# Patient Record
Sex: MALE | Race: WHITE | Employment: OTHER | URBAN - METROPOLITAN AREA
[De-identification: names, ages, dates, MRNs, and addresses within clinical notes are randomized per-mention and may not be internally consistent; named-entity substitution may affect disease eponyms.]

---

## 2017-01-01 ENCOUNTER — PATIENT OUTREACH (OUTPATIENT)
Dept: CASE MANAGEMENT | Age: 67
End: 2017-01-01

## 2017-01-01 ENCOUNTER — TELEPHONE (OUTPATIENT)
Dept: INTERNAL MEDICINE CLINIC | Facility: CLINIC | Age: 67
End: 2017-01-01

## 2017-01-01 ENCOUNTER — OFFICE VISIT (OUTPATIENT)
Dept: INTERNAL MEDICINE CLINIC | Facility: CLINIC | Age: 67
End: 2017-01-01

## 2017-01-01 ENCOUNTER — HOSPITAL ENCOUNTER (EMERGENCY)
Facility: HOSPITAL | Age: 67
Discharge: HOME | End: 2017-01-01
Payer: MEDICARE

## 2017-01-01 ENCOUNTER — APPOINTMENT (OUTPATIENT)
Dept: CT IMAGING | Facility: HOSPITAL | Age: 67
DRG: 880 | End: 2017-01-01
Attending: EMERGENCY MEDICINE
Payer: MEDICARE

## 2017-01-01 ENCOUNTER — APPOINTMENT (OUTPATIENT)
Dept: ULTRASOUND IMAGING | Facility: HOSPITAL | Age: 67
End: 2017-01-01
Attending: EMERGENCY MEDICINE
Payer: MEDICARE

## 2017-01-01 ENCOUNTER — APPOINTMENT (OUTPATIENT)
Dept: GENERAL RADIOLOGY | Facility: HOSPITAL | Age: 67
End: 2017-01-01
Payer: MEDICARE

## 2017-01-01 ENCOUNTER — OFFICE VISIT (OUTPATIENT)
Dept: NEUROLOGY | Facility: CLINIC | Age: 67
End: 2017-01-01

## 2017-01-01 ENCOUNTER — HOSPITAL ENCOUNTER (OUTPATIENT)
Facility: HOSPITAL | Age: 67
Setting detail: OBSERVATION
Discharge: HOME | End: 2017-01-01
Attending: EMERGENCY MEDICINE | Admitting: INTERNAL MEDICINE
Payer: MEDICARE

## 2017-01-01 ENCOUNTER — TELEPHONE (OUTPATIENT)
Dept: NEUROLOGY | Facility: CLINIC | Age: 67
End: 2017-01-01

## 2017-01-01 ENCOUNTER — APPOINTMENT (OUTPATIENT)
Dept: CT IMAGING | Facility: HOSPITAL | Age: 67
End: 2017-01-01
Attending: EMERGENCY MEDICINE
Payer: MEDICARE

## 2017-01-01 ENCOUNTER — OFFICE VISIT (OUTPATIENT)
Dept: PHYSICAL THERAPY | Age: 67
End: 2017-01-01
Attending: Other
Payer: MEDICARE

## 2017-01-01 ENCOUNTER — APPOINTMENT (OUTPATIENT)
Dept: GENERAL RADIOLOGY | Facility: HOSPITAL | Age: 67
End: 2017-01-01
Attending: EMERGENCY MEDICINE
Payer: MEDICARE

## 2017-01-01 ENCOUNTER — HOSPITAL ENCOUNTER (INPATIENT)
Facility: HOSPITAL | Age: 67
LOS: 4 days | Discharge: LEFT BEFORE DISCHARGE | DRG: 880 | End: 2017-01-01
Attending: EMERGENCY MEDICINE | Admitting: HOSPITALIST
Payer: MEDICARE

## 2017-01-01 ENCOUNTER — APPOINTMENT (OUTPATIENT)
Dept: MRI IMAGING | Facility: HOSPITAL | Age: 67
DRG: 880 | End: 2017-01-01
Attending: PHYSICIAN ASSISTANT
Payer: MEDICARE

## 2017-01-01 ENCOUNTER — APPOINTMENT (OUTPATIENT)
Dept: GENERAL RADIOLOGY | Facility: HOSPITAL | Age: 67
DRG: 229 | End: 2017-01-01
Payer: MEDICARE

## 2017-01-01 ENCOUNTER — APPOINTMENT (OUTPATIENT)
Dept: GENERAL RADIOLOGY | Facility: HOSPITAL | Age: 67
DRG: 229 | End: 2017-01-01
Attending: INTERNAL MEDICINE
Payer: MEDICARE

## 2017-01-01 ENCOUNTER — LAB ENCOUNTER (OUTPATIENT)
Dept: LAB | Facility: HOSPITAL | Age: 67
End: 2017-01-01
Attending: Other
Payer: MEDICARE

## 2017-01-01 ENCOUNTER — TELEPHONE (OUTPATIENT)
Dept: SURGERY | Facility: CLINIC | Age: 67
End: 2017-01-01

## 2017-01-01 ENCOUNTER — APPOINTMENT (OUTPATIENT)
Dept: MRI IMAGING | Facility: HOSPITAL | Age: 67
DRG: 103 | End: 2017-01-01
Attending: Other
Payer: MEDICARE

## 2017-01-01 ENCOUNTER — APPOINTMENT (OUTPATIENT)
Dept: CT IMAGING | Facility: HOSPITAL | Age: 67
DRG: 103 | End: 2017-01-01
Attending: EMERGENCY MEDICINE
Payer: MEDICARE

## 2017-01-01 ENCOUNTER — APPOINTMENT (OUTPATIENT)
Dept: INTERVENTIONAL RADIOLOGY/VASCULAR | Facility: HOSPITAL | Age: 67
DRG: 229 | End: 2017-01-01
Attending: INTERNAL MEDICINE
Payer: MEDICARE

## 2017-01-01 ENCOUNTER — TELEPHONE (OUTPATIENT)
Dept: NEPHROLOGY | Facility: CLINIC | Age: 67
End: 2017-01-01

## 2017-01-01 ENCOUNTER — APPOINTMENT (OUTPATIENT)
Dept: PHYSICAL THERAPY | Age: 67
End: 2017-01-01
Attending: Other
Payer: MEDICARE

## 2017-01-01 ENCOUNTER — APPOINTMENT (OUTPATIENT)
Dept: ULTRASOUND IMAGING | Facility: HOSPITAL | Age: 67
DRG: 291 | End: 2017-01-01
Attending: NURSE PRACTITIONER
Payer: MEDICARE

## 2017-01-01 ENCOUNTER — PATIENT MESSAGE (OUTPATIENT)
Dept: NEUROLOGY | Facility: CLINIC | Age: 67
End: 2017-01-01

## 2017-01-01 ENCOUNTER — APPOINTMENT (OUTPATIENT)
Dept: CV DIAGNOSTICS | Facility: HOSPITAL | Age: 67
End: 2017-01-01
Attending: INTERNAL MEDICINE
Payer: MEDICARE

## 2017-01-01 ENCOUNTER — APPOINTMENT (OUTPATIENT)
Dept: MRI IMAGING | Facility: HOSPITAL | Age: 67
DRG: 229 | End: 2017-01-01
Attending: Other
Payer: MEDICARE

## 2017-01-01 ENCOUNTER — APPOINTMENT (OUTPATIENT)
Dept: LAB | Facility: HOSPITAL | Age: 67
End: 2017-01-01
Attending: INTERNAL MEDICINE
Payer: MEDICARE

## 2017-01-01 ENCOUNTER — HOSPITAL ENCOUNTER (INPATIENT)
Facility: HOSPITAL | Age: 67
LOS: 2 days | Discharge: HOME | DRG: 291 | End: 2017-01-01
Attending: EMERGENCY MEDICINE | Admitting: INTERNAL MEDICINE
Payer: MEDICARE

## 2017-01-01 ENCOUNTER — HOSPITAL ENCOUNTER (EMERGENCY)
Facility: HOSPITAL | Age: 67
Discharge: ANOTHER HOSPITAL | End: 2017-01-01
Attending: EMERGENCY MEDICINE
Payer: MEDICARE

## 2017-01-01 ENCOUNTER — APPOINTMENT (OUTPATIENT)
Dept: CV DIAGNOSTICS | Facility: HOSPITAL | Age: 67
DRG: 103 | End: 2017-01-01
Attending: Other
Payer: MEDICARE

## 2017-01-01 ENCOUNTER — APPOINTMENT (OUTPATIENT)
Dept: CT IMAGING | Facility: HOSPITAL | Age: 67
DRG: 880 | End: 2017-01-01
Attending: INTERNAL MEDICINE
Payer: MEDICARE

## 2017-01-01 ENCOUNTER — HOSPITAL ENCOUNTER (INPATIENT)
Facility: HOSPITAL | Age: 67
LOS: 3 days | Discharge: HOME | DRG: 103 | End: 2017-01-01
Attending: EMERGENCY MEDICINE | Admitting: HOSPITALIST
Payer: MEDICARE

## 2017-01-01 ENCOUNTER — APPOINTMENT (OUTPATIENT)
Dept: LAB | Age: 67
End: 2017-01-01
Attending: INTERNAL MEDICINE
Payer: MEDICARE

## 2017-01-01 ENCOUNTER — APPOINTMENT (OUTPATIENT)
Dept: ULTRASOUND IMAGING | Facility: HOSPITAL | Age: 67
DRG: 103 | End: 2017-01-01
Attending: Other
Payer: MEDICARE

## 2017-01-01 ENCOUNTER — APPOINTMENT (OUTPATIENT)
Dept: CT IMAGING | Facility: HOSPITAL | Age: 67
DRG: 229 | End: 2017-01-01
Attending: HOSPITALIST
Payer: MEDICARE

## 2017-01-01 ENCOUNTER — APPOINTMENT (OUTPATIENT)
Dept: CT IMAGING | Facility: HOSPITAL | Age: 67
DRG: 229 | End: 2017-01-01
Attending: EMERGENCY MEDICINE
Payer: MEDICARE

## 2017-01-01 ENCOUNTER — APPOINTMENT (OUTPATIENT)
Dept: GENERAL RADIOLOGY | Facility: HOSPITAL | Age: 67
DRG: 103 | End: 2017-01-01
Payer: MEDICARE

## 2017-01-01 ENCOUNTER — APPOINTMENT (OUTPATIENT)
Dept: CT IMAGING | Facility: HOSPITAL | Age: 67
End: 2017-01-01
Payer: MEDICARE

## 2017-01-01 ENCOUNTER — PATIENT MESSAGE (OUTPATIENT)
Dept: CASE MANAGEMENT | Age: 67
End: 2017-01-01

## 2017-01-01 ENCOUNTER — APPOINTMENT (OUTPATIENT)
Dept: GENERAL RADIOLOGY | Facility: HOSPITAL | Age: 67
DRG: 291 | End: 2017-01-01
Attending: EMERGENCY MEDICINE
Payer: MEDICARE

## 2017-01-01 ENCOUNTER — OFFICE VISIT (OUTPATIENT)
Dept: NEPHROLOGY | Facility: CLINIC | Age: 67
End: 2017-01-01

## 2017-01-01 ENCOUNTER — HOSPITAL ENCOUNTER (INPATIENT)
Facility: HOSPITAL | Age: 67
LOS: 1 days | Discharge: HOME HEALTH | DRG: 229 | End: 2017-01-01
Attending: EMERGENCY MEDICINE | Admitting: HOSPITALIST
Payer: MEDICARE

## 2017-01-01 ENCOUNTER — HOSPITAL ENCOUNTER (EMERGENCY)
Facility: HOSPITAL | Age: 67
Discharge: HOME | End: 2017-01-01
Attending: EMERGENCY MEDICINE
Payer: MEDICARE

## 2017-01-01 VITALS
RESPIRATION RATE: 18 BRPM | BODY MASS INDEX: 34.71 KG/M2 | DIASTOLIC BLOOD PRESSURE: 60 MMHG | WEIGHT: 285.06 LBS | HEIGHT: 76 IN | OXYGEN SATURATION: 97 % | TEMPERATURE: 98 F | SYSTOLIC BLOOD PRESSURE: 156 MMHG | HEART RATE: 58 BPM

## 2017-01-01 VITALS
OXYGEN SATURATION: 97 % | WEIGHT: 281 LBS | HEART RATE: 54 BPM | TEMPERATURE: 97 F | SYSTOLIC BLOOD PRESSURE: 113 MMHG | DIASTOLIC BLOOD PRESSURE: 50 MMHG | RESPIRATION RATE: 16 BRPM | BODY MASS INDEX: 34.22 KG/M2 | HEIGHT: 76 IN

## 2017-01-01 VITALS
OXYGEN SATURATION: 100 % | DIASTOLIC BLOOD PRESSURE: 82 MMHG | HEIGHT: 76 IN | BODY MASS INDEX: 34.83 KG/M2 | WEIGHT: 286 LBS | SYSTOLIC BLOOD PRESSURE: 136 MMHG | RESPIRATION RATE: 16 BRPM | TEMPERATURE: 98 F | HEART RATE: 88 BPM

## 2017-01-01 VITALS
TEMPERATURE: 98 F | SYSTOLIC BLOOD PRESSURE: 116 MMHG | HEIGHT: 76 IN | HEART RATE: 60 BPM | RESPIRATION RATE: 18 BRPM | DIASTOLIC BLOOD PRESSURE: 58 MMHG | WEIGHT: 301 LBS | BODY MASS INDEX: 36.65 KG/M2

## 2017-01-01 VITALS
HEART RATE: 79 BPM | RESPIRATION RATE: 17 BRPM | BODY MASS INDEX: 34.63 KG/M2 | TEMPERATURE: 98 F | HEIGHT: 76 IN | WEIGHT: 284.38 LBS | DIASTOLIC BLOOD PRESSURE: 70 MMHG | SYSTOLIC BLOOD PRESSURE: 128 MMHG | OXYGEN SATURATION: 98 %

## 2017-01-01 VITALS
WEIGHT: 299 LBS | HEART RATE: 56 BPM | SYSTOLIC BLOOD PRESSURE: 140 MMHG | DIASTOLIC BLOOD PRESSURE: 80 MMHG | BODY MASS INDEX: 36 KG/M2

## 2017-01-01 VITALS
BODY MASS INDEX: 36.41 KG/M2 | DIASTOLIC BLOOD PRESSURE: 70 MMHG | HEIGHT: 76 IN | WEIGHT: 299 LBS | SYSTOLIC BLOOD PRESSURE: 152 MMHG | HEART RATE: 65 BPM | RESPIRATION RATE: 19 BRPM

## 2017-01-01 VITALS
RESPIRATION RATE: 18 BRPM | HEART RATE: 75 BPM | DIASTOLIC BLOOD PRESSURE: 55 MMHG | BODY MASS INDEX: 36.4 KG/M2 | SYSTOLIC BLOOD PRESSURE: 150 MMHG | TEMPERATURE: 98 F | WEIGHT: 298.94 LBS | OXYGEN SATURATION: 100 % | HEIGHT: 76 IN

## 2017-01-01 VITALS
DIASTOLIC BLOOD PRESSURE: 60 MMHG | BODY MASS INDEX: 34 KG/M2 | HEART RATE: 64 BPM | SYSTOLIC BLOOD PRESSURE: 130 MMHG | WEIGHT: 281 LBS

## 2017-01-01 VITALS
BODY MASS INDEX: 35.52 KG/M2 | RESPIRATION RATE: 18 BRPM | OXYGEN SATURATION: 95 % | TEMPERATURE: 98 F | HEIGHT: 76 IN | DIASTOLIC BLOOD PRESSURE: 65 MMHG | HEART RATE: 62 BPM | WEIGHT: 291.69 LBS | SYSTOLIC BLOOD PRESSURE: 152 MMHG

## 2017-01-01 VITALS
OXYGEN SATURATION: 100 % | WEIGHT: 278.38 LBS | BODY MASS INDEX: 34 KG/M2 | RESPIRATION RATE: 18 BRPM | SYSTOLIC BLOOD PRESSURE: 123 MMHG | HEART RATE: 54 BPM | TEMPERATURE: 98 F | DIASTOLIC BLOOD PRESSURE: 60 MMHG

## 2017-01-01 VITALS
BODY MASS INDEX: 35.97 KG/M2 | TEMPERATURE: 97 F | WEIGHT: 295.38 LBS | RESPIRATION RATE: 16 BRPM | HEART RATE: 55 BPM | DIASTOLIC BLOOD PRESSURE: 60 MMHG | SYSTOLIC BLOOD PRESSURE: 118 MMHG | HEIGHT: 76 IN

## 2017-01-01 VITALS
DIASTOLIC BLOOD PRESSURE: 66 MMHG | HEART RATE: 52 BPM | RESPIRATION RATE: 18 BRPM | HEIGHT: 76 IN | WEIGHT: 280 LBS | OXYGEN SATURATION: 98 % | TEMPERATURE: 98 F | BODY MASS INDEX: 34.1 KG/M2 | SYSTOLIC BLOOD PRESSURE: 158 MMHG

## 2017-01-01 VITALS
SYSTOLIC BLOOD PRESSURE: 146 MMHG | BODY MASS INDEX: 36.29 KG/M2 | WEIGHT: 298 LBS | HEIGHT: 76 IN | RESPIRATION RATE: 16 BRPM | DIASTOLIC BLOOD PRESSURE: 70 MMHG | HEART RATE: 60 BPM | TEMPERATURE: 98 F

## 2017-01-01 VITALS
SYSTOLIC BLOOD PRESSURE: 122 MMHG | DIASTOLIC BLOOD PRESSURE: 60 MMHG | BODY MASS INDEX: 35.97 KG/M2 | HEART RATE: 60 BPM | DIASTOLIC BLOOD PRESSURE: 56 MMHG | SYSTOLIC BLOOD PRESSURE: 110 MMHG | WEIGHT: 293.38 LBS | HEIGHT: 76 IN | HEART RATE: 51 BPM | HEIGHT: 76 IN | TEMPERATURE: 98 F | TEMPERATURE: 98 F | OXYGEN SATURATION: 99 % | BODY MASS INDEX: 35.72 KG/M2 | WEIGHT: 295.38 LBS | RESPIRATION RATE: 17 BRPM | RESPIRATION RATE: 10 BRPM

## 2017-01-01 VITALS
SYSTOLIC BLOOD PRESSURE: 126 MMHG | DIASTOLIC BLOOD PRESSURE: 68 MMHG | RESPIRATION RATE: 18 BRPM | BODY MASS INDEX: 37.14 KG/M2 | HEIGHT: 76 IN | HEART RATE: 50 BPM | WEIGHT: 305 LBS

## 2017-01-01 VITALS
WEIGHT: 293 LBS | RESPIRATION RATE: 16 BRPM | DIASTOLIC BLOOD PRESSURE: 62 MMHG | BODY MASS INDEX: 36 KG/M2 | SYSTOLIC BLOOD PRESSURE: 130 MMHG | HEART RATE: 74 BPM

## 2017-01-01 VITALS
HEART RATE: 94 BPM | WEIGHT: 287.94 LBS | TEMPERATURE: 98 F | DIASTOLIC BLOOD PRESSURE: 52 MMHG | OXYGEN SATURATION: 99 % | BODY MASS INDEX: 35 KG/M2 | RESPIRATION RATE: 16 BRPM | SYSTOLIC BLOOD PRESSURE: 122 MMHG

## 2017-01-01 VITALS
WEIGHT: 315 LBS | OXYGEN SATURATION: 98 % | DIASTOLIC BLOOD PRESSURE: 72 MMHG | HEIGHT: 76 IN | RESPIRATION RATE: 18 BRPM | BODY MASS INDEX: 38.36 KG/M2 | SYSTOLIC BLOOD PRESSURE: 130 MMHG | HEART RATE: 54 BPM

## 2017-01-01 DIAGNOSIS — E78.5 DYSLIPIDEMIA: ICD-10-CM

## 2017-01-01 DIAGNOSIS — R11.2 INTRACTABLE VOMITING WITH NAUSEA, UNSPECIFIED VOMITING TYPE: ICD-10-CM

## 2017-01-01 DIAGNOSIS — E03.8 OTHER SPECIFIED HYPOTHYROIDISM: Chronic | ICD-10-CM

## 2017-01-01 DIAGNOSIS — E13.22 CONTROLLED OTHER SPECIFIED DIABETES MELLITUS WITH STAGE 3 CHRONIC KIDNEY DISEASE, UNSPECIFIED LONG TERM INSULIN USE STATUS: ICD-10-CM

## 2017-01-01 DIAGNOSIS — E11.40 TYPE 2 DIABETES MELLITUS WITH DIABETIC NEUROPATHY, WITH LONG-TERM CURRENT USE OF INSULIN (HCC): ICD-10-CM

## 2017-01-01 DIAGNOSIS — Z79.4 TYPE 2 DIABETES MELLITUS WITH DIABETIC NEUROPATHY, WITH LONG-TERM CURRENT USE OF INSULIN (HCC): ICD-10-CM

## 2017-01-01 DIAGNOSIS — N18.30 CKD (CHRONIC KIDNEY DISEASE) STAGE 3, GFR 30-59 ML/MIN (HCC): ICD-10-CM

## 2017-01-01 DIAGNOSIS — R27.8 SENSORY ATAXIA: ICD-10-CM

## 2017-01-01 DIAGNOSIS — R79.89 ELEVATED LFTS: Primary | ICD-10-CM

## 2017-01-01 DIAGNOSIS — I63.9 ACUTE ISCHEMIC STROKE (HCC): ICD-10-CM

## 2017-01-01 DIAGNOSIS — N18.9 CHRONIC KIDNEY DISEASE, UNSPECIFIED CKD STAGE: ICD-10-CM

## 2017-01-01 DIAGNOSIS — R42 DIZZINESS: ICD-10-CM

## 2017-01-01 DIAGNOSIS — D69.6 THROMBOCYTOPENIA (HCC): ICD-10-CM

## 2017-01-01 DIAGNOSIS — I73.9 PVD (PERIPHERAL VASCULAR DISEASE) (HCC): ICD-10-CM

## 2017-01-01 DIAGNOSIS — I10 BENIGN ESSENTIAL HTN: ICD-10-CM

## 2017-01-01 DIAGNOSIS — R80.9 PROTEINURIA, UNSPECIFIED TYPE: ICD-10-CM

## 2017-01-01 DIAGNOSIS — M79.609 PAIN IN EXTREMITY, UNSPECIFIED EXTREMITY: ICD-10-CM

## 2017-01-01 DIAGNOSIS — Z79.4 TYPE 2 DIABETES MELLITUS WITH OTHER CIRCULATORY COMPLICATION, WITH LONG-TERM CURRENT USE OF INSULIN (HCC): ICD-10-CM

## 2017-01-01 DIAGNOSIS — E11.22 DIABETES MELLITUS WITH STAGE 3 CHRONIC KIDNEY DISEASE (HCC): ICD-10-CM

## 2017-01-01 DIAGNOSIS — N28.9 RENAL INSUFFICIENCY: Primary | ICD-10-CM

## 2017-01-01 DIAGNOSIS — M25.562 ACUTE PAIN OF LEFT KNEE: Primary | ICD-10-CM

## 2017-01-01 DIAGNOSIS — K75.81 LIVER CIRRHOSIS SECONDARY TO NASH (HCC): ICD-10-CM

## 2017-01-01 DIAGNOSIS — I25.110 CORONARY ARTERY DISEASE INVOLVING NATIVE CORONARY ARTERY OF NATIVE HEART WITH UNSTABLE ANGINA PECTORIS (HCC): ICD-10-CM

## 2017-01-01 DIAGNOSIS — K74.60 LIVER CIRRHOSIS SECONDARY TO NASH (HCC): ICD-10-CM

## 2017-01-01 DIAGNOSIS — G44.229 CHRONIC TENSION-TYPE HEADACHE, NOT INTRACTABLE: ICD-10-CM

## 2017-01-01 DIAGNOSIS — E89.0 POSTPROCEDURAL HYPOTHYROIDISM: ICD-10-CM

## 2017-01-01 DIAGNOSIS — N17.9 ACUTE RENAL FAILURE SUPERIMPOSED ON CHRONIC KIDNEY DISEASE, UNSPECIFIED CKD STAGE, UNSPECIFIED ACUTE RENAL FAILURE TYPE (HCC): ICD-10-CM

## 2017-01-01 DIAGNOSIS — R07.9 CHEST PAIN, UNSPECIFIED TYPE: Primary | ICD-10-CM

## 2017-01-01 DIAGNOSIS — K74.60 LIVER CIRRHOSIS SECONDARY TO NASH (HCC): Primary | ICD-10-CM

## 2017-01-01 DIAGNOSIS — E11.00 UNCONTROLLED TYPE 2 DIABETES MELLITUS WITH HYPEROSMOLAR NONKETOTIC HYPERGLYCEMIA (HCC): ICD-10-CM

## 2017-01-01 DIAGNOSIS — L02.91 ABSCESS: Primary | ICD-10-CM

## 2017-01-01 DIAGNOSIS — R20.0 BILATERAL HAND NUMBNESS: ICD-10-CM

## 2017-01-01 DIAGNOSIS — R42 VERTIGO: ICD-10-CM

## 2017-01-01 DIAGNOSIS — E11.42 DIABETIC POLYNEUROPATHY ASSOCIATED WITH TYPE 2 DIABETES MELLITUS (HCC): ICD-10-CM

## 2017-01-01 DIAGNOSIS — F33.42 RECURRENT MAJOR DEPRESSIVE DISORDER, IN FULL REMISSION (HCC): ICD-10-CM

## 2017-01-01 DIAGNOSIS — Z99.89 OSA ON CPAP: ICD-10-CM

## 2017-01-01 DIAGNOSIS — R07.9 ACUTE CHEST PAIN: Primary | ICD-10-CM

## 2017-01-01 DIAGNOSIS — Z86.73 HISTORY OF STROKE: ICD-10-CM

## 2017-01-01 DIAGNOSIS — R29.818 TRANSIENT NEUROLOGICAL SYMPTOMS: ICD-10-CM

## 2017-01-01 DIAGNOSIS — R74.8 ABNORMAL SERUM LEVEL OF ALKALINE PHOSPHATASE: ICD-10-CM

## 2017-01-01 DIAGNOSIS — E11.59 TYPE 2 DIABETES MELLITUS WITH OTHER CIRCULATORY COMPLICATION, WITH LONG-TERM CURRENT USE OF INSULIN (HCC): ICD-10-CM

## 2017-01-01 DIAGNOSIS — R07.9 CHEST PAIN OF UNCERTAIN ETIOLOGY: Primary | ICD-10-CM

## 2017-01-01 DIAGNOSIS — N18.30 DIABETES MELLITUS WITH STAGE 3 CHRONIC KIDNEY DISEASE (HCC): ICD-10-CM

## 2017-01-01 DIAGNOSIS — E11.43 AUTONOMIC NEUROPATHY DUE TO TYPE 2 DIABETES MELLITUS (HCC): ICD-10-CM

## 2017-01-01 DIAGNOSIS — I63.9 ACUTE ISCHEMIC STROKE (HCC): Primary | ICD-10-CM

## 2017-01-01 DIAGNOSIS — R55 POSTURAL DIZZINESS WITH PRESYNCOPE: ICD-10-CM

## 2017-01-01 DIAGNOSIS — R53.1 WEAKNESS: ICD-10-CM

## 2017-01-01 DIAGNOSIS — I10 ESSENTIAL HYPERTENSION: ICD-10-CM

## 2017-01-01 DIAGNOSIS — Z94.4 LIVER TRANSPLANT RECIPIENT (HCC): ICD-10-CM

## 2017-01-01 DIAGNOSIS — M25.562 ACUTE PAIN OF LEFT KNEE: ICD-10-CM

## 2017-01-01 DIAGNOSIS — R41.3 SHORT-TERM MEMORY LOSS: ICD-10-CM

## 2017-01-01 DIAGNOSIS — R55 SYNCOPE AND COLLAPSE: Primary | ICD-10-CM

## 2017-01-01 DIAGNOSIS — E78.5 HYPERLIPIDEMIA, UNSPECIFIED HYPERLIPIDEMIA TYPE: ICD-10-CM

## 2017-01-01 DIAGNOSIS — Z94.4 LIVER TRANSPLANTED (HCC): ICD-10-CM

## 2017-01-01 DIAGNOSIS — R55 SYNCOPE AND COLLAPSE: ICD-10-CM

## 2017-01-01 DIAGNOSIS — Z09 HOSPITAL DISCHARGE FOLLOW-UP: ICD-10-CM

## 2017-01-01 DIAGNOSIS — F44.9 CONVERSION DISORDER: ICD-10-CM

## 2017-01-01 DIAGNOSIS — Z94.0 KIDNEY TRANSPLANT RECIPIENT: ICD-10-CM

## 2017-01-01 DIAGNOSIS — Z89.9 AMPUTEE: ICD-10-CM

## 2017-01-01 DIAGNOSIS — E03.9 HYPOTHYROIDISM, UNSPECIFIED TYPE: ICD-10-CM

## 2017-01-01 DIAGNOSIS — I63.9 ACUTE CVA (CEREBROVASCULAR ACCIDENT) (HCC): Primary | ICD-10-CM

## 2017-01-01 DIAGNOSIS — I63.9 ACUTE CVA (CEREBROVASCULAR ACCIDENT) (HCC): ICD-10-CM

## 2017-01-01 DIAGNOSIS — Z89.9 AMPUTEE: Primary | ICD-10-CM

## 2017-01-01 DIAGNOSIS — R20.2 PARESTHESIA: Primary | ICD-10-CM

## 2017-01-01 DIAGNOSIS — R53.1 GENERALIZED WEAKNESS: ICD-10-CM

## 2017-01-01 DIAGNOSIS — K75.81 LIVER CIRRHOSIS SECONDARY TO NASH (HCC): Primary | ICD-10-CM

## 2017-01-01 DIAGNOSIS — L02.31 ABSCESS OF RIGHT BUTTOCK: ICD-10-CM

## 2017-01-01 DIAGNOSIS — S83.92XA SPRAIN OF LEFT KNEE, UNSPECIFIED LIGAMENT, INITIAL ENCOUNTER: ICD-10-CM

## 2017-01-01 DIAGNOSIS — N18.3 CONTROLLED OTHER SPECIFIED DIABETES MELLITUS WITH STAGE 3 CHRONIC KIDNEY DISEASE, UNSPECIFIED LONG TERM INSULIN USE STATUS: ICD-10-CM

## 2017-01-01 DIAGNOSIS — N17.9 AKI (ACUTE KIDNEY INJURY) (HCC): Primary | ICD-10-CM

## 2017-01-01 DIAGNOSIS — E66.01 SEVERE OBESITY (BMI 35.0-39.9) WITH COMORBIDITY (HCC): Primary | Chronic | ICD-10-CM

## 2017-01-01 DIAGNOSIS — R17 ELEVATED BILIRUBIN: ICD-10-CM

## 2017-01-01 DIAGNOSIS — N18.4 CKD (CHRONIC KIDNEY DISEASE) STAGE 4, GFR 15-29 ML/MIN (HCC): ICD-10-CM

## 2017-01-01 DIAGNOSIS — R74.01 TRANSAMINITIS: Chronic | ICD-10-CM

## 2017-01-01 DIAGNOSIS — Z94.4 H/O LIVER TRANSPLANT (HCC): ICD-10-CM

## 2017-01-01 DIAGNOSIS — I50.22 CHRONIC SYSTOLIC CONGESTIVE HEART FAILURE (HCC): Primary | ICD-10-CM

## 2017-01-01 DIAGNOSIS — R20.2 PARESTHESIA: ICD-10-CM

## 2017-01-01 DIAGNOSIS — D64.9 ANEMIA, UNSPECIFIED TYPE: ICD-10-CM

## 2017-01-01 DIAGNOSIS — R29.818 TRANSIENT NEUROLOGICAL SYMPTOMS: Primary | ICD-10-CM

## 2017-01-01 DIAGNOSIS — G47.33 OSA ON CPAP: ICD-10-CM

## 2017-01-01 DIAGNOSIS — Z02.9 ENCOUNTERS FOR ADMINISTRATIVE PURPOSE: ICD-10-CM

## 2017-01-01 DIAGNOSIS — M21.372 FOOT DROP, LEFT: Primary | ICD-10-CM

## 2017-01-01 DIAGNOSIS — I25.119 CORONARY ARTERY DISEASE INVOLVING NATIVE CORONARY ARTERY OF NATIVE HEART WITH ANGINA PECTORIS (HCC): ICD-10-CM

## 2017-01-01 DIAGNOSIS — G43.919 INTRACTABLE COMPLICATED MIGRAINE: ICD-10-CM

## 2017-01-01 DIAGNOSIS — R42 POSTURAL DIZZINESS WITH PRESYNCOPE: ICD-10-CM

## 2017-01-01 DIAGNOSIS — E11.42 TYPE 2 DIABETES MELLITUS WITH POLYNEUROPATHY (HCC): ICD-10-CM

## 2017-01-01 DIAGNOSIS — E11.43 AUTONOMIC NEUROPATHY DUE TO TYPE 2 DIABETES MELLITUS (HCC): Primary | ICD-10-CM

## 2017-01-01 DIAGNOSIS — R27.8 SENSORY ATAXIA: Primary | ICD-10-CM

## 2017-01-01 DIAGNOSIS — R17 JAUNDICE: Primary | ICD-10-CM

## 2017-01-01 DIAGNOSIS — E72.20 HYPERAMMONEMIA (HCC): ICD-10-CM

## 2017-01-01 DIAGNOSIS — K76.7 HEPATORENAL SYNDROME (HCC): ICD-10-CM

## 2017-01-01 DIAGNOSIS — R07.9 ACUTE CHEST PAIN: ICD-10-CM

## 2017-01-01 DIAGNOSIS — E78.2 MIXED HYPERLIPIDEMIA: ICD-10-CM

## 2017-01-01 DIAGNOSIS — N18.9 ACUTE RENAL FAILURE SUPERIMPOSED ON CHRONIC KIDNEY DISEASE, UNSPECIFIED CKD STAGE, UNSPECIFIED ACUTE RENAL FAILURE TYPE (HCC): ICD-10-CM

## 2017-01-01 DIAGNOSIS — I50.33 ACUTE ON CHRONIC DIASTOLIC CONGESTIVE HEART FAILURE (HCC): ICD-10-CM

## 2017-01-01 LAB
ALBUMIN SERPL-MCNC: 2.5 G/DL (ref 3.5–4.8)
ALBUMIN SERPL-MCNC: 2.6 G/DL (ref 3.5–4.8)
ALBUMIN SERPL-MCNC: 2.7 G/DL (ref 3.5–4.8)
ALBUMIN SERPL-MCNC: 2.7 G/DL (ref 3.5–4.8)
ALBUMIN SERPL-MCNC: 2.8 G/DL (ref 3.5–4.8)
ALBUMIN SERPL-MCNC: 2.9 G/DL (ref 3.5–4.8)
ALBUMIN SERPL-MCNC: 3 G/DL (ref 3.5–4.8)
ALBUMIN SERPL-MCNC: 3.1 G/DL (ref 3.5–4.8)
ALBUMIN SERPL-MCNC: 3.3 G/DL (ref 3.5–4.8)
ALLENS TEST: POSITIVE
ALP LIVER SERPL-CCNC: 1087 U/L (ref 45–117)
ALP LIVER SERPL-CCNC: 1146 U/L (ref 45–117)
ALP LIVER SERPL-CCNC: 1235 U/L (ref 45–117)
ALP LIVER SERPL-CCNC: 1348 U/L (ref 45–117)
ALP LIVER SERPL-CCNC: 748 U/L (ref 45–117)
ALP LIVER SERPL-CCNC: 797 U/L (ref 45–117)
ALP LIVER SERPL-CCNC: 917 U/L (ref 45–117)
ALP LIVER SERPL-CCNC: 949 U/L (ref 45–117)
ALP LIVER SERPL-CCNC: 949 U/L (ref 45–117)
ALT SERPL-CCNC: 144 U/L (ref 17–63)
ALT SERPL-CCNC: 177 U/L (ref 17–63)
ALT SERPL-CCNC: 41 U/L (ref 17–63)
ALT SERPL-CCNC: 46 U/L (ref 17–63)
ALT SERPL-CCNC: 48 U/L (ref 17–63)
ALT SERPL-CCNC: 52 U/L (ref 17–63)
ALT SERPL-CCNC: 52 U/L (ref 17–63)
ALT SERPL-CCNC: 61 U/L (ref 17–63)
ALT SERPL-CCNC: 84 U/L (ref 17–63)
AMMONIA: 34 UMOL/L (ref 11–32)
ANA SCREEN: NEGATIVE
APTT PPP: 25.3 SECONDS (ref 25–34)
APTT PPP: 26.9 SECONDS (ref 25–34)
APTT PPP: 27.2 SECONDS (ref 25–34)
ARTERIAL BLD GAS O2 SATURATION: 95 % (ref 92–100)
ARTERIAL BLOOD GAS BASE EXCESS: -7.6
ARTERIAL BLOOD GAS HCO3: 17.3 MEQ/L (ref 22–26)
ARTERIAL BLOOD GAS PCO2: 33 MM HG (ref 35–45)
ARTERIAL BLOOD GAS PH: 7.34 (ref 7.35–7.45)
ARTERIAL BLOOD GAS PO2: 89 MM HG (ref 80–105)
AST SERPL-CCNC: 174 U/L (ref 15–41)
AST SERPL-CCNC: 198 U/L (ref 15–41)
AST SERPL-CCNC: 57 U/L (ref 15–41)
AST SERPL-CCNC: 68 U/L (ref 15–41)
AST SERPL-CCNC: 72 U/L (ref 15–41)
AST SERPL-CCNC: 80 U/L (ref 15–41)
AST SERPL-CCNC: 87 U/L (ref 15–41)
AST SERPL-CCNC: 95 U/L (ref 15–41)
AST SERPL-CCNC: 99 U/L (ref 15–41)
ATRIAL RATE: 25 BPM
ATRIAL RATE: 45 BPM
ATRIAL RATE: 53 BPM
ATRIAL RATE: 54 BPM
ATRIAL RATE: 56 BPM
ATRIAL RATE: 60 BPM
ATRIAL RATE: 68 BPM
ATRIAL RATE: 73 BPM
ATRIAL RATE: 82 BPM
ATRIAL RATE: 83 BPM
BASOPHILS # BLD AUTO: 0 X10(3) UL (ref 0–0.1)
BASOPHILS # BLD AUTO: 0 X10(3) UL (ref 0–0.1)
BASOPHILS # BLD AUTO: 0.01 X10(3) UL (ref 0–0.1)
BASOPHILS NFR BLD AUTO: 0 %
BASOPHILS NFR BLD AUTO: 0 %
BASOPHILS NFR BLD AUTO: 0.1 %
BASOPHILS NFR BLD AUTO: 0.2 %
BILIRUB SERPL-MCNC: 2.3 MG/DL (ref 0.1–2)
BILIRUB SERPL-MCNC: 2.3 MG/DL (ref 0.1–2)
BILIRUB SERPL-MCNC: 2.7 MG/DL (ref 0.1–2)
BILIRUB SERPL-MCNC: 2.8 MG/DL (ref 0.1–2)
BILIRUB SERPL-MCNC: 2.8 MG/DL (ref 0.1–2)
BILIRUB SERPL-MCNC: 3.1 MG/DL (ref 0.1–2)
BILIRUB SERPL-MCNC: 3.2 MG/DL (ref 0.1–2)
BILIRUB SERPL-MCNC: 4.6 MG/DL (ref 0.1–2)
BILIRUB SERPL-MCNC: 5.1 MG/DL (ref 0.1–2)
BILIRUB UR QL STRIP.AUTO: NEGATIVE
BUN BLD-MCNC: 39 MG/DL (ref 8–20)
BUN BLD-MCNC: 40 MG/DL (ref 8–20)
BUN BLD-MCNC: 46 MG/DL (ref 8–20)
BUN BLD-MCNC: 59 MG/DL (ref 8–20)
BUN BLD-MCNC: 65 MG/DL (ref 8–20)
BUN BLD-MCNC: 66 MG/DL (ref 8–20)
BUN BLD-MCNC: 70 MG/DL (ref 8–20)
BUN BLD-MCNC: 71 MG/DL (ref 8–20)
BUN BLD-MCNC: 71 MG/DL (ref 8–20)
BUN BLD-MCNC: 73 MG/DL (ref 8–20)
BUN BLD-MCNC: 77 MG/DL (ref 8–20)
BUN BLD-MCNC: 80 MG/DL (ref 8–20)
BUN BLD-MCNC: 81 MG/DL (ref 8–20)
BUN BLD-MCNC: 83 MG/DL (ref 8–20)
CALCIUM BLD-MCNC: 10.2 MG/DL (ref 8.3–10.3)
CALCIUM BLD-MCNC: 8.4 MG/DL (ref 8.3–10.3)
CALCIUM BLD-MCNC: 8.9 MG/DL (ref 8.3–10.3)
CALCIUM BLD-MCNC: 8.9 MG/DL (ref 8.3–10.3)
CALCIUM BLD-MCNC: 9 MG/DL (ref 8.3–10.3)
CALCIUM BLD-MCNC: 9.2 MG/DL (ref 8.3–10.3)
CALCIUM BLD-MCNC: 9.4 MG/DL (ref 8.3–10.3)
CALCIUM BLD-MCNC: 9.5 MG/DL (ref 8.3–10.3)
CALCIUM BLD-MCNC: 9.5 MG/DL (ref 8.3–10.3)
CALCIUM BLD-MCNC: 9.8 MG/DL (ref 8.3–10.3)
CALCIUM BLD-MCNC: 9.9 MG/DL (ref 8.3–10.3)
CALCIUM BLD-MCNC: 9.9 MG/DL (ref 8.3–10.3)
CALCULATED O2 SATURATION: 96 % (ref 92–100)
CARBOXYHEMOGLOBIN: 1.4 % SAT (ref 0–3)
CHLORIDE: 104 MMOL/L (ref 101–111)
CHLORIDE: 104 MMOL/L (ref 101–111)
CHLORIDE: 105 MMOL/L (ref 101–111)
CHLORIDE: 106 MMOL/L (ref 101–111)
CHLORIDE: 108 MMOL/L (ref 101–111)
CHLORIDE: 109 MMOL/L (ref 101–111)
CHLORIDE: 109 MMOL/L (ref 101–111)
CHLORIDE: 110 MMOL/L (ref 101–111)
CHLORIDE: 111 MMOL/L (ref 101–111)
CHLORIDE: 112 MMOL/L (ref 101–111)
CHLORIDE: 114 MMOL/L (ref 101–111)
CHLORIDE: 115 MMOL/L (ref 101–111)
CHLORIDE: 118 MMOL/L (ref 101–111)
CHLORIDE: 99 MMOL/L (ref 101–111)
CHOLEST SMN-MCNC: 119 MG/DL (ref ?–200)
CHOLEST SMN-MCNC: 178 MG/DL (ref ?–200)
CHOLEST SMN-MCNC: 183 MG/DL (ref ?–200)
CK: 46 IU/L (ref 39–308)
CLARITY UR REFRACT.AUTO: CLEAR
CLARITY UR REFRACT.AUTO: CLEAR
CO2: 17 MMOL/L (ref 22–32)
CO2: 17 MMOL/L (ref 22–32)
CO2: 18 MMOL/L (ref 22–32)
CO2: 19 MMOL/L (ref 22–32)
CO2: 20 MMOL/L (ref 22–32)
CO2: 21 MMOL/L (ref 22–32)
CO2: 21 MMOL/L (ref 22–32)
CO2: 23 MMOL/L (ref 22–32)
CO2: 25 MMOL/L (ref 22–32)
CO2: 25 MMOL/L (ref 22–32)
COLOR UR AUTO: YELLOW
COLOR UR AUTO: YELLOW
CREAT BLD-MCNC: 1.91 MG/DL (ref 0.7–1.3)
CREAT BLD-MCNC: 1.99 MG/DL (ref 0.7–1.3)
CREAT BLD-MCNC: 2.1 MG/DL (ref 0.7–1.3)
CREAT BLD-MCNC: 2.31 MG/DL (ref 0.7–1.3)
CREAT BLD-MCNC: 2.34 MG/DL (ref 0.7–1.3)
CREAT BLD-MCNC: 2.35 MG/DL (ref 0.7–1.3)
CREAT BLD-MCNC: 2.47 MG/DL (ref 0.7–1.3)
CREAT BLD-MCNC: 2.47 MG/DL (ref 0.7–1.3)
CREAT BLD-MCNC: 2.49 MG/DL (ref 0.7–1.3)
CREAT BLD-MCNC: 2.68 MG/DL (ref 0.7–1.3)
CREAT BLD-MCNC: 2.7 MG/DL (ref 0.7–1.3)
CREAT BLD-MCNC: 2.76 MG/DL (ref 0.7–1.3)
CREAT BLD-MCNC: 2.81 MG/DL (ref 0.7–1.3)
CREAT BLD-MCNC: 2.82 MG/DL (ref 0.7–1.3)
EOSINOPHIL # BLD AUTO: 0.18 X10(3) UL (ref 0–0.3)
EOSINOPHIL # BLD AUTO: 0.27 X10(3) UL (ref 0–0.3)
EOSINOPHIL # BLD AUTO: 0.34 X10(3) UL (ref 0–0.3)
EOSINOPHIL # BLD AUTO: 0.36 X10(3) UL (ref 0–0.3)
EOSINOPHIL # BLD AUTO: 0.36 X10(3) UL (ref 0–0.3)
EOSINOPHIL # BLD AUTO: 0.38 X10(3) UL (ref 0–0.3)
EOSINOPHIL # BLD AUTO: 0.44 X10(3) UL (ref 0–0.3)
EOSINOPHIL # BLD AUTO: 0.52 X10(3) UL (ref 0–0.3)
EOSINOPHIL NFR BLD AUTO: 3.4 %
EOSINOPHIL NFR BLD AUTO: 3.7 %
EOSINOPHIL NFR BLD AUTO: 4.8 %
EOSINOPHIL NFR BLD AUTO: 4.8 %
EOSINOPHIL NFR BLD AUTO: 5.3 %
EOSINOPHIL NFR BLD AUTO: 5.4 %
EOSINOPHIL NFR BLD AUTO: 5.6 %
EOSINOPHIL NFR BLD AUTO: 5.7 %
EOSINOPHIL NFR BLD AUTO: 6.9 %
EOSINOPHIL NFR BLD AUTO: 6.9 %
ERYTHROCYTE [DISTWIDTH] IN BLOOD BY AUTOMATED COUNT: 12.8 % (ref 11.5–16)
ERYTHROCYTE [DISTWIDTH] IN BLOOD BY AUTOMATED COUNT: 13.3 % (ref 11.5–16)
ERYTHROCYTE [DISTWIDTH] IN BLOOD BY AUTOMATED COUNT: 13.3 % (ref 11.5–16)
ERYTHROCYTE [DISTWIDTH] IN BLOOD BY AUTOMATED COUNT: 14.3 % (ref 11.5–16)
ERYTHROCYTE [DISTWIDTH] IN BLOOD BY AUTOMATED COUNT: 14.4 % (ref 11.5–16)
ERYTHROCYTE [DISTWIDTH] IN BLOOD BY AUTOMATED COUNT: 14.8 % (ref 11.5–16)
ERYTHROCYTE [DISTWIDTH] IN BLOOD BY AUTOMATED COUNT: 15.1 % (ref 11.5–16)
ERYTHROCYTE [DISTWIDTH] IN BLOOD BY AUTOMATED COUNT: 15.2 % (ref 11.5–16)
ERYTHROCYTE [DISTWIDTH] IN BLOOD BY AUTOMATED COUNT: 15.5 % (ref 11.5–16)
ERYTHROCYTE [DISTWIDTH] IN BLOOD BY AUTOMATED COUNT: 15.5 % (ref 11.5–16)
EST. AVERAGE GLUCOSE BLD GHB EST-MCNC: 151 MG/DL (ref 68–126)
EST. AVERAGE GLUCOSE BLD GHB EST-MCNC: 163 MG/DL (ref 68–126)
EST. AVERAGE GLUCOSE BLD GHB EST-MCNC: 166 MG/DL (ref 68–126)
EST. AVERAGE GLUCOSE BLD GHB EST-MCNC: 189 MG/DL (ref 68–126)
FIO2: 21 %
FREE T4: 1.1 NG/DL (ref 0.9–1.8)
GLUCOSE BLD-MCNC: 101 MG/DL (ref 65–99)
GLUCOSE BLD-MCNC: 103 MG/DL (ref 65–99)
GLUCOSE BLD-MCNC: 106 MG/DL (ref 65–99)
GLUCOSE BLD-MCNC: 111 MG/DL (ref 65–99)
GLUCOSE BLD-MCNC: 115 MG/DL (ref 65–99)
GLUCOSE BLD-MCNC: 116 MG/DL (ref 65–99)
GLUCOSE BLD-MCNC: 116 MG/DL (ref 65–99)
GLUCOSE BLD-MCNC: 118 MG/DL (ref 65–99)
GLUCOSE BLD-MCNC: 120 MG/DL (ref 65–99)
GLUCOSE BLD-MCNC: 122 MG/DL (ref 65–99)
GLUCOSE BLD-MCNC: 124 MG/DL (ref 65–99)
GLUCOSE BLD-MCNC: 128 MG/DL (ref 65–99)
GLUCOSE BLD-MCNC: 130 MG/DL (ref 65–99)
GLUCOSE BLD-MCNC: 131 MG/DL (ref 65–99)
GLUCOSE BLD-MCNC: 131 MG/DL (ref 65–99)
GLUCOSE BLD-MCNC: 132 MG/DL (ref 70–99)
GLUCOSE BLD-MCNC: 137 MG/DL (ref 65–99)
GLUCOSE BLD-MCNC: 137 MG/DL (ref 70–99)
GLUCOSE BLD-MCNC: 139 MG/DL (ref 65–99)
GLUCOSE BLD-MCNC: 142 MG/DL (ref 65–99)
GLUCOSE BLD-MCNC: 146 MG/DL (ref 65–99)
GLUCOSE BLD-MCNC: 149 MG/DL (ref 65–99)
GLUCOSE BLD-MCNC: 151 MG/DL (ref 65–99)
GLUCOSE BLD-MCNC: 152 MG/DL (ref 65–99)
GLUCOSE BLD-MCNC: 152 MG/DL (ref 65–99)
GLUCOSE BLD-MCNC: 158 MG/DL (ref 65–99)
GLUCOSE BLD-MCNC: 159 MG/DL (ref 65–99)
GLUCOSE BLD-MCNC: 162 MG/DL (ref 65–99)
GLUCOSE BLD-MCNC: 162 MG/DL (ref 65–99)
GLUCOSE BLD-MCNC: 175 MG/DL (ref 65–99)
GLUCOSE BLD-MCNC: 176 MG/DL (ref 65–99)
GLUCOSE BLD-MCNC: 188 MG/DL (ref 70–99)
GLUCOSE BLD-MCNC: 189 MG/DL (ref 70–99)
GLUCOSE BLD-MCNC: 192 MG/DL (ref 70–99)
GLUCOSE BLD-MCNC: 193 MG/DL (ref 65–99)
GLUCOSE BLD-MCNC: 211 MG/DL (ref 65–99)
GLUCOSE BLD-MCNC: 217 MG/DL (ref 65–99)
GLUCOSE BLD-MCNC: 220 MG/DL (ref 65–99)
GLUCOSE BLD-MCNC: 223 MG/DL (ref 65–99)
GLUCOSE BLD-MCNC: 233 MG/DL (ref 65–99)
GLUCOSE BLD-MCNC: 235 MG/DL (ref 65–99)
GLUCOSE BLD-MCNC: 235 MG/DL (ref 65–99)
GLUCOSE BLD-MCNC: 256 MG/DL (ref 65–99)
GLUCOSE BLD-MCNC: 260 MG/DL (ref 65–99)
GLUCOSE BLD-MCNC: 262 MG/DL (ref 65–99)
GLUCOSE BLD-MCNC: 264 MG/DL (ref 65–99)
GLUCOSE BLD-MCNC: 278 MG/DL (ref 70–99)
GLUCOSE BLD-MCNC: 281 MG/DL (ref 70–99)
GLUCOSE BLD-MCNC: 284 MG/DL (ref 65–99)
GLUCOSE BLD-MCNC: 300 MG/DL (ref 65–99)
GLUCOSE BLD-MCNC: 307 MG/DL (ref 65–99)
GLUCOSE BLD-MCNC: 310 MG/DL (ref 65–99)
GLUCOSE BLD-MCNC: 310 MG/DL (ref 70–99)
GLUCOSE BLD-MCNC: 334 MG/DL (ref 65–99)
GLUCOSE BLD-MCNC: 337 MG/DL (ref 70–99)
GLUCOSE BLD-MCNC: 360 MG/DL (ref 70–99)
GLUCOSE BLD-MCNC: 386 MG/DL (ref 65–99)
GLUCOSE BLD-MCNC: 402 MG/DL (ref 70–99)
GLUCOSE BLD-MCNC: 58 MG/DL (ref 65–99)
GLUCOSE BLD-MCNC: 62 MG/DL (ref 70–99)
GLUCOSE BLD-MCNC: 65 MG/DL (ref 65–99)
GLUCOSE BLD-MCNC: 66 MG/DL (ref 65–99)
GLUCOSE BLD-MCNC: 68 MG/DL (ref 65–99)
GLUCOSE BLD-MCNC: 77 MG/DL (ref 65–99)
GLUCOSE BLD-MCNC: 85 MG/DL (ref 65–99)
GLUCOSE BLD-MCNC: 90 MG/DL (ref 70–99)
GLUCOSE BLD-MCNC: 93 MG/DL (ref 65–99)
GLUCOSE BLD-MCNC: 93 MG/DL (ref 65–99)
GLUCOSE BLD-MCNC: 94 MG/DL (ref 70–99)
GLUCOSE BLD-MCNC: 95 MG/DL (ref 65–99)
GLUCOSE BLD-MCNC: 97 MG/DL (ref 65–99)
GLUCOSE BLD-MCNC: 99 MG/DL (ref 65–99)
GLUCOSE UR STRIP.AUTO-MCNC: 50 MG/DL
GLUCOSE UR STRIP.AUTO-MCNC: NEGATIVE MG/DL
GLUCOSE UR STRIP.AUTO-MCNC: NEGATIVE MG/DL
HAV IGM SER QL: 1.9 MG/DL (ref 1.7–3)
HBA1C MFR BLD HPLC: 6.9 % (ref ?–5.7)
HBA1C MFR BLD HPLC: 7.3 % (ref ?–5.7)
HBA1C MFR BLD HPLC: 7.4 % (ref ?–5.7)
HBA1C MFR BLD HPLC: 8.2 % (ref ?–5.7)
HCT VFR BLD AUTO: 25.8 % (ref 37–53)
HCT VFR BLD AUTO: 26.2 % (ref 37–53)
HCT VFR BLD AUTO: 28.7 % (ref 37–53)
HCT VFR BLD AUTO: 28.8 % (ref 37–53)
HCT VFR BLD AUTO: 30.2 % (ref 37–53)
HCT VFR BLD AUTO: 30.7 % (ref 37–53)
HCT VFR BLD AUTO: 31.7 % (ref 37–53)
HCT VFR BLD AUTO: 32.5 % (ref 37–53)
HCT VFR BLD AUTO: 35.3 % (ref 37–53)
HCT VFR BLD AUTO: 36.9 % (ref 37–53)
HDLC SERPL-MCNC: 29 MG/DL (ref 45–?)
HDLC SERPL-MCNC: 41 MG/DL (ref 45–?)
HDLC SERPL-MCNC: 42 MG/DL (ref 45–?)
HDLC SERPL: 2.83 {RATIO} (ref ?–4.97)
HDLC SERPL: 4.34 {RATIO} (ref ?–4.97)
HDLC SERPL: 6.31 {RATIO} (ref ?–4.97)
HGB BLD-MCNC: 10 G/DL (ref 13–17)
HGB BLD-MCNC: 10.1 G/DL (ref 13–17)
HGB BLD-MCNC: 10.6 G/DL (ref 13–17)
HGB BLD-MCNC: 11.5 G/DL (ref 13–17)
HGB BLD-MCNC: 12.5 G/DL (ref 13–17)
HGB BLD-MCNC: 8.7 G/DL (ref 13–17)
HGB BLD-MCNC: 8.8 G/DL (ref 13–17)
HGB BLD-MCNC: 9.2 G/DL (ref 13–17)
HGB BLD-MCNC: 9.6 G/DL (ref 13–17)
HGB BLD-MCNC: 9.9 G/DL (ref 13–17)
HYALINE CASTS #/AREA URNS AUTO: PRESENT /LPF
IMMATURE GRANULOCYTE COUNT: 0.01 X10(3) UL (ref 0–1)
IMMATURE GRANULOCYTE COUNT: 0.02 X10(3) UL (ref 0–1)
IMMATURE GRANULOCYTE COUNT: 0.03 X10(3) UL (ref 0–1)
IMMATURE GRANULOCYTE COUNT: 0.04 X10(3) UL (ref 0–1)
IMMATURE GRANULOCYTE RATIO %: 0.2 %
IMMATURE GRANULOCYTE RATIO %: 0.3 %
IMMATURE GRANULOCYTE RATIO %: 0.3 %
IMMATURE GRANULOCYTE RATIO %: 0.4 %
IMMATURE GRANULOCYTE RATIO %: 0.5 %
IMMATURE GRANULOCYTE RATIO %: 0.5 %
INR BLD: 0.96 (ref 0.89–1.11)
INR BLD: 0.97 (ref 0.89–1.11)
INR BLD: 0.97 (ref 0.89–1.11)
INR BLD: 0.98 (ref 0.89–1.11)
INR BLD: 1.12 (ref 0.89–1.11)
IONIZED CALCIUM: 1.29 MMOL/L (ref 1.12–1.32)
KETONES UR STRIP.AUTO-MCNC: NEGATIVE MG/DL
LACTIC ACID ARTERIAL: <1.3 MMOL/L (ref 0.5–2)
LDLC SERPL CALC-MCNC: 120 MG/DL (ref ?–130)
LDLC SERPL CALC-MCNC: 128 MG/DL (ref ?–130)
LDLC SERPL CALC-MCNC: 62 MG/DL (ref ?–130)
LDLC SERPL-MCNC: 15 MG/DL (ref 5–40)
LDLC SERPL-MCNC: 17 MG/DL (ref 5–40)
LEUKOCYTE ESTERASE UR QL STRIP.AUTO: NEGATIVE
LIPASE: 102 U/L (ref 73–393)
LIPASE: 155 U/L (ref 73–393)
LYMPHOCYTES # BLD AUTO: 0.54 X10(3) UL (ref 0.9–4)
LYMPHOCYTES # BLD AUTO: 0.58 X10(3) UL (ref 0.9–4)
LYMPHOCYTES # BLD AUTO: 0.77 X10(3) UL (ref 0.9–4)
LYMPHOCYTES # BLD AUTO: 0.8 X10(3) UL (ref 0.9–4)
LYMPHOCYTES # BLD AUTO: 0.87 X10(3) UL (ref 0.9–4)
LYMPHOCYTES # BLD AUTO: 0.93 X10(3) UL (ref 0.9–4)
LYMPHOCYTES # BLD AUTO: 0.94 X10(3) UL (ref 0.9–4)
LYMPHOCYTES # BLD AUTO: 0.95 X10(3) UL (ref 0.9–4)
LYMPHOCYTES # BLD AUTO: 1.09 X10(3) UL (ref 0.9–4)
LYMPHOCYTES # BLD AUTO: 1.19 X10(3) UL (ref 0.9–4)
LYMPHOCYTES NFR BLD AUTO: 10.4 %
LYMPHOCYTES NFR BLD AUTO: 11 %
LYMPHOCYTES NFR BLD AUTO: 11.4 %
LYMPHOCYTES NFR BLD AUTO: 12.4 %
LYMPHOCYTES NFR BLD AUTO: 13.2 %
LYMPHOCYTES NFR BLD AUTO: 14.1 %
LYMPHOCYTES NFR BLD AUTO: 14.5 %
LYMPHOCYTES NFR BLD AUTO: 14.9 %
LYMPHOCYTES NFR BLD AUTO: 15.5 %
LYMPHOCYTES NFR BLD AUTO: 16.6 %
M PROTEIN MFR SERPL ELPH: 6.2 G/DL (ref 6.1–8.3)
M PROTEIN MFR SERPL ELPH: 6.4 G/DL (ref 6.1–8.3)
M PROTEIN MFR SERPL ELPH: 6.6 G/DL (ref 6.1–8.3)
M PROTEIN MFR SERPL ELPH: 6.7 G/DL (ref 6.1–8.3)
M PROTEIN MFR SERPL ELPH: 6.9 G/DL (ref 6.1–8.3)
M PROTEIN MFR SERPL ELPH: 7 G/DL (ref 6.1–8.3)
M PROTEIN MFR SERPL ELPH: 7.2 G/DL (ref 6.1–8.3)
M PROTEIN MFR SERPL ELPH: 7.5 G/DL (ref 6.1–8.3)
M PROTEIN MFR SERPL ELPH: 7.8 G/DL (ref 6.1–8.3)
MCH RBC QN AUTO: 28.7 PG (ref 27–33.2)
MCH RBC QN AUTO: 28.8 PG (ref 27–33.2)
MCH RBC QN AUTO: 28.8 PG (ref 27–33.2)
MCH RBC QN AUTO: 28.9 PG (ref 27–33.2)
MCH RBC QN AUTO: 29 PG (ref 27–33.2)
MCH RBC QN AUTO: 29 PG (ref 27–33.2)
MCH RBC QN AUTO: 29.2 PG (ref 27–33.2)
MCH RBC QN AUTO: 29.6 PG (ref 27–33.2)
MCH RBC QN AUTO: 29.7 PG (ref 27–33.2)
MCH RBC QN AUTO: 30 PG (ref 27–33.2)
MCHC RBC AUTO-ENTMCNC: 31.8 G/DL (ref 31–37)
MCHC RBC AUTO-ENTMCNC: 31.9 G/DL (ref 31–37)
MCHC RBC AUTO-ENTMCNC: 31.9 G/DL (ref 31–37)
MCHC RBC AUTO-ENTMCNC: 32.6 G/DL (ref 31–37)
MCHC RBC AUTO-ENTMCNC: 33.2 G/DL (ref 31–37)
MCHC RBC AUTO-ENTMCNC: 33.9 G/DL (ref 31–37)
MCHC RBC AUTO-ENTMCNC: 34.1 G/DL (ref 31–37)
MCHC RBC AUTO-ENTMCNC: 34.5 G/DL (ref 31–37)
MCV RBC AUTO: 85.6 FL (ref 80–99)
MCV RBC AUTO: 85.7 FL (ref 80–99)
MCV RBC AUTO: 88.1 FL (ref 80–99)
MCV RBC AUTO: 88.3 FL (ref 80–99)
MCV RBC AUTO: 88.3 FL (ref 80–99)
MCV RBC AUTO: 89 FL (ref 80–99)
MCV RBC AUTO: 89.4 FL (ref 80–99)
MCV RBC AUTO: 90.1 FL (ref 80–99)
MCV RBC AUTO: 90.6 FL (ref 80–99)
MCV RBC AUTO: 91.4 FL (ref 80–99)
METHEMOGLOBIN: 0.6 % SAT (ref 0.4–1.5)
MONOCYTES # BLD AUTO: 0.35 X10(3) UL (ref 0.1–0.6)
MONOCYTES # BLD AUTO: 0.36 X10(3) UL (ref 0.1–0.6)
MONOCYTES # BLD AUTO: 0.44 X10(3) UL (ref 0.1–0.6)
MONOCYTES # BLD AUTO: 0.47 X10(3) UL (ref 0.1–0.6)
MONOCYTES # BLD AUTO: 0.47 X10(3) UL (ref 0.1–0.6)
MONOCYTES # BLD AUTO: 0.48 X10(3) UL (ref 0.1–0.6)
MONOCYTES # BLD AUTO: 0.5 X10(3) UL (ref 0.1–0.6)
MONOCYTES # BLD AUTO: 0.53 X10(3) UL (ref 0.1–0.6)
MONOCYTES # BLD AUTO: 0.57 X10(3) UL (ref 0.1–0.6)
MONOCYTES # BLD AUTO: 0.66 X10(3) UL (ref 0.1–0.6)
MONOCYTES NFR BLD AUTO: 6.2 %
MONOCYTES NFR BLD AUTO: 6.6 %
MONOCYTES NFR BLD AUTO: 6.9 %
MONOCYTES NFR BLD AUTO: 6.9 %
MONOCYTES NFR BLD AUTO: 7.6 %
MONOCYTES NFR BLD AUTO: 7.6 %
MONOCYTES NFR BLD AUTO: 7.7 %
MONOCYTES NFR BLD AUTO: 8.3 %
MONOCYTES NFR BLD AUTO: 8.6 %
MONOCYTES NFR BLD AUTO: 8.9 %
NEUTROPHIL ABS PRELIM: 3.74 X10 (3) UL (ref 1.3–6.7)
NEUTROPHIL ABS PRELIM: 3.88 X10 (3) UL (ref 1.3–6.7)
NEUTROPHIL ABS PRELIM: 3.89 X10 (3) UL (ref 1.3–6.7)
NEUTROPHIL ABS PRELIM: 3.94 X10 (3) UL (ref 1.3–6.7)
NEUTROPHIL ABS PRELIM: 4.34 X10 (3) UL (ref 1.3–6.7)
NEUTROPHIL ABS PRELIM: 4.84 X10 (3) UL (ref 1.3–6.7)
NEUTROPHIL ABS PRELIM: 5.3 X10 (3) UL (ref 1.3–6.7)
NEUTROPHIL ABS PRELIM: 5.77 X10 (3) UL (ref 1.3–6.7)
NEUTROPHIL ABS PRELIM: 5.81 X10 (3) UL (ref 1.3–6.7)
NEUTROPHIL ABS PRELIM: 5.91 X10 (3) UL (ref 1.3–6.7)
NEUTROPHILS # BLD AUTO: 3.74 X10(3) UL (ref 1.3–6.7)
NEUTROPHILS # BLD AUTO: 3.88 X10(3) UL (ref 1.3–6.7)
NEUTROPHILS # BLD AUTO: 3.89 X10(3) UL (ref 1.3–6.7)
NEUTROPHILS # BLD AUTO: 3.94 X10(3) UL (ref 1.3–6.7)
NEUTROPHILS # BLD AUTO: 4.34 X10(3) UL (ref 1.3–6.7)
NEUTROPHILS # BLD AUTO: 4.84 X10(3) UL (ref 1.3–6.7)
NEUTROPHILS # BLD AUTO: 5.3 X10(3) UL (ref 1.3–6.7)
NEUTROPHILS # BLD AUTO: 5.77 X10(3) UL (ref 1.3–6.7)
NEUTROPHILS # BLD AUTO: 5.81 X10(3) UL (ref 1.3–6.7)
NEUTROPHILS # BLD AUTO: 5.91 X10(3) UL (ref 1.3–6.7)
NEUTROPHILS NFR BLD AUTO: 69.4 %
NEUTROPHILS NFR BLD AUTO: 70.5 %
NEUTROPHILS NFR BLD AUTO: 70.9 %
NEUTROPHILS NFR BLD AUTO: 71.8 %
NEUTROPHILS NFR BLD AUTO: 72.8 %
NEUTROPHILS NFR BLD AUTO: 73.1 %
NEUTROPHILS NFR BLD AUTO: 76 %
NEUTROPHILS NFR BLD AUTO: 76 %
NEUTROPHILS NFR BLD AUTO: 76.2 %
NEUTROPHILS NFR BLD AUTO: 76.5 %
NITRITE UR QL STRIP.AUTO: NEGATIVE
NONHDLC SERPL-MCNC: 137 MG/DL (ref ?–130)
NONHDLC SERPL-MCNC: 154 MG/DL (ref ?–130)
NONHDLC SERPL-MCNC: 77 MG/DL (ref ?–130)
P AXIS: 40 DEGREES
P AXIS: 45 DEGREES
P AXIS: 47 DEGREES
P AXIS: 50 DEGREES
P AXIS: 50 DEGREES
P AXIS: 51 DEGREES
P AXIS: 51 DEGREES
P AXIS: 52 DEGREES
P-R INTERVAL: 194 MS
P-R INTERVAL: 198 MS
P-R INTERVAL: 206 MS
P-R INTERVAL: 222 MS
P-R INTERVAL: 224 MS
P-R INTERVAL: 226 MS
P-R INTERVAL: 226 MS
P-R INTERVAL: 232 MS
PATIENT TEMPERATURE: 98.6 F
PH UR STRIP.AUTO: 5 [PH] (ref 4.5–8)
PLATELET # BLD AUTO: 113 10(3)UL (ref 150–450)
PLATELET # BLD AUTO: 119 10(3)UL (ref 150–450)
PLATELET # BLD AUTO: 121 10(3)UL (ref 150–450)
PLATELET # BLD AUTO: 123 10(3)UL (ref 150–450)
PLATELET # BLD AUTO: 138 10(3)UL (ref 150–450)
PLATELET # BLD AUTO: 150 10(3)UL (ref 150–450)
PLATELET # BLD AUTO: 153 10(3)UL (ref 150–450)
PLATELET # BLD AUTO: 155 10(3)UL (ref 150–450)
PLATELET # BLD AUTO: 162 10(3)UL (ref 150–450)
PLATELET # BLD AUTO: 216 10(3)UL (ref 150–450)
POTASSIUM BLOOD GAS: 3.8 MMOL/L (ref 3.6–5.1)
POTASSIUM SERPL-SCNC: 3.8 MMOL/L (ref 3.6–5.1)
POTASSIUM SERPL-SCNC: 3.8 MMOL/L (ref 3.6–5.1)
POTASSIUM SERPL-SCNC: 3.9 MMOL/L (ref 3.6–5.1)
POTASSIUM SERPL-SCNC: 4 MMOL/L (ref 3.6–5.1)
POTASSIUM SERPL-SCNC: 4.1 MMOL/L (ref 3.6–5.1)
POTASSIUM SERPL-SCNC: 4.1 MMOL/L (ref 3.6–5.1)
POTASSIUM SERPL-SCNC: 4.3 MMOL/L (ref 3.6–5.1)
POTASSIUM SERPL-SCNC: 4.4 MMOL/L (ref 3.6–5.1)
POTASSIUM SERPL-SCNC: 4.4 MMOL/L (ref 3.6–5.1)
POTASSIUM SERPL-SCNC: 4.5 MMOL/L (ref 3.6–5.1)
POTASSIUM SERPL-SCNC: 4.8 MMOL/L (ref 3.6–5.1)
POTASSIUM SERPL-SCNC: 5 MMOL/L (ref 3.6–5.1)
POTASSIUM SERPL-SCNC: 5.2 MMOL/L (ref 3.6–5.1)
POTASSIUM SERPL-SCNC: 5.3 MMOL/L (ref 3.6–5.1)
PRO-BETA NATRIURETIC PEPTIDE: 1534 PG/ML (ref ?–125)
PRO-BETA NATRIURETIC PEPTIDE: 829 PG/ML (ref ?–125)
PROT UR STRIP.AUTO-MCNC: 30 MG/DL
PROT UR STRIP.AUTO-MCNC: NEGATIVE MG/DL
PROT UR STRIP.AUTO-MCNC: NEGATIVE MG/DL
PSA SERPL DL<=0.01 NG/ML-MCNC: 12.8 SECONDS (ref 12–14.3)
PSA SERPL DL<=0.01 NG/ML-MCNC: 12.9 SECONDS (ref 12–14.3)
PSA SERPL DL<=0.01 NG/ML-MCNC: 12.9 SECONDS (ref 12–14.3)
PSA SERPL DL<=0.01 NG/ML-MCNC: 13 SECONDS (ref 12–14.3)
PSA SERPL DL<=0.01 NG/ML-MCNC: 14.5 SECONDS (ref 12–14.3)
Q-T INTERVAL: 428 MS
Q-T INTERVAL: 430 MS
Q-T INTERVAL: 434 MS
Q-T INTERVAL: 450 MS
Q-T INTERVAL: 454 MS
Q-T INTERVAL: 466 MS
Q-T INTERVAL: 470 MS
Q-T INTERVAL: 494 MS
Q-T INTERVAL: 526 MS
Q-T INTERVAL: 532 MS
QRS DURATION: 120 MS
QRS DURATION: 174 MS
QRS DURATION: 174 MS
QRS DURATION: 176 MS
QRS DURATION: 176 MS
QRS DURATION: 178 MS
QRS DURATION: 182 MS
QRS DURATION: 182 MS
QRS DURATION: 208 MS
QRS DURATION: 212 MS
QTC CALCULATION (BEZET): 438 MS
QTC CALCULATION (BEZET): 441 MS
QTC CALCULATION (BEZET): 466 MS
QTC CALCULATION (BEZET): 468 MS
QTC CALCULATION (BEZET): 471 MS
QTC CALCULATION (BEZET): 478 MS
QTC CALCULATION (BEZET): 479 MS
QTC CALCULATION (BEZET): 485 MS
QTC CALCULATION (BEZET): 502 MS
QTC CALCULATION (BEZET): 509 MS
R AXIS: -11 DEGREES
R AXIS: -11 DEGREES
R AXIS: 28 DEGREES
R AXIS: 28 DEGREES
R AXIS: 35 DEGREES
R AXIS: 36 DEGREES
R AXIS: 38 DEGREES
R AXIS: 38 DEGREES
R AXIS: 45 DEGREES
R AXIS: 49 DEGREES
RBC # BLD AUTO: 2.93 X10(6)UL (ref 3.8–5.8)
RBC # BLD AUTO: 2.93 X10(6)UL (ref 3.8–5.8)
RBC # BLD AUTO: 3.15 X10(6)UL (ref 3.8–5.8)
RBC # BLD AUTO: 3.35 X10(6)UL (ref 3.8–5.8)
RBC # BLD AUTO: 3.35 X10(6)UL (ref 3.8–5.8)
RBC # BLD AUTO: 3.45 X10(6)UL (ref 3.8–5.8)
RBC # BLD AUTO: 3.5 X10(6)UL (ref 3.8–5.8)
RBC # BLD AUTO: 3.68 X10(6)UL (ref 3.8–5.8)
RBC # BLD AUTO: 4 X10(6)UL (ref 3.8–5.8)
RBC # BLD AUTO: 4.31 X10(6)UL (ref 3.8–5.8)
RBC UR QL AUTO: NEGATIVE
RED CELL DISTRIBUTION WIDTH-SD: 39.9 FL (ref 35.1–46.3)
RED CELL DISTRIBUTION WIDTH-SD: 42.7 FL (ref 35.1–46.3)
RED CELL DISTRIBUTION WIDTH-SD: 43.3 FL (ref 35.1–46.3)
RED CELL DISTRIBUTION WIDTH-SD: 45.8 FL (ref 35.1–46.3)
RED CELL DISTRIBUTION WIDTH-SD: 46.8 FL (ref 35.1–46.3)
RED CELL DISTRIBUTION WIDTH-SD: 47.1 FL (ref 35.1–46.3)
RED CELL DISTRIBUTION WIDTH-SD: 48.2 FL (ref 35.1–46.3)
RED CELL DISTRIBUTION WIDTH-SD: 49.5 FL (ref 35.1–46.3)
RED CELL DISTRIBUTION WIDTH-SD: 50.4 FL (ref 35.1–46.3)
RED CELL DISTRIBUTION WIDTH-SD: 51.3 FL (ref 35.1–46.3)
SED RATE-ML: 48 MM/HR (ref 0–12)
SODIUM BLOOD GAS: 138 MMOL/L (ref 136–144)
SODIUM SERPL-SCNC: 133 MMOL/L (ref 136–144)
SODIUM SERPL-SCNC: 135 MMOL/L (ref 136–144)
SODIUM SERPL-SCNC: 136 MMOL/L (ref 136–144)
SODIUM SERPL-SCNC: 137 MMOL/L (ref 136–144)
SODIUM SERPL-SCNC: 138 MMOL/L (ref 136–144)
SODIUM SERPL-SCNC: 140 MMOL/L (ref 136–144)
SODIUM SERPL-SCNC: 141 MMOL/L (ref 136–144)
SODIUM SERPL-SCNC: 143 MMOL/L (ref 136–144)
SODIUM SERPL-SCNC: 144 MMOL/L (ref 136–144)
SODIUM SERPL-SCNC: 144 MMOL/L (ref 136–144)
SP GR UR STRIP.AUTO: 1.01 (ref 1–1.03)
T AXIS: 10 DEGREES
T AXIS: 18 DEGREES
T AXIS: 18 DEGREES
T AXIS: 19 DEGREES
T AXIS: 23 DEGREES
T AXIS: 24 DEGREES
T AXIS: 4 DEGREES
T AXIS: 72 DEGREES
T AXIS: 88 DEGREES
T AXIS: 9 DEGREES
TOTAL HEMOGLOBIN: 9.5 G/DL (ref 12.6–17.4)
TRIGLYCERIDES: 129 MG/DL (ref ?–150)
TRIGLYCERIDES: 77 MG/DL (ref ?–150)
TRIGLYCERIDES: 87 MG/DL (ref ?–150)
TROPONIN: <0.046 NG/ML (ref ?–0.05)
TSI SER-ACNC: 0.08 MIU/ML (ref 0.35–5.5)
UROBILINOGEN UR STRIP.AUTO-MCNC: 2 MG/DL
UROBILINOGEN UR STRIP.AUTO-MCNC: 4 MG/DL
UROBILINOGEN UR STRIP.AUTO-MCNC: <2 MG/DL
VENTRICULAR RATE: 50 BPM
VENTRICULAR RATE: 50 BPM
VENTRICULAR RATE: 53 BPM
VENTRICULAR RATE: 54 BPM
VENTRICULAR RATE: 56 BPM
VENTRICULAR RATE: 60 BPM
VENTRICULAR RATE: 68 BPM
VENTRICULAR RATE: 73 BPM
VENTRICULAR RATE: 82 BPM
VENTRICULAR RATE: 83 BPM
VLDL: 26 MG/DL (ref 5–40)
WBC # BLD AUTO: 4.9 X10(3) UL (ref 4–13)
WBC # BLD AUTO: 5.1 X10(3) UL (ref 4–13)
WBC # BLD AUTO: 5.5 X10(3) UL (ref 4–13)
WBC # BLD AUTO: 5.6 X10(3) UL (ref 4–13)
WBC # BLD AUTO: 6.1 X10(3) UL (ref 4–13)
WBC # BLD AUTO: 6.6 X10(3) UL (ref 4–13)
WBC # BLD AUTO: 7.5 X10(3) UL (ref 4–13)
WBC # BLD AUTO: 7.6 X10(3) UL (ref 4–13)
WBC # BLD AUTO: 7.7 X10(3) UL (ref 4–13)
WBC # BLD AUTO: 8 X10(3) UL (ref 4–13)

## 2017-01-01 PROCEDURE — 99232 SBSQ HOSP IP/OBS MODERATE 35: CPT | Performed by: OTHER

## 2017-01-01 PROCEDURE — 99233 SBSQ HOSP IP/OBS HIGH 50: CPT | Performed by: OTHER

## 2017-01-01 PROCEDURE — 82140 ASSAY OF AMMONIA: CPT

## 2017-01-01 PROCEDURE — 84439 ASSAY OF FREE THYROXINE: CPT

## 2017-01-01 PROCEDURE — 84443 ASSAY THYROID STIM HORMONE: CPT

## 2017-01-01 PROCEDURE — 71010 XR CHEST AP PORTABLE  (CPT=71010): CPT | Performed by: EMERGENCY MEDICINE

## 2017-01-01 PROCEDURE — 87070 CULTURE OTHR SPECIMN AEROBIC: CPT | Performed by: EMERGENCY MEDICINE

## 2017-01-01 PROCEDURE — 99239 HOSP IP/OBS DSCHRG MGMT >30: CPT | Performed by: HOSPITALIST

## 2017-01-01 PROCEDURE — 76700 US EXAM ABDOM COMPLETE: CPT | Performed by: NURSE PRACTITIONER

## 2017-01-01 PROCEDURE — 82962 GLUCOSE BLOOD TEST: CPT

## 2017-01-01 PROCEDURE — 71010 XR CHEST AP PORTABLE  (CPT=71010): CPT | Performed by: INTERNAL MEDICINE

## 2017-01-01 PROCEDURE — 83036 HEMOGLOBIN GLYCOSYLATED A1C: CPT

## 2017-01-01 PROCEDURE — 99223 1ST HOSP IP/OBS HIGH 75: CPT | Performed by: HOSPITALIST

## 2017-01-01 PROCEDURE — 70544 MR ANGIOGRAPHY HEAD W/O DYE: CPT | Performed by: OTHER

## 2017-01-01 PROCEDURE — 70551 MRI BRAIN STEM W/O DYE: CPT | Performed by: OTHER

## 2017-01-01 PROCEDURE — 73560 X-RAY EXAM OF KNEE 1 OR 2: CPT | Performed by: EMERGENCY MEDICINE

## 2017-01-01 PROCEDURE — 84484 ASSAY OF TROPONIN QUANT: CPT

## 2017-01-01 PROCEDURE — 99232 SBSQ HOSP IP/OBS MODERATE 35: CPT | Performed by: HOSPITALIST

## 2017-01-01 PROCEDURE — 93880 EXTRACRANIAL BILAT STUDY: CPT | Performed by: OTHER

## 2017-01-01 PROCEDURE — 02HK3NZ INSERTION OF INTRACARDIAC PACEMAKER INTO RIGHT VENTRICLE, PERCUTANEOUS APPROACH: ICD-10-PCS | Performed by: INTERNAL MEDICINE

## 2017-01-01 PROCEDURE — G0009 ADMIN PNEUMOCOCCAL VACCINE: HCPCS | Performed by: INTERNAL MEDICINE

## 2017-01-01 PROCEDURE — 96361 HYDRATE IV INFUSION ADD-ON: CPT

## 2017-01-01 PROCEDURE — 99223 1ST HOSP IP/OBS HIGH 75: CPT | Performed by: OTHER

## 2017-01-01 PROCEDURE — 80053 COMPREHEN METABOLIC PANEL: CPT | Performed by: INTERNAL MEDICINE

## 2017-01-01 PROCEDURE — 99222 1ST HOSP IP/OBS MODERATE 55: CPT | Performed by: INTERNAL MEDICINE

## 2017-01-01 PROCEDURE — 95819 EEG AWAKE AND ASLEEP: CPT | Performed by: OTHER

## 2017-01-01 PROCEDURE — 99214 OFFICE O/P EST MOD 30 MIN: CPT | Performed by: INTERNAL MEDICINE

## 2017-01-01 PROCEDURE — 70544 MR ANGIOGRAPHY HEAD W/O DYE: CPT | Performed by: PHYSICIAN ASSISTANT

## 2017-01-01 PROCEDURE — 36415 COLL VENOUS BLD VENIPUNCTURE: CPT

## 2017-01-01 PROCEDURE — 82570 ASSAY OF URINE CREATININE: CPT

## 2017-01-01 PROCEDURE — 93306 TTE W/DOPPLER COMPLETE: CPT | Performed by: OTHER

## 2017-01-01 PROCEDURE — 93970 EXTREMITY STUDY: CPT | Performed by: EMERGENCY MEDICINE

## 2017-01-01 PROCEDURE — 87186 SC STD MICRODIL/AGAR DIL: CPT | Performed by: EMERGENCY MEDICINE

## 2017-01-01 PROCEDURE — 80053 COMPREHEN METABOLIC PANEL: CPT

## 2017-01-01 PROCEDURE — G0008 ADMIN INFLUENZA VIRUS VAC: HCPCS | Performed by: INTERNAL MEDICINE

## 2017-01-01 PROCEDURE — 82272 OCCULT BLD FECES 1-3 TESTS: CPT

## 2017-01-01 PROCEDURE — 87205 SMEAR GRAM STAIN: CPT | Performed by: EMERGENCY MEDICINE

## 2017-01-01 PROCEDURE — 73630 X-RAY EXAM OF FOOT: CPT | Performed by: EMERGENCY MEDICINE

## 2017-01-01 PROCEDURE — 99284 EMERGENCY DEPT VISIT MOD MDM: CPT

## 2017-01-01 PROCEDURE — 87077 CULTURE AEROBIC IDENTIFY: CPT | Performed by: EMERGENCY MEDICINE

## 2017-01-01 PROCEDURE — 97112 NEUROMUSCULAR REEDUCATION: CPT

## 2017-01-01 PROCEDURE — 90653 IIV ADJUVANT VACCINE IM: CPT | Performed by: INTERNAL MEDICINE

## 2017-01-01 PROCEDURE — 99490 CHRNC CARE MGMT STAFF 1ST 20: CPT

## 2017-01-01 PROCEDURE — 85610 PROTHROMBIN TIME: CPT | Performed by: EMERGENCY MEDICINE

## 2017-01-01 PROCEDURE — 99285 EMERGENCY DEPT VISIT HI MDM: CPT

## 2017-01-01 PROCEDURE — 97163 PT EVAL HIGH COMPLEX 45 MIN: CPT

## 2017-01-01 PROCEDURE — 80053 COMPREHEN METABOLIC PANEL: CPT | Performed by: EMERGENCY MEDICINE

## 2017-01-01 PROCEDURE — 85025 COMPLETE CBC W/AUTO DIFF WBC: CPT | Performed by: EMERGENCY MEDICINE

## 2017-01-01 PROCEDURE — 93975 VASCULAR STUDY: CPT | Performed by: NURSE PRACTITIONER

## 2017-01-01 PROCEDURE — 81003 URINALYSIS AUTO W/O SCOPE: CPT

## 2017-01-01 PROCEDURE — 99232 SBSQ HOSP IP/OBS MODERATE 35: CPT | Performed by: INTERNAL MEDICINE

## 2017-01-01 PROCEDURE — 96360 HYDRATION IV INFUSION INIT: CPT

## 2017-01-01 PROCEDURE — B51VYZZ FLUOROSCOPY OF OTHER VEINS USING OTHER CONTRAST: ICD-10-PCS | Performed by: INTERNAL MEDICINE

## 2017-01-01 PROCEDURE — 70551 MRI BRAIN STEM W/O DYE: CPT | Performed by: PHYSICIAN ASSISTANT

## 2017-01-01 PROCEDURE — 70450 CT HEAD/BRAIN W/O DYE: CPT | Performed by: INTERNAL MEDICINE

## 2017-01-01 PROCEDURE — 70491 CT SOFT TISSUE NECK W/DYE: CPT | Performed by: HOSPITALIST

## 2017-01-01 PROCEDURE — 83880 ASSAY OF NATRIURETIC PEPTIDE: CPT

## 2017-01-01 PROCEDURE — 99223 1ST HOSP IP/OBS HIGH 75: CPT | Performed by: INTERNAL MEDICINE

## 2017-01-01 PROCEDURE — 80048 BASIC METABOLIC PNL TOTAL CA: CPT | Performed by: INTERNAL MEDICINE

## 2017-01-01 PROCEDURE — 93000 ELECTROCARDIOGRAM COMPLETE: CPT | Performed by: INTERNAL MEDICINE

## 2017-01-01 PROCEDURE — 90670 PCV13 VACCINE IM: CPT | Performed by: INTERNAL MEDICINE

## 2017-01-01 PROCEDURE — 73590 X-RAY EXAM OF LOWER LEG: CPT | Performed by: EMERGENCY MEDICINE

## 2017-01-01 PROCEDURE — 99215 OFFICE O/P EST HI 40 MIN: CPT | Performed by: OTHER

## 2017-01-01 PROCEDURE — 85730 THROMBOPLASTIN TIME PARTIAL: CPT | Performed by: EMERGENCY MEDICINE

## 2017-01-01 PROCEDURE — 81001 URINALYSIS AUTO W/SCOPE: CPT | Performed by: EMERGENCY MEDICINE

## 2017-01-01 PROCEDURE — 99217 OBSERVATION CARE DISCHARGE: CPT | Performed by: HOSPITALIST

## 2017-01-01 PROCEDURE — 96374 THER/PROPH/DIAG INJ IV PUSH: CPT

## 2017-01-01 PROCEDURE — 93017 CV STRESS TEST TRACING ONLY: CPT | Performed by: INTERNAL MEDICINE

## 2017-01-01 PROCEDURE — 99233 SBSQ HOSP IP/OBS HIGH 50: CPT | Performed by: HOSPITALIST

## 2017-01-01 PROCEDURE — 70547 MR ANGIOGRAPHY NECK W/O DYE: CPT | Performed by: PHYSICIAN ASSISTANT

## 2017-01-01 PROCEDURE — 10060 I&D ABSCESS SIMPLE/SINGLE: CPT

## 2017-01-01 PROCEDURE — 70450 CT HEAD/BRAIN W/O DYE: CPT | Performed by: EMERGENCY MEDICINE

## 2017-01-01 PROCEDURE — 83690 ASSAY OF LIPASE: CPT | Performed by: EMERGENCY MEDICINE

## 2017-01-01 PROCEDURE — 83690 ASSAY OF LIPASE: CPT

## 2017-01-01 PROCEDURE — 99225 SUBSEQUENT OBSERVATION CARE: CPT | Performed by: HOSPITALIST

## 2017-01-01 PROCEDURE — 72141 MRI NECK SPINE W/O DYE: CPT | Performed by: OTHER

## 2017-01-01 PROCEDURE — 74176 CT ABD & PELVIS W/O CONTRAST: CPT | Performed by: EMERGENCY MEDICINE

## 2017-01-01 PROCEDURE — 70450 CT HEAD/BRAIN W/O DYE: CPT

## 2017-01-01 PROCEDURE — 78452 HT MUSCLE IMAGE SPECT MULT: CPT | Performed by: INTERNAL MEDICINE

## 2017-01-01 PROCEDURE — 99214 OFFICE O/P EST MOD 30 MIN: CPT | Performed by: OTHER

## 2017-01-01 PROCEDURE — 99495 TRANSJ CARE MGMT MOD F2F 14D: CPT | Performed by: INTERNAL MEDICINE

## 2017-01-01 PROCEDURE — 99204 OFFICE O/P NEW MOD 45 MIN: CPT | Performed by: INTERNAL MEDICINE

## 2017-01-01 PROCEDURE — 99215 OFFICE O/P EST HI 40 MIN: CPT | Performed by: NURSE PRACTITIONER

## 2017-01-01 PROCEDURE — 99496 TRANSJ CARE MGMT HIGH F2F 7D: CPT | Performed by: INTERNAL MEDICINE

## 2017-01-01 PROCEDURE — 99291 CRITICAL CARE FIRST HOUR: CPT | Performed by: INTERNAL MEDICINE

## 2017-01-01 PROCEDURE — 95816 EEG AWAKE AND DROWSY: CPT | Performed by: OTHER

## 2017-01-01 PROCEDURE — 82607 VITAMIN B-12: CPT

## 2017-01-01 PROCEDURE — 99220 INITIAL OBSERVATION CARE,LEVL III: CPT | Performed by: INTERNAL MEDICINE

## 2017-01-01 PROCEDURE — 71010 XR CHEST AP PORTABLE  (CPT=71010): CPT

## 2017-01-01 PROCEDURE — 84425 ASSAY OF VITAMIN B-1: CPT

## 2017-01-01 PROCEDURE — 99214 OFFICE O/P EST MOD 30 MIN: CPT | Performed by: NURSE PRACTITIONER

## 2017-01-01 PROCEDURE — 71020 XR CHEST PA + LAT CHEST (CPT=71020): CPT | Performed by: INTERNAL MEDICINE

## 2017-01-01 PROCEDURE — 82550 ASSAY OF CK (CPK): CPT

## 2017-01-01 PROCEDURE — 99239 HOSP IP/OBS DSCHRG MGMT >30: CPT | Performed by: INTERNAL MEDICINE

## 2017-01-01 PROCEDURE — 80061 LIPID PANEL: CPT

## 2017-01-01 PROCEDURE — 82043 UR ALBUMIN QUANTITATIVE: CPT

## 2017-01-01 PROCEDURE — 85025 COMPLETE CBC W/AUTO DIFF WBC: CPT

## 2017-01-01 RX ORDER — ONDANSETRON 2 MG/ML
4 INJECTION INTRAMUSCULAR; INTRAVENOUS EVERY 6 HOURS PRN
Status: DISCONTINUED | OUTPATIENT
Start: 2017-01-01 | End: 2017-01-01

## 2017-01-01 RX ORDER — ROSUVASTATIN CALCIUM 5 MG/1
5 TABLET, COATED ORAL NIGHTLY
Status: DISCONTINUED | OUTPATIENT
Start: 2017-01-01 | End: 2017-01-01

## 2017-01-01 RX ORDER — ROSUVASTATIN CALCIUM 10 MG/1
5 TABLET, COATED ORAL NIGHTLY
Status: DISCONTINUED | OUTPATIENT
Start: 2017-01-01 | End: 2017-01-01

## 2017-01-01 RX ORDER — LORAZEPAM 2 MG/ML
1-2 INJECTION INTRAMUSCULAR AS NEEDED
Status: DISCONTINUED | OUTPATIENT
Start: 2017-01-01 | End: 2017-01-01 | Stop reason: SDUPTHER

## 2017-01-01 RX ORDER — DOCUSATE SODIUM 100 MG/1
100 CAPSULE, LIQUID FILLED ORAL 2 TIMES DAILY
Status: DISCONTINUED | OUTPATIENT
Start: 2017-01-01 | End: 2017-01-01

## 2017-01-01 RX ORDER — ESCITALOPRAM OXALATE 10 MG/1
10 TABLET ORAL EVERY MORNING
Status: DISCONTINUED | OUTPATIENT
Start: 2017-01-01 | End: 2017-01-01

## 2017-01-01 RX ORDER — LORAZEPAM 0.5 MG/1
0.5 TABLET ORAL ONCE
Qty: 2 TABLET | Refills: 0 | Status: CANCELLED | OUTPATIENT
Start: 2017-01-01 | End: 2017-01-01

## 2017-01-01 RX ORDER — HYDROCODONE BITARTRATE AND ACETAMINOPHEN 5; 325 MG/1; MG/1
2 TABLET ORAL ONCE
Status: COMPLETED | OUTPATIENT
Start: 2017-01-01 | End: 2017-01-01

## 2017-01-01 RX ORDER — NORTRIPTYLINE HYDROCHLORIDE 10 MG/1
20 CAPSULE ORAL NIGHTLY
Qty: 60 CAPSULE | Refills: 1 | Status: SHIPPED | OUTPATIENT
Start: 2017-01-01 | End: 2017-01-01

## 2017-01-01 RX ORDER — SODIUM PHOSPHATE, DIBASIC AND SODIUM PHOSPHATE, MONOBASIC 7; 19 G/133ML; G/133ML
1 ENEMA RECTAL ONCE AS NEEDED
Status: DISCONTINUED | OUTPATIENT
Start: 2017-01-01 | End: 2017-01-01

## 2017-01-01 RX ORDER — LACTULOSE 20 G/30ML
20 SOLUTION ORAL 2 TIMES DAILY
Qty: 420 ML | Refills: 0 | Status: SHIPPED | OUTPATIENT
Start: 2017-01-01 | End: 2017-01-01

## 2017-01-01 RX ORDER — ISOSORBIDE DINITRATE 20 MG/1
10 TABLET ORAL 2 TIMES DAILY
Status: DISCONTINUED | OUTPATIENT
Start: 2017-01-01 | End: 2017-01-01

## 2017-01-01 RX ORDER — NITROGLYCERIN 20 MG/100ML
10 INJECTION INTRAVENOUS CONTINUOUS
Status: DISCONTINUED | OUTPATIENT
Start: 2017-01-01 | End: 2017-01-01

## 2017-01-01 RX ORDER — DEXTROSE MONOHYDRATE 25 G/50ML
50 INJECTION, SOLUTION INTRAVENOUS
Status: DISCONTINUED | OUTPATIENT
Start: 2017-01-01 | End: 2017-01-01

## 2017-01-01 RX ORDER — GABAPENTIN 400 MG/1
400 CAPSULE ORAL 3 TIMES DAILY
Qty: 90 CAPSULE | Refills: 3 | Status: SHIPPED | OUTPATIENT
Start: 2017-01-01 | End: 2017-01-01

## 2017-01-01 RX ORDER — NALTREXONE HYDROCHLORIDE 50 MG/1
50 TABLET, FILM COATED ORAL
Status: DISCONTINUED | OUTPATIENT
Start: 2017-01-01 | End: 2017-01-01

## 2017-01-01 RX ORDER — HYDROXYZINE HYDROCHLORIDE 25 MG/1
25 TABLET, FILM COATED ORAL EVERY 6 HOURS PRN
Status: DISCONTINUED | OUTPATIENT
Start: 2017-01-01 | End: 2017-01-01

## 2017-01-01 RX ORDER — FUROSEMIDE 10 MG/ML
20 INJECTION INTRAMUSCULAR; INTRAVENOUS
Status: DISCONTINUED | OUTPATIENT
Start: 2017-01-01 | End: 2017-01-01

## 2017-01-01 RX ORDER — GABAPENTIN 400 MG/1
400 CAPSULE ORAL 3 TIMES DAILY
Qty: 90 CAPSULE | Refills: 0 | Status: SHIPPED | OUTPATIENT
Start: 2017-01-01 | End: 2017-01-01

## 2017-01-01 RX ORDER — TEMAZEPAM 7.5 MG/1
7.5 CAPSULE ORAL NIGHTLY PRN
Status: DISCONTINUED | OUTPATIENT
Start: 2017-01-01 | End: 2017-01-01

## 2017-01-01 RX ORDER — DIPHENHYDRAMINE HCL 50 MG
50 CAPSULE ORAL NIGHTLY PRN
Status: DISCONTINUED | OUTPATIENT
Start: 2017-01-01 | End: 2017-01-01

## 2017-01-01 RX ORDER — BUMETANIDE 2 MG/1
2 TABLET ORAL DAILY
Status: CANCELLED | OUTPATIENT
Start: 2017-01-01

## 2017-01-01 RX ORDER — LORAZEPAM 2 MG/ML
2 INJECTION INTRAMUSCULAR ONCE AS NEEDED
Status: ACTIVE | OUTPATIENT
Start: 2017-01-01 | End: 2017-01-01

## 2017-01-01 RX ORDER — CLOPIDOGREL BISULFATE 75 MG/1
75 TABLET ORAL DAILY
COMMUNITY

## 2017-01-01 RX ORDER — KETOROLAC TROMETHAMINE 30 MG/ML
15 INJECTION, SOLUTION INTRAMUSCULAR; INTRAVENOUS ONCE
Status: COMPLETED | OUTPATIENT
Start: 2017-01-01 | End: 2017-01-01

## 2017-01-01 RX ORDER — LORAZEPAM 2 MG/ML
1 INJECTION INTRAMUSCULAR ONCE
Status: COMPLETED | OUTPATIENT
Start: 2017-01-01 | End: 2017-01-01

## 2017-01-01 RX ORDER — SODIUM CHLORIDE 9 MG/ML
1000 INJECTION, SOLUTION INTRAVENOUS ONCE
Status: COMPLETED | OUTPATIENT
Start: 2017-01-01 | End: 2017-01-01

## 2017-01-01 RX ORDER — METOCLOPRAMIDE HYDROCHLORIDE 5 MG/ML
10 INJECTION INTRAMUSCULAR; INTRAVENOUS EVERY 8 HOURS PRN
Status: DISCONTINUED | OUTPATIENT
Start: 2017-01-01 | End: 2017-01-01

## 2017-01-01 RX ORDER — LEVOTHYROXINE SODIUM 0.15 MG/1
300 TABLET ORAL
Status: DISCONTINUED | OUTPATIENT
Start: 2017-01-01 | End: 2017-01-01

## 2017-01-01 RX ORDER — ASPIRIN 325 MG
325 TABLET ORAL DAILY
Status: DISCONTINUED | OUTPATIENT
Start: 2017-01-01 | End: 2017-01-01

## 2017-01-01 RX ORDER — METOPROLOL TARTRATE 50 MG/1
50 TABLET, FILM COATED ORAL 2 TIMES DAILY
COMMUNITY

## 2017-01-01 RX ORDER — PANTOPRAZOLE SODIUM 20 MG/1
20 TABLET, DELAYED RELEASE ORAL
Status: DISCONTINUED | OUTPATIENT
Start: 2017-01-01 | End: 2017-01-01

## 2017-01-01 RX ORDER — METOPROLOL TARTRATE 50 MG/1
50 TABLET, FILM COATED ORAL
Status: DISCONTINUED | OUTPATIENT
Start: 2017-01-01 | End: 2017-01-01

## 2017-01-01 RX ORDER — URSODIOL 300 MG/1
300 CAPSULE ORAL 3 TIMES DAILY
Status: DISCONTINUED | OUTPATIENT
Start: 2017-01-01 | End: 2017-01-01

## 2017-01-01 RX ORDER — ROSUVASTATIN CALCIUM 5 MG/1
5 TABLET, COATED ORAL NIGHTLY
Qty: 30 TABLET | Refills: 1 | Status: ON HOLD | OUTPATIENT
Start: 2017-01-01 | End: 2018-01-01

## 2017-01-01 RX ORDER — ALPRAZOLAM 0.5 MG/1
0.5 TABLET ORAL 2 TIMES DAILY PRN
Status: DISCONTINUED | OUTPATIENT
Start: 2017-01-01 | End: 2017-01-01

## 2017-01-01 RX ORDER — GABAPENTIN 400 MG/1
400 CAPSULE ORAL 3 TIMES DAILY
Status: DISCONTINUED | OUTPATIENT
Start: 2017-01-01 | End: 2017-01-01

## 2017-01-01 RX ORDER — CLOPIDOGREL BISULFATE 75 MG/1
75 TABLET ORAL DAILY
Status: DISCONTINUED | OUTPATIENT
Start: 2017-01-01 | End: 2017-01-01

## 2017-01-01 RX ORDER — PANTOPRAZOLE SODIUM 40 MG/1
40 TABLET, DELAYED RELEASE ORAL DAILY
Status: DISCONTINUED | OUTPATIENT
Start: 2017-01-01 | End: 2017-01-01

## 2017-01-01 RX ORDER — MORPHINE SULFATE 2 MG/ML
2 INJECTION, SOLUTION INTRAMUSCULAR; INTRAVENOUS ONCE
Status: COMPLETED | OUTPATIENT
Start: 2017-01-01 | End: 2017-01-01

## 2017-01-01 RX ORDER — PANTOPRAZOLE SODIUM 20 MG/1
20 TABLET, DELAYED RELEASE ORAL 2 TIMES DAILY
Status: DISCONTINUED | OUTPATIENT
Start: 2017-01-01 | End: 2017-01-01

## 2017-01-01 RX ORDER — ALPRAZOLAM 0.5 MG/1
0.5 TABLET ORAL EVERY 6 HOURS PRN
Status: DISCONTINUED | OUTPATIENT
Start: 2017-01-01 | End: 2017-01-01

## 2017-01-01 RX ORDER — MORPHINE SULFATE 4 MG/ML
1 INJECTION, SOLUTION INTRAMUSCULAR; INTRAVENOUS EVERY 2 HOUR PRN
Status: DISCONTINUED | OUTPATIENT
Start: 2017-01-01 | End: 2017-01-01

## 2017-01-01 RX ORDER — MYCOPHENOLATE MOFETIL 250 MG/1
1000 CAPSULE ORAL 2 TIMES DAILY
Status: DISCONTINUED | OUTPATIENT
Start: 2017-01-01 | End: 2017-01-01

## 2017-01-01 RX ORDER — ACETAMINOPHEN 325 MG/1
650 TABLET ORAL EVERY 4 HOURS PRN
Status: DISCONTINUED | OUTPATIENT
Start: 2017-01-01 | End: 2017-01-01

## 2017-01-01 RX ORDER — PYRIDOSTIGMINE BROMIDE 60 MG/1
30 TABLET ORAL DAILY
Status: DISCONTINUED | OUTPATIENT
Start: 2017-01-01 | End: 2017-01-01

## 2017-01-01 RX ORDER — PANTOPRAZOLE SODIUM 40 MG/1
40 TABLET, DELAYED RELEASE ORAL
Status: DISCONTINUED | OUTPATIENT
Start: 2017-01-01 | End: 2017-01-01

## 2017-01-01 RX ORDER — SODIUM CHLORIDE 9 MG/ML
INJECTION, SOLUTION INTRAVENOUS ONCE
Status: COMPLETED | OUTPATIENT
Start: 2017-01-01 | End: 2017-01-01

## 2017-01-01 RX ORDER — AMLODIPINE BESYLATE 2.5 MG/1
2.5 TABLET ORAL DAILY
Qty: 30 TABLET | Refills: 3 | Status: SHIPPED | OUTPATIENT
Start: 2017-01-01 | End: 2017-01-01

## 2017-01-01 RX ORDER — DOXEPIN HYDROCHLORIDE 50 MG/1
1 CAPSULE ORAL DAILY
Status: DISCONTINUED | OUTPATIENT
Start: 2017-01-01 | End: 2017-01-01

## 2017-01-01 RX ORDER — BUMETANIDE 1 MG/1
2 TABLET ORAL DAILY
Qty: 180 TABLET | Refills: 0 | Status: SHIPPED | OUTPATIENT
Start: 2017-01-01 | End: 2017-01-01

## 2017-01-01 RX ORDER — DIAZEPAM 2 MG/1
TABLET ORAL
COMMUNITY
Start: 2016-08-03 | End: 2017-01-01 | Stop reason: ALTCHOICE

## 2017-01-01 RX ORDER — BUMETANIDE 1 MG/1
TABLET ORAL
Qty: 180 TABLET | Refills: 0 | Status: SHIPPED | OUTPATIENT
Start: 2017-01-01 | End: 2017-01-01

## 2017-01-01 RX ORDER — SODIUM CHLORIDE 9 MG/ML
INJECTION, SOLUTION INTRAVENOUS CONTINUOUS
Status: ACTIVE | OUTPATIENT
Start: 2017-01-01 | End: 2017-01-01

## 2017-01-01 RX ORDER — BUMETANIDE 2 MG/1
2 TABLET ORAL DAILY
Status: DISCONTINUED | OUTPATIENT
Start: 2017-01-01 | End: 2017-01-01

## 2017-01-01 RX ORDER — HYDROXYZINE HYDROCHLORIDE 25 MG/1
25 TABLET, FILM COATED ORAL EVERY 6 HOURS PRN
COMMUNITY
Start: 2017-01-01 | End: 2017-01-01 | Stop reason: ALTCHOICE

## 2017-01-01 RX ORDER — BUMETANIDE 1 MG/1
2 TABLET ORAL DAILY
COMMUNITY

## 2017-01-01 RX ORDER — NORTRIPTYLINE HYDROCHLORIDE 10 MG/1
20 CAPSULE ORAL NIGHTLY
Status: DISCONTINUED | OUTPATIENT
Start: 2017-01-01 | End: 2017-01-01

## 2017-01-01 RX ORDER — ROSUVASTATIN CALCIUM 5 MG/1
5 TABLET, COATED ORAL NIGHTLY
Qty: 90 TABLET | Refills: 1 | OUTPATIENT
Start: 2017-01-01

## 2017-01-01 RX ORDER — AMLODIPINE BESYLATE 2.5 MG/1
2.5 TABLET ORAL DAILY
Status: DISCONTINUED | OUTPATIENT
Start: 2017-01-01 | End: 2017-01-01

## 2017-01-01 RX ORDER — DIPHENHYDRAMINE HYDROCHLORIDE 50 MG/ML
25 INJECTION INTRAMUSCULAR; INTRAVENOUS ONCE
Status: COMPLETED | OUTPATIENT
Start: 2017-01-01 | End: 2017-01-01

## 2017-01-01 RX ORDER — SENNOSIDES 8.6 MG
17.2 TABLET ORAL NIGHTLY
Status: DISCONTINUED | OUTPATIENT
Start: 2017-01-01 | End: 2017-01-01

## 2017-01-01 RX ORDER — ACETAMINOPHEN 650 MG/1
650 SUPPOSITORY RECTAL EVERY 4 HOURS PRN
Status: DISCONTINUED | OUTPATIENT
Start: 2017-01-01 | End: 2017-01-01

## 2017-01-01 RX ORDER — PYRIDOSTIGMINE BROMIDE 60 MG/1
30 TABLET ORAL DAILY
Qty: 30 TABLET | Refills: 1 | Status: SHIPPED | OUTPATIENT
Start: 2017-01-01 | End: 2017-01-01

## 2017-01-01 RX ORDER — LORAZEPAM 2 MG/ML
1-2 INJECTION INTRAMUSCULAR ONCE
Status: DISCONTINUED | OUTPATIENT
Start: 2017-01-01 | End: 2017-01-01

## 2017-01-01 RX ORDER — BISACODYL 10 MG
10 SUPPOSITORY, RECTAL RECTAL
Status: DISCONTINUED | OUTPATIENT
Start: 2017-01-01 | End: 2017-01-01

## 2017-01-01 RX ORDER — HYDROMORPHONE HYDROCHLORIDE 1 MG/ML
0.5 INJECTION, SOLUTION INTRAMUSCULAR; INTRAVENOUS; SUBCUTANEOUS ONCE
Status: COMPLETED | OUTPATIENT
Start: 2017-01-01 | End: 2017-01-01

## 2017-01-01 RX ORDER — ROSUVASTATIN CALCIUM 5 MG/1
5 TABLET, COATED ORAL NIGHTLY
COMMUNITY
End: 2017-01-01

## 2017-01-01 RX ORDER — GABAPENTIN 100 MG/1
100 CAPSULE ORAL 3 TIMES DAILY
Status: ON HOLD | COMMUNITY
End: 2017-01-01

## 2017-01-01 RX ORDER — ISOSORBIDE DINITRATE 10 MG/1
10 TABLET ORAL 2 TIMES DAILY
COMMUNITY
End: 2018-01-01 | Stop reason: DRUGHIGH

## 2017-01-01 RX ORDER — METOPROLOL TARTRATE 50 MG/1
50 TABLET, FILM COATED ORAL
Qty: 60 TABLET | Refills: 3 | Status: SHIPPED | OUTPATIENT
Start: 2017-01-01 | End: 2017-01-01

## 2017-01-01 RX ORDER — MELATONIN
325 DAILY
Status: DISCONTINUED | OUTPATIENT
Start: 2017-01-01 | End: 2017-01-01

## 2017-01-01 RX ORDER — CHLORHEXIDINE GLUCONATE 4 G/100ML
30 SOLUTION TOPICAL
Status: ACTIVE | OUTPATIENT
Start: 2017-01-01 | End: 2017-01-01

## 2017-01-01 RX ORDER — NORTRIPTYLINE HYDROCHLORIDE 10 MG/1
CAPSULE ORAL
Qty: 120 CAPSULE | Refills: 1 | Status: SHIPPED | OUTPATIENT
Start: 2017-01-01 | End: 2017-01-01

## 2017-01-01 RX ORDER — INSULIN ASPART 100 [IU]/ML
0.2 INJECTION, SOLUTION INTRAVENOUS; SUBCUTANEOUS ONCE
Status: COMPLETED | OUTPATIENT
Start: 2017-01-01 | End: 2017-01-01

## 2017-01-01 RX ORDER — POLYETHYLENE GLYCOL 3350 17 G/17G
17 POWDER, FOR SOLUTION ORAL DAILY PRN
Status: DISCONTINUED | OUTPATIENT
Start: 2017-01-01 | End: 2017-01-01

## 2017-01-01 RX ORDER — ENOXAPARIN SODIUM 100 MG/ML
40 INJECTION SUBCUTANEOUS NIGHTLY
Status: DISCONTINUED | OUTPATIENT
Start: 2017-01-01 | End: 2017-01-01

## 2017-01-01 RX ORDER — NITROGLYCERIN 0.4 MG/1
0.4 TABLET SUBLINGUAL AS NEEDED
COMMUNITY
Start: 2015-10-07 | End: 2018-01-01

## 2017-01-01 RX ORDER — NITROGLYCERIN 0.4 MG/1
0.4 TABLET SUBLINGUAL AS NEEDED
Status: DISCONTINUED | OUTPATIENT
Start: 2017-01-01 | End: 2017-01-01

## 2017-01-01 RX ORDER — HEPARIN SODIUM 5000 [USP'U]/ML
5000 INJECTION, SOLUTION INTRAVENOUS; SUBCUTANEOUS EVERY 12 HOURS SCHEDULED
Status: DISCONTINUED | OUTPATIENT
Start: 2017-01-01 | End: 2017-01-01

## 2017-01-01 RX ORDER — NITROGLYCERIN 0.4 MG/1
0.4 TABLET SUBLINGUAL EVERY 5 MIN PRN
Status: DISCONTINUED | OUTPATIENT
Start: 2017-01-01 | End: 2017-01-01

## 2017-01-01 RX ORDER — SODIUM CHLORIDE 9 MG/ML
INJECTION, SOLUTION INTRAVENOUS CONTINUOUS
Status: DISCONTINUED | OUTPATIENT
Start: 2017-01-01 | End: 2017-01-01

## 2017-01-01 RX ORDER — NITROGLYCERIN 0.4 MG/1
0.4 TABLET SUBLINGUAL ONCE
Status: COMPLETED | OUTPATIENT
Start: 2017-01-01 | End: 2017-01-01

## 2017-01-01 RX ORDER — LORAZEPAM 2 MG/ML
1 INJECTION INTRAMUSCULAR AS NEEDED
Status: DISCONTINUED | OUTPATIENT
Start: 2017-01-01 | End: 2017-01-01

## 2017-01-01 RX ORDER — MORPHINE SULFATE 4 MG/ML
2 INJECTION, SOLUTION INTRAMUSCULAR; INTRAVENOUS EVERY 2 HOUR PRN
Status: DISCONTINUED | OUTPATIENT
Start: 2017-01-01 | End: 2017-01-01

## 2017-01-01 RX ORDER — LIDOCAINE HYDROCHLORIDE 10 MG/ML
INJECTION, SOLUTION INFILTRATION; PERINEURAL
Status: COMPLETED
Start: 2017-01-01 | End: 2017-01-01

## 2017-01-01 RX ORDER — MORPHINE SULFATE 4 MG/ML
4 INJECTION, SOLUTION INTRAMUSCULAR; INTRAVENOUS EVERY 2 HOUR PRN
Status: DISCONTINUED | OUTPATIENT
Start: 2017-01-01 | End: 2017-01-01

## 2017-01-01 RX ORDER — FAMOTIDINE 20 MG/1
20 TABLET ORAL DAILY
Status: DISCONTINUED | OUTPATIENT
Start: 2017-01-01 | End: 2017-01-01

## 2017-01-01 RX ORDER — ONDANSETRON 2 MG/ML
4 INJECTION INTRAMUSCULAR; INTRAVENOUS EVERY 4 HOURS PRN
Status: DISCONTINUED | OUTPATIENT
Start: 2017-01-01 | End: 2017-01-01

## 2017-01-01 RX ORDER — METOCLOPRAMIDE HYDROCHLORIDE 5 MG/ML
5 INJECTION INTRAMUSCULAR; INTRAVENOUS EVERY 8 HOURS PRN
Status: DISCONTINUED | OUTPATIENT
Start: 2017-01-01 | End: 2017-01-01

## 2017-01-01 RX ORDER — ALENDRONATE SODIUM 70 MG/1
70 TABLET ORAL WEEKLY
COMMUNITY
End: 2017-01-01

## 2017-01-01 RX ORDER — URSODIOL 300 MG/1
300 CAPSULE ORAL 3 TIMES DAILY
COMMUNITY

## 2017-01-01 RX ORDER — HEPARIN SODIUM 5000 [USP'U]/ML
INJECTION, SOLUTION INTRAVENOUS; SUBCUTANEOUS
Status: COMPLETED
Start: 2017-01-01 | End: 2017-01-01

## 2017-01-01 RX ORDER — LABETALOL HYDROCHLORIDE 5 MG/ML
10 INJECTION, SOLUTION INTRAVENOUS EVERY 4 HOURS PRN
Status: DISCONTINUED | OUTPATIENT
Start: 2017-01-01 | End: 2017-01-01

## 2017-01-01 RX ORDER — ASPIRIN 300 MG
300 SUPPOSITORY, RECTAL RECTAL DAILY
Status: DISCONTINUED | OUTPATIENT
Start: 2017-01-01 | End: 2017-01-01

## 2017-01-01 RX ORDER — ASPIRIN 81 MG/1
324 TABLET, CHEWABLE ORAL ONCE
Status: COMPLETED | OUTPATIENT
Start: 2017-01-01 | End: 2017-01-01

## 2017-01-01 RX ORDER — GABAPENTIN 300 MG/1
300 CAPSULE ORAL 3 TIMES DAILY
Status: DISCONTINUED | OUTPATIENT
Start: 2017-01-01 | End: 2017-01-01

## 2017-01-01 RX ORDER — HEPARIN SODIUM 1000 [USP'U]/ML
INJECTION, SOLUTION INTRAVENOUS; SUBCUTANEOUS
Status: COMPLETED
Start: 2017-01-01 | End: 2017-01-01

## 2017-01-01 RX ORDER — MORPHINE SULFATE 2 MG/ML
1 INJECTION, SOLUTION INTRAMUSCULAR; INTRAVENOUS EVERY 4 HOURS PRN
Status: DISCONTINUED | OUTPATIENT
Start: 2017-01-01 | End: 2017-01-01

## 2017-01-01 RX ORDER — HYDROCODONE BITARTRATE AND ACETAMINOPHEN 5; 325 MG/1; MG/1
1 TABLET ORAL EVERY 6 HOURS PRN
Qty: 20 TABLET | Refills: 0 | Status: SHIPPED | OUTPATIENT
Start: 2017-01-01 | End: 2017-01-01 | Stop reason: ALTCHOICE

## 2017-01-01 RX ORDER — CLINDAMYCIN PHOSPHATE 150 MG/ML
INJECTION, SOLUTION INTRAVENOUS
Status: COMPLETED
Start: 2017-01-01 | End: 2017-01-01

## 2017-01-01 RX ORDER — HYDROCODONE BITARTRATE AND ACETAMINOPHEN 10; 325 MG/1; MG/1
1 TABLET ORAL EVERY 4 HOURS PRN
Status: DISCONTINUED | OUTPATIENT
Start: 2017-01-01 | End: 2017-01-01

## 2017-01-01 RX ORDER — FAMOTIDINE 10 MG/ML
20 INJECTION, SOLUTION INTRAVENOUS DAILY
Status: DISCONTINUED | OUTPATIENT
Start: 2017-01-01 | End: 2017-01-01

## 2017-01-01 RX ORDER — GABAPENTIN 400 MG/1
400 CAPSULE ORAL 3 TIMES DAILY
Qty: 270 CAPSULE | Refills: 0 | Status: SHIPPED | OUTPATIENT
Start: 2017-01-01 | End: 2018-01-01

## 2017-01-01 RX ORDER — DIPHENHYDRAMINE HYDROCHLORIDE 50 MG/ML
INJECTION INTRAMUSCULAR; INTRAVENOUS
Status: COMPLETED
Start: 2017-01-01 | End: 2017-01-01

## 2017-01-01 RX ORDER — GABAPENTIN 300 MG/1
300 CAPSULE ORAL 3 TIMES DAILY
Status: ON HOLD | COMMUNITY
End: 2017-01-01

## 2017-01-01 RX ORDER — LABETALOL HYDROCHLORIDE 5 MG/ML
10 INJECTION, SOLUTION INTRAVENOUS EVERY 10 MIN PRN
Status: DISCONTINUED | OUTPATIENT
Start: 2017-01-01 | End: 2017-01-01

## 2017-01-01 RX ORDER — GABAPENTIN 400 MG/1
400 CAPSULE ORAL 3 TIMES DAILY
Qty: 90 CAPSULE | Refills: 1 | Status: SHIPPED | OUTPATIENT
Start: 2017-01-01 | End: 2017-01-01

## 2017-01-01 RX ORDER — ERGOCALCIFEROL 1.25 MG/1
50000 CAPSULE ORAL WEEKLY
Status: DISCONTINUED | OUTPATIENT
Start: 2017-01-01 | End: 2017-01-01

## 2017-01-01 RX ORDER — MIDAZOLAM HYDROCHLORIDE 1 MG/ML
INJECTION INTRAMUSCULAR; INTRAVENOUS
Status: COMPLETED
Start: 2017-01-01 | End: 2017-01-01

## 2017-01-01 RX ORDER — MORPHINE SULFATE 2 MG/ML
2 INJECTION, SOLUTION INTRAMUSCULAR; INTRAVENOUS EVERY 4 HOURS PRN
Status: DISCONTINUED | OUTPATIENT
Start: 2017-01-01 | End: 2017-01-01

## 2017-01-01 RX ORDER — ASPIRIN 81 MG/1
324 TABLET, CHEWABLE ORAL ONCE
Status: DISCONTINUED | OUTPATIENT
Start: 2017-01-01 | End: 2017-01-01

## 2017-01-01 RX ORDER — IBUPROFEN 200 MG
200 TABLET ORAL EVERY 6 HOURS PRN
Status: DISCONTINUED | OUTPATIENT
Start: 2017-01-01 | End: 2017-01-01

## 2017-01-01 RX ORDER — ROSUVASTATIN CALCIUM 20 MG/1
5 TABLET, COATED ORAL NIGHTLY
Status: DISCONTINUED | OUTPATIENT
Start: 2017-01-01 | End: 2017-01-01

## 2017-01-01 RX ORDER — METOCLOPRAMIDE HYDROCHLORIDE 5 MG/ML
5 INJECTION INTRAMUSCULAR; INTRAVENOUS ONCE
Status: COMPLETED | OUTPATIENT
Start: 2017-01-01 | End: 2017-01-01

## 2017-01-01 RX ORDER — ISOSORBIDE DINITRATE 10 MG/1
10 TABLET ORAL 2 TIMES DAILY
Status: DISCONTINUED | OUTPATIENT
Start: 2017-01-01 | End: 2017-01-01

## 2017-01-01 RX ORDER — LORAZEPAM 2 MG/ML
1 INJECTION INTRAMUSCULAR ONCE AS NEEDED
Status: ACTIVE | OUTPATIENT
Start: 2017-01-01 | End: 2017-01-01

## 2017-01-01 RX ORDER — PANTOPRAZOLE SODIUM 40 MG/1
40 TABLET, DELAYED RELEASE ORAL DAILY
Status: ON HOLD | COMMUNITY
Start: 2017-01-01 | End: 2017-01-01

## 2017-01-01 RX ORDER — AMITRIPTYLINE HYDROCHLORIDE 25 MG/1
25 TABLET, FILM COATED ORAL NIGHTLY
Status: DISCONTINUED | OUTPATIENT
Start: 2017-01-01 | End: 2017-01-01

## 2017-01-01 RX ORDER — HYDROCODONE BITARTRATE AND ACETAMINOPHEN 5; 325 MG/1; MG/1
1-2 TABLET ORAL EVERY 6 HOURS PRN
Status: DISCONTINUED | OUTPATIENT
Start: 2017-01-01 | End: 2017-01-01

## 2017-01-01 RX ORDER — MULTIVITAMIN WITH FOLIC ACID 400 MCG
1 TABLET ORAL DAILY
COMMUNITY

## 2017-01-01 RX ORDER — ROSUVASTATIN CALCIUM 5 MG/1
5 TABLET, COATED ORAL NIGHTLY
Qty: 30 TABLET | Refills: 0 | Status: CANCELLED | OUTPATIENT
Start: 2017-01-01

## 2017-01-01 RX ORDER — ROSUVASTATIN CALCIUM 5 MG/1
5 TABLET, COATED ORAL NIGHTLY
Qty: 90 TABLET | Refills: 1 | Status: SHIPPED | OUTPATIENT
Start: 2017-01-01 | End: 2017-01-01

## 2017-01-01 RX ORDER — NITROGLYCERIN 0.4 MG/1
TABLET SUBLINGUAL
Status: COMPLETED
Start: 2017-01-01 | End: 2017-01-01

## 2017-01-01 RX ORDER — NORTRIPTYLINE HYDROCHLORIDE 10 MG/1
20 CAPSULE ORAL NIGHTLY
COMMUNITY
End: 2018-01-01

## 2017-01-01 RX ORDER — CLINDAMYCIN HYDROCHLORIDE 300 MG/1
300 CAPSULE ORAL 3 TIMES DAILY
Qty: 21 CAPSULE | Refills: 0 | Status: ON HOLD | OUTPATIENT
Start: 2017-01-01 | End: 2017-01-01

## 2017-01-01 RX ORDER — MORPHINE SULFATE 4 MG/ML
4 INJECTION, SOLUTION INTRAMUSCULAR; INTRAVENOUS ONCE
Status: COMPLETED | OUTPATIENT
Start: 2017-01-01 | End: 2017-01-01

## 2017-01-01 RX ORDER — ONDANSETRON 2 MG/ML
4 INJECTION INTRAMUSCULAR; INTRAVENOUS ONCE
Status: COMPLETED | OUTPATIENT
Start: 2017-01-01 | End: 2017-01-01

## 2017-01-01 RX ORDER — HYDROCODONE BITARTRATE AND ACETAMINOPHEN 5; 325 MG/1; MG/1
1 TABLET ORAL EVERY 6 HOURS PRN
Status: DISCONTINUED | OUTPATIENT
Start: 2017-01-01 | End: 2017-01-01

## 2017-01-05 ENCOUNTER — OFFICE VISIT (OUTPATIENT)
Dept: NEUROLOGY | Facility: CLINIC | Age: 67
End: 2017-01-05

## 2017-01-05 VITALS
BODY MASS INDEX: 37.51 KG/M2 | DIASTOLIC BLOOD PRESSURE: 64 MMHG | HEART RATE: 80 BPM | HEIGHT: 76 IN | SYSTOLIC BLOOD PRESSURE: 140 MMHG | RESPIRATION RATE: 16 BRPM | WEIGHT: 308 LBS

## 2017-01-05 DIAGNOSIS — E78.2 MIXED HYPERLIPIDEMIA: ICD-10-CM

## 2017-01-05 DIAGNOSIS — I65.21 INTRACRANIAL CAROTID STENOSIS, RIGHT: ICD-10-CM

## 2017-01-05 DIAGNOSIS — R55 SYNCOPE, UNSPECIFIED SYNCOPE TYPE: ICD-10-CM

## 2017-01-05 DIAGNOSIS — E11.43 AUTONOMIC NEUROPATHY DUE TO TYPE 2 DIABETES MELLITUS (HCC): Primary | ICD-10-CM

## 2017-01-05 PROCEDURE — 99215 OFFICE O/P EST HI 40 MIN: CPT | Performed by: OTHER

## 2017-01-05 RX ORDER — HYDROCODONE BITARTRATE AND ACETAMINOPHEN 5; 325 MG/1; MG/1
1 TABLET ORAL EVERY 6 HOURS PRN
COMMUNITY
Start: 2016-11-17 | End: 2017-01-11

## 2017-01-05 NOTE — PROGRESS NOTES
[employer] in [address] [facility]  Neurology - Clinic Follow up  2017, 3:09 PM     Krysta Dunlap Patient Status:  No patient class for patient encounter    3/1/1950 MRN [id number]   Location [unfilled] [facility] Daija Limb     Patient pre • Obesity    • Peripheral vascular disease ([facility] [address] 2005     diabets broken ankle led to amputation   • Kidney disorder        PAST SURGICAL HISTORY:       Past Surgical History    OTHER      Comment LLIVER TRANSPLANT    LAPS GSTR RSTCV PX PLMT BAND      CA Comment:Muscle weakness  Sulfa Antibiotics         Vancomycin                  Comment:Other reaction(s): Joint Pain    MEDICATIONS: EMR reviewed   Current outpatient prescriptions:   •  gabapentin 300 MG Oral Cap, Take 300 mg by mouth 2 (two) times daily. (REVIA) 50 MG Oral Tab, Take 50 mg by mouth nightly.  , Disp: , Rfl:   •  Multiple Vitamins-Minerals (CENTRUM SILVER) Oral Tab, Take 1 tablet by mouth daily. , Disp: , Rfl:   •  Clopidogrel Bisulfate (PLAVIX) 75 MG Oral Tab, Take 75 mg by mouth daily. , Disp Is intact for age, and Language is intact, no slurred speech; calm, no acute stress, pleasant,   CN is normal from II to XII, visual field is full, EOMI, pupil symmetrical, light reflexes are present, fundus exam is normal. Face symmetrical with normal se given recent stroke  -holter monitor pending  -ok for cardiac rehab, if he skips an insulin dose or a meal or is dehydrated, he should not go to rehab that day as those have been his triggers for the prolonged syncopal episodes  -high grade compression sto

## 2017-01-05 NOTE — PROGRESS NOTES
Pt here for f/u on syncopal episodes.   Pt reports no syncopal episodes, but does report 2 occasions - 1 before Xmas and 1 after Xmas - where pt experienced a sharp pain behind right eye followed by numbness on entire right side of body and confusions/disor

## 2017-01-05 NOTE — PATIENT INSTRUCTIONS
Refill policies:    • Allow 2 business days for refills; controlled substances may take longer.   • Contact your pharmacy at least 5 days prior to running out of medication and have them send an electronic request or submit request through the “request re your physician has recommended that you have a procedure or additional testing performed. [facility] will contact your insurance carrier to obtain pre-certification or prior authorization.     Unfortunately, [employer] has seen an increas

## 2017-01-06 ENCOUNTER — TELEPHONE (OUTPATIENT)
Dept: NEUROLOGY | Facility: CLINIC | Age: 67
End: 2017-01-06

## 2017-01-06 NOTE — TELEPHONE ENCOUNTER
Authorization #[id number] for MRA [facility] [id number] at [address] [place] [address] exp 2-5-17    Called patient left detailed message including centralized scheduling phone #

## 2017-01-11 ENCOUNTER — HOSPITAL ENCOUNTER (INPATIENT)
Facility: HOSPITAL | Age: 67
LOS: 1 days | Discharge: HOME | DRG: 287 | End: 2017-01-12
Attending: EMERGENCY MEDICINE | Admitting: INTERNAL MEDICINE
Payer: MEDICARE

## 2017-01-11 ENCOUNTER — CARDPULM VISIT (OUTPATIENT)
Dept: CARDIAC REHAB | Facility: HOSPITAL | Age: 67
End: 2017-01-11
Attending: INTERNAL MEDICINE
Payer: MEDICARE

## 2017-01-11 ENCOUNTER — APPOINTMENT (OUTPATIENT)
Dept: GENERAL RADIOLOGY | Facility: HOSPITAL | Age: 67
DRG: 287 | End: 2017-01-11
Attending: EMERGENCY MEDICINE
Payer: MEDICARE

## 2017-01-11 DIAGNOSIS — N18.30 CHRONIC KIDNEY DISEASE, STAGE 3 (MODERATE): ICD-10-CM

## 2017-01-11 DIAGNOSIS — Z86.69 HISTORY OF MIGRAINE: ICD-10-CM

## 2017-01-11 DIAGNOSIS — I10 BENIGN ESSENTIAL HTN: ICD-10-CM

## 2017-01-11 DIAGNOSIS — I25.119 CORONARY ARTERY DISEASE INVOLVING NATIVE HEART WITH ANGINA PECTORIS, UNSPECIFIED VESSEL OR LESION TYPE (HCC): ICD-10-CM

## 2017-01-11 DIAGNOSIS — R07.9 CHEST PAIN, RULE OUT ACUTE MYOCARDIAL INFARCTION: Primary | ICD-10-CM

## 2017-01-11 DIAGNOSIS — E11.9 TYPE 2 DIABETES MELLITUS WITHOUT COMPLICATION, WITHOUT LONG-TERM CURRENT USE OF INSULIN (HCC): ICD-10-CM

## 2017-01-11 LAB
ALBUMIN SERPL-MCNC: 3 G/DL (ref 3.5–4.8)
ALP LIVER SERPL-CCNC: 1541 U/L (ref 45–117)
ALT SERPL-CCNC: 95 U/L (ref 17–63)
APTT PPP: 29.5 SECONDS (ref 25–34)
AST SERPL-CCNC: 70 U/L (ref 15–41)
BASOPHILS # BLD AUTO: 0.01 X10(3) UL (ref 0–0.1)
BASOPHILS NFR BLD AUTO: 0.1 %
BILIRUB SERPL-MCNC: 2.3 MG/DL (ref 0.1–2)
BUN BLD-MCNC: 33 MG/DL (ref 8–20)
CALCIUM BLD-MCNC: 9.1 MG/DL (ref 8.3–10.3)
CHLORIDE: 107 MMOL/L (ref 101–111)
CO2: 22 MMOL/L (ref 22–32)
CREAT BLD-MCNC: 2.14 MG/DL (ref 0.7–1.3)
EOSINOPHIL # BLD AUTO: 0.5 X10(3) UL (ref 0–0.3)
EOSINOPHIL NFR BLD AUTO: 7.4 %
ERYTHROCYTE [DISTWIDTH] IN BLOOD BY AUTOMATED COUNT: 13.3 % (ref 11.5–16)
GLUCOSE BLD-MCNC: 143 MG/DL (ref 65–99)
GLUCOSE BLD-MCNC: 190 MG/DL (ref 70–99)
GLUCOSE BLD-MCNC: 70 MG/DL (ref 65–99)
HCT VFR BLD AUTO: 36.9 % (ref 37–53)
HGB BLD-MCNC: 12.2 G/DL (ref 13–17)
IMMATURE GRANULOCYTE COUNT: 0.03 X10(3) UL (ref 0–1)
IMMATURE GRANULOCYTE RATIO %: 0.4 %
INR BLD: 0.91 (ref 0.89–1.12)
LYMPHOCYTES # BLD AUTO: 1.03 X10(3) UL (ref 0.9–4)
LYMPHOCYTES NFR BLD AUTO: 15.3 %
M PROTEIN MFR SERPL ELPH: 7.1 G/DL (ref 6.1–8.3)
MCH RBC QN AUTO: 30 PG (ref 27–33.2)
MCHC RBC AUTO-ENTMCNC: 33.1 G/DL (ref 31–37)
MCV RBC AUTO: 90.9 FL (ref 80–99)
MONOCYTES # BLD AUTO: 0.51 X10(3) UL (ref 0.1–0.6)
MONOCYTES NFR BLD AUTO: 7.6 %
NEUTROPHIL ABS PRELIM: 4.67 X10 (3) UL (ref 1.3–6.7)
NEUTROPHILS # BLD AUTO: 4.67 X10(3) UL (ref 1.3–6.7)
NEUTROPHILS NFR BLD AUTO: 69.2 %
PLATELET # BLD AUTO: 181 10(3)UL (ref 150–450)
POTASSIUM SERPL-SCNC: 3.7 MMOL/L (ref 3.6–5.1)
PSA SERPL DL<=0.01 NG/ML-MCNC: 12.5 SECONDS (ref 12.3–14.8)
RBC # BLD AUTO: 4.06 X10(6)UL (ref 4.3–5.7)
RED CELL DISTRIBUTION WIDTH-SD: 44 FL (ref 35.1–46.3)
SODIUM SERPL-SCNC: 137 MMOL/L (ref 136–144)
TROPONIN: <0.046 NG/ML (ref ?–0.05)
WBC # BLD AUTO: 6.8 X10(3) UL (ref 4–13)

## 2017-01-11 PROCEDURE — 93798 PHYS/QHP OP CAR RHAB W/ECG: CPT

## 2017-01-11 PROCEDURE — 99223 1ST HOSP IP/OBS HIGH 75: CPT | Performed by: INTERNAL MEDICINE

## 2017-01-11 PROCEDURE — 71010 XR CHEST AP PORTABLE  (CPT=71010): CPT

## 2017-01-11 RX ORDER — ASPIRIN 325 MG
325 TABLET ORAL DAILY
Status: DISCONTINUED | OUTPATIENT
Start: 2017-01-11 | End: 2017-01-11

## 2017-01-11 RX ORDER — FERROUS SULFATE TAB EC 324 MG (65 MG FE EQUIVALENT) 324 (65 FE) MG
1 TABLET DELAYED RESPONSE ORAL DAILY
COMMUNITY
End: 2017-01-01

## 2017-01-11 RX ORDER — SODIUM PHOSPHATE, DIBASIC AND SODIUM PHOSPHATE, MONOBASIC 7; 19 G/133ML; G/133ML
1 ENEMA RECTAL ONCE AS NEEDED
Status: ACTIVE | OUTPATIENT
Start: 2017-01-11 | End: 2017-01-11

## 2017-01-11 RX ORDER — ASPIRIN 325 MG
325 TABLET, DELAYED RELEASE (ENTERIC COATED) ORAL DAILY
Status: DISCONTINUED | OUTPATIENT
Start: 2017-01-11 | End: 2017-01-11

## 2017-01-11 RX ORDER — NITROGLYCERIN 0.4 MG/1
0.4 TABLET SUBLINGUAL ONCE
Status: COMPLETED | OUTPATIENT
Start: 2017-01-11 | End: 2017-01-11

## 2017-01-11 RX ORDER — DIPHENHYDRAMINE HCL 25 MG
25 CAPSULE ORAL NIGHTLY
Status: DISCONTINUED | OUTPATIENT
Start: 2017-01-11 | End: 2017-01-12

## 2017-01-11 RX ORDER — NITROGLYCERIN 20 MG/100ML
INJECTION INTRAVENOUS CONTINUOUS
Status: DISCONTINUED | OUTPATIENT
Start: 2017-01-11 | End: 2017-01-12

## 2017-01-11 RX ORDER — FINASTERIDE 5 MG/1
5 TABLET, FILM COATED ORAL DAILY
Status: DISCONTINUED | OUTPATIENT
Start: 2017-01-12 | End: 2017-01-12

## 2017-01-11 RX ORDER — MORPHINE SULFATE 4 MG/ML
4 INJECTION, SOLUTION INTRAMUSCULAR; INTRAVENOUS EVERY 2 HOUR PRN
Status: DISCONTINUED | OUTPATIENT
Start: 2017-01-11 | End: 2017-01-12

## 2017-01-11 RX ORDER — POTASSIUM CHLORIDE 20 MEQ/1
20 TABLET, EXTENDED RELEASE ORAL ONCE
Status: COMPLETED | OUTPATIENT
Start: 2017-01-11 | End: 2017-01-11

## 2017-01-11 RX ORDER — MYCOPHENOLATE MOFETIL 250 MG/1
1000 CAPSULE ORAL 2 TIMES DAILY
Status: DISCONTINUED | OUTPATIENT
Start: 2017-01-11 | End: 2017-01-12

## 2017-01-11 RX ORDER — URSODIOL 300 MG/1
300 CAPSULE ORAL DAILY
Status: DISCONTINUED | OUTPATIENT
Start: 2017-01-11 | End: 2017-01-12

## 2017-01-11 RX ORDER — MELATONIN
325 DAILY
Status: DISCONTINUED | OUTPATIENT
Start: 2017-01-12 | End: 2017-01-12

## 2017-01-11 RX ORDER — HEPARIN SODIUM AND DEXTROSE 10000; 5 [USP'U]/100ML; G/100ML
INJECTION INTRAVENOUS CONTINUOUS
Status: CANCELLED | OUTPATIENT
Start: 2017-01-12

## 2017-01-11 RX ORDER — NITROGLYCERIN 0.4 MG/1
0.4 TABLET SUBLINGUAL EVERY 5 MIN PRN
Status: DISCONTINUED | OUTPATIENT
Start: 2017-01-11 | End: 2017-01-12

## 2017-01-11 RX ORDER — HEPARIN SODIUM 5000 [USP'U]/ML
5000 INJECTION INTRAVENOUS; SUBCUTANEOUS ONCE
Status: COMPLETED | OUTPATIENT
Start: 2017-01-11 | End: 2017-01-11

## 2017-01-11 RX ORDER — GABAPENTIN 300 MG/1
300 CAPSULE ORAL 3 TIMES DAILY
Status: DISCONTINUED | OUTPATIENT
Start: 2017-01-11 | End: 2017-01-12

## 2017-01-11 RX ORDER — BISACODYL 10 MG
10 SUPPOSITORY, RECTAL RECTAL
Status: DISCONTINUED | OUTPATIENT
Start: 2017-01-11 | End: 2017-01-12

## 2017-01-11 RX ORDER — ALPRAZOLAM 0.5 MG/1
0.5 TABLET ORAL AS NEEDED
Status: DISCONTINUED | OUTPATIENT
Start: 2017-01-11 | End: 2017-01-12

## 2017-01-11 RX ORDER — ASPIRIN 325 MG
325 TABLET ORAL DAILY
Status: DISCONTINUED | OUTPATIENT
Start: 2017-01-11 | End: 2017-01-12

## 2017-01-11 RX ORDER — MORPHINE SULFATE 2 MG/ML
1 INJECTION, SOLUTION INTRAMUSCULAR; INTRAVENOUS EVERY 2 HOUR PRN
Status: DISCONTINUED | OUTPATIENT
Start: 2017-01-11 | End: 2017-01-12

## 2017-01-11 RX ORDER — TOPIRAMATE 25 MG/1
12.5 TABLET ORAL NIGHTLY
Status: DISCONTINUED | OUTPATIENT
Start: 2017-01-11 | End: 2017-01-12

## 2017-01-11 RX ORDER — POLYETHYLENE GLYCOL 3350 17 G/17G
17 POWDER, FOR SOLUTION ORAL DAILY PRN
Status: DISCONTINUED | OUTPATIENT
Start: 2017-01-11 | End: 2017-01-12

## 2017-01-11 RX ORDER — ONDANSETRON 2 MG/ML
4 INJECTION INTRAMUSCULAR; INTRAVENOUS EVERY 6 HOURS PRN
Status: DISCONTINUED | OUTPATIENT
Start: 2017-01-11 | End: 2017-01-12

## 2017-01-11 RX ORDER — ALFUZOSIN HYDROCHLORIDE 10 MG/1
10 TABLET, EXTENDED RELEASE ORAL
Status: DISCONTINUED | OUTPATIENT
Start: 2017-01-12 | End: 2017-01-12

## 2017-01-11 RX ORDER — SODIUM CHLORIDE 9 MG/ML
INJECTION, SOLUTION INTRAVENOUS CONTINUOUS
Status: DISCONTINUED | OUTPATIENT
Start: 2017-01-11 | End: 2017-01-12

## 2017-01-11 RX ORDER — LEVOTHYROXINE SODIUM 300 UG/1
325 TABLET ORAL
Status: ON HOLD | COMMUNITY
End: 2017-04-20

## 2017-01-11 RX ORDER — ISOSORBIDE DINITRATE 20 MG/1
10 TABLET ORAL 2 TIMES DAILY
Status: DISCONTINUED | OUTPATIENT
Start: 2017-01-11 | End: 2017-01-11

## 2017-01-11 RX ORDER — HEPARIN SODIUM AND DEXTROSE 10000; 5 [USP'U]/100ML; G/100ML
1000 INJECTION INTRAVENOUS ONCE
Status: DISCONTINUED | OUTPATIENT
Start: 2017-01-11 | End: 2017-01-11

## 2017-01-11 RX ORDER — NALTREXONE HYDROCHLORIDE 50 MG/1
50 TABLET, FILM COATED ORAL
Status: DISCONTINUED | OUTPATIENT
Start: 2017-01-11 | End: 2017-01-12

## 2017-01-11 RX ORDER — ASPIRIN 81 MG/1
162 TABLET, CHEWABLE ORAL ONCE
Status: DISCONTINUED | OUTPATIENT
Start: 2017-01-11 | End: 2017-01-11

## 2017-01-11 RX ORDER — ESCITALOPRAM OXALATE 10 MG/1
10 TABLET ORAL EVERY MORNING
Status: DISCONTINUED | OUTPATIENT
Start: 2017-01-12 | End: 2017-01-12

## 2017-01-11 RX ORDER — BUMETANIDE 2 MG/1
2 TABLET ORAL DAILY
Status: DISCONTINUED | OUTPATIENT
Start: 2017-01-12 | End: 2017-01-11

## 2017-01-11 RX ORDER — MORPHINE SULFATE 2 MG/ML
2 INJECTION, SOLUTION INTRAMUSCULAR; INTRAVENOUS EVERY 2 HOUR PRN
Status: DISCONTINUED | OUTPATIENT
Start: 2017-01-11 | End: 2017-01-12

## 2017-01-11 RX ORDER — CLOPIDOGREL BISULFATE 75 MG/1
75 TABLET ORAL DAILY
Status: DISCONTINUED | OUTPATIENT
Start: 2017-01-12 | End: 2017-01-12

## 2017-01-11 RX ORDER — PANTOPRAZOLE SODIUM 20 MG/1
20 TABLET, DELAYED RELEASE ORAL
Status: DISCONTINUED | OUTPATIENT
Start: 2017-01-12 | End: 2017-01-12

## 2017-01-11 NOTE — CARDIAC REHAB
During the 330 class at approx 4:00 pm pt was on sci fit machine and stated that he was having chest pain 9 out 10. Pt said it radiated to his left arm and he was dizzy and sob.  B/P130/68  Pt assisted to cardiac chair and bs rechecked 222  Was 327 b/p rech

## 2017-01-11 NOTE — ED PROVIDER NOTES
Patient Seen in: [facility] Emergency Department    History   Patient presents with:  Chest Pain Angina (cardiovascular)    Stated Complaint: Chest pain while in cardiac rehab    HPI    59-year-old male with a history of complex migraines, history o COLONOSCOPY N/A 3/4/2016    Comment Procedure: COLONOSCOPY;  Surgeon: Abhay Mena DO;  Location:  ENDOSCOPY    CATARACT      CATH DRUG ELUTING STENT  10/16    Comment om    ANGIOPLASTY (CORONARY)  10/31/16    Comment Stents X 4     ANGIOPLASTY (COR by mouth 3 (three) times daily. Naltrexone HCl (REVIA) 50 MG Oral Tab,  Take 50 mg by mouth nightly. Multiple Vitamins-Minerals (CENTRUM SILVER) Oral Tab,  Take 1 tablet by mouth daily.    Clopidogrel Bisulfate (PLAVIX) 75 MG Oral Tab,  Take 75 mg b without evidence of injection or perforation. Neck exam: Supple. There is no lymphadenopathy or carotid bruit. Cardiovascular exam: Regular rate and rhythm. There are no murmurs, rubs, gallops. Lungs: Clear to auscultation bilaterally.   There is no au states that his pain is markedly reduced after nitroglycerin. Patient will be started on heparin for possible unstable angina. He will be admitted to cardiac telemetry floor. [address] [place] [address] heart specialists cardiology Dr. Marge Orosco.   Admit to Suzanna

## 2017-01-12 ENCOUNTER — PRIOR ORIGINAL RECORDS (OUTPATIENT)
Dept: OTHER | Age: 67
End: 2017-01-12

## 2017-01-12 ENCOUNTER — APPOINTMENT (OUTPATIENT)
Dept: INTERVENTIONAL RADIOLOGY/VASCULAR | Facility: HOSPITAL | Age: 67
DRG: 287 | End: 2017-01-12
Attending: INTERNAL MEDICINE
Payer: MEDICARE

## 2017-01-12 VITALS
TEMPERATURE: 98 F | DIASTOLIC BLOOD PRESSURE: 45 MMHG | BODY MASS INDEX: 38 KG/M2 | SYSTOLIC BLOOD PRESSURE: 124 MMHG | WEIGHT: 308.63 LBS | OXYGEN SATURATION: 100 % | RESPIRATION RATE: 18 BRPM | HEART RATE: 56 BPM

## 2017-01-12 LAB
ATRIAL RATE: 59 BPM
ATRIAL RATE: 74 BPM
BASOPHILS # BLD AUTO: 0.01 X10(3) UL (ref 0–0.1)
BASOPHILS NFR BLD AUTO: 0.1 %
BUN BLD-MCNC: 36 MG/DL (ref 8–20)
CALCIUM BLD-MCNC: 8.5 MG/DL (ref 8.3–10.3)
CHLORIDE: 109 MMOL/L (ref 101–111)
CHOLEST SMN-MCNC: 227 MG/DL (ref ?–200)
CO2: 24 MMOL/L (ref 22–32)
CREAT BLD-MCNC: 2.02 MG/DL (ref 0.7–1.3)
EOSINOPHIL # BLD AUTO: 0.55 X10(3) UL (ref 0–0.3)
EOSINOPHIL NFR BLD AUTO: 8.1 %
ERYTHROCYTE [DISTWIDTH] IN BLOOD BY AUTOMATED COUNT: 13.4 % (ref 11.5–16)
GLUCOSE BLD-MCNC: 100 MG/DL (ref 65–99)
GLUCOSE BLD-MCNC: 102 MG/DL (ref 65–99)
GLUCOSE BLD-MCNC: 107 MG/DL (ref 70–99)
GLUCOSE BLD-MCNC: 111 MG/DL (ref 65–99)
GLUCOSE BLD-MCNC: 124 MG/DL (ref 65–99)
GLUCOSE BLD-MCNC: 149 MG/DL (ref 65–99)
GLUCOSE BLD-MCNC: 52 MG/DL (ref 65–99)
GLUCOSE BLD-MCNC: 56 MG/DL (ref 65–99)
HCT VFR BLD AUTO: 32.6 % (ref 37–53)
HDLC SERPL-MCNC: 39 MG/DL (ref 45–?)
HDLC SERPL: 5.82 {RATIO} (ref ?–4.97)
HGB BLD-MCNC: 10.6 G/DL (ref 13–17)
IMMATURE GRANULOCYTE COUNT: 0.03 X10(3) UL (ref 0–1)
IMMATURE GRANULOCYTE RATIO %: 0.4 %
INR BLD: 0.93 (ref 0.89–1.12)
LDLC SERPL CALC-MCNC: 165 MG/DL (ref ?–130)
LYMPHOCYTES # BLD AUTO: 1.16 X10(3) UL (ref 0.9–4)
LYMPHOCYTES NFR BLD AUTO: 17.1 %
MCH RBC QN AUTO: 29.9 PG (ref 27–33.2)
MCHC RBC AUTO-ENTMCNC: 32.5 G/DL (ref 31–37)
MCV RBC AUTO: 91.8 FL (ref 80–99)
MONOCYTES # BLD AUTO: 0.62 X10(3) UL (ref 0.1–0.6)
MONOCYTES NFR BLD AUTO: 9.2 %
NEUTROPHIL ABS PRELIM: 4.4 X10 (3) UL (ref 1.3–6.7)
NEUTROPHILS # BLD AUTO: 4.4 X10(3) UL (ref 1.3–6.7)
NEUTROPHILS NFR BLD AUTO: 65.1 %
NONHDLC SERPL-MCNC: 188 MG/DL (ref ?–130)
P AXIS: 47 DEGREES
P AXIS: 50 DEGREES
P-R INTERVAL: 194 MS
P-R INTERVAL: 214 MS
PLATELET # BLD AUTO: 146 10(3)UL (ref 150–450)
POTASSIUM SERPL-SCNC: 3.7 MMOL/L (ref 3.6–5.1)
PSA SERPL DL<=0.01 NG/ML-MCNC: 12.7 SECONDS (ref 12.3–14.8)
Q-T INTERVAL: 426 MS
Q-T INTERVAL: 452 MS
QRS DURATION: 180 MS
QRS DURATION: 188 MS
QTC CALCULATION (BEZET): 447 MS
QTC CALCULATION (BEZET): 472 MS
R AXIS: 26 DEGREES
R AXIS: 29 DEGREES
RBC # BLD AUTO: 3.55 X10(6)UL (ref 4.3–5.7)
RED CELL DISTRIBUTION WIDTH-SD: 44.5 FL (ref 35.1–46.3)
SODIUM SERPL-SCNC: 140 MMOL/L (ref 136–144)
T AXIS: 12 DEGREES
T AXIS: 12 DEGREES
TRIGLYCERIDES: 113 MG/DL (ref ?–150)
TROPONIN: <0.046 NG/ML (ref ?–0.05)
VENTRICULAR RATE: 59 BPM
VENTRICULAR RATE: 74 BPM
VLDL: 23 MG/DL (ref 5–40)
WBC # BLD AUTO: 6.8 X10(3) UL (ref 4–13)

## 2017-01-12 PROCEDURE — B211YZZ FLUOROSCOPY OF MULTIPLE CORONARY ARTERIES USING OTHER CONTRAST: ICD-10-PCS | Performed by: INTERNAL MEDICINE

## 2017-01-12 PROCEDURE — 99152 MOD SED SAME PHYS/QHP 5/>YRS: CPT

## 2017-01-12 PROCEDURE — 99153 MOD SED SAME PHYS/QHP EA: CPT

## 2017-01-12 PROCEDURE — 4A023N7 MEASUREMENT OF CARDIAC SAMPLING AND PRESSURE, LEFT HEART, PERCUTANEOUS APPROACH: ICD-10-PCS | Performed by: INTERNAL MEDICINE

## 2017-01-12 PROCEDURE — 99238 HOSP IP/OBS DSCHRG MGMT 30/<: CPT | Performed by: HOSPITALIST

## 2017-01-12 RX ORDER — BUMETANIDE 1 MG/1
2 TABLET ORAL DAILY
Refills: 0 | Status: SHIPPED | COMMUNITY
Start: 2017-01-14 | End: 2017-01-01

## 2017-01-12 RX ORDER — HEPARIN SODIUM 5000 [USP'U]/ML
INJECTION, SOLUTION INTRAVENOUS; SUBCUTANEOUS
Status: COMPLETED
Start: 2017-01-12 | End: 2017-01-12

## 2017-01-12 RX ORDER — DEXTROSE MONOHYDRATE 25 G/50ML
INJECTION, SOLUTION INTRAVENOUS
Status: DISPENSED
Start: 2017-01-12 | End: 2017-01-12

## 2017-01-12 RX ORDER — LIDOCAINE HYDROCHLORIDE 10 MG/ML
INJECTION, SOLUTION INFILTRATION; PERINEURAL
Status: COMPLETED
Start: 2017-01-12 | End: 2017-01-12

## 2017-01-12 RX ORDER — DEXTROSE MONOHYDRATE 25 G/50ML
50 INJECTION, SOLUTION INTRAVENOUS
Status: DISCONTINUED | OUTPATIENT
Start: 2017-01-12 | End: 2017-01-12

## 2017-01-12 RX ORDER — MIDAZOLAM HYDROCHLORIDE 1 MG/ML
INJECTION INTRAMUSCULAR; INTRAVENOUS
Status: COMPLETED
Start: 2017-01-12 | End: 2017-01-12

## 2017-01-12 RX ORDER — POTASSIUM CHLORIDE 20 MEQ/1
20 TABLET, EXTENDED RELEASE ORAL ONCE
Status: COMPLETED | OUTPATIENT
Start: 2017-01-12 | End: 2017-01-12

## 2017-01-12 RX ORDER — DEXTROSE MONOHYDRATE 25 G/50ML
INJECTION, SOLUTION INTRAVENOUS
Status: COMPLETED
Start: 2017-01-12 | End: 2017-01-12

## 2017-01-12 RX ORDER — SODIUM CHLORIDE 9 MG/ML
INJECTION, SOLUTION INTRAVENOUS CONTINUOUS
Status: ACTIVE | OUTPATIENT
Start: 2017-01-12 | End: 2017-01-12

## 2017-01-12 NOTE — PROCEDURES
[facility]  Angiogram Procedure Note    Max Emil Location: Cath Lab    CSN [id number] MRN [id number]   Admission Date 1/11/2017 Procedure Date 1/12/2017   Attending Physician Freda Degroot MD Procedure Physician Jacob Mccormick MD     Pre-Procedure and nondominant. It was not visualized on today's study  2. The LIMA to the LAD was not visualized on today's study it is been visualized multiple times in the past 12 months and is widely patent  3.   The left main coronary artery is no significant steno

## 2017-01-12 NOTE — DISCHARGE SUMMARY
[facility] HOSPITAL[facility]  DISCHARGE SUMMARY     Payal Amos Patient Status:  Inpatient    3/1/1950 MRN [id number]   [facility] 8NE-A Attending Harley Reese MD   Hosp Day # 1 PCP Valorie Thomas     Date of Admission: 2017  Date of [address]  aspirin 325 MG Tabs   Last time this was given:  325 mg on 1/12/2017  9:33 AM        Take 325 mg by mouth daily.     Refills:  0       bumetanide 1 MG Tabs   Commonly known as:  BUMEX   Start taking on:  1/14/2017        Take 2 tablets (2 mg total) by mouth skin daily with breakfast.    Quantity:  10 mL   Refills:  0       isosorbide dinitrate 10 MG Tabs   Commonly known as:  ISORDIL        Take 1 tablet (10 mg total) by mouth 2 (two) times daily.     Quantity:  60 tablet   Refills:  1       metoprolol Tartrat °C)  Pulse:  [50-73] 63  Resp:  [18] 18  BP: (110-147)/(38-89) 132/56 mmHg    Physical Exam:    General: No acute distress. Respiratory: Clear to auscultation bilaterally. No wheezes. No rhonchi. Cardiovascular: S1, S2. Regular rate and rhythm.  No murmu

## 2017-01-12 NOTE — CONSULTS
crystals [facility] cardiology consult: full dose dictated    62 y/o wm with cad sp robotic cabgx 2v in 2015 with sequential lima to lad and diag, with drug coated stenting of om, pdacirc, lad in oct 2016, who presents with several week hx of intermittent angina with

## 2017-01-12 NOTE — PLAN OF CARE
Right groin c/d/i post cath. Okay for discharge today per Dr. Holli Guidry with follow up in the clinic next week. Will restart Bumex Saturday, check BMP on Monday to ensure stable kidney function post cath. Discussed with Mr. bD Harris.     Will try to arrange jere

## 2017-01-12 NOTE — H&P
[facility]  History and Physical     Shay Myers Patient Status:  Inpatient    3/1/1950 MRN [id number]   [facility] 8NE-A Attending Angel Bird MD   Hosp Day # 0 PCP Franci Pagan     Chief Complaint: chest pain    History DO Cielo;  Location: [facility] ENDOSCOPY    CATARACT      CATH DRUG ELUTING STENT  10/16    Comment om    ANGIOPLASTY (CORONARY)  10/31/16    Comment Stents X 4     ANGIOPLASTY (CORONARY)  10/18/16    Comment Stents x 1    ANGIOGRAM  10/18/16    BYPASS SURGERY on File Prior to Encounter:  gabapentin 300 MG Oral Cap Take 300 mg by mouth 3 (three) times daily. Disp:  Rfl:    alprazolam 0.5 MG Oral Tab Take 0.5 mg by mouth as needed.    Disp:  Rfl:    topiramate 25 MG Oral Tab Take 0.5 tablets (12.5 mg total) by m (CELLCEPT) 500 MG Oral Tab Take 1,000 mg by mouth 2 (two) times daily. Disp:  Rfl:        Review of Systems:   A comprehensive 14 point review of systems was completed. Pertinent positives and negatives noted in the HPI.     Physical Exam:    /51 m medications  4. Anemia continue iron  5. BPH continue home medications  6. Hypothyroidism continue Synthroid  7.  Diabetes mellitus type 2 continue sliding scale insulin as well as 50 units of long-acting insulin tonight slight change from his home regimen

## 2017-01-12 NOTE — PLAN OF CARE
Diabetes/Glucose Control    • Glucose maintained within prescribed range Progressing        Patient/Family Goals    • Patient/Family Long Term Goal Progressing    • Patient/Family Short Term Goal Progressing          Received patient from ER admitting with

## 2017-01-12 NOTE — PAYOR COMM NOTE
Attending Physician: Norma Flores MD    Review Type: ADMISSION   Reviewer: Warner Dobson       Date: January 12, 2017 - 9:32 AM  Payor: Natalia Crebea MEDICARE ADV PPO  Authorization Number: [id number]  Admit date: 1/11/2017  4:27 PM   Admitted from Emergency Dep injection 50 mL     Date Action Dose Route User    1/12/2017 0401 Given 50 mL Intravenous Chris Dalton, KEILA      diphenhydrAMINE (BENADRYL) cap/tab 25 mg     Date Action Dose Route User    1/11/2017 2223 Given 25 mg Oral Chris Dalton RN      heparin (POR Intravenous Alessandro Mojica, KEILA    1/11/2017 7516 Rate/Dose Change 10 mcg/min Intravenous Remington Tapia RN    1/11/2017 1711 New Bag 5 mcg/min Intravenous Remington Tapia RN      Pantoprazole Sodium (PROTONIX) EC tab 20 mg     Date Action Dose Ro POCT GLUCOSE - Abnormal; Notable for the following:     POC Glucose 52 (*)     All other components within normal limits   POCT GLUCOSE - Abnormal; Notable for the following:     POC Glucose 56 (*)     All other components within normal limits   POCT GLU order CBC WITH DIFFERENTIAL WITH PLATELET.   Procedure                               Abnormality         Status                     ---------                               -----------         ------                     CBC W/ DIFFERENTIAL[[id number]]

## 2017-01-12 NOTE — HISTORICAL OFFICE NOTE
Marylou Rogers  : 1950  ACCOUNT:  [id number]  [phone]  PCP:  None  TODAY'S DATE: 2016  DICTATED BY:  Boni Choi M.D.]    CHIEF COMPLAINT: [Followup of Angina, stable, Followup of Hypercholesteremia and pure.]    HPI:  [On 2016, Th 11/2016, OM 10/2016, PTCA/Stent and robotic    FAMILY HISTORY: Negative for premature CAD. Negative for AAA. SOCIAL HISTORY: SMOKING: Former tobacco use. used to smoke but quit >5 years ago >20 years ago. CAFFEINE: 2 beverages daily.  ALCOHOL: denies drink him back in 8 weeks and hopefully he will have gone through rehab and back in cardiac rehab by that point. We will need follow up stress testing in the future. ASSESSMENT:  1. CAD, of native vessels  2. Angina, stable  3.  Chest discomfort/tightness

## 2017-01-12 NOTE — PROGRESS NOTES
At 2219 pt reports Cp of a 8 on a 1-10 scale, sublingual nitro given and gtt increase to 6ml/hr Per Dr. Karin Martin. Pt pain was reduced to a 4.

## 2017-01-12 NOTE — CONSULTS
[facility]    PATIENT'S NAME: Nelda Anbael   ATTENDING PHYSICIAN: Dustin Keith MD   CONSULTING PHYSICIAN: Martin Salinas M.D.    PATIENT ACCOUNT#:   [de-identified]    LOCATION:  [address] [place] [address]  MEDICAL RECORD #:   [id number]       DATE OF BIRTH:  [birth year] disease at a young age. REVIEW OF SYSTEMS:  Completed and other than above was negative.         PHYSICAL EXAMINATION:    VITAL SIGNS:  Blood pressure is 120/50, heart rate 70, respiratory rate 18, he is afebrile in no acute distress, saturating at 100

## 2017-01-13 NOTE — PROGRESS NOTES
01/12/17 1823   Clinical Encounter Type   Visited With Patient and family together   Patient's Supportive Strategies/Resources  provided emotional support, including active listening and acknowledgement of concerns   Referral From Patient   Refe

## 2017-01-16 LAB
GLUCOSE BLD-MCNC: 157 MG/DL (ref 65–99)
GLUCOSE BLD-MCNC: 69 MG/DL (ref 65–99)

## 2017-01-27 ENCOUNTER — PRIOR ORIGINAL RECORDS (OUTPATIENT)
Dept: OTHER | Age: 67
End: 2017-01-27

## 2017-02-01 ENCOUNTER — PRIOR ORIGINAL RECORDS (OUTPATIENT)
Dept: OTHER | Age: 67
End: 2017-02-01

## 2017-02-03 ENCOUNTER — TELEPHONE (OUTPATIENT)
Dept: NEUROLOGY | Facility: CLINIC | Age: 67
End: 2017-02-03

## 2017-02-03 LAB
BUN: 36 MG/DL
CALCIUM: 8.5 MG/DL
CHLORIDE: 109 MEQ/L
CHOLESTEROL, TOTAL: 227 MG/DL
CREATININE, SERUM: 2.02 MG/DL
GLUCOSE: 107 MG/DL
HDL CHOLESTEROL: 39 MG/DL
HEMATOCRIT: 32.6 %
HEMOGLOBIN: 10.6 G/DL
LDL CHOLESTEROL: 165 MG/DL
PLATELETS: 146 K/UL
POTASSIUM, SERUM: 3.7 MEQ/L
RED BLOOD COUNT: 3.55 X 10-6/U
SODIUM: 140 MEQ/L
TRIGLYCERIDES: 113 MG/DL
WHITE BLOOD COUNT: 6.8 X 10-3/U

## 2017-02-03 RX ORDER — LORAZEPAM 0.5 MG/1
0.5 TABLET ORAL ONCE
Qty: 2 TABLET | Refills: 0 | Status: SHIPPED | OUTPATIENT
Start: 2017-02-03 | End: 2017-02-03

## 2017-02-03 NOTE — TELEPHONE ENCOUNTER
Spoke with patient and he indicated that he has gotten \"2 pills\" of ativan in the past to assist with his test anxiety. Requesting a sedative for his upcoming MRI procedure on 2/8. Confirmed preferred pharmacy of [facility] in [place].

## 2017-02-03 NOTE — TELEPHONE ENCOUNTER
Rx printed and signed by Dr. Uma Grijalva. Faxed to preferred pharmacy at above number. Fax confirmation received. Spoke with patient to inform him that Rx was faxed. He verbalized understanding.

## 2017-02-07 ENCOUNTER — HOSPITAL ENCOUNTER (INPATIENT)
Facility: HOSPITAL | Age: 67
LOS: 2 days | Discharge: HOME | DRG: 247 | End: 2017-02-11
Attending: EMERGENCY MEDICINE | Admitting: HOSPITALIST
Payer: MEDICARE

## 2017-02-07 ENCOUNTER — APPOINTMENT (OUTPATIENT)
Dept: GENERAL RADIOLOGY | Facility: HOSPITAL | Age: 67
DRG: 247 | End: 2017-02-07
Payer: MEDICARE

## 2017-02-07 ENCOUNTER — PRIOR ORIGINAL RECORDS (OUTPATIENT)
Dept: OTHER | Age: 67
End: 2017-02-07

## 2017-02-07 DIAGNOSIS — R07.9 ACUTE CHEST PAIN: Primary | ICD-10-CM

## 2017-02-07 LAB
ALBUMIN SERPL-MCNC: 3.1 G/DL (ref 3.5–4.8)
ALP LIVER SERPL-CCNC: 1364 U/L (ref 45–117)
ALT SERPL-CCNC: 113 U/L (ref 17–63)
APTT PPP: 28.1 SECONDS (ref 25–34)
AST SERPL-CCNC: 169 U/L (ref 15–41)
BASOPHILS # BLD AUTO: 0.01 X10(3) UL (ref 0–0.1)
BASOPHILS NFR BLD AUTO: 0.2 %
BILIRUB SERPL-MCNC: 4.5 MG/DL (ref 0.1–2)
BUN BLD-MCNC: 41 MG/DL (ref 8–20)
CALCIUM BLD-MCNC: 8.9 MG/DL (ref 8.3–10.3)
CHLORIDE: 104 MMOL/L (ref 101–111)
CO2: 26 MMOL/L (ref 22–32)
CREAT BLD-MCNC: 2.2 MG/DL (ref 0.7–1.3)
EOSINOPHIL # BLD AUTO: 0.36 X10(3) UL (ref 0–0.3)
EOSINOPHIL NFR BLD AUTO: 7.1 %
ERYTHROCYTE [DISTWIDTH] IN BLOOD BY AUTOMATED COUNT: 13.9 % (ref 11.5–16)
GLUCOSE BLD-MCNC: 144 MG/DL (ref 70–99)
GLUCOSE BLD-MCNC: 74 MG/DL (ref 65–99)
HCT VFR BLD AUTO: 36.8 % (ref 37–53)
HGB BLD-MCNC: 12.2 G/DL (ref 13–17)
IMMATURE GRANULOCYTE COUNT: 0.02 X10(3) UL (ref 0–1)
IMMATURE GRANULOCYTE RATIO %: 0.4 %
INR BLD: 0.92 (ref 0.89–1.12)
LYMPHOCYTES # BLD AUTO: 1.02 X10(3) UL (ref 0.9–4)
LYMPHOCYTES NFR BLD AUTO: 20.2 %
M PROTEIN MFR SERPL ELPH: 7.3 G/DL (ref 6.1–8.3)
MCH RBC QN AUTO: 29.9 PG (ref 27–33.2)
MCHC RBC AUTO-ENTMCNC: 33.2 G/DL (ref 31–37)
MCV RBC AUTO: 90.2 FL (ref 80–99)
MONOCYTES # BLD AUTO: 0.4 X10(3) UL (ref 0.1–0.6)
MONOCYTES NFR BLD AUTO: 7.9 %
NEUTROPHIL ABS PRELIM: 3.24 X10 (3) UL (ref 1.3–6.7)
NEUTROPHILS # BLD AUTO: 3.24 X10(3) UL (ref 1.3–6.7)
NEUTROPHILS NFR BLD AUTO: 64.2 %
PLATELET # BLD AUTO: 171 10(3)UL (ref 150–450)
POTASSIUM SERPL-SCNC: 4.7 MMOL/L (ref 3.6–5.1)
PSA SERPL DL<=0.01 NG/ML-MCNC: 12.6 SECONDS (ref 12.3–14.8)
RBC # BLD AUTO: 4.08 X10(6)UL (ref 4.3–5.7)
RED CELL DISTRIBUTION WIDTH-SD: 45.3 FL (ref 35.1–46.3)
SODIUM SERPL-SCNC: 137 MMOL/L (ref 136–144)
TROPONIN: <0.046 NG/ML (ref ?–0.05)
WBC # BLD AUTO: 5.1 X10(3) UL (ref 4–13)

## 2017-02-07 PROCEDURE — 71010 XR CHEST AP PORTABLE  (CPT=71010): CPT

## 2017-02-07 PROCEDURE — 99220 INITIAL OBSERVATION CARE,LEVL III: CPT | Performed by: HOSPITALIST

## 2017-02-07 RX ORDER — ASPIRIN 325 MG
325 TABLET ORAL DAILY
Status: DISCONTINUED | OUTPATIENT
Start: 2017-02-07 | End: 2017-02-07

## 2017-02-07 RX ORDER — ESCITALOPRAM OXALATE 10 MG/1
10 TABLET ORAL EVERY MORNING
Status: DISCONTINUED | OUTPATIENT
Start: 2017-02-08 | End: 2017-02-11

## 2017-02-07 RX ORDER — NITROGLYCERIN 0.4 MG/1
0.4 TABLET SUBLINGUAL EVERY 5 MIN PRN
Status: DISCONTINUED | OUTPATIENT
Start: 2017-02-07 | End: 2017-02-11

## 2017-02-07 RX ORDER — DEXTROSE MONOHYDRATE 25 G/50ML
50 INJECTION, SOLUTION INTRAVENOUS
Status: DISCONTINUED | OUTPATIENT
Start: 2017-02-07 | End: 2017-02-11

## 2017-02-07 RX ORDER — ONDANSETRON 2 MG/ML
4 INJECTION INTRAMUSCULAR; INTRAVENOUS EVERY 6 HOURS PRN
Status: DISCONTINUED | OUTPATIENT
Start: 2017-02-07 | End: 2017-02-11

## 2017-02-07 RX ORDER — ALPRAZOLAM 0.5 MG/1
0.5 TABLET ORAL AS NEEDED
Status: DISCONTINUED | OUTPATIENT
Start: 2017-02-07 | End: 2017-02-08

## 2017-02-07 RX ORDER — ALFUZOSIN HYDROCHLORIDE 10 MG/1
10 TABLET, EXTENDED RELEASE ORAL
Status: DISCONTINUED | OUTPATIENT
Start: 2017-02-08 | End: 2017-02-10

## 2017-02-07 RX ORDER — TOPIRAMATE 25 MG/1
12.5 TABLET ORAL NIGHTLY
Status: DISCONTINUED | OUTPATIENT
Start: 2017-02-07 | End: 2017-02-10

## 2017-02-07 RX ORDER — URSODIOL 300 MG/1
300 CAPSULE ORAL DAILY
Status: DISCONTINUED | OUTPATIENT
Start: 2017-02-07 | End: 2017-02-11

## 2017-02-07 RX ORDER — MYCOPHENOLATE MOFETIL 250 MG/1
1000 CAPSULE ORAL 2 TIMES DAILY
Status: DISCONTINUED | OUTPATIENT
Start: 2017-02-07 | End: 2017-02-11

## 2017-02-07 RX ORDER — SODIUM CHLORIDE 9 MG/ML
INJECTION, SOLUTION INTRAVENOUS CONTINUOUS
Status: DISCONTINUED | OUTPATIENT
Start: 2017-02-07 | End: 2017-02-11

## 2017-02-07 RX ORDER — ASPIRIN 325 MG
325 TABLET ORAL DAILY
Status: DISCONTINUED | OUTPATIENT
Start: 2017-02-07 | End: 2017-02-11

## 2017-02-07 RX ORDER — CLOPIDOGREL BISULFATE 75 MG/1
75 TABLET ORAL DAILY
Status: DISCONTINUED | OUTPATIENT
Start: 2017-02-07 | End: 2017-02-11

## 2017-02-07 RX ORDER — MELATONIN
325 DAILY
Status: DISCONTINUED | OUTPATIENT
Start: 2017-02-07 | End: 2017-02-11

## 2017-02-07 RX ORDER — GABAPENTIN 300 MG/1
300 CAPSULE ORAL 3 TIMES DAILY
Status: DISCONTINUED | OUTPATIENT
Start: 2017-02-07 | End: 2017-02-11

## 2017-02-07 RX ORDER — ASPIRIN 81 MG/1
324 TABLET, CHEWABLE ORAL ONCE
Status: DISCONTINUED | OUTPATIENT
Start: 2017-02-07 | End: 2017-02-07

## 2017-02-07 RX ORDER — PANTOPRAZOLE SODIUM 20 MG/1
20 TABLET, DELAYED RELEASE ORAL
Status: DISCONTINUED | OUTPATIENT
Start: 2017-02-08 | End: 2017-02-11

## 2017-02-07 RX ORDER — HEPARIN SODIUM 5000 [USP'U]/ML
5000 INJECTION, SOLUTION INTRAVENOUS; SUBCUTANEOUS EVERY 8 HOURS SCHEDULED
Status: DISCONTINUED | OUTPATIENT
Start: 2017-02-07 | End: 2017-02-11

## 2017-02-07 RX ORDER — ACETAMINOPHEN 325 MG/1
650 TABLET ORAL EVERY 6 HOURS PRN
Status: DISCONTINUED | OUTPATIENT
Start: 2017-02-07 | End: 2017-02-11

## 2017-02-07 RX ORDER — DIPHENHYDRAMINE HCL 50 MG
50 CAPSULE ORAL NIGHTLY PRN
Status: DISCONTINUED | OUTPATIENT
Start: 2017-02-07 | End: 2017-02-11

## 2017-02-07 RX ORDER — ENOXAPARIN SODIUM 100 MG/ML
40 INJECTION SUBCUTANEOUS DAILY
Status: DISCONTINUED | OUTPATIENT
Start: 2017-02-07 | End: 2017-02-07

## 2017-02-07 RX ORDER — FINASTERIDE 5 MG/1
5 TABLET, FILM COATED ORAL DAILY
Status: DISCONTINUED | OUTPATIENT
Start: 2017-02-07 | End: 2017-02-11

## 2017-02-07 RX ORDER — NALTREXONE HYDROCHLORIDE 50 MG/1
50 TABLET, FILM COATED ORAL
Status: DISCONTINUED | OUTPATIENT
Start: 2017-02-07 | End: 2017-02-11

## 2017-02-07 RX ORDER — BUMETANIDE 2 MG/1
2 TABLET ORAL DAILY
Status: DISCONTINUED | OUTPATIENT
Start: 2017-02-07 | End: 2017-02-11

## 2017-02-07 RX ORDER — ISOSORBIDE DINITRATE 10 MG/1
10 TABLET ORAL 2 TIMES DAILY
Status: DISCONTINUED | OUTPATIENT
Start: 2017-02-07 | End: 2017-02-11

## 2017-02-08 ENCOUNTER — APPOINTMENT (OUTPATIENT)
Dept: CV DIAGNOSTICS | Facility: HOSPITAL | Age: 67
DRG: 247 | End: 2017-02-08
Attending: CLINICAL NURSE SPECIALIST
Payer: MEDICARE

## 2017-02-08 ENCOUNTER — HOSPITAL ENCOUNTER (OUTPATIENT)
Dept: MRI IMAGING | Facility: HOSPITAL | Age: 67
Discharge: HOME | End: 2017-02-08
Attending: Other
Payer: MEDICARE

## 2017-02-08 LAB
ATRIAL RATE: 65 BPM
CHOLEST SMN-MCNC: 243 MG/DL (ref ?–200)
EST. AVERAGE GLUCOSE BLD GHB EST-MCNC: 180 MG/DL (ref 68–126)
GLUCOSE BLD-MCNC: 120 MG/DL (ref 65–99)
GLUCOSE BLD-MCNC: 168 MG/DL (ref 65–99)
GLUCOSE BLD-MCNC: 218 MG/DL (ref 65–99)
GLUCOSE BLD-MCNC: 269 MG/DL (ref 65–99)
HBA1C MFR BLD HPLC: 7.9 % (ref ?–5.7)
HDLC SERPL-MCNC: 30 MG/DL (ref 45–?)
HDLC SERPL: 8.1 {RATIO} (ref ?–4.97)
LDLC SERPL CALC-MCNC: 182 MG/DL (ref ?–130)
NONHDLC SERPL-MCNC: 213 MG/DL (ref ?–130)
P AXIS: 50 DEGREES
P-R INTERVAL: 198 MS
Q-T INTERVAL: 440 MS
QRS DURATION: 184 MS
QTC CALCULATION (BEZET): 457 MS
R AXIS: 44 DEGREES
T AXIS: 28 DEGREES
TRIGLYCERIDES: 157 MG/DL (ref ?–150)
TROPONIN I SERPL-MCNC: 0 NG/ML (ref ?–0.03)
TROPONIN: <0.046 NG/ML (ref ?–0.05)
VENTRICULAR RATE: 65 BPM
VLDL: 31 MG/DL (ref 5–40)

## 2017-02-08 PROCEDURE — 78452 HT MUSCLE IMAGE SPECT MULT: CPT

## 2017-02-08 PROCEDURE — 93018 CV STRESS TEST I&R ONLY: CPT | Performed by: CLINICAL NURSE SPECIALIST

## 2017-02-08 PROCEDURE — 84484 ASSAY OF TROPONIN QUANT: CPT

## 2017-02-08 PROCEDURE — 93017 CV STRESS TEST TRACING ONLY: CPT

## 2017-02-08 PROCEDURE — 99232 SBSQ HOSP IP/OBS MODERATE 35: CPT | Performed by: HOSPITALIST

## 2017-02-08 RX ORDER — ALPRAZOLAM 0.5 MG/1
0.5 TABLET ORAL 2 TIMES DAILY PRN
Status: DISCONTINUED | OUTPATIENT
Start: 2017-02-08 | End: 2017-02-11

## 2017-02-08 RX ORDER — MORPHINE SULFATE 2 MG/ML
1 INJECTION, SOLUTION INTRAMUSCULAR; INTRAVENOUS EVERY 2 HOUR PRN
Status: DISCONTINUED | OUTPATIENT
Start: 2017-02-08 | End: 2017-02-11

## 2017-02-08 NOTE — ED INITIAL ASSESSMENT (HPI)
Pt complaining of mid chest pain radiating to L arm & whole chest x 4 days. + nosebleed x 6 hrs yesterday which was resolved. + dizziness.

## 2017-02-08 NOTE — HISTORICAL OFFICE NOTE
Nisreen Semaj  : 1950  ACCOUNT:  [id number]  [phone]  PCP:  None  TODAY'S DATE: 2017  DICTATED BY:  Shaji Choi M.D.]    CHIEF COMPLAINT: [Followup of CAD, of native vessels, Followup of Hypercholesteremia, pure, Followup of Hypertensio special diet. MARITAL STATUS: . OCCUPATION: disabled.     ALLERGIES: Cipro - Intravenous and Sulfa Antibiotics - CLASS    MEDICATIONS: Selected prescriptions see below    VITAL SIGNS: [B/P - 140/72 , Pulse - 74, Weight -  317, Height -   76 , BMI - 3 recurrent chest pain or shortness of breath. He is having episodes of falls.  He has nonobstructive carotid artery disease and is going to have an MRA/MRI of his brain secondary to severe right-sided head pain that sounds like a migraine with gate imbalance

## 2017-02-08 NOTE — PROGRESS NOTES
[facility]  Cardiology Progress Note    Subjective:  Still with intermittent episodes of chest pressure    Objective:  /50 mmHg  Pulse 60  Temp(Src) 98 °F (36.7 °C) (Oral)  Resp 15  Ht 6' 4\" (1.93 m)  Wt 306 lb (138.8 kg)  BMI 37.26 kg/m2  SpO2 subsequent MV PCI/GIULIA 10/16 - last cath 1/12, medical therapy as above recommended.   · HTN  · CKD  · Hx Liver transplant '06  · Hx (R) BKA '04  · Dyslipidemia  · DM    Plan:    - Lexiscan nuclear stress test.  Further recommendations based on results of st

## 2017-02-08 NOTE — PAYOR COMM NOTE
Attending Physician: Jeff Mathews MD    Review Type: ADMISSION   Reviewer: Agueda Knowles       Date: February 8, 2017 - 10:19 AM  Payor: Flordia Saha MEDICARE ADV PPO  Authorization Number: [id number]  Admit date: 2/7/2017  7:32 PM       REVIEWER COMMENTS  Chief (70-30) 100 UNIT/ML injection 70 Units     Date Action Dose Route User    2/8/2017 0957 Given 70 Units Subcutaneous (Left Upper Arm) Cari Rico, RN      morphINE sulfate (PF) 2 MG/ML injection 1 mg Q2H    Date Action Dose Route User    2/8/2017 1009 Gi LDL Cholesterol 182 (*)     Chol/HDL Ratio 8.10 (*)     Non HDL Chol 213 (*)     All other components within normal limits   HEMOGLOBIN A1C - Abnormal; Notable for the following:     HgbA1C 7.9 (*)     Estimated Average Glucose 180 (*)    CBC W/ DIFFERENTI

## 2017-02-08 NOTE — H&P
[facility]  History and Physical     Delfina Hi Patient Status:  Observation    3/1/1950 MRN [id number]   [facility] 2NE-A Attending Izzy Brown MD   Hosp Day # 0 PCP Joseluis Montes     Chief Complaint: Chest pain    Hist DO Cielo;  Location: [facility] ENDOSCOPY    CATARACT      CATH DRUG ELUTING STENT  10/16    Comment om    ANGIOPLASTY (CORONARY)  10/31/16    Comment Stents X 4     ANGIOPLASTY (CORONARY)  10/18/16    Comment Stents x 1    ANGIOGRAM  10/18/16    BYPASS SURGERY file prior to encounter. Current Outpatient Prescriptions on File Prior to Encounter:  bumetanide 1 MG Oral Tab Take 2 tablets (2 mg total) by mouth daily. Disp:  Rfl: 0   Ferrous Sulfate 324 (65 FE) MG Oral Tab EC Take 1 tablet by mouth daily.  Disp:  Rfl (PLAVIX) 75 MG Oral Tab Take 75 mg by mouth daily. Disp:  Rfl:    DiphenhydrAMINE HCl (BENADRYL) 25 MG Oral Cap Take 50 mg by mouth nightly as needed for Itching or Sleep.    Disp:  Rfl:    Insulin NPH Isophane & Regular (NOVOLIN 70/30) (70-30) 100 UNIT/ML Imaging: Imaging data reviewed in Epic. ASSESSMENT / PLAN:     1. Chest pain, c/w angina  1. Repeat cardiac angiogram 1/2017 c/w patent stents, stable disease  2. May need to have another angiogram vs starting Ranexa  3. Cardiology following  2.

## 2017-02-08 NOTE — PLAN OF CARE
Patient/Family Goals    • Patient/Family Short Term Goal Not Progressing          CARDIOVASCULAR - ADULT    • Maintains optimal cardiac output and hemodynamic stability Progressing    • Absence of cardiac arrhythmias or at baseline Progressing        Diabe team. RN will continue to monitor.

## 2017-02-08 NOTE — PROGRESS NOTES
PRELIMINARY CARDIACDIAGNOSTICS STRESS TESTING RESULTS      Inpatient nuclear Lexiscan stress test ordered by S CINTHYA Cruz. Resting EKG SB, RBBB, 1st degree AVB. Resting vitals 118/63, 56, 99% on room air.     Stationary Lexiscan protocol implemen

## 2017-02-08 NOTE — CONSULTS
Vivek Shelton  3/1/1950    Reason for consult: Angina      HPI: 62 y/o wm with liver transplant, DM, R BKA, CKD, CAD sp robotic cabgx 2v in 2015 with sequential lima to lad and diag, with drug coated stenting of om, pda, circ, lad in oct 2016, 5 stents at History Narrative         Review of Systems:   Constitutional: No fevers, chills, fatigue or night sweats. ENT: No mouth pain, neck pain, running nose, headaches or swollen glands.   Skin: No rashes, pruritus or skin changes,  Respiratory: Denies cough, wh BUN  41*   CREATSERUM  2.20*   CA  8.9   NA  137   K  4.7   CL  104   CO2  26.0     Recent Labs   Lab  02/07/17   1950   RBC  4.08*   HGB  12.2*   HCT  36.8*   MCV  90.2   MCH  29.9   MCHC  33.2   RDW  13.9   NEPRELIM  3.24   WBC  5.1   PLT  171.0 sinus    ----------------------------------------------------------------------------    Conclusions:    1. Left ventricle: The cavity size was normal. Wall thickness was mildly     increased.  Systolic function was normal. The estimated ejection fraction the LAD and diagonal branch (visualized twice in the last 12 months)  3.  Patent stents in the LAD, obtuse marginal and left posterior descending artery    Patient Active Problem List:     Chest pain     Unstable angina (HCC)     Coronary artery disease in month.    2. Will have Interventional review films, as one may be inclined to repeat angio for eval.    3. Otherwise , treatment options may need to augment to Ranexa for support .     4. Dr. Graciela Boggs and Dr. Dorian Mcleod to see patient in am.       COURTNEY Self

## 2017-02-08 NOTE — PROGRESS NOTES
[facility]  Progress Note     Jennifer Bunn Patient Status:  Observation    3/1/1950 MRN [id number]   [facility] 2NE-A Attending Leonarda Julio MD   Hosp Day # 1 PCP Mau Bill     Chief Complaint:  Chest pain     S: Patient breakfast   • Pantoprazole Sodium  20 mg Oral QAM AC   • Naltrexone HCl  50 mg Oral nightly   • mycophenolate mofetil  1,000 mg Oral BID   • metoprolol Tartrate  12.5 mg Oral 2x Daily(Beta Blocker)   • Levothyroxine Sodium  325 mcg Oral Before breakfast

## 2017-02-08 NOTE — ED NOTES
Report given to floor RN THIERNO RIBEIRO [employer], patient updated on bed assignment, patient awaiting transportation.

## 2017-02-08 NOTE — ED PROVIDER NOTES
Patient Seen in: [facility] Emergency Department    History   Patient presents with:  Chest Pain Angina (cardiovascular)    Stated Complaint: chest pain    HPI    Lianna Benson is a 44-year-old presenting with chest pain.   He has been noticing this only pr Comment Right Below knee amputee       Medications :   bumetanide 1 MG Oral Tab,  Take 2 tablets (2 mg total) by mouth daily. Ferrous Sulfate 324 (65 FE) MG Oral Tab EC,  Take 1 tablet by mouth daily.    Cholecalciferol (VITAMIN D) 1000 UNITS Oral Tab,  T the skin every evening. Mycophenolate Mofetil (CELLCEPT) 500 MG Oral Tab,  Take 1,000 mg by mouth 2 (two) times daily.        Family History   Problem Relation Age of Onset   • Cancer Father    • Other[other] [OTHER] Mother          Smoking Status: Former Course     Labs Reviewed   CBC W/ DIFFERENTIAL - Abnormal; Notable for the following:     RBC 4.08 (*)     HGB 12.2 (*)     HCT 36.8 (*)     Eosinophil Absolute 0.36 (*)     All other components within normal limits   TROPONIN I - Normal   PROTHROMBIN TIME

## 2017-02-08 NOTE — PLAN OF CARE
Maintains optimal cardiac output and hemodynamic stability Progressing      Absence of cardiac arrhythmias or at baseline Progressing      Electrolytes maintained within normal limits Progressing      Achieves optimal ventilation and oxygenation Progressin

## 2017-02-08 NOTE — CM/SW NOTE
SW met with patient to assess for discharge needs. Patient has an extensive prior medical history including CABG, BKA, 11 stents, and multiple strokes. Patient identified that he lives in an apartment with his wife. Their home has 0 stairs.   Patient cunningham Mental Status Alert;Oriented   Patient's Home Environment Condo/Apt no elevator   Number of Levels in Home 1   Number of Stair in Home 0   Patient lives with Spouse   Patient Status Prior to Admission   Independent with ADLs and Mobility Yes   Services in

## 2017-02-09 ENCOUNTER — APPOINTMENT (OUTPATIENT)
Dept: MRI IMAGING | Facility: HOSPITAL | Age: 67
DRG: 247 | End: 2017-02-09
Attending: NURSE PRACTITIONER
Payer: MEDICARE

## 2017-02-09 ENCOUNTER — APPOINTMENT (OUTPATIENT)
Dept: GENERAL RADIOLOGY | Facility: HOSPITAL | Age: 67
DRG: 247 | End: 2017-02-09
Attending: NURSE PRACTITIONER
Payer: MEDICARE

## 2017-02-09 ENCOUNTER — APPOINTMENT (OUTPATIENT)
Dept: CT IMAGING | Facility: HOSPITAL | Age: 67
DRG: 247 | End: 2017-02-09
Attending: NURSE PRACTITIONER
Payer: MEDICARE

## 2017-02-09 LAB
ALBUMIN SERPL-MCNC: 3 G/DL (ref 3.5–4.8)
ALLENS TEST: POSITIVE
ALP LIVER SERPL-CCNC: 1176 U/L (ref 45–117)
ALT SERPL-CCNC: 110 U/L (ref 17–63)
ARTERIAL BLD GAS O2 SATURATION: 95 % (ref 92–100)
ARTERIAL BLOOD GAS BASE EXCESS: -1.4
ARTERIAL BLOOD GAS HCO3: 22.8 MEQ/L (ref 22–26)
ARTERIAL BLOOD GAS PCO2: 36 MM HG (ref 35–45)
ARTERIAL BLOOD GAS PH: 7.42 (ref 7.35–7.45)
ARTERIAL BLOOD GAS PO2: 78 MM HG (ref 80–105)
AST SERPL-CCNC: 168 U/L (ref 15–41)
ATRIAL RATE: 56 BPM
ATRIAL RATE: 63 BPM
ATRIAL RATE: 65 BPM
BASOPHILS # BLD AUTO: 0.01 X10(3) UL (ref 0–0.1)
BASOPHILS NFR BLD AUTO: 0.2 %
BILIRUB SERPL-MCNC: 3.7 MG/DL (ref 0.1–2)
BILIRUB UR QL STRIP.AUTO: NEGATIVE
BUN BLD-MCNC: 45 MG/DL (ref 8–20)
CALCIUM BLD-MCNC: 9.1 MG/DL (ref 8.3–10.3)
CALCULATED O2 SATURATION: 96 % (ref 92–100)
CARBOXYHEMOGLOBIN: 1.6 % SAT (ref 0–3)
CHLORIDE: 105 MMOL/L (ref 101–111)
CLARITY UR REFRACT.AUTO: CLEAR
CO2: 25 MMOL/L (ref 22–32)
COLOR UR AUTO: YELLOW
CREAT BLD-MCNC: 2.33 MG/DL (ref 0.7–1.3)
EOSINOPHIL # BLD AUTO: 0.25 X10(3) UL (ref 0–0.3)
EOSINOPHIL NFR BLD AUTO: 5.7 %
ERYTHROCYTE [DISTWIDTH] IN BLOOD BY AUTOMATED COUNT: 14 % (ref 11.5–16)
GLUCOSE BLD-MCNC: 114 MG/DL (ref 65–99)
GLUCOSE BLD-MCNC: 138 MG/DL (ref 65–99)
GLUCOSE BLD-MCNC: 161 MG/DL (ref 65–99)
GLUCOSE BLD-MCNC: 177 MG/DL (ref 65–99)
GLUCOSE BLD-MCNC: 87 MG/DL (ref 65–99)
GLUCOSE BLD-MCNC: 92 MG/DL (ref 70–99)
GLUCOSE UR STRIP.AUTO-MCNC: NEGATIVE MG/DL
HCT VFR BLD AUTO: 34.9 % (ref 37–53)
HGB BLD-MCNC: 11.3 G/DL (ref 13–17)
IMMATURE GRANULOCYTE COUNT: 0.02 X10(3) UL (ref 0–1)
IMMATURE GRANULOCYTE RATIO %: 0.5 %
IONIZED CALCIUM: 1.14 MMOL/L (ref 1.12–1.32)
KETONES UR STRIP.AUTO-MCNC: NEGATIVE MG/DL
LACTIC ACID ARTERIAL: <1.3 MMOL/L (ref 0.5–2)
LEUKOCYTE ESTERASE UR QL STRIP.AUTO: NEGATIVE
LYMPHOCYTES # BLD AUTO: 0.76 X10(3) UL (ref 0.9–4)
LYMPHOCYTES NFR BLD AUTO: 17.3 %
M PROTEIN MFR SERPL ELPH: 6.9 G/DL (ref 6.1–8.3)
MCH RBC QN AUTO: 30.1 PG (ref 27–33.2)
MCHC RBC AUTO-ENTMCNC: 32.4 G/DL (ref 31–37)
MCV RBC AUTO: 92.8 FL (ref 80–99)
METHEMOGLOBIN: 0.7 % SAT (ref 0.4–1.5)
MONOCYTES # BLD AUTO: 0.28 X10(3) UL (ref 0.1–0.6)
MONOCYTES NFR BLD AUTO: 6.4 %
NEUTROPHIL ABS PRELIM: 3.07 X10 (3) UL (ref 1.3–6.7)
NEUTROPHILS # BLD AUTO: 3.07 X10(3) UL (ref 1.3–6.7)
NEUTROPHILS NFR BLD AUTO: 69.9 %
NITRITE UR QL STRIP.AUTO: NEGATIVE
P AXIS: 54 DEGREES
P AXIS: 58 DEGREES
P AXIS: 59 DEGREES
P-R INTERVAL: 196 MS
P-R INTERVAL: 208 MS
P-R INTERVAL: 216 MS
PATIENT TEMPERATURE: 98.6 F
PH UR STRIP.AUTO: 5 [PH] (ref 4.5–8)
PLATELET # BLD AUTO: 155 10(3)UL (ref 150–450)
POTASSIUM BLOOD GAS: 4.6 MMOL/L (ref 3.6–5.1)
POTASSIUM SERPL-SCNC: 4.3 MMOL/L (ref 3.6–5.1)
PROT UR STRIP.AUTO-MCNC: NEGATIVE MG/DL
Q-T INTERVAL: 458 MS
Q-T INTERVAL: 462 MS
Q-T INTERVAL: 464 MS
QRS DURATION: 184 MS
QRS DURATION: 188 MS
QRS DURATION: 192 MS
QTC CALCULATION (BEZET): 441 MS
QTC CALCULATION (BEZET): 474 MS
QTC CALCULATION (BEZET): 480 MS
R AXIS: 38 DEGREES
R AXIS: 47 DEGREES
R AXIS: 48 DEGREES
RBC # BLD AUTO: 3.76 X10(6)UL (ref 4.3–5.7)
RED CELL DISTRIBUTION WIDTH-SD: 47 FL (ref 35.1–46.3)
SODIUM BLOOD GAS: 136 MMOL/L (ref 136–144)
SODIUM SERPL-SCNC: 140 MMOL/L (ref 136–144)
SP GR UR STRIP.AUTO: 1.01 (ref 1–1.03)
T AXIS: 17 DEGREES
T AXIS: 22 DEGREES
T AXIS: 35 DEGREES
TOTAL HEMOGLOBIN: 11.2 G/DL (ref 13.2–17.3)
UROBILINOGEN UR STRIP.AUTO-MCNC: 4 MG/DL
VENTRICULAR RATE: 56 BPM
VENTRICULAR RATE: 63 BPM
VENTRICULAR RATE: 65 BPM
WBC # BLD AUTO: 4.4 X10(3) UL (ref 4–13)

## 2017-02-09 PROCEDURE — 70498 CT ANGIOGRAPHY NECK: CPT

## 2017-02-09 PROCEDURE — 99233 SBSQ HOSP IP/OBS HIGH 50: CPT | Performed by: HOSPITALIST

## 2017-02-09 PROCEDURE — 70496 CT ANGIOGRAPHY HEAD: CPT

## 2017-02-09 PROCEDURE — 70551 MRI BRAIN STEM W/O DYE: CPT

## 2017-02-09 PROCEDURE — 70544 MR ANGIOGRAPHY HEAD W/O DYE: CPT

## 2017-02-09 PROCEDURE — 71010 XR CHEST AP PORTABLE  (CPT=71010): CPT

## 2017-02-09 RX ORDER — FOLIC ACID 1 MG/1
1 TABLET ORAL DAILY
Status: DISCONTINUED | OUTPATIENT
Start: 2017-02-09 | End: 2017-02-10

## 2017-02-09 RX ORDER — LABETALOL HYDROCHLORIDE 5 MG/ML
10 INJECTION, SOLUTION INTRAVENOUS EVERY 10 MIN PRN
Status: DISCONTINUED | OUTPATIENT
Start: 2017-02-09 | End: 2017-02-10

## 2017-02-09 RX ORDER — DOXEPIN HYDROCHLORIDE 50 MG/1
1 CAPSULE ORAL DAILY
Status: DISCONTINUED | OUTPATIENT
Start: 2017-02-09 | End: 2017-02-10

## 2017-02-09 RX ORDER — LORAZEPAM 2 MG/ML
1 INJECTION INTRAMUSCULAR ONCE
Status: COMPLETED | OUTPATIENT
Start: 2017-02-09 | End: 2017-02-09

## 2017-02-09 RX ORDER — ONDANSETRON 2 MG/ML
4 INJECTION INTRAMUSCULAR; INTRAVENOUS EVERY 6 HOURS PRN
Status: DISCONTINUED | OUTPATIENT
Start: 2017-02-09 | End: 2017-02-09

## 2017-02-09 RX ORDER — SODIUM CHLORIDE 9 MG/ML
INJECTION, SOLUTION INTRAVENOUS CONTINUOUS
Status: DISCONTINUED | OUTPATIENT
Start: 2017-02-09 | End: 2017-02-10

## 2017-02-09 RX ORDER — MELATONIN
100 DAILY
Status: DISCONTINUED | OUTPATIENT
Start: 2017-02-09 | End: 2017-02-10

## 2017-02-09 NOTE — PLAN OF CARE
Problem: CARDIOVASCULAR - ADULT  Goal: Maintains optimal cardiac output and hemodynamic stability  INTERVENTIONS:  - Monitor vital signs, rhythm, and trends  - Monitor for bleeding, hypotension and signs of decreased cardiac output  - Evaluate effectivenes bedside, uses when ambulating; morphine given for chest pain, expresses relief; patient's blood sugar 87 this morning, asked for only 20 units of NPH instead of 70 units, patient states he bases his dose on his blood sugar; patient to have cath tomorrow wi

## 2017-02-09 NOTE — PROGRESS NOTES
[facility]  Cardiology Progress Note    Subjective:  Denies any chest discomfort or shortness of breath at rest.  Verbalizes with any exertion at all he becomes markedly dyspneic and has chest pressure.     Objective:  /52 mmHg  Pulse 61  Temp(Sr EF ~55% .  Plan repeat LHC with possible PCI 2/10 w/ Dr. Claudell Favors. · CAD w/ hx robotic assisted CABG '15, subsequent MV PCI/GIULIA 10/16 - last cath 1/12, medical therapy as above recommended.   · HTN  · CKD  · Hx Liver transplant '06  · Hx (R) BKA '04  · Dysli

## 2017-02-09 NOTE — PLAN OF CARE
At 1330, patient's wife came into terrazas stating \"my  passed out\", [employer] called, patient was breathing and had a pulse, eyes were closed and patient not responding to painful or verbal stimuli. Scott Nick came into room, called gerber peraza.  Diana Cottrell

## 2017-02-09 NOTE — PROGRESS NOTES
[address] [facility] Neurology Initial Evaluation    Anna Loera Patient Status:  Inpatient    3/1/1950 MRN [id number]   [facility] 2NE-A Attending Valente Wellington MD   Hosp Day # 2 PCP [address] [facility] FOR CONSULTATION:  Code transfusion [id number]   • Sleep apnea    • Coronary atherosclerosis    • High cholesterol    • Cataract    • Disorder of liver    • Depression    • Anxiety state    • Anemia      on Iron Suppliments    • Congestive heart disease ([facility] [address] 2006   • Atherosclero has previously tolerated ceftriaxone and             cefepime, daptomycin has a 2-3% association with             LFT abnormalities and given that patient had not             been exposed to daptomycin prior to this recent             exposure it is though 2 (two) times daily. Disp: 60 tablet Rfl: 1 1/11/2017 at 0900   metoprolol Tartrate 25 MG Oral Tab Take 0.5 tablets (12.5 mg total) by mouth 2x Daily(Beta Blocker).  Disp: 30 tablet Rfl: 3 1/11/2017 at 0900   tamsulosin HCl (FLOMAX) 0.4 MG Oral Cap Take 0.4 tab 2 mg 2 mg Oral Daily   escitalopram (LEXAPRO) tablet 10 mg 10 mg Oral QAM   Clopidogrel Bisulfate (PLAVIX) tab 75 mg 75 mg Oral Daily   cycloSPORINE modified (NEORAL) cap 175 mg 175 mg Oral BID   DiphenhydrAMINE HCl (BENADRYL) cap 50 mg 50 mg Oral Nigh mg Intravenous Q6H PRN   Heparin Sodium (Porcine) 5000 UNIT/ML injection 5,000 Units 5,000 Units Subcutaneous Q8H [address]       REVIEW OF SYSTEMS:  A 10-point system was reviewed. Pertinent positives and negatives are noted in HPI.       PHYSICAL EXAMINATION: 02/09/2017     Lipid Panel 2/8/2017  TC   243  Trg   157  HDL   30  LDL   182    HgA1C 2/8/2017 7.9    Diagnostics:  CT BRAIN + CTA BRAIN / NECK 2/9/2017  CONCLUSION:         1. No acute intracranial abnormality identified.      2.  Stable minimal chronic m improving     Risk factors  HLD  HTN  DM  CAD    Plan:  Stroke order set in place   [facility] / CTA completed   2 D echo completed in 10/2016   Will obtain MRI/MRA   Lipid panel and A1C completed   Patient currently on ASA and plavix   Will check for platelet in

## 2017-02-09 NOTE — PROGRESS NOTES
[facility]  Progress Note     Radha Carpenter Patient Status:  Observation    3/1/1950 MRN [id number]   [facility] 2NE-A Attending Rafy Porter MD   Hosp Day # 2 PCP Bruce Martinez     Chief Complaint:  Chest pain     S: Patient 2.20*  2.33*   CA  8.9  9.1   ALB  3.1*  3.0*   NA  137  140   K  4.7  4.3   CL  104  105   CO2  26.0  25.0   ALKPHO  1364*  1176*   AST  169*  168*   ALT  113*  110*   BILT  4.5*  3.7*   TP  7.3  6.9       Recent Labs   Lab  02/07/17   1950   PTP  12.6 plavix,   5. Start IVF  6. Thiamine daily  7. No statin yet  - has liver txplant   Elevation in LFT's   3. CAD with complex PCI 10/2016, as above  4. CKD, Cr at baseline  Starting fluids at 75/ hr   Urine dark per pt   5.  S/p liver transplant, cont immunos

## 2017-02-10 ENCOUNTER — APPOINTMENT (OUTPATIENT)
Dept: INTERVENTIONAL RADIOLOGY/VASCULAR | Facility: HOSPITAL | Age: 67
DRG: 247 | End: 2017-02-10
Attending: INTERNAL MEDICINE
Payer: MEDICARE

## 2017-02-10 PROBLEM — R29.818 TRANSIENT NEUROLOGICAL SYMPTOMS: Status: ACTIVE | Noted: 2017-02-10

## 2017-02-10 PROBLEM — R40.4 AWARENESS ALTERATION, TRANSIENT: Status: ACTIVE | Noted: 2017-02-10

## 2017-02-10 PROBLEM — G43.109 COMPLICATED MIGRAINE: Status: ACTIVE | Noted: 2017-02-10

## 2017-02-10 LAB
ALBUMIN SERPL-MCNC: 2.8 G/DL (ref 3.5–4.8)
ALP LIVER SERPL-CCNC: 1049 U/L (ref 45–117)
ALT SERPL-CCNC: 97 U/L (ref 17–63)
ASPIRIN PLT INHIBITION: 526 ARU (ref 620–672)
AST SERPL-CCNC: 138 U/L (ref 15–41)
BILIRUB SERPL-MCNC: 3 MG/DL (ref 0.1–2)
BUN BLD-MCNC: 46 MG/DL (ref 8–20)
CALCIUM BLD-MCNC: 8.7 MG/DL (ref 8.3–10.3)
CHLORIDE: 102 MMOL/L (ref 101–111)
CO2: 27 MMOL/L (ref 22–32)
CREAT BLD-MCNC: 2.18 MG/DL (ref 0.7–1.3)
GLUCOSE BLD-MCNC: 101 MG/DL (ref 70–99)
GLUCOSE BLD-MCNC: 107 MG/DL (ref 65–99)
GLUCOSE BLD-MCNC: 110 MG/DL (ref 65–99)
GLUCOSE BLD-MCNC: 126 MG/DL (ref 65–99)
GLUCOSE BLD-MCNC: 189 MG/DL (ref 65–99)
ISTAT ACTIVATED CLOTTING TIME: 435 SECONDS (ref 74–137)
M PROTEIN MFR SERPL ELPH: 6.7 G/DL (ref 6.1–8.3)
P2Y12 REACTION UNITS: 80 PRU
POTASSIUM SERPL-SCNC: 4.2 MMOL/L (ref 3.6–5.1)
SODIUM SERPL-SCNC: 139 MMOL/L (ref 136–144)

## 2017-02-10 PROCEDURE — 4A023N7 MEASUREMENT OF CARDIAC SAMPLING AND PRESSURE, LEFT HEART, PERCUTANEOUS APPROACH: ICD-10-PCS | Performed by: INTERNAL MEDICINE

## 2017-02-10 PROCEDURE — B240ZZ3 ULTRASONOGRAPHY OF SINGLE CORONARY ARTERY, INTRAVASCULAR: ICD-10-PCS | Performed by: INTERNAL MEDICINE

## 2017-02-10 PROCEDURE — B211YZZ FLUOROSCOPY OF MULTIPLE CORONARY ARTERIES USING OTHER CONTRAST: ICD-10-PCS | Performed by: INTERNAL MEDICINE

## 2017-02-10 PROCEDURE — 3E033GC INTRODUCTION OF OTHER THERAPEUTIC SUBSTANCE INTO PERIPHERAL VEIN, PERCUTANEOUS APPROACH: ICD-10-PCS | Performed by: INTERNAL MEDICINE

## 2017-02-10 PROCEDURE — 99232 SBSQ HOSP IP/OBS MODERATE 35: CPT | Performed by: HOSPITALIST

## 2017-02-10 PROCEDURE — B218YZZ FLUOROSCOPY OF LEFT INTERNAL MAMMARY BYPASS GRAFT USING OTHER CONTRAST: ICD-10-PCS | Performed by: INTERNAL MEDICINE

## 2017-02-10 PROCEDURE — 99223 1ST HOSP IP/OBS HIGH 75: CPT | Performed by: OTHER

## 2017-02-10 PROCEDURE — 027035Z DILATION OF CORONARY ARTERY, ONE ARTERY WITH TWO DRUG-ELUTING INTRALUMINAL DEVICES, PERCUTANEOUS APPROACH: ICD-10-PCS | Performed by: INTERNAL MEDICINE

## 2017-02-10 RX ORDER — ASPIRIN 81 MG/1
324 TABLET, CHEWABLE ORAL ONCE
Status: DISCONTINUED | OUTPATIENT
Start: 2017-02-10 | End: 2017-02-10

## 2017-02-10 RX ORDER — SODIUM CHLORIDE 9 MG/ML
INJECTION, SOLUTION INTRAVENOUS CONTINUOUS
Status: DISCONTINUED | OUTPATIENT
Start: 2017-02-10 | End: 2017-02-11

## 2017-02-10 RX ORDER — HEPARIN SODIUM 5000 [USP'U]/ML
INJECTION, SOLUTION INTRAVENOUS; SUBCUTANEOUS
Status: COMPLETED
Start: 2017-02-10 | End: 2017-02-10

## 2017-02-10 RX ORDER — MIDODRINE HYDROCHLORIDE 5 MG/1
2.5 TABLET ORAL 3 TIMES DAILY
Status: DISCONTINUED | OUTPATIENT
Start: 2017-02-10 | End: 2017-02-11

## 2017-02-10 RX ORDER — TOPIRAMATE 25 MG/1
50 TABLET ORAL NIGHTLY
Status: DISCONTINUED | OUTPATIENT
Start: 2017-02-10 | End: 2017-02-11

## 2017-02-10 RX ORDER — SODIUM CHLORIDE 9 MG/ML
INJECTION, SOLUTION INTRAVENOUS CONTINUOUS
Status: ACTIVE | OUTPATIENT
Start: 2017-02-10 | End: 2017-02-10

## 2017-02-10 RX ORDER — MIDAZOLAM HYDROCHLORIDE 1 MG/ML
INJECTION INTRAMUSCULAR; INTRAVENOUS
Status: COMPLETED
Start: 2017-02-10 | End: 2017-02-10

## 2017-02-10 RX ORDER — LIDOCAINE HYDROCHLORIDE 10 MG/ML
INJECTION, SOLUTION INFILTRATION; PERINEURAL
Status: COMPLETED
Start: 2017-02-10 | End: 2017-02-10

## 2017-02-10 NOTE — CM/SW NOTE
JUAN CARLOS met with patient's wife, Olive Stern ([phone]) to provide support. Per chart/RN report, patient \"passed out\" yesterday (2/9), code blue was called, and stroke protocol initiated.   Aj identified that she is still comfortable taking patient home wi

## 2017-02-10 NOTE — PROGRESS NOTES
------ orthostatic blood pressure------       02/10/17 1002 02/10/17 1003 02/10/17 1005   Vital Signs   Pulse 65 64 68   /43 mmHg 111/55 mmHg 102/54 mmHg   BP Location Left arm Left arm Left arm   BP Method Automatic Automatic Automatic   Patient Pos

## 2017-02-10 NOTE — PAYOR COMM NOTE
Attending Physician: Rafy Porter MD    2/9    Chief Complaint:  Chest pain     S: Patient   Had syncopal episode while wife in room this am- was getting up to go void in bathroom.  Pt has been w/u in past for syncope- sees Dr Willson of [facility] as outpt - was sc PET/CT may be of further value. CXR    CONCLUSION:  Cardiomegaly with vascular congestion. No ivett edema or infiltrate.     Labs:       Lab  02/07/17   1950   02/09/17   1206    WBC   5.1   4.4    HGB   12.2*   11.3*    MCV   90.2   92.8    PLT   171. 0 above  4. CKD, Cr at baseline  Starting fluids at 75/ hr   Urine dark per pt  5. S/p liver transplant, cont immunosuppressives  LFT's elevated   6. GERD, PPi  7. DMII, hyperglycemia protocol  A1c 7.9  8. DL,    Elevated lipid levels  9. RYAN  10.  BPH  On

## 2017-02-10 NOTE — PROGRESS NOTES
[facility]  Progress Note     Elena Chopra Patient Status:  Observation    3/1/1950 MRN [id number]   [facility] 2NE-A Attending Doug Maria MD   Hosp Day # 3 PCP Mary Curiel     Chief Complaint:  Chest pain     S: Patient HCl  2.5 mg Oral TID   • topiramate  50 mg Oral Nightly   • nitroGLYCERIN  0.5 inch Topical TID   • bumetanide  2 mg Oral Daily   • escitalopram  10 mg Oral QAM   • Clopidogrel Bisulfate  75 mg Oral Daily   • cycloSPORINE modified  175 mg Oral BID   • max hs  Will see how orthostatics are in am  Poss d/c Sat if all sx agree      Estimated date of discharge: tbd  Discharge is dependent on: clinical course  At this point Mr. Rubia Ceballos is expected to be discharge to: home     Plan of care discussed with RN, pt wif

## 2017-02-10 NOTE — PLAN OF CARE
02.09.17 2230 ativan 1mg given as ordered pta to mri  Mri screening done and sent  Awaiting transport - mxe.

## 2017-02-10 NOTE — PHYSICAL THERAPY NOTE
PT orders received, chart reviewed. Pt currently on bedrest per stroke protocol. Will check back in am 2/11/17. RN aware.

## 2017-02-10 NOTE — PROGRESS NOTES
Consult called to Dr Asha Jewell- reviewed MRI findings from last night, Order received to d/c stroke protocol.

## 2017-02-10 NOTE — PROCEDURES
[facility]  PCI Procedure Note    Jan Degree Location: Cath Lab    CSN [id number] MRN [id number]   Admission Date 2/7/2017 Procedure Date 2/10/2017   Attending Physician Connie Blair MD Procedure Physician Rula Hamilton MD     Pre-Procedure Diag resolute drug-eluting stents were deployed in the mid and distal left anterior descending artery with an excellent result. Intracoronary ultrasound was performed of the LAD. The right femoral artery was closed with a 6 [employer] Angio-Seal device.     IV w

## 2017-02-10 NOTE — SLP NOTE
Attempted to see patient for bedside swallowing evaluation per stroke protocol. Patient is NPO for cardiac cath lab procedure later this am. Will attempt to see patient later today.     Mehran Mead MA, CCC-SLP  Speech Language Pathologist    Addendum: Timo Bray

## 2017-02-10 NOTE — CONSULTS
[facility]    Report of Consultation    Arnoldo Billing Patient Status:  Inpatient    3/1/1950 MRN [id number]   [facility] 2NE-A Attending Elda Maria MD   Hosp Day # 3 PCP Luis E Trevino     Date of Admission:  2017  Date o minutes. He states sometime he passes out like yesterday. Last October per chart he had left sided weakness and LOC and MRI brain done reports an old infarct in right frontal lobe.   He was admitted again in December for similar symptoms and Dr Marisela Guadalupe Comment Right Below knee amputee    CATH PERCUTANEOUS  TRANSLUMINAL CORONARY ANGIOPLASTY       Family History   Problem Relation Age of Onset   • Cancer Father    • Other[other] Marya Halney Mother       reports that he quit smoking about 23 years ago.  He has n 0.5 mg, Oral, BID PRN  •  0.9%  NaCl infusion, , Intravenous, Continuous  •  bumetanide (BUMEX) tab 2 mg, 2 mg, Oral, Daily  •  escitalopram (LEXAPRO) tablet 10 mg, 10 mg, Oral, QAM  •  Clopidogrel Bisulfate (PLAVIX) tab 75 mg, 75 mg, Oral, Daily  •  cyclo mg, 650 mg, Oral, Q6H PRN  •  ondansetron HCl (ZOFRAN) injection 4 mg, 4 mg, Intravenous, Q6H PRN  •  Heparin Sodium (Porcine) 5000 UNIT/ML injection 5,000 Units, 5,000 Units, Subcutaneous, Q8H [address]    Review of Systems:  A 10-point system was reviewed.   Pe BUN 46 02/10/2017   CREATSERUM 2.18 02/10/2017    02/10/2017   CA 8.7 02/10/2017   ALKPHO 1049 02/10/2017   ALT 97 02/10/2017    02/10/2017   BILT 3.0 02/10/2017   ALB 2.8 02/10/2017   TP 6.7 02/10/2017       Imaging:    MRI brain and MRA h his EEG have been negative so there is no indication to repeat it at this point. For stroke prevention he is on Plavix. We will follow up.     We discussed the above at length with the patient and his wife and they agreed with the plan  Total time spent w

## 2017-02-11 VITALS
HEART RATE: 60 BPM | DIASTOLIC BLOOD PRESSURE: 61 MMHG | BODY MASS INDEX: 37.26 KG/M2 | RESPIRATION RATE: 18 BRPM | TEMPERATURE: 98 F | HEIGHT: 76 IN | WEIGHT: 306 LBS | OXYGEN SATURATION: 100 % | SYSTOLIC BLOOD PRESSURE: 130 MMHG

## 2017-02-11 LAB
ATRIAL RATE: 59 BPM
BUN BLD-MCNC: 45 MG/DL (ref 8–20)
CALCIUM BLD-MCNC: 8.9 MG/DL (ref 8.3–10.3)
CHLORIDE: 105 MMOL/L (ref 101–111)
CO2: 28 MMOL/L (ref 22–32)
CREAT BLD-MCNC: 2.18 MG/DL (ref 0.7–1.3)
ERYTHROCYTE [DISTWIDTH] IN BLOOD BY AUTOMATED COUNT: 13.8 % (ref 11.5–16)
GLUCOSE BLD-MCNC: 105 MG/DL (ref 70–99)
GLUCOSE BLD-MCNC: 115 MG/DL (ref 65–99)
GLUCOSE BLD-MCNC: 159 MG/DL (ref 65–99)
GLUCOSE BLD-MCNC: 201 MG/DL (ref 65–99)
HCT VFR BLD AUTO: 34.1 % (ref 37–53)
HGB BLD-MCNC: 10.7 G/DL (ref 13–17)
MCH RBC QN AUTO: 29.8 PG (ref 27–33.2)
MCHC RBC AUTO-ENTMCNC: 31.4 G/DL (ref 31–37)
MCV RBC AUTO: 95 FL (ref 80–99)
P AXIS: 58 DEGREES
P-R INTERVAL: 216 MS
PLATELET # BLD AUTO: 124 10(3)UL (ref 150–450)
POTASSIUM SERPL-SCNC: 4.4 MMOL/L (ref 3.6–5.1)
Q-T INTERVAL: 472 MS
QRS DURATION: 190 MS
QTC CALCULATION (BEZET): 467 MS
R AXIS: 43 DEGREES
RBC # BLD AUTO: 3.59 X10(6)UL (ref 4.3–5.7)
RED CELL DISTRIBUTION WIDTH-SD: 48.1 FL (ref 35.1–46.3)
SODIUM SERPL-SCNC: 140 MMOL/L (ref 136–144)
T AXIS: 22 DEGREES
VENTRICULAR RATE: 59 BPM
WBC # BLD AUTO: 3.8 X10(3) UL (ref 4–13)

## 2017-02-11 PROCEDURE — 99233 SBSQ HOSP IP/OBS HIGH 50: CPT | Performed by: OTHER

## 2017-02-11 PROCEDURE — 99239 HOSP IP/OBS DSCHRG MGMT >30: CPT | Performed by: HOSPITALIST

## 2017-02-11 RX ORDER — TOPIRAMATE 50 MG/1
50 TABLET, FILM COATED ORAL NIGHTLY
Qty: 30 TABLET | Refills: 0 | Status: SHIPPED | OUTPATIENT
Start: 2017-02-11 | End: 2017-02-11

## 2017-02-11 RX ORDER — MIDODRINE HYDROCHLORIDE 2.5 MG/1
2.5 TABLET ORAL 3 TIMES DAILY
Qty: 90 TABLET | Refills: 0 | Status: SHIPPED | OUTPATIENT
Start: 2017-02-11 | End: 2017-05-25 | Stop reason: ALTCHOICE

## 2017-02-11 RX ORDER — TOPIRAMATE 50 MG/1
50 TABLET, FILM COATED ORAL NIGHTLY
Qty: 30 TABLET | Refills: 0 | Status: SHIPPED | OUTPATIENT
Start: 2017-02-11 | End: 2017-02-28

## 2017-02-11 NOTE — PROGRESS NOTES
[id number] Neurology Progress Note    Rich Rater Patient Status:  Inpatient    3/1/1950 MRN [id number]   [facility] 2NE-A Attending Jacqueline Heath MD   Hosp Day # 4 PCP Leidy Brantley           Neurology attending note hypoperfusion seizure  2. Recurrent syncope, suspected autonomic dysfunction  3. Old right frontal infarct  4. Hx of CAD s/p stent  5. Diabetes with peripheral neuropathy  6. Liver transplant  7.  CKD    Continue Midodrine for probable orthostatic hypotensi leg weakness noted, unable to fully assess right leg due to below knee amputation. Finger-to-nose coordination is intact. Gait deferred.       Lab Results  Component Value Date   WBC 3.8 02/11/2017   HGB 10.7 02/11/2017   HCT 34.1 02/11/2017   .0 0

## 2017-02-11 NOTE — PROGRESS NOTES
[facility] Cardiology Progress Note    Subjective: Had one episode of chest pain during night, resolved with SL NTG. No complaints now. Objective:  /44 mmHg  Pulse 53  Temp(Src) 98.2 °F (36.8 °C) (Oral)  Resp 18  Ht 6' 4\" (1.93 m)  Wt 306 lb (138.

## 2017-02-11 NOTE — CARDIAC REHAB
Discharge teaching completed with pt., he has Hx of CABG, and many stents. He will come to Phase 2 cardiac rehab at [facility],  will call and schedule.  He has severe osteoporosis with back pain and wants to start PT as outpatient first. Provided literature a

## 2017-02-11 NOTE — PROGRESS NOTES
0430 patient reports chest pain  And tightness of a 9 on a 1-10 scale. Bp 120/44 HR 53. Sublingual nitro given, pain reduced to a 4 with bp of 107/44 HR 56. A second nirto offered after 5 minutes and patient declined stating \" the pain is going away'.

## 2017-02-11 NOTE — PHYSICAL THERAPY NOTE
PHYSICAL THERAPY QUICK EVALUATION - INPATIENT    Room Number: 7202/2004-F  Evaluation Date: 2/11/2017  Presenting Problem: Stent placement  Physician Order: PT Eval and Treat    Problem List  Principal Problem:    S/P drug eluting coronary stent placement 10/18/16    BYPASS SURGERY  2015    Comment Robotic CABG X 2    OTHER SURGICAL HISTORY  2005    Comment Right Below knee amputee    CATH PERCUTANEOUS  TRANSLUMINAL CORONARY ANGIOPLASTY         HOME SITUATION  Type of Home: Apartment   Home Layout: [address] [facility] Assistance: Supervision  Distance (ft): 350  Assistive Device: None  Pattern:  (wide NAVIN, decreased stride length)          Skilled Therapy Provided: Pt received seated eob, agreeable to PT.   Pt able to don prosthetic limb and B shoes independently to birgit

## 2017-02-11 NOTE — PROGRESS NOTES
[facility]  Progress Note     Gely Durán Patient Status:  Observation    3/1/1950 MRN [id number]   [facility] 2NE-A Attending Joby Williamson MD   Hosp Day # 4 PCP Hoa Rosas     Chief Complaint:  Chest pain     S: Patient Oral BID   • ursodiol  300 mg Oral Daily   • Pantoprazole Sodium  20 mg Oral QAM AC   • Naltrexone HCl  50 mg Oral nightly   • mycophenolate mofetil  1,000 mg Oral BID   • metoprolol Tartrate  12.5 mg Oral 2x Daily(Beta Blocker)   • Levothyroxine Sodium  3

## 2017-02-11 NOTE — DIETARY NOTE
Nutrition Short Note   Dietitian consult received per cardiac rehab/CHF protocol standing order. Pt to be educated by cardiac rehab staff and encouraged to attend outpatient classes taught by RD. VERNON available PRN.     Saloni Esqueda RD, LDN

## 2017-02-12 NOTE — DISCHARGE SUMMARY
[facility]  DISCHARGE SUMMARY     Padmini Miller Patient Status:  Inpatient    3/1/1950 MRN [id number]   [facility] 2NE-A Attending No att. providers found   Hosp Day # 4 PCP Cr Wilson     Date of Admission: 2017  Date pain was concerning for angina. Cardiology was placed in consultation. Patient was ruled out for acute myocardial infarction. Cardiology recommended angiogram to reevaluate coronary disease.   Prior to the angiogram, patient had a rapid response for sync Last time this was given:  325 mg on 2/11/2017  8:35 AM        Take 325 mg by mouth daily.     Refills:  0       bumetanide 1 MG Tabs   Last time this was given:  2 mg on 2/11/2017  8:35 AM   Commonly known as:  BUMEX        Take 2 tablets (2 mg total) by 70 Units into the skin daily with breakfast.    Quantity:  10 mL   Refills:  0       isosorbide dinitrate 10 MG Tabs   Last time this was given:  10 mg on 2/11/2017  8:33 AM   Commonly known as:  ISORDIL        Take 1 tablet (10 mg total) by mouth 2 (two) [address]      please call to see in 1-2 weeks.      [address] [facility] [address] [place] [id number] [phone]    Schedule a

## 2017-02-13 NOTE — CM/SW NOTE
02/13/17 0800   Discharge disposition   Discharged to: Home or [address] [place] [address] after discharge None   Patient assessed for rehabilitation services?  Yes   Patient Refuses Rehab Services Yes   Discharge transportation Private car   Patient discharged

## 2017-02-28 ENCOUNTER — OFFICE VISIT (OUTPATIENT)
Dept: NEUROLOGY | Facility: CLINIC | Age: 67
End: 2017-02-28

## 2017-02-28 VITALS
HEART RATE: 82 BPM | WEIGHT: 304 LBS | SYSTOLIC BLOOD PRESSURE: 134 MMHG | DIASTOLIC BLOOD PRESSURE: 72 MMHG | BODY MASS INDEX: 37 KG/M2 | RESPIRATION RATE: 18 BRPM

## 2017-02-28 DIAGNOSIS — I65.21 INTRACRANIAL CAROTID STENOSIS, RIGHT: ICD-10-CM

## 2017-02-28 DIAGNOSIS — I51.9: ICD-10-CM

## 2017-02-28 DIAGNOSIS — E11.43 AUTONOMIC NEUROPATHY DUE TO TYPE 2 DIABETES MELLITUS (HCC): Primary | ICD-10-CM

## 2017-02-28 DIAGNOSIS — R55 SYNCOPE, UNSPECIFIED SYNCOPE TYPE: ICD-10-CM

## 2017-02-28 DIAGNOSIS — Z86.69 HISTORY OF MIGRAINE: ICD-10-CM

## 2017-02-28 PROCEDURE — 99215 OFFICE O/P EST HI 40 MIN: CPT | Performed by: OTHER

## 2017-02-28 RX ORDER — TOPIRAMATE 25 MG/1
50 CAPSULE, COATED PELLETS ORAL NIGHTLY
Qty: 60 CAPSULE | Refills: 3 | Status: ON HOLD | OUTPATIENT
Start: 2017-02-28 | End: 2017-04-20

## 2017-02-28 NOTE — PROGRESS NOTES
[facility] in [place]  Neurology - Clinic Follow up  2017, 11:57 AM     Jan Degree Patient Status:  No patient class for patient encounter    3/1/1950 MRN [id number]   Location [unfilled] PCP Agusto Mcintosh     Patient p MEDICAL HISTORY:   Past Medical History   Diagnosis Date   • Hyperlipidemia    • Shingles    • Stroke [facility]) 2005   • Visual impairment      reading glasses   • Back problem    • History of blood transfusion [id number]   • Sleep apnea    • Coronary atheroscl previously tolerated ceftriaxone and             cefepime, daptomycin has a 2-3% association with             LFT abnormalities and given that patient had not             been exposed to daptomycin prior to this recent             exposure it is thought th Tab, Take 0.5 tablets (12.5 mg total) by mouth 2x Daily(Beta Blocker). , Disp: 30 tablet, Rfl: 3  •  Citalopram Hydrobromide (CELEXA) 20 MG Oral Tab, Take 20 mg by mouth daily. , Disp: , Rfl:   •  finasteride (PROSCAR) 5 MG Oral Tab, Take 5 mg by mouth daily Vitals reviewed. Physical Exam:  General Exam:  Appearance: well developed,  nurished , in no acute distress,   Pink Conjunctiva;   Neck: supple, no bruits;  Cardiac: S1/S2 RRR  Lungs: CTA B/L;  Musculoskeletal: no joint tenderness, others see neuro exam; at [address] to have him laid down when he has an episode of syncope.  Very unusual for this to be strictly perfusion related (duration of syncope), and with the focal deficits, only a hemodynamically significant intracranial stenosis may potentially f

## 2017-02-28 NOTE — PATIENT INSTRUCTIONS
Refill policies:    • Allow 2 business days for refills; controlled substances may take longer.   • Contact your pharmacy at least 5 days prior to running out of medication and have them send an electronic request or submit request through the “request re your physician has recommended that you have a procedure or additional testing performed. [facility]) will contact your insurance carrier to obtain pre-certification or prior authorization.     Unfortunately, [employer] has seen an increas

## 2017-03-21 ENCOUNTER — PRIOR ORIGINAL RECORDS (OUTPATIENT)
Dept: OTHER | Age: 67
End: 2017-03-21

## 2017-03-21 ENCOUNTER — APPOINTMENT (OUTPATIENT)
Dept: LAB | Facility: HOSPITAL | Age: 67
DRG: 287 | End: 2017-03-21
Attending: INTERNAL MEDICINE
Payer: MEDICARE

## 2017-03-21 DIAGNOSIS — N18.30 CHRONIC KIDNEY DISEASE, STAGE 3 (MODERATE): ICD-10-CM

## 2017-03-21 LAB
BUN BLD-MCNC: 48 MG/DL (ref 8–20)
CALCIUM BLD-MCNC: 9.9 MG/DL (ref 8.3–10.3)
CHLORIDE: 96 MMOL/L (ref 101–111)
CO2: 27 MMOL/L (ref 22–32)
CREAT BLD-MCNC: 2.06 MG/DL (ref 0.7–1.3)
GLUCOSE BLD-MCNC: 394 MG/DL (ref 70–99)
POTASSIUM SERPL-SCNC: 4.4 MMOL/L (ref 3.6–5.1)
SODIUM SERPL-SCNC: 133 MMOL/L (ref 136–144)

## 2017-03-21 PROCEDURE — 80048 BASIC METABOLIC PNL TOTAL CA: CPT

## 2017-03-22 ENCOUNTER — HOSPITAL ENCOUNTER (INPATIENT)
Facility: HOSPITAL | Age: 67
LOS: 1 days | Discharge: HOME | DRG: 287 | End: 2017-03-24
Attending: EMERGENCY MEDICINE | Admitting: STUDENT IN AN ORGANIZED HEALTH CARE EDUCATION/TRAINING PROGRAM
Payer: MEDICARE

## 2017-03-22 ENCOUNTER — PRIOR ORIGINAL RECORDS (OUTPATIENT)
Dept: OTHER | Age: 67
End: 2017-03-22

## 2017-03-22 ENCOUNTER — APPOINTMENT (OUTPATIENT)
Dept: GENERAL RADIOLOGY | Facility: HOSPITAL | Age: 67
DRG: 287 | End: 2017-03-22
Attending: EMERGENCY MEDICINE
Payer: MEDICARE

## 2017-03-22 DIAGNOSIS — R07.9 ACUTE CHEST PAIN: Primary | ICD-10-CM

## 2017-03-22 PROBLEM — N17.9 ACUTE KIDNEY INJURY (HCC): Status: ACTIVE | Noted: 2017-03-22

## 2017-03-22 PROBLEM — D64.9 ANEMIA: Status: ACTIVE | Noted: 2017-03-22

## 2017-03-22 PROBLEM — R79.89 AZOTEMIA: Status: ACTIVE | Noted: 2017-03-22

## 2017-03-22 PROBLEM — E87.1 HYPONATREMIA: Status: ACTIVE | Noted: 2017-03-22

## 2017-03-22 PROBLEM — R73.9 HYPERGLYCEMIA: Status: ACTIVE | Noted: 2017-03-22

## 2017-03-22 PROBLEM — D69.6 THROMBOCYTOPENIA (HCC): Status: ACTIVE | Noted: 2017-03-22

## 2017-03-22 LAB
ALBUMIN SERPL-MCNC: 2.9 G/DL (ref 3.5–4.8)
ALP LIVER SERPL-CCNC: 1232 U/L (ref 45–117)
ALT SERPL-CCNC: 103 U/L (ref 17–63)
APTT PPP: 28.6 SECONDS (ref 25–34)
AST SERPL-CCNC: 122 U/L (ref 15–41)
BASOPHILS # BLD AUTO: 0 X10(3) UL (ref 0–0.1)
BASOPHILS NFR BLD AUTO: 0 %
BILIRUB SERPL-MCNC: 3.4 MG/DL (ref 0.1–2)
BILIRUB UR QL STRIP.AUTO: NEGATIVE
BUN BLD-MCNC: 55 MG/DL (ref 8–20)
CALCIUM BLD-MCNC: 9.2 MG/DL (ref 8.3–10.3)
CHLORIDE: 97 MMOL/L (ref 101–111)
CLARITY UR REFRACT.AUTO: CLEAR
CO2: 25 MMOL/L (ref 22–32)
COLOR UR AUTO: YELLOW
CREAT BLD-MCNC: 2.16 MG/DL (ref 0.7–1.3)
D-DIMER: <0.27 UG/ML FEU (ref 0–0.49)
EOSINOPHIL # BLD AUTO: 0.33 X10(3) UL (ref 0–0.3)
EOSINOPHIL NFR BLD AUTO: 5.8 %
ERYTHROCYTE [DISTWIDTH] IN BLOOD BY AUTOMATED COUNT: 13.4 % (ref 11.5–16)
GLUCOSE BLD-MCNC: 298 MG/DL (ref 65–99)
GLUCOSE BLD-MCNC: 521 MG/DL (ref 65–99)
GLUCOSE BLD-MCNC: 601 MG/DL (ref 70–99)
GLUCOSE UR STRIP.AUTO-MCNC: >=500 MG/DL
HCT VFR BLD AUTO: 34.1 % (ref 37–53)
HGB BLD-MCNC: 11.2 G/DL (ref 13–17)
IMMATURE GRANULOCYTE COUNT: 0.02 X10(3) UL (ref 0–1)
IMMATURE GRANULOCYTE RATIO %: 0.4 %
INR BLD: 0.95 (ref 0.89–1.11)
KETONES UR STRIP.AUTO-MCNC: NEGATIVE MG/DL
LEUKOCYTE ESTERASE UR QL STRIP.AUTO: NEGATIVE
LYMPHOCYTES # BLD AUTO: 0.62 X10(3) UL (ref 0.9–4)
LYMPHOCYTES NFR BLD AUTO: 11 %
M PROTEIN MFR SERPL ELPH: 7 G/DL (ref 6.1–8.3)
MCH RBC QN AUTO: 30.6 PG (ref 27–33.2)
MCHC RBC AUTO-ENTMCNC: 32.8 G/DL (ref 31–37)
MCV RBC AUTO: 93.2 FL (ref 80–99)
MONOCYTES # BLD AUTO: 0.38 X10(3) UL (ref 0.1–0.6)
MONOCYTES NFR BLD AUTO: 6.7 %
NEUTROPHIL ABS PRELIM: 4.31 X10 (3) UL (ref 1.3–6.7)
NEUTROPHILS # BLD AUTO: 4.31 X10(3) UL (ref 1.3–6.7)
NEUTROPHILS NFR BLD AUTO: 76.1 %
NITRITE UR QL STRIP.AUTO: NEGATIVE
PH UR STRIP.AUTO: 5 [PH] (ref 4.5–8)
PLATELET # BLD AUTO: 123 10(3)UL (ref 150–450)
POTASSIUM SERPL-SCNC: 4.8 MMOL/L (ref 3.6–5.1)
PROT UR STRIP.AUTO-MCNC: NEGATIVE MG/DL
PSA SERPL DL<=0.01 NG/ML-MCNC: 12.7 SECONDS (ref 12–14.3)
RBC # BLD AUTO: 3.66 X10(6)UL (ref 3.8–5.8)
RBC UR QL AUTO: NEGATIVE
RED CELL DISTRIBUTION WIDTH-SD: 45.4 FL (ref 35.1–46.3)
SODIUM SERPL-SCNC: 131 MMOL/L (ref 136–144)
SP GR UR STRIP.AUTO: 1.01 (ref 1–1.03)
TROPONIN: <0.046 NG/ML (ref ?–0.05)
TROPONIN: <0.046 NG/ML (ref ?–0.05)
UROBILINOGEN UR STRIP.AUTO-MCNC: <2 MG/DL
WBC # BLD AUTO: 5.7 X10(3) UL (ref 4–13)

## 2017-03-22 PROCEDURE — 99223 1ST HOSP IP/OBS HIGH 75: CPT | Performed by: STUDENT IN AN ORGANIZED HEALTH CARE EDUCATION/TRAINING PROGRAM

## 2017-03-22 PROCEDURE — 71010 XR CHEST AP PORTABLE  (CPT=71010): CPT

## 2017-03-22 RX ORDER — HEPARIN SODIUM 5000 [USP'U]/ML
5000 INJECTION INTRAVENOUS; SUBCUTANEOUS ONCE
Status: COMPLETED | OUTPATIENT
Start: 2017-03-22 | End: 2017-03-22

## 2017-03-22 RX ORDER — ONDANSETRON 2 MG/ML
4 INJECTION INTRAMUSCULAR; INTRAVENOUS ONCE
Status: COMPLETED | OUTPATIENT
Start: 2017-03-22 | End: 2017-03-22

## 2017-03-22 RX ORDER — MORPHINE SULFATE 4 MG/ML
4 INJECTION, SOLUTION INTRAMUSCULAR; INTRAVENOUS EVERY 30 MIN PRN
Status: DISCONTINUED | OUTPATIENT
Start: 2017-03-22 | End: 2017-03-24

## 2017-03-22 RX ORDER — NITROGLYCERIN 20 MG/100ML
INJECTION INTRAVENOUS CONTINUOUS
Status: DISCONTINUED | OUTPATIENT
Start: 2017-03-22 | End: 2017-03-23

## 2017-03-22 RX ORDER — HEPARIN SODIUM AND DEXTROSE 10000; 5 [USP'U]/100ML; G/100ML
INJECTION INTRAVENOUS CONTINUOUS
Status: CANCELLED | OUTPATIENT
Start: 2017-03-23

## 2017-03-22 RX ORDER — SODIUM CHLORIDE 9 MG/ML
INJECTION, SOLUTION INTRAVENOUS CONTINUOUS
Status: CANCELLED | OUTPATIENT
Start: 2017-03-22 | End: 2017-03-22

## 2017-03-22 RX ORDER — INSULIN ASPART 100 [IU]/ML
0.2 INJECTION, SOLUTION INTRAVENOUS; SUBCUTANEOUS ONCE
Status: COMPLETED | OUTPATIENT
Start: 2017-03-22 | End: 2017-03-22

## 2017-03-22 RX ORDER — HEPARIN SODIUM AND DEXTROSE 10000; 5 [USP'U]/100ML; G/100ML
1000 INJECTION INTRAVENOUS ONCE
Status: COMPLETED | OUTPATIENT
Start: 2017-03-22 | End: 2017-03-23

## 2017-03-23 ENCOUNTER — CHARTING TRANS (OUTPATIENT)
Dept: OTHER | Age: 67
End: 2017-03-23

## 2017-03-23 ENCOUNTER — APPOINTMENT (OUTPATIENT)
Dept: CV DIAGNOSTICS | Facility: HOSPITAL | Age: 67
DRG: 287 | End: 2017-03-23
Attending: STUDENT IN AN ORGANIZED HEALTH CARE EDUCATION/TRAINING PROGRAM
Payer: MEDICARE

## 2017-03-23 ENCOUNTER — APPOINTMENT (OUTPATIENT)
Dept: ULTRASOUND IMAGING | Facility: HOSPITAL | Age: 67
DRG: 287 | End: 2017-03-23
Attending: HOSPITALIST
Payer: MEDICARE

## 2017-03-23 ENCOUNTER — APPOINTMENT (OUTPATIENT)
Dept: INTERVENTIONAL RADIOLOGY/VASCULAR | Facility: HOSPITAL | Age: 67
DRG: 287 | End: 2017-03-23
Attending: NURSE PRACTITIONER
Payer: MEDICARE

## 2017-03-23 LAB
ALBUMIN SERPL-MCNC: 2.7 G/DL (ref 3.5–4.8)
ALP LIVER SERPL-CCNC: 1067 U/L (ref 45–117)
ALT SERPL-CCNC: 88 U/L (ref 17–63)
APTT PPP: 39.4 SECONDS (ref 25–34)
APTT PPP: 40.2 SECONDS (ref 25–34)
AST SERPL-CCNC: 94 U/L (ref 15–41)
ATRIAL RATE: 55 BPM
ATRIAL RATE: 56 BPM
ATRIAL RATE: 58 BPM
BASOPHILS # BLD AUTO: 0.01 X10(3) UL (ref 0–0.1)
BASOPHILS NFR BLD AUTO: 0.2 %
BILIRUB SERPL-MCNC: 3.3 MG/DL (ref 0.1–2)
BUN BLD-MCNC: 61 MG/DL (ref 8–20)
CALCIUM BLD-MCNC: 8.9 MG/DL (ref 8.3–10.3)
CHLORIDE: 106 MMOL/L (ref 101–111)
CHOLEST SMN-MCNC: 222 MG/DL (ref ?–200)
CO2: 23 MMOL/L (ref 22–32)
CREAT BLD-MCNC: 2.27 MG/DL (ref 0.7–1.3)
EOSINOPHIL # BLD AUTO: 0.36 X10(3) UL (ref 0–0.3)
EOSINOPHIL NFR BLD AUTO: 5.8 %
ERYTHROCYTE [DISTWIDTH] IN BLOOD BY AUTOMATED COUNT: 13.5 % (ref 11.5–16)
GLUCOSE BLD-MCNC: 135 MG/DL (ref 65–99)
GLUCOSE BLD-MCNC: 154 MG/DL (ref 70–99)
GLUCOSE BLD-MCNC: 158 MG/DL (ref 65–99)
GLUCOSE BLD-MCNC: 179 MG/DL (ref 65–99)
GLUCOSE BLD-MCNC: 202 MG/DL (ref 65–99)
GLUCOSE BLD-MCNC: 69 MG/DL (ref 65–99)
GLUCOSE BLD-MCNC: 72 MG/DL (ref 65–99)
GLUCOSE BLD-MCNC: 86 MG/DL (ref 65–99)
GLUCOSE BLD-MCNC: 87 MG/DL (ref 65–99)
GLUCOSE BLD-MCNC: >600 MG/DL (ref 65–99)
HAV IGM SER QL: 2.5 MG/DL (ref 1.7–3)
HCT VFR BLD AUTO: 31.9 % (ref 37–53)
HDLC SERPL-MCNC: 35 MG/DL (ref 45–?)
HDLC SERPL: 6.34 {RATIO} (ref ?–4.97)
HGB BLD-MCNC: 10.7 G/DL (ref 13–17)
IMMATURE GRANULOCYTE COUNT: 0.02 X10(3) UL (ref 0–1)
IMMATURE GRANULOCYTE RATIO %: 0.3 %
INR BLD: 0.96 (ref 0.89–1.11)
ISTAT ACTIVATED CLOTTING TIME: 147 SECONDS (ref 74–137)
LDLC SERPL CALC-MCNC: 155 MG/DL (ref ?–130)
LYMPHOCYTES # BLD AUTO: 0.88 X10(3) UL (ref 0.9–4)
LYMPHOCYTES NFR BLD AUTO: 14.3 %
M PROTEIN MFR SERPL ELPH: 6.4 G/DL (ref 6.1–8.3)
MCH RBC QN AUTO: 30.7 PG (ref 27–33.2)
MCHC RBC AUTO-ENTMCNC: 33.5 G/DL (ref 31–37)
MCV RBC AUTO: 91.7 FL (ref 80–99)
MONOCYTES # BLD AUTO: 0.55 X10(3) UL (ref 0.1–0.6)
MONOCYTES NFR BLD AUTO: 8.9 %
NEUTROPHIL ABS PRELIM: 4.35 X10 (3) UL (ref 1.3–6.7)
NEUTROPHILS # BLD AUTO: 4.35 X10(3) UL (ref 1.3–6.7)
NEUTROPHILS NFR BLD AUTO: 70.5 %
NONHDLC SERPL-MCNC: 187 MG/DL (ref ?–130)
P AXIS: 37 DEGREES
P AXIS: 46 DEGREES
P AXIS: 52 DEGREES
P-R INTERVAL: 196 MS
P-R INTERVAL: 202 MS
P-R INTERVAL: 224 MS
PLATELET # BLD AUTO: 117 10(3)UL (ref 150–450)
POTASSIUM SERPL-SCNC: 4.4 MMOL/L (ref 3.6–5.1)
PSA SERPL DL<=0.01 NG/ML-MCNC: 12.8 SECONDS (ref 12–14.3)
Q-T INTERVAL: 456 MS
Q-T INTERVAL: 458 MS
Q-T INTERVAL: 468 MS
QRS DURATION: 110 MS
QRS DURATION: 184 MS
QRS DURATION: 184 MS
QTC CALCULATION (BEZET): 441 MS
QTC CALCULATION (BEZET): 447 MS
QTC CALCULATION (BEZET): 447 MS
R AXIS: 29 DEGREES
R AXIS: 30 DEGREES
R AXIS: 31 DEGREES
RBC # BLD AUTO: 3.48 X10(6)UL (ref 3.8–5.8)
RED CELL DISTRIBUTION WIDTH-SD: 44.7 FL (ref 35.1–46.3)
SED RATE-ML: 72 MM/HR (ref 0–12)
SODIUM SERPL-SCNC: 137 MMOL/L (ref 136–144)
T AXIS: 18 DEGREES
T AXIS: 20 DEGREES
T AXIS: 24 DEGREES
TRIGLYCERIDES: 162 MG/DL (ref ?–150)
TROPONIN: <0.046 NG/ML (ref ?–0.05)
VENTRICULAR RATE: 55 BPM
VENTRICULAR RATE: 56 BPM
VENTRICULAR RATE: 58 BPM
VLDL: 32 MG/DL (ref 5–40)
WBC # BLD AUTO: 6.2 X10(3) UL (ref 4–13)

## 2017-03-23 PROCEDURE — B218YZZ FLUOROSCOPY OF LEFT INTERNAL MAMMARY BYPASS GRAFT USING OTHER CONTRAST: ICD-10-PCS | Performed by: INTERNAL MEDICINE

## 2017-03-23 PROCEDURE — 99232 SBSQ HOSP IP/OBS MODERATE 35: CPT | Performed by: HOSPITALIST

## 2017-03-23 PROCEDURE — 4A023N7 MEASUREMENT OF CARDIAC SAMPLING AND PRESSURE, LEFT HEART, PERCUTANEOUS APPROACH: ICD-10-PCS | Performed by: INTERNAL MEDICINE

## 2017-03-23 PROCEDURE — 93306 TTE W/DOPPLER COMPLETE: CPT

## 2017-03-23 PROCEDURE — B211YZZ FLUOROSCOPY OF MULTIPLE CORONARY ARTERIES USING OTHER CONTRAST: ICD-10-PCS | Performed by: INTERNAL MEDICINE

## 2017-03-23 PROCEDURE — 93971 EXTREMITY STUDY: CPT

## 2017-03-23 PROCEDURE — 93306 TTE W/DOPPLER COMPLETE: CPT | Performed by: INTERNAL MEDICINE

## 2017-03-23 RX ORDER — MULTIPLE VITAMINS W/ MINERALS TAB 9MG-400MCG
1 TAB ORAL DAILY
Status: DISCONTINUED | OUTPATIENT
Start: 2017-03-23 | End: 2017-03-24

## 2017-03-23 RX ORDER — ISOSORBIDE DINITRATE 20 MG/1
10 TABLET ORAL 2 TIMES DAILY
Status: DISCONTINUED | OUTPATIENT
Start: 2017-03-23 | End: 2017-03-24

## 2017-03-23 RX ORDER — SODIUM CHLORIDE 9 MG/ML
INJECTION, SOLUTION INTRAVENOUS CONTINUOUS
Status: DISCONTINUED | OUTPATIENT
Start: 2017-03-23 | End: 2017-03-23

## 2017-03-23 RX ORDER — MYCOPHENOLATE MOFETIL 250 MG/1
1000 CAPSULE ORAL 2 TIMES DAILY
Status: DISCONTINUED | OUTPATIENT
Start: 2017-03-23 | End: 2017-03-24

## 2017-03-23 RX ORDER — DEXTROSE MONOHYDRATE 25 G/50ML
50 INJECTION, SOLUTION INTRAVENOUS
Status: DISCONTINUED | OUTPATIENT
Start: 2017-03-23 | End: 2017-03-24

## 2017-03-23 RX ORDER — HEPARIN SODIUM AND DEXTROSE 10000; 5 [USP'U]/100ML; G/100ML
1000 INJECTION INTRAVENOUS ONCE
Status: DISCONTINUED | OUTPATIENT
Start: 2017-03-23 | End: 2017-03-24

## 2017-03-23 RX ORDER — ALPRAZOLAM 0.5 MG/1
0.5 TABLET ORAL NIGHTLY PRN
Status: DISCONTINUED | OUTPATIENT
Start: 2017-03-23 | End: 2017-03-24

## 2017-03-23 RX ORDER — MIDODRINE HYDROCHLORIDE 5 MG/1
2.5 TABLET ORAL 3 TIMES DAILY
Status: DISCONTINUED | OUTPATIENT
Start: 2017-03-23 | End: 2017-03-24

## 2017-03-23 RX ORDER — MYCOPHENOLATE MOFETIL 250 MG/1
1000 CAPSULE ORAL 2 TIMES DAILY
Status: DISCONTINUED | OUTPATIENT
Start: 2017-03-23 | End: 2017-03-23

## 2017-03-23 RX ORDER — FINASTERIDE 5 MG/1
5 TABLET, FILM COATED ORAL DAILY
Status: DISCONTINUED | OUTPATIENT
Start: 2017-03-23 | End: 2017-03-24

## 2017-03-23 RX ORDER — MORPHINE SULFATE 2 MG/ML
1-2 INJECTION, SOLUTION INTRAMUSCULAR; INTRAVENOUS EVERY 4 HOURS PRN
Status: DISCONTINUED | OUTPATIENT
Start: 2017-03-23 | End: 2017-03-24

## 2017-03-23 RX ORDER — ACETYLCYSTEINE 200 MG/ML
600 SOLUTION ORAL; RESPIRATORY (INHALATION) 2 TIMES DAILY
Status: COMPLETED | OUTPATIENT
Start: 2017-03-23 | End: 2017-03-24

## 2017-03-23 RX ORDER — TRAMADOL HYDROCHLORIDE 50 MG/1
50 TABLET ORAL EVERY 6 HOURS PRN
Status: DISCONTINUED | OUTPATIENT
Start: 2017-03-23 | End: 2017-03-24

## 2017-03-23 RX ORDER — MIDAZOLAM HYDROCHLORIDE 1 MG/ML
INJECTION INTRAMUSCULAR; INTRAVENOUS
Status: COMPLETED
Start: 2017-03-23 | End: 2017-03-23

## 2017-03-23 RX ORDER — HEPARIN SODIUM AND DEXTROSE 10000; 5 [USP'U]/100ML; G/100ML
INJECTION INTRAVENOUS CONTINUOUS
Status: DISCONTINUED | OUTPATIENT
Start: 2017-03-23 | End: 2017-03-23

## 2017-03-23 RX ORDER — TRAMADOL HYDROCHLORIDE 50 MG/1
100 TABLET ORAL EVERY 6 HOURS PRN
Status: DISCONTINUED | OUTPATIENT
Start: 2017-03-23 | End: 2017-03-24

## 2017-03-23 RX ORDER — CLOPIDOGREL BISULFATE 75 MG/1
75 TABLET ORAL DAILY
Status: DISCONTINUED | OUTPATIENT
Start: 2017-03-23 | End: 2017-03-24

## 2017-03-23 RX ORDER — ASPIRIN 325 MG
325 TABLET ORAL DAILY
Status: DISCONTINUED | OUTPATIENT
Start: 2017-03-23 | End: 2017-03-24

## 2017-03-23 RX ORDER — PANTOPRAZOLE SODIUM 20 MG/1
20 TABLET, DELAYED RELEASE ORAL
Status: DISCONTINUED | OUTPATIENT
Start: 2017-03-23 | End: 2017-03-24

## 2017-03-23 RX ORDER — HEPARIN SODIUM 5000 [USP'U]/ML
INJECTION, SOLUTION INTRAVENOUS; SUBCUTANEOUS
Status: COMPLETED
Start: 2017-03-23 | End: 2017-03-23

## 2017-03-23 RX ORDER — ONDANSETRON 2 MG/ML
4 INJECTION INTRAMUSCULAR; INTRAVENOUS EVERY 6 HOURS PRN
Status: DISCONTINUED | OUTPATIENT
Start: 2017-03-23 | End: 2017-03-24

## 2017-03-23 RX ORDER — ACETAMINOPHEN 325 MG/1
650 TABLET ORAL EVERY 6 HOURS PRN
Status: DISCONTINUED | OUTPATIENT
Start: 2017-03-23 | End: 2017-03-24

## 2017-03-23 RX ORDER — NALTREXONE HYDROCHLORIDE 50 MG/1
50 TABLET, FILM COATED ORAL
Status: CANCELLED | OUTPATIENT
Start: 2017-03-23

## 2017-03-23 RX ORDER — GABAPENTIN 300 MG/1
300 CAPSULE ORAL 3 TIMES DAILY
Status: DISCONTINUED | OUTPATIENT
Start: 2017-03-23 | End: 2017-03-24

## 2017-03-23 RX ORDER — NITROGLYCERIN 0.4 MG/1
0.4 TABLET SUBLINGUAL EVERY 5 MIN PRN
Status: DISCONTINUED | OUTPATIENT
Start: 2017-03-23 | End: 2017-03-24

## 2017-03-23 RX ORDER — TOPIRAMATE 25 MG/1
50 TABLET ORAL NIGHTLY
Status: DISCONTINUED | OUTPATIENT
Start: 2017-03-23 | End: 2017-03-24

## 2017-03-23 RX ORDER — DIPHENHYDRAMINE HCL 50 MG
50 CAPSULE ORAL NIGHTLY PRN
Status: DISCONTINUED | OUTPATIENT
Start: 2017-03-23 | End: 2017-03-24

## 2017-03-23 RX ORDER — MELATONIN
325 DAILY
Status: DISCONTINUED | OUTPATIENT
Start: 2017-03-23 | End: 2017-03-24

## 2017-03-23 RX ORDER — ACETAMINOPHEN 325 MG/1
650 TABLET ORAL EVERY 4 HOURS PRN
Status: DISCONTINUED | OUTPATIENT
Start: 2017-03-23 | End: 2017-03-24

## 2017-03-23 RX ORDER — HEPARIN SODIUM 5000 [USP'U]/ML
5000 INJECTION, SOLUTION INTRAVENOUS; SUBCUTANEOUS EVERY 8 HOURS
Status: DISCONTINUED | OUTPATIENT
Start: 2017-03-23 | End: 2017-03-23

## 2017-03-23 RX ORDER — HEPARIN SODIUM AND DEXTROSE 10000; 5 [USP'U]/100ML; G/100ML
1000 INJECTION INTRAVENOUS ONCE
Status: DISCONTINUED | OUTPATIENT
Start: 2017-03-23 | End: 2017-03-23

## 2017-03-23 RX ORDER — DEXTROSE AND SODIUM CHLORIDE 5; .45 G/100ML; G/100ML
INJECTION, SOLUTION INTRAVENOUS CONTINUOUS
Status: DISCONTINUED | OUTPATIENT
Start: 2017-03-23 | End: 2017-03-23

## 2017-03-23 RX ORDER — ESCITALOPRAM OXALATE 10 MG/1
10 TABLET ORAL EVERY MORNING
Status: DISCONTINUED | OUTPATIENT
Start: 2017-03-23 | End: 2017-03-24

## 2017-03-23 RX ORDER — SODIUM CHLORIDE 9 MG/ML
INJECTION, SOLUTION INTRAVENOUS CONTINUOUS
Status: DISCONTINUED | OUTPATIENT
Start: 2017-03-23 | End: 2017-03-24

## 2017-03-23 RX ORDER — LIDOCAINE HYDROCHLORIDE 10 MG/ML
INJECTION, SOLUTION INFILTRATION; PERINEURAL
Status: COMPLETED
Start: 2017-03-23 | End: 2017-03-23

## 2017-03-23 RX ORDER — BUMETANIDE 2 MG/1
2 TABLET ORAL DAILY
Status: DISCONTINUED | OUTPATIENT
Start: 2017-03-23 | End: 2017-03-24

## 2017-03-23 RX ORDER — ASPIRIN 81 MG/1
324 TABLET, CHEWABLE ORAL ONCE
Status: DISCONTINUED | OUTPATIENT
Start: 2017-03-23 | End: 2017-03-24

## 2017-03-23 NOTE — PAYOR COMM NOTE
Attending Physician: Ashley Gil MD    Review Type: ADMISSION   Reviewer: Lindsay Curtis       Date: March 23, 2017 - 8:14 AM  Payor: Andrei Diehl MEDICARE ADV PPO  Authorization Number: [id number]/A  Admit date: 3/22/2017  7:31 PM   Admitted from Emergency Dept.: y bradycardia  Reading: Nonspecific intraventricular block, no acute ST change    Xr Chest Ap Portable       CONCLUSION:  1. Cardiomegaly.    MEDICATIONS ADMINISTERED IN LAST 1 DAY:  acetylcysteine (MUCOMYST) 20 % solution 600 mg     Date Action Dose Route Us nitroGLYCERIN infusion 50mg in D5W 250ml     Date Action Dose Route User    3/23/2017 0600 Restarted 10 mcg/min Intravenous Jonel Guadalupe RN    3/23/2017 0300 Restarted 10 mcg/min Intravenous Jonel Guadalupe RN    3/22/2017 2021 New Bag 10 Cholesterol 35 (*)     LDL Cholesterol 155 (*)     Chol/HDL Ratio 6.34 (*)     Non HDL Chol 187 (*)     All other components within normal limits   COMP METABOLIC PANEL (14) - Abnormal; Notable for the following:     Glucose 154 (*)     BUN 61 (*)     Crea CBC WITH DIFFERENTIAL WITH PLATELET    Narrative: The following orders were created for panel order CBC WITH DIFFERENTIAL WITH PLATELET.   Procedure                               Abnormality         Status                     --------- EKG shows no acute changes but based on his story his symptoms are fairly typical.  We will keep him n.p.o. and will have the team evaluated him in the morning to see whether or not they want to perform a another cardiac catheterization.  We will put the p

## 2017-03-23 NOTE — PROCEDURES
Procedure: [facility]    Coronary angiography  : [place]  Access: RFA (manual compression)  Findings: LM Minimal CAD    LAD Widely patent stents in prox and mid    Cx Widely patent stents    RCA Small, non-dominant; minimal CAD    LIMA to D2 and dLAD Wi

## 2017-03-23 NOTE — ED NOTES
Assisted PT in removing pants. After this was done w/o difficulty; Pt had a minor syncopal episode in the room. Bilateral side rails elevated, vitals remained stable & Pt wife rpt this is a normal incident that occurs when Pt has headaches.  Incident lasted

## 2017-03-23 NOTE — CONSULTS
[facility] NOTE  Cardiology Consultation    Vishnu Nick Patient Status:  Observation    3/1/1950 MRN [id number]   [facility] 8NE-A Attending Jorge A Britton MD   Hosp Day # 1 PCP Riley Metzger     Reason for Consultatio states that this is exactly how symptoms were prior to his recent procedure.     The patient also reports that he has been getting lightheaded and almost passed out a few days ago. He also said he has been having some diarrhea which has been blood-tinged. Coronary artery disease involving native heart with angina pectoris (HCC)     Hyperlipidemia     Benign prostatic hyperplasia     Coronary artery disease involving autologous vein coronary bypass graft with unstable angina pectoris (HCC)     Acute CVA (cer disorder          Past Surgical History    OTHER      Comment LLIVER TRANSPLANT    LAPS GSTR RSTCV PX PLMT BAND      CABG  10/2015    OTHER      Comment R BKA    THYROIDECTOMY      REPAIR ING HERNIA,5+Y/O,REDUCIBL      OTHER      Comment STENTS    COLONOSC FLOXIN FAMILY DRUGS  Quinolones                Rifampin                  Statins                     Comment:Muscle weakness  Sulfa Antibiotics         Vancomycin                  Comment:Other reaction(s): Joint Pain    Home Medications:    Prescription omeprazole (PRILOSEC) 20 MG Oral Capsule Delayed Release Take 40 mg by mouth 2 (two) times daily. Disp:  Rfl:  Taking   Naltrexone HCl (REVIA) 50 MG Oral Tab Take 50 mg by mouth nightly.    Disp:  Rfl:  Taking   Multiple Vitamins-Minerals (CENTRUM SILVE upstrokes are normal no bruits  Lungs; clear to percussion and auscultation  Cardiac exam no rubs gallops or murmurs  Abdomen; soft nontender normal bowel sounds  Pulses; full and symmetric  Extremities no edema  Neurologic oriented to person place and riccardo

## 2017-03-23 NOTE — H&P
[facility]  History and Physical     Payal Amos Patient Status:  Emergency    3/1/1950 MRN [id number]   Location [address] Attending No att. providers found   [facility] Day # 0 PCP [facility]     Chief Complaint: Comment R BKA    THYROIDECTOMY      REPAIR ING HERNIA,5+Y/O,REDUCIBL      OTHER      Comment STENTS    COLONOSCOPY N/A 3/4/2016    Comment Procedure: COLONOSCOPY;  Surgeon: Kaushik Mosley DO;  Location: [facility] ENDOSCOPY    CATARACT      CATH DRUG ELUTING Comment:Muscle weakness  Sulfa Antibiotics         Vancomycin                  Comment:Other reaction(s): Joint Pain    Medications:    No current facility-administered medications on file prior to encounter.   Current Outpatient Prescriptions on File Prior Disp:  Rfl:    Naltrexone HCl (REVIA) 50 MG Oral Tab Take 50 mg by mouth nightly. Disp:  Rfl:    Multiple Vitamins-Minerals (CENTRUM SILVER) Oral Tab Take 1 tablet by mouth daily.  Disp:  Rfl:    Clopidogrel Bisulfate (PLAVIX) 75 MG Oral Tab Take 75 mg by 133*  131*   K  4.4  4.8   CL  96*  97*   CO2  27.0  25.0   ALKPHO   --   1232*   AST   --   122*   ALT   --   103*   BILT   --   3.4*   TP   --   7.0       CrCl cannot be calculated (Unknown ideal weight. ). Recent Labs   Lab  03/22/17   1945   PTP  12.

## 2017-03-23 NOTE — PROCEDURES
[facility]    PATIENT'S NAME: Geovanna Warren   ATTENDING PHYSICIAN: Mercy Soto M.D.    OPERATING PHYSICIAN: Justin Lindsay MD   PATIENT ACCOUNT#:   [id number]    LOCATION:  [address] [place] [address]  MEDICAL RECORD #:   [id number]       DATE OF BIRTH:  [birth year]. The IV was maintained by the RN and moderate conscious sedation of versed and fentanyl was given. The patient was assessed and monitoring of oxygen, heart rate, and blood pressure by nurse and myself during the exam from [phone] to 1530.     CONCL

## 2017-03-23 NOTE — ED PROVIDER NOTES
Patient Seen in: [facility] Emergency Department    History   Patient presents with:  Chest Pain Angina (cardiovascular)    Stated Complaint: cp    HPI    Patient presents with chest pain.   The patient states that he came in yesterday for outpatient led to amputation   • Kidney disorder            Past Surgical History    OTHER      Comment LLIVER TRANSPLANT    LAPS GSTR RSTCV PX PLMT BAND      CABG  10/2015    OTHER      Comment R BKA    THYROIDECTOMY      REPAIR ING HERNIA,5+Y/O,REDUCIBL      OTHER (CELEXA) 20 MG Oral Tab,  Take 20 mg by mouth daily. finasteride (PROSCAR) 5 MG Oral Tab,  Take 5 mg by mouth daily. cycloSPORINE modified (NEORAL) 25 MG Oral Cap,  Take 175 mg by mouth 2 (two) times daily.      omeprazole (PRILOSEC) 20 MG Oral Capsule distress. HEENT: Normocephalic, atraumatic, pupils equal round and reactive to light, oropharynx clear, uvula midline. Neck: Supple. Cardiovascular: Mild bradycardia. Respiratory: Lungs clear to auscultation.   Abdomen: Soft, nontender, no rebound or gu The following orders were created for panel order CBC WITH DIFFERENTIAL WITH PLATELET.   Procedure                               Abnormality         Status                     ---------                               -----------         ------ The patient had already taken his aspirin and Plavix today so was not given additional doses. He was started on a nitroglycerin drip and given morphine additionally. He felt some relief in his pain but it was not resolved.   His glucose is quite eleva

## 2017-03-23 NOTE — PROGRESS NOTES
[facility]  Progress Note     Madi  Patient Status:  Observation    3/1/1950 MRN [id number]   [facility] 8NE-A Attending Aileen Leon MD   Hosp Day # 1 PCP Jayson Michaels     Chief Complaint: Chest Pain  S:  Has pers <0.046  <0.046        Imaging: Imaging data reviewed in Epic.   Medications:   • acetylcysteine  600 mg Oral BID   • aspirin  325 mg Oral Daily   • bumetanide  2 mg Oral Daily   • cholecalciferol  1,000 Units Oral Daily   • escitalopram  10 mg Oral QAM   •

## 2017-03-23 NOTE — HISTORICAL OFFICE NOTE
[address]quel [address]  [phone]  : 1950  ACCOUNT: [id number]  CARDIOLOGIST: Indra Ramirez M.D.   Hospital: [facility]  Admitted: 02/10/2017  Discharged: 2017    DISCHARGE SUMMARY    DISCHARGE DIAGNOSES:  1. CAD with history of CABG/PCI, admitte complaints,c/u severe headaches and black out feeling w/loss of balance.]    Mr. Joanne Butler is a 32-year-old gentleman with known coronary disease, previous bypass surgery, and previous stent. He came in recently with intermittent chest pain.  He proceeded with trauma and normocephalic. EYES: conjunctivae not injected and no xanthelasma. ENT: mucosa pink and moist. NECK: jugular venous pressure not elevated. RESP: respirations with normal rate and rhythm, clear to auscultation.  MS: inadequate gait for exercise/te participate in his care. We will see him back in six months.  He is being started on a low dose statin per medical attending once his hepatologist/liver transplant doctors feel it is safe to do so. ]    PRESCRIPTIONS:  11/17/16 Isosorbide Dinitrate  10MG

## 2017-03-24 VITALS
SYSTOLIC BLOOD PRESSURE: 156 MMHG | RESPIRATION RATE: 18 BRPM | HEART RATE: 54 BPM | OXYGEN SATURATION: 97 % | TEMPERATURE: 98 F | BODY MASS INDEX: 36 KG/M2 | DIASTOLIC BLOOD PRESSURE: 58 MMHG | WEIGHT: 292.56 LBS

## 2017-03-24 LAB
BUN BLD-MCNC: 62 MG/DL (ref 8–20)
BUN BLD-MCNC: 63 MG/DL (ref 8–20)
CALCIUM BLD-MCNC: 8.7 MG/DL (ref 8.3–10.3)
CALCIUM BLD-MCNC: 8.8 MG/DL (ref 8.3–10.3)
CHLORIDE: 102 MMOL/L (ref 101–111)
CHLORIDE: 106 MMOL/L (ref 101–111)
CO2: 20 MMOL/L (ref 22–32)
CO2: 24 MMOL/L (ref 22–32)
CREAT BLD-MCNC: 2.09 MG/DL (ref 0.7–1.3)
CREAT BLD-MCNC: 2.41 MG/DL (ref 0.7–1.3)
GLUCOSE BLD-MCNC: 174 MG/DL (ref 65–99)
GLUCOSE BLD-MCNC: 218 MG/DL (ref 65–99)
GLUCOSE BLD-MCNC: 222 MG/DL (ref 70–99)
GLUCOSE BLD-MCNC: 234 MG/DL (ref 65–99)
GLUCOSE BLD-MCNC: 234 MG/DL (ref 70–99)
GLUCOSE BLD-MCNC: 242 MG/DL (ref 65–99)
GLUCOSE BLD-MCNC: 273 MG/DL (ref 65–99)
HAV IGM SER QL: 2.4 MG/DL (ref 1.7–3)
POTASSIUM SERPL-SCNC: 4.5 MMOL/L (ref 3.6–5.1)
POTASSIUM SERPL-SCNC: 4.8 MMOL/L (ref 3.6–5.1)
SODIUM SERPL-SCNC: 135 MMOL/L (ref 136–144)
SODIUM SERPL-SCNC: 136 MMOL/L (ref 136–144)
TROPONIN: <0.046 NG/ML (ref ?–0.05)

## 2017-03-24 PROCEDURE — 99238 HOSP IP/OBS DSCHRG MGMT 30/<: CPT | Performed by: HOSPITALIST

## 2017-03-24 NOTE — PLAN OF CARE
CARDIOVASCULAR - ADULT    • Maintains optimal cardiac output and hemodynamic stability Completed    • Absence of cardiac arrhythmias or at baseline Completed        HEMATOLOGIC - ADULT    • Maintains hematologic stability Completed    • Free from bleeding

## 2017-03-24 NOTE — DISCHARGE SUMMARY
[facility]CHIATRIC [place] HOSPITALIST  DISCHARGE SUMMARY     Radha Carpenter Patient Status:  Inpatient    3/1/1950 MRN [id number]   [facility] 8NE-A Attending Rafy Porter MD   Hosp Day # 2 PCP Bruce Martinez     Date of Admission: 3/22/2017  Date of D relevant for syncope 2 days PTP- says he felt his legs get weak and then he LOC and woke up seconds later as soon as he hit the ground. Pt also complaining of sore throat. Brief Synopsis: Patient was placed on nitro and heparin drip.   Cardiology evalua nightly as needed for Itching or Sleep. Refills:  0       Ferrous Sulfate 324 (65 Fe) MG Tbec   Last time this was given:  325 mg on 3/23/2017  9:48 AM        Take 1 tablet by mouth daily.     Refills:  0       finasteride 5 MG Tabs   Last time this was nightly. Refills:  0       omeprazole 20 MG Cpdr   Commonly known as:  PRILOSEC        Take 40 mg by mouth 2 (two) times daily.     Refills:  0       SYNTHROID 300 MCG Tabs   Last time this was given:  3/24/2017  6:37 AM   Generic drug:  Levothyroxine So

## 2017-03-24 NOTE — PROGRESS NOTES
[facility] Cardiology Progress Note    Subjective:  Feels \"on and off\", just getting day going, no issues overnight per nursing.      Objective:  /40 mmHg  Pulse 54  Temp(Src) 97.7 °F (36.5 °C) (Oral)  Resp 17  Wt 292 lb 8.8 oz (132.7 kg)  SpO2 98%

## 2017-03-24 NOTE — PROGRESS NOTES
IV and tele discontinued. Pt. Received discharge instructions and follow-up information. Post-cath activity instructions reviewed as well. Questions addressed and answered. Pt. To be transported by wheelchair with tech to d/c transportation.

## 2017-03-28 LAB
BUN: 48 MG/DL
CALCIUM: 9.9 MG/DL
CHLORIDE: 96 MEQ/L
CREATININE, SERUM: 2.06 MG/DL
GLUCOSE: 394 MG/DL
POTASSIUM, SERUM: 4.4 MEQ/L
SODIUM: 133 MEQ/L

## 2017-04-14 ENCOUNTER — HOSPITAL ENCOUNTER (OUTPATIENT)
Dept: PHYSICAL THERAPY | Facility: HOSPITAL | Age: 67
Setting detail: THERAPIES SERIES
Discharge: HOME | End: 2017-04-14
Attending: HOSPITALIST
Payer: MEDICARE

## 2017-04-14 DIAGNOSIS — R26.9 ABNORMALITY OF GAIT: Primary | ICD-10-CM

## 2017-04-14 PROCEDURE — 97163 PT EVAL HIGH COMPLEX 45 MIN: CPT

## 2017-04-14 PROCEDURE — 97110 THERAPEUTIC EXERCISES: CPT

## 2017-04-14 NOTE — PROGRESS NOTES
NEUROLOGICAL EVALUATION:   Referring Physician: Dr. Nishant Perez  Diagnosis: abnormality of gait     Date of Service: 4/14/2017     PATIENT SUMMARY   Blanche Venegas is a 74year old y/o male who presents to therapy today with complaints of low back pain and di poor posture, decreased activity tolerance, and gait deviations. Patient is quite deconditioned and now dealing with back pain so severe that it causes him to exercise and walk less.   Patient has a myriad of other health problems listed above that further wide stance, staff on R, small steps, forward lean     Special tests:   SLR L 34 degrees, R 46 degrees    Today’s Treatment and Response: Evaluation and initial patient education complete. Discussed plan of care. Issued HEP.   HEP Issued and Handouts Give care.    X___________________________________________________ Date____________________    Certification From: 5/54/3675  To:7/13/2017

## 2017-04-17 ENCOUNTER — HOSPITAL ENCOUNTER (OUTPATIENT)
Facility: HOSPITAL | Age: 67
Setting detail: OBSERVATION
Discharge: HOME | End: 2017-04-20
Attending: EMERGENCY MEDICINE | Admitting: HOSPITALIST
Payer: MEDICARE

## 2017-04-17 ENCOUNTER — TELEPHONE (OUTPATIENT)
Dept: PHYSICAL THERAPY | Facility: HOSPITAL | Age: 67
End: 2017-04-17

## 2017-04-17 ENCOUNTER — HOSPITAL ENCOUNTER (OUTPATIENT)
Dept: PHYSICAL THERAPY | Facility: HOSPITAL | Age: 67
Setting detail: THERAPIES SERIES
End: 2017-04-17
Attending: HOSPITALIST
Payer: MEDICARE

## 2017-04-17 ENCOUNTER — APPOINTMENT (OUTPATIENT)
Dept: GENERAL RADIOLOGY | Facility: HOSPITAL | Age: 67
End: 2017-04-17
Attending: EMERGENCY MEDICINE
Payer: MEDICARE

## 2017-04-17 ENCOUNTER — APPOINTMENT (OUTPATIENT)
Dept: CT IMAGING | Facility: HOSPITAL | Age: 67
End: 2017-04-17
Attending: EMERGENCY MEDICINE
Payer: MEDICARE

## 2017-04-17 DIAGNOSIS — R73.9 HYPERGLYCEMIA: ICD-10-CM

## 2017-04-17 DIAGNOSIS — E11.40 TYPE 2 DIABETES MELLITUS WITH DIABETIC NEUROPATHY, WITH LONG-TERM CURRENT USE OF INSULIN (HCC): ICD-10-CM

## 2017-04-17 DIAGNOSIS — Z79.4 TYPE 2 DIABETES MELLITUS WITH COMPLICATION, WITH LONG-TERM CURRENT USE OF INSULIN (HCC): ICD-10-CM

## 2017-04-17 DIAGNOSIS — Z79.4 TYPE 2 DIABETES MELLITUS WITH DIABETIC NEUROPATHY, WITH LONG-TERM CURRENT USE OF INSULIN (HCC): ICD-10-CM

## 2017-04-17 DIAGNOSIS — E11.8 TYPE 2 DIABETES MELLITUS WITH COMPLICATION, WITH LONG-TERM CURRENT USE OF INSULIN (HCC): ICD-10-CM

## 2017-04-17 DIAGNOSIS — N18.30 CKD (CHRONIC KIDNEY DISEASE) STAGE 3, GFR 30-59 ML/MIN (HCC): ICD-10-CM

## 2017-04-17 DIAGNOSIS — R41.9 ALTERATION OF AWARENESS: Primary | ICD-10-CM

## 2017-04-17 DIAGNOSIS — R55 SYNCOPE, UNSPECIFIED SYNCOPE TYPE: ICD-10-CM

## 2017-04-17 PROCEDURE — 70450 CT HEAD/BRAIN W/O DYE: CPT

## 2017-04-17 PROCEDURE — 71010 XR CHEST AP PORTABLE  (CPT=71010): CPT

## 2017-04-17 RX ORDER — SODIUM CHLORIDE 9 MG/ML
1000 INJECTION, SOLUTION INTRAVENOUS ONCE
Status: COMPLETED | OUTPATIENT
Start: 2017-04-17 | End: 2017-04-18

## 2017-04-17 NOTE — TELEPHONE ENCOUNTER
Patient came in for PT today and while warming up for 5' on the Nustep, disclosed that he fell yesterday but did not hit his head.   After further questioning, it was revealed that the fall was caused by a \"dizzy\" feeling with severe headache, L side weak

## 2017-04-18 ENCOUNTER — APPOINTMENT (OUTPATIENT)
Dept: CT IMAGING | Facility: HOSPITAL | Age: 67
End: 2017-04-18
Attending: NURSE PRACTITIONER
Payer: MEDICARE

## 2017-04-18 PROBLEM — R41.9 ALTERATION OF AWARENESS: Status: ACTIVE | Noted: 2017-04-18

## 2017-04-18 PROCEDURE — 70450 CT HEAD/BRAIN W/O DYE: CPT

## 2017-04-18 PROCEDURE — 99220 INITIAL OBSERVATION CARE,LEVL III: CPT | Performed by: HOSPITALIST

## 2017-04-18 PROCEDURE — 99215 OFFICE O/P EST HI 40 MIN: CPT | Performed by: OTHER

## 2017-04-18 RX ORDER — MYCOPHENOLATE MOFETIL 250 MG/1
1000 CAPSULE ORAL 2 TIMES DAILY
Status: DISCONTINUED | OUTPATIENT
Start: 2017-04-18 | End: 2017-04-20

## 2017-04-18 RX ORDER — BUMETANIDE 1 MG/1
2 TABLET ORAL DAILY
Status: DISCONTINUED | OUTPATIENT
Start: 2017-04-18 | End: 2017-04-18

## 2017-04-18 RX ORDER — ONDANSETRON 2 MG/ML
4 INJECTION INTRAMUSCULAR; INTRAVENOUS EVERY 6 HOURS PRN
Status: DISCONTINUED | OUTPATIENT
Start: 2017-04-18 | End: 2017-04-20

## 2017-04-18 RX ORDER — MORPHINE SULFATE 2 MG/ML
1 INJECTION, SOLUTION INTRAMUSCULAR; INTRAVENOUS EVERY 2 HOUR PRN
Status: DISCONTINUED | OUTPATIENT
Start: 2017-04-18 | End: 2017-04-20

## 2017-04-18 RX ORDER — ACETAMINOPHEN 325 MG/1
650 TABLET ORAL EVERY 6 HOURS PRN
Status: DISCONTINUED | OUTPATIENT
Start: 2017-04-18 | End: 2017-04-20

## 2017-04-18 RX ORDER — NALTREXONE HYDROCHLORIDE 50 MG/1
50 TABLET, FILM COATED ORAL
Status: DISCONTINUED | OUTPATIENT
Start: 2017-04-18 | End: 2017-04-20

## 2017-04-18 RX ORDER — FINASTERIDE 5 MG/1
5 TABLET, FILM COATED ORAL DAILY
Status: DISCONTINUED | OUTPATIENT
Start: 2017-04-18 | End: 2017-04-20

## 2017-04-18 RX ORDER — HEPARIN SODIUM 5000 [USP'U]/ML
5000 INJECTION, SOLUTION INTRAVENOUS; SUBCUTANEOUS EVERY 8 HOURS
Status: DISCONTINUED | OUTPATIENT
Start: 2017-04-18 | End: 2017-04-20

## 2017-04-18 RX ORDER — MORPHINE SULFATE 4 MG/ML
4 INJECTION, SOLUTION INTRAMUSCULAR; INTRAVENOUS EVERY 2 HOUR PRN
Status: DISCONTINUED | OUTPATIENT
Start: 2017-04-18 | End: 2017-04-20

## 2017-04-18 RX ORDER — SODIUM CHLORIDE 9 MG/ML
INJECTION, SOLUTION INTRAVENOUS CONTINUOUS
Status: ACTIVE | OUTPATIENT
Start: 2017-04-18 | End: 2017-04-18

## 2017-04-18 RX ORDER — MIDODRINE HYDROCHLORIDE 2.5 MG/1
2.5 TABLET ORAL 3 TIMES DAILY
Status: DISCONTINUED | OUTPATIENT
Start: 2017-04-18 | End: 2017-04-20

## 2017-04-18 RX ORDER — BUMETANIDE 1 MG/1
2 TABLET ORAL DAILY
Status: DISCONTINUED | OUTPATIENT
Start: 2017-04-19 | End: 2017-04-20

## 2017-04-18 RX ORDER — POTASSIUM CHLORIDE 20 MEQ/1
40 TABLET, EXTENDED RELEASE ORAL ONCE
Status: COMPLETED | OUTPATIENT
Start: 2017-04-18 | End: 2017-04-18

## 2017-04-18 RX ORDER — ASPIRIN 325 MG
325 TABLET ORAL DAILY
Status: DISCONTINUED | OUTPATIENT
Start: 2017-04-18 | End: 2017-04-20

## 2017-04-18 RX ORDER — ESCITALOPRAM OXALATE 10 MG/1
10 TABLET ORAL EVERY MORNING
Status: DISCONTINUED | OUTPATIENT
Start: 2017-04-18 | End: 2017-04-20

## 2017-04-18 RX ORDER — ISOSORBIDE DINITRATE 10 MG/1
10 TABLET ORAL 2 TIMES DAILY
Status: DISCONTINUED | OUTPATIENT
Start: 2017-04-18 | End: 2017-04-20

## 2017-04-18 RX ORDER — DEXTROSE MONOHYDRATE 25 G/50ML
50 INJECTION, SOLUTION INTRAVENOUS
Status: DISCONTINUED | OUTPATIENT
Start: 2017-04-18 | End: 2017-04-20

## 2017-04-18 RX ORDER — CLOPIDOGREL BISULFATE 75 MG/1
75 TABLET ORAL DAILY
Status: DISCONTINUED | OUTPATIENT
Start: 2017-04-18 | End: 2017-04-20

## 2017-04-18 RX ORDER — DIPHENHYDRAMINE HCL 50 MG
50 CAPSULE ORAL NIGHTLY PRN
Status: DISCONTINUED | OUTPATIENT
Start: 2017-04-18 | End: 2017-04-20

## 2017-04-18 RX ORDER — MYCOPHENOLATE MOFETIL 250 MG/1
1000 CAPSULE ORAL 2 TIMES DAILY
Status: DISCONTINUED | OUTPATIENT
Start: 2017-04-18 | End: 2017-04-18

## 2017-04-18 RX ORDER — PANTOPRAZOLE SODIUM 40 MG/1
40 TABLET, DELAYED RELEASE ORAL
Status: DISCONTINUED | OUTPATIENT
Start: 2017-04-18 | End: 2017-04-20

## 2017-04-18 RX ORDER — GABAPENTIN 300 MG/1
300 CAPSULE ORAL 3 TIMES DAILY
Status: DISCONTINUED | OUTPATIENT
Start: 2017-04-18 | End: 2017-04-20

## 2017-04-18 RX ORDER — ALPRAZOLAM 0.5 MG/1
0.5 TABLET ORAL AS NEEDED
Status: DISCONTINUED | OUTPATIENT
Start: 2017-04-18 | End: 2017-04-20

## 2017-04-18 RX ORDER — MORPHINE SULFATE 2 MG/ML
2 INJECTION, SOLUTION INTRAMUSCULAR; INTRAVENOUS EVERY 2 HOUR PRN
Status: DISCONTINUED | OUTPATIENT
Start: 2017-04-18 | End: 2017-04-20

## 2017-04-18 NOTE — OCCUPATIONAL THERAPY NOTE
OCCUPATIONAL THERAPY QUICK EVALUATION - INPATIENT    Room Number: 4880/4971-L  Evaluation Date: 4/18/2017     Type of Evaluation: Initial  Presenting Problem: Syncope, hyperglycemia    Physician Order: IP Consult to Occupational Therapy  Reason for Therapy 10/18/16    BYPASS SURGERY  2015    Comment Robotic CABG X 2    OTHER SURGICAL HISTORY  2005    Comment Right Below knee amputee    CATH PERCUTANEOUS  TRANSLUMINAL CORONARY ANGIOPLASTY         HOME SITUATION  Type of Home: Apartment  Home Layout: One level independent  Sit to Stand: Modified independent    Skilled Therapy Provided: Pt was received supine in bed for session, therapist completed MMT and ROM on pt, pt was able to amb x1 in room and hallway and bathroom with supervision, therapist assist pt to c

## 2017-04-18 NOTE — ED INITIAL ASSESSMENT (HPI)
Wife sts imbalances with sugars lately over the past few days. Out of insulin since Sunday. Wife sts he get's these spells that last for a few hours then symptoms resolve. + diarrhea 3-4 started last night.

## 2017-04-18 NOTE — PLAN OF CARE
Diabetes/Glucose Control    • Glucose maintained within prescribed range Progressing        GASTROINTESTINAL - ADULT    • Maintains or returns to baseline bowel function Progressing        NEUROLOGICAL - ADULT    • Achieves stable or improved neurological

## 2017-04-18 NOTE — PROGRESS NOTES
Responded to RR called this morning at 11 am with Dr. Thony Barrett:  RN reports patient was up in the bathroom feeling well moving all extremities well. He had no complaints to nursing at the time.   Patient was able to make it back to the bed bu

## 2017-04-18 NOTE — ED PROVIDER NOTES
Patient Seen in: [facility] Emergency Department    History   Patient presents with:  Stroke (neurologic)    Stated Complaint: stroke symptoms    HPI    Wife provides [de-identified] of history.   Patient has remarkable medical history of syncopal episodes LAPS GSTR RSTCV PX PLMT BAND      CABG  10/2015    OTHER      Comment R BKA    THYROIDECTOMY      REPAIR ING HERNIA,5+Y/O,REDUCIBL      OTHER      Comment STENTS    COLONOSCOPY N/A 3/4/2016    Comment Procedure: COLONOSCOPY;  Surgeon: Padma Will, daily.   cycloSPORINE modified (NEORAL) 25 MG Oral Cap,  Take 175 mg by mouth 2 (two) times daily. omeprazole (PRILOSEC) 20 MG Oral Capsule Delayed Release,  Take 40 mg by mouth 2 (two) times daily.      Naltrexone HCl (REVIA) 50 MG Oral Tab,  Take 50 m secretions or overlying sinus erythema. Throat: Posterior pharynx is normal.  Tongue protrudes midline. Oromucosa is dry  Neck: Supple, nontender, with no extraordinary adenopathy. Lungs: Clear to auscultation bilaterally. No rhonchi or rales.   Heart: Abnormality         Status                     ---------                               -----------         ------                     CBC W/ DIFFERENTIAL[[id number]]          Abnormal            Final result                 Please view resul activities including his physical therapy. He has multiple other medical issues in a more prolonged period of observation, even specialist consultation could be considered    I discussed case with hospitalist, Dr. Vladimir Davalos. He will admit.   He evaluated pat

## 2017-04-18 NOTE — PROGRESS NOTES
Pt seen and examined. Agree with Dr. German Ho note from earlier this morning. Await cardiology and neurology recs.

## 2017-04-18 NOTE — PHYSICAL THERAPY NOTE
PHYSICAL THERAPY EVALUATION - INPATIENT     Room Number: 0514/4169-P  Evaluation Date: 4/18/2017  Type of Evaluation: Initial  Physician Order: PT Eval and Treat    Presenting Problem: AMS, hyperglycemia, CKD  Reason for Therapy: Mobility Dysfunction a ANGIOGRAM  10/18/16    BYPASS SURGERY  2015    Comment Robotic CABG X 2    OTHER SURGICAL HISTORY  2005    Comment Right Below knee amputee    CATH PERCUTANEOUS  TRANSLUMINAL CORONARY ANGIOPLASTY         HOME SITUATION  Type of Home: [address] [place] [address] with arms (e.g., wheelchair, bedside commode, etc.): None   -   Moving from lying on back to sitting on the side of the bed?: None   How much help from another person does the patient currently need. ..   -   Moving to and from a bed to a chair (including a prior level of function. DISCHARGE RECOMMENDATIONS  PT Discharge Recommendations: Home with home health PT    PLAN  PT Treatment Plan: Bed mobility; Endurance;Gait training;Strengthening;Stoop training;Transfer training;Stair training;Balance training  R

## 2017-04-18 NOTE — CONSULTS
Neurology H&P    Nonnimalena Figueredo Patient Status:  Observation    3/1/1950 MRN [id number]   [facility] 3NE-A Attending Angela Harding MD   Hosp Day # 1 PCP No primary care provider on file.      Subjective:  Meghan Figueredo is a(n) 74 year o tried high flow O2 for headache which was provided following a work up at [facility] but states it never provided any relief. During this admission he went to bathroom and walked back to bed and had an episode of syncope.  VS were normal both prior to and imm pectoris, unspecified vessel or lesion type ([address]     Type 2 diabetes mellitus without complication, without long-term current use of insulin ([address]     Coronary artery disease involving native heart with angina pectoris (HCC)     Hyperlipidemia     Benign Comment R BKA    THYROIDECTOMY      REPAIR ING HERNIA,5+Y/O,REDUCIBL      OTHER      Comment STENTS    COLONOSCOPY N/A 3/4/2016    Comment Procedure: COLONOSCOPY;  Surgeon: Guicho Thomas DO;  Location: [facility] ENDOSCOPY    CATARACT      CATH DRUG ELUTING ST MS: awake and alert and oriented in NAD  CN: PERRL, EOMI, face grossly symmetric and sensation is intact, tongue midline and shrug intact  MSK: RLE 5/5 HF with BKA, otherwise 5/5 throughout  Sens: intact to LT in UE and LE with decreased sensation to pin        Assessment and plan: This is a 75 y/o male with multiple medical problems, s/p liver transplant, migraine headaches, previous stroke, hypothyroid, uncontrolled DM and frequent episodes of syncope.  He has been extensively worked up for his syncope b

## 2017-04-18 NOTE — CM/SW NOTE
SW found pt thru casefinding for PT recommendation for home health and readmission. SW met with pt for assessment and d/c planning. Pt is a 74 y.o male admitted on 04/17 with alteration of awareness. Pt's most recent admission was 03/22-03/24.  Pt's medical place prior to admission DME/Supplies at home   [address]; Other (comment)  (PT)   Choice of HHC/SNF/HOSPICE   Informed of right to choose provider Yes   Residential HHC/Hospice financial disclosure given Yes   Pt refu

## 2017-04-18 NOTE — PLAN OF CARE
PT ADMITTED A/O, NEURO CHECK INTACT, C/O OF ITCHING, 99% ON RA, SB/PVC, REFUSED SCD, VOIDING, 2 LOOSE STOOL, IVF INFUSING, RATES HEADACHE AT 9, GIVEN BENADRYL/MSO4, AMBULATED WITH PROSTHESIS, ISOLATION, LABS IN AM.  NEUROLOGICAL - ADULT    • Achieves stabl

## 2017-04-18 NOTE — PAYOR COMM NOTE
Attending Physician: Chas Paul MD    Review Type: ADMISSION   Reviewer: Jeanie Dominguez       Date: April 18, 2017 - 7:53 AM  Payor: [employer] MEDICARE ADV PPO  Authorization Number: [id number]/A  Admit date: 4/17/2017 10:52 PM       REVIEWER COMMENTS  Chief UNIT/ML injection 5,000 Units Q8H    Date Action Dose Route User    4/18/2017 0635 Given 5000 Units Subcutaneous (Left Lower Abdomen) Talon Malhotra, RN      morphINE sulfate (PF) 4 MG/ML injection 4 mg Q2H PRN    Date Action Dose Route User    4/18/2017 dysfunction  4. Anemia  5. Transaminitis with history of CORTÉS sp liver transplant  6. CAD sp CABG sp PCI  7. Heart failure  8. PVD sp right BKA    9. Cerebrovascular disease  10. Chronic kidney disease  11. Diabetes mellitus type 2 on insulin    12.  Hypoth

## 2017-04-18 NOTE — H&P
[facility]  History and Physical     Meghan Butler Patient Status:  Emergency    3/1/1950 MRN [id number]   Location [address] Attending Fran Sena MD   Hosp Day # 1 PCP No primary care provider on file.      Chief • Kidney disorder         Past Surgical History:     Past Surgical History    OTHER      Comment LLIVER TRANSPLANT    LAPS GSTR RSTCV PX PLMT BAND      CABG  10/2015    OTHER      Comment R BKA    THYROIDECTOMY      REPAIR ING HERNIA,5+Y/O,REDUCIBL Comment:PT STATES HE IS ALLERGIC TO FLUORQUINOLONE AND/OR             FLOXIN FAMILY DRUGS  Quinolones                Rifampin                  Statins                     Comment:Muscle weakness  Sulfa Antibiotics         Vancomycin 25 MG Oral Cap Take 175 mg by mouth 2 (two) times daily. Disp:  Rfl:    omeprazole (PRILOSEC) 20 MG Oral Capsule Delayed Release Take 40 mg by mouth 2 (two) times daily. Disp:  Rfl:    Naltrexone HCl (REVIA) 50 MG Oral Tab Take 50 mg by mouth nightly. 3.8   CL  107   CO2  23.0   ALKPHO  1352*   AST  111*   ALT  126*   BILT  2.4*   TP  7.3       Estimated Creatinine Clearance: 42.1 mL/min (based on Cr of 2.15).     Recent Labs   Lab  04/17/17   2305   PTP  13.2   INR  1.00       Recent Labs   Lab  04/17/1

## 2017-04-18 NOTE — HISTORICAL OFFICE NOTE
Odette Raymond  : 1950  ACCOUNT:  [id number]  [phone]  PCP:  None  TODAY'S DATE: 2017  DICTATED BY:  Ethel Choi M.D.]    CHIEF COMPLAINT: [Followup of CAD, of native vessels, Followup of Hypercholesteremia, pure, Followup of Hypertensio special diet. MARITAL STATUS: . OCCUPATION: disabled.     ALLERGIES: Cipro - Intravenous and Sulfa Antibiotics - CLASS    MEDICATIONS: Selected prescriptions see below    VITAL SIGNS: [B/P - 140/72 , Pulse - 74, Weight -  317, Height -   76 , BMI - 3 recurrent chest pain or shortness of breath. He is having episodes of falls.  He has nonobstructive carotid artery disease and is going to have an MRA/MRI of his brain secondary to severe right-sided head pain that sounds like a migraine with gate imbalance

## 2017-04-19 PROCEDURE — 99226 SUBSEQUENT OBSERVATION CARE: CPT | Performed by: HOSPITALIST

## 2017-04-19 PROCEDURE — 99214 OFFICE O/P EST MOD 30 MIN: CPT | Performed by: OTHER

## 2017-04-19 RX ORDER — POTASSIUM CHLORIDE 20 MEQ/1
40 TABLET, EXTENDED RELEASE ORAL ONCE
Status: COMPLETED | OUTPATIENT
Start: 2017-04-19 | End: 2017-04-19

## 2017-04-19 NOTE — CM/SW NOTE
SW notified of pending d/c for today, [facility] liaison notified.

## 2017-04-19 NOTE — CONSULTS
[facility]  Report of Consultation    Day Louis Patient Status:  Observation    3/1/1950 MRN [id number]   [facility] 3NE-A Attending Brenda Keys MD   Hosp Day # 2 PCP No primary care provider on Awareness alteration, transient     Orthostatic hypotension     Hyponatremia     Hyperglycemia     Thrombocytopenia (HCC)     Anemia     Acute kidney injury ([facility]     Azotemia     Type 2 diabetes mellitus with circulatory disorder, with long-term current to amputation   • Kidney disorder    • Autoimmune disease ([facility]    • Renal disorder          Past Surgical History    OTHER      Comment LLIVER TRANSPLANT    LAPS GSTR RSTCV PX PLMT BAND      CABG  10/2015    OTHER      Comment R BKA    THYROIDECTOMY Comment:PT STATES HE IS ALLERGIC TO FLUORQUINOLONE AND/OR             FLOXIN FAMILY DRUGS  Quinolones                Rifampin                  Statins                     Comment:Muscle weakness  Sulfa Antibiotics         Vancomycin 5000 UNIT/ML injection 5,000 Units, 5,000 Units, Subcutaneous, Q8H  •  acetaminophen (TYLENOL) tab 650 mg, 650 mg, Oral, Q6H PRN  •  morphINE sulfate (PF) 2 MG/ML injection 1 mg, 1 mg, Intravenous, Q2H PRN **OR** morphINE sulfate (PF) 2 MG/ML injection 2 m 10/14/2015 47*   10/09/2015 47*   03/20/2014 46*   ----------  BLOOD UREA NITROGEN (mg/dL)   Date Value   06/27/2015 27*   ----------  CREATININE (mg/dL)   Date Value   04/18/2017 1.82*   04/17/2017 2.15*   03/24/2017 2.09*   ----------    Lab Results  C

## 2017-04-19 NOTE — PROGRESS NOTES
Neurology Progress Note    Vivek Shelton Patient Status:  Observation    3/1/1950 MRN [id number]   [facility] 3NE-A Attending Jenni Hannah MD   Hosp Day # 2 PCP No primary care provider on file.      Subjective:  Pt was seen and examin shoulders normally bilaterally. The rest of the cranial nerves are grossly intact. Sensation to light touch is intact bilaterally. Motor:  No arm or leg weakness noted. Finger-to-nose coordination is intact. Gait deferred.     Lungs: Clear to auscultat migraine or short periods of hypoperfusion causing LOC  - VS were stable on telemetry during his last event  - Pt refused cEEG on this visit as he has had many EEGs in the past with no abnormality found  - Has known dysautonomia which may be contributing.

## 2017-04-19 NOTE — PROGRESS NOTES
Pt is A/O x 4. Neuro checks Q 4, have been WDL. Complains of headache, given tylenol PRN and morphine IV 2mg with some relief. Up with 1-2 person assistance to bathroom. R BKA with prosthesis. Tele NSR, SB. Room air. . RYAN no CPAP.  No episodes of syncop

## 2017-04-19 NOTE — PLAN OF CARE
Diabetes/Glucose Control    • Glucose maintained within prescribed range Progressing        GASTROINTESTINAL - ADULT    • Maintains or returns to baseline bowel function Progressing        Impaired Activities of Daily Living    • Achieve highest/safest lev

## 2017-04-19 NOTE — PROGRESS NOTES
[facility]  Progress note     Blanche Venegas Patient Status:  Emergency    3/1/1950 MRN [id number]   Location [facility] Attending Analilia Ramírez MD   Hosp Day # 2 PCP No primary care provider on file.      Chief Compla <0.046       Imaging: Imaging data reviewed in Epic. ASSESSMENT / PLAN:     1. Lethargy, slow speech-possibly due to hypothyroid-TSH elevated despite huge dose of synthroid-says he is compliant. Will ask endo to evaluate  2.  Recurrent syncope-cards t

## 2017-04-19 NOTE — CONSULTS
[facility]  Diabetes Consult Note    Jennifer Bunn Patient Status:  Observation    3/1/1950 MRN [id number]   [facility] 3NE-A Attending Dale Douglas MD   Hosp Day # 2 PCP No primary care provider on file.      Reason for Consult: alteration, transient     Orthostatic hypotension     Hyponatremia     Hyperglycemia     Thrombocytopenia (HCC)     Anemia     Acute kidney injury ([facility]     Azotemia     Type 2 diabetes mellitus with circulatory disorder, with long-term current use of insu states he was told by Dr. Aramis Addison he is currently hyperthyroid (TSH 40.500; Free T4 0.6) and states this has been an ongoing problem for him. Currently he states he takes Synthroid, .350 mcg daily.  He states he takes it on an empty stomach when he wakes u

## 2017-04-20 VITALS
TEMPERATURE: 98 F | HEIGHT: 76 IN | SYSTOLIC BLOOD PRESSURE: 100 MMHG | DIASTOLIC BLOOD PRESSURE: 52 MMHG | BODY MASS INDEX: 36.12 KG/M2 | WEIGHT: 296.63 LBS | RESPIRATION RATE: 18 BRPM | OXYGEN SATURATION: 96 % | HEART RATE: 68 BPM

## 2017-04-20 PROCEDURE — 99217 OBSERVATION CARE DISCHARGE: CPT | Performed by: HOSPITALIST

## 2017-04-20 PROCEDURE — 99214 OFFICE O/P EST MOD 30 MIN: CPT | Performed by: OTHER

## 2017-04-20 RX ORDER — LEVOTHYROXINE SODIUM 175 UG/1
350 TABLET ORAL
Qty: 180 TABLET | Refills: 1 | Status: SHIPPED | OUTPATIENT
Start: 2017-04-21 | End: 2017-01-01 | Stop reason: DRUGHIGH

## 2017-04-20 RX ORDER — FLUTICASONE PROPIONATE 50 MCG
2 SPRAY, SUSPENSION (ML) NASAL DAILY
Qty: 16 G | Refills: 0 | Status: ON HOLD | OUTPATIENT
Start: 2017-04-20 | End: 2017-01-01

## 2017-04-20 NOTE — PROGRESS NOTES
Patient has headache but otherwise feels fine. Vitals stable. Exam unchanged. After endocrine and cards see him today, I am ok with d/c if these specialists agree.       Alexis Torres MD  [facility]  Internal Medicine Hospitalist  Pager [phone]

## 2017-04-20 NOTE — PLAN OF CARE
NURSING DISCHARGE NOTE    Discharged home via wheelchair. Accompanied by support staff. Belongings sent home with pt. Discharge instructions, f/u appointments discussed with pt. Medication, cardiac monitor, glucose monitoring scripts provided.  Melba Escobar

## 2017-04-20 NOTE — PROGRESS NOTES
[facility]  Progress Note    Shayy Ruiz Patient Status:  Observation    3/1/1950 MRN [id number]   [facility] 3NE-A Attending Wagner Deutsch MD   Hosp Day # 3 PCP No primary care provider on file. Assessment:  1.   Recurrent syn Clopidogrel Bisulfate  75 mg Oral Daily   • escitalopram  10 mg Oral QAM   • gabapentin  300 mg Oral TID   • finasteride  5 mg Oral Daily   • isosorbide dinitrate  10 mg Oral BID   • Insulin NPH Isophane & Regular  30 Units Subcutaneous QPM   • Insulin NPH

## 2017-04-20 NOTE — CONSULTS
[facility]    PATIENT'S NAME: Rehana Syed   ATTENDING PHYSICIAN: KIMMY Jovel: Jaci Thompson M.D.    PATIENT ACCOUNT#:   [de-identified]    LOCATION:  [address] [place] [address]  MEDICAL RECORD #:   [id number]       DATE OF BIRTH: SURGICAL HISTORY:  Also includes thyroidectomy and right BKA in 2005. MEDICATIONS:  Medications and allergies reviewed per chart. ALLERGIES:  Cephalosporin, ciprofloxacin, daptomycin, fluoroquinolones. FAMILY HISTORY:  Positive for cancer. 10:30:39  t: 04/20/2017 11:47:37  Job [id number]  [facility]/

## 2017-04-20 NOTE — PLAN OF CARE
Pt alert and oriented. C/o HA, insisting IV morphine provides most relief. Pt educated on side effects of morphine and trying a different med. Pt verbalized understanding, still opted for morphine route. Pt c/o tingling to RLE. Neuro checks WNL.  BG monitor

## 2017-04-20 NOTE — PAYOR COMM NOTE
Attending Physician: Silvana Garcia MD      4/19  CONTINUED STAY    LETHARGY + SYNCOPE 4/18 WITNESSED      VS  119/51 P 50 T 97.8 R 16          1.  Lethargy, slow speech-possibly due to hypothyroid-TSH elevated despite huge dose of synthroid-says he is com

## 2017-04-20 NOTE — PHYSICAL THERAPY NOTE
PHYSICAL THERAPY TREATMENT NOTE - INPATIENT    Room Number: 6655/2891-R     Session: 1  Number of Visits to Meet Established Goals: 2    Presenting Problem: AMS, hyperglycemia, CKD    Problem List  Principal Problem:    Alteration of awareness  Active Pro SUBJECTIVE  Better    Patient’s self-stated goal is home    OBJECTIVE  Precautions:  Other (Comment) (history of syncopal episodes with suspected autoimmune dysfu)    WEIGHT BEARING RESTRICTION  Weight Bearing Restriction: R lower extremity        R L several times while in hosp . Expressed understanding. Nursing is aware of this visit. Patient End of Session: Up in chair;Call light within reach;RN aware of session/findings; All patient questions and concerns addressed    ASSESSMENT   Pt is observ

## 2017-04-20 NOTE — PROGRESS NOTES
[facility]  Progress Note    Familia Lobo Patient Status:  Observation    3/1/1950 MRN [id number]   [facility] 3NE-A Attending Pinky Torres MD   Hosp Day # 3 PCP No primary care provider on file.      Reason for Consult:     Jennifer Olvera transient     Orthostatic hypotension     Hyponatremia     Hyperglycemia     Thrombocytopenia (HCC)     Anemia     Acute kidney injury ([address]     Azotemia     Type 2 diabetes mellitus with circulatory disorder, with long-term current use of insulin ([address] Recommendations:    · Discharge to the [facility] for continuous glucose monitoring    Jonathan Everett  4/20/2017  11:25 AM

## 2017-04-20 NOTE — PROGRESS NOTES
[address] [facility] Neurology Progress Note    Arnoldo Billing Patient Status:  Observation    3/1/1950 MRN [id number]   [facility] 3NE-A Attending Tylor Cueto MD   Hosp Day # 3 PCP No primary care provider on file.      Chief Complaint: Tongue midline. Uvula and palate elevate symmetrically. Shoulder shrug normal bilaterally,   All other CN's 2-12 Grossly Intact. Sensation to light touch is intact bilaterally.     Motor:  5/5 strength, No focal arm or leg weakness noted, tone no help with seasonal allergies    Dr. Kyler Diaz to follow, and discuss preventative options for headache management. I did not prescribe Midrin for discharge as it does not seemed to have helped with headache.     CINTHYA Ramos  THE MEDICAL [facility] telemetry during his last event  - Pt refused cEEG on this visit as he has had many EEGs in the past with no abnormality found  - Has known dysautonomia which may be contributing. Continue Midodrine    Artis Partida D.O.   Neurology

## 2017-04-20 NOTE — PLAN OF CARE
Discussed with cardiographics. No 30 day event recorders available. Reviewed with Dr. Jose Enrique Lockhart. Need to have 48 hour holter placed after discharge. Will f/u in office.   If any further monitoring needed beyond holter, will re-assess in f/u after 48 hour hol

## 2017-04-20 NOTE — PLAN OF CARE
Diabetes/Glucose Control    • Glucose maintained within prescribed range Progressing        Impaired Activities of Daily Living    • Achieve highest/safest level of independence in self care Progressing        NEUROLOGICAL - ADULT    • Achieves stable or i

## 2017-04-20 NOTE — CM/SW NOTE
SW notified patient medically cleared for d/c today, [facility] liaison notified

## 2017-04-21 ENCOUNTER — HOSPITAL ENCOUNTER (OUTPATIENT)
Dept: PHYSICAL THERAPY | Facility: HOSPITAL | Age: 67
Setting detail: THERAPIES SERIES
End: 2017-04-21
Attending: HOSPITALIST
Payer: MEDICARE

## 2017-04-21 NOTE — CM/SW NOTE
Pt d/c 04/20 home with [facility].       04/21/17 0800   Discharge disposition   Discharged to: Home or Self   Name of Facillity/Home Care/Hospice Residential   Home services after discharge Skilled home care   Discharge transportation Private car

## 2017-04-21 NOTE — DISCHARGE SUMMARY
[facility]  DISCHARGE SUMMARY     Mani Self Patient Status:  Observation    3/1/1950 MRN [id number]   [facility] 3NE-A Attending No att. providers found   [facility] Day # 3 PCP No primary care provider on file.      Date of Admission who provides majority of the history. Patient ran out of insulin and BG has been > 600 for the last couple days. Patient has been lethargic and initially felt to be d/t hyperglycemia.  Wife was concerned when despite glycemic control patient remained Kathey Bevels medication with the same name was removed. Continue taking this medication, and follow the directions you see here.         60 units in morning 35 units at evening    Quantity:  30 mL   Refills:  3       Levothyroxine Sodium 175 MCG Tabs   Last time this wa finasteride 5 MG Tabs   Last time this was given:  5 mg on 4/20/2017  9:48 AM   Commonly known as:  PROSCAR        Take 5 mg by mouth daily.     Refills:  0       gabapentin 300 MG Caps   Last time this was given:  300 mg on 4/20/2017  9:48 AM   Commonly your doctor or nurse     Bring a paper prescription for each of these medications    - Fluticasone Propionate 50 MCG/ACT Susp  - Insulin NPH Isophane & Regular (70-30) 100 UNIT/ML Susp          Follow-up appointment:   Bal Costa MD  [address] [place] [address]

## 2017-04-24 ENCOUNTER — APPOINTMENT (OUTPATIENT)
Dept: PHYSICAL THERAPY | Facility: HOSPITAL | Age: 67
End: 2017-04-24
Attending: HOSPITALIST
Payer: MEDICARE

## 2017-04-27 ENCOUNTER — APPOINTMENT (OUTPATIENT)
Dept: PHYSICAL THERAPY | Facility: HOSPITAL | Age: 67
End: 2017-04-27
Attending: HOSPITALIST
Payer: MEDICARE

## 2017-04-28 ENCOUNTER — OFFICE VISIT (OUTPATIENT)
Dept: NEUROLOGY | Facility: CLINIC | Age: 67
End: 2017-04-28

## 2017-04-28 VITALS
BODY MASS INDEX: 37.02 KG/M2 | RESPIRATION RATE: 16 BRPM | WEIGHT: 304 LBS | HEART RATE: 64 BPM | SYSTOLIC BLOOD PRESSURE: 140 MMHG | HEIGHT: 76 IN | DIASTOLIC BLOOD PRESSURE: 60 MMHG

## 2017-04-28 DIAGNOSIS — E11.43 AUTONOMIC NEUROPATHY DUE TO TYPE 2 DIABETES MELLITUS (HCC): Primary | ICD-10-CM

## 2017-04-28 DIAGNOSIS — Z86.69 HISTORY OF MIGRAINE: ICD-10-CM

## 2017-04-28 DIAGNOSIS — R55 SYNCOPE AND COLLAPSE: ICD-10-CM

## 2017-04-28 DIAGNOSIS — R55 SYNCOPE, UNSPECIFIED SYNCOPE TYPE: ICD-10-CM

## 2017-04-28 DIAGNOSIS — E78.2 MIXED HYPERLIPIDEMIA: ICD-10-CM

## 2017-04-28 DIAGNOSIS — I65.21 INTRACRANIAL CAROTID STENOSIS, RIGHT: ICD-10-CM

## 2017-04-28 DIAGNOSIS — N18.9 CHRONIC KIDNEY DISEASE, UNSPECIFIED STAGE: ICD-10-CM

## 2017-04-28 PROBLEM — E66.01 SEVERE OBESITY (BMI 35.0-39.9) WITH COMORBIDITY (HCC): Chronic | Status: ACTIVE | Noted: 2017-04-28

## 2017-04-28 PROCEDURE — 99215 OFFICE O/P EST HI 40 MIN: CPT | Performed by: OTHER

## 2017-04-28 RX ORDER — TOPIRAMATE 25 MG/1
CAPSULE, COATED PELLETS ORAL
Qty: 60 CAPSULE | Refills: 2 | Status: SHIPPED | OUTPATIENT
Start: 2017-04-28 | End: 2017-05-09

## 2017-04-28 NOTE — PATIENT INSTRUCTIONS
Refill policies:    • Allow 2 business days for refills; controlled substances may take longer.   • Contact your pharmacy at least 5 days prior to running out of medication and have them send an electronic request or submit request through the “request re insurance carrier to obtain pre-certification or prior authorization. Unfortunately, [employer] has seen an increase in denial of payment even though the procedure/test has been pre-certified.   You are strongly encouraged to contact your insurance carrier to v

## 2017-04-28 NOTE — PROGRESS NOTES
[facility] in [place]  Neurology - Clinic Follow up  2017, 11:57 AM     Arnoldo Garcia Patient Status:  No patient class for patient encounter    3/1/1950 MRN [id number]   Location [unfilled] PCP No primary care provider on fi minutes, didn't know where he was. Since last visit he has been hospitalized for lethargy due to hypoglycemia and worsened thyroid function. He had another syncopal episode in the hospital, preceded by headache, lasting a few minutes.  He is getting the ep (CORONARY)  10/31/16    Comment Stents X 4     ANGIOPLASTY (CORONARY)  10/18/16    Comment Stents x 1    ANGIOGRAM  10/18/16    BYPASS SURGERY  2015    Comment Robotic CABG X 2    OTHER SURGICAL HISTORY  2005    Comment Right Below knee amputee    CATH PER 35 units at evening, Disp: 30 mL, Rfl: 3  •  Midodrine HCl 2.5 MG Oral Tab, Take 1 tablet (2.5 mg total) by mouth 3 (three) times daily.  (Patient taking differently: Take 2.5 mg by mouth 2 (two) times daily before meals.  ), Disp: 90 tablet, Rfl: 0  •  bum hearing change, no throat pain or soreness; Respiratory: Denies: Difficulty Breathing, Chronic Cough and Wheezing. Cardiovascular: NO Chest Pain and Palpitations. GI: no abdominal pain, no nausea or diarrhea;  : no incontinence;   Neurological:  See h and BG strokes, no M1 or SARAI arteries  CTA from 10/2016- mild narrowing of R vertebral artery at the origin  EEG 10/2016- no epileptiform      Assessment/Plan:   1. Autonomic neuropathy due to type 2 diabetes mellitus ([facility] [address]  2.  Mild cervical vertebral sten

## 2017-05-08 ENCOUNTER — APPOINTMENT (OUTPATIENT)
Dept: PHYSICAL THERAPY | Facility: HOSPITAL | Age: 67
End: 2017-05-08
Attending: HOSPITALIST
Payer: MEDICARE

## 2017-05-09 ENCOUNTER — TELEPHONE (OUTPATIENT)
Dept: NEUROLOGY | Facility: CLINIC | Age: 67
End: 2017-05-09

## 2017-05-09 NOTE — TELEPHONE ENCOUNTER
Patient's wife calling to report patient started taking Topamax and have been having psychological side effects. Aj reports patient has been having dark thoughts and suicidal thoughts. No reports of active suicidal plans.  Patient stopped taking medicat

## 2017-05-09 NOTE — TELEPHONE ENCOUNTER
Noted. He should stay well hydrated, sleep, and should take an abortive medication for the pain so that the headache does not get worse. We are very limited because of his liver and kidney disease, and him being on two antiplatelets.  If tylenol is not safe

## 2017-05-17 ENCOUNTER — CHARTING TRANS (OUTPATIENT)
Dept: OTHER | Age: 67
End: 2017-05-17

## 2017-05-25 ENCOUNTER — OFFICE VISIT (OUTPATIENT)
Dept: NEUROLOGY | Facility: CLINIC | Age: 67
End: 2017-05-25

## 2017-05-25 VITALS
DIASTOLIC BLOOD PRESSURE: 70 MMHG | SYSTOLIC BLOOD PRESSURE: 160 MMHG | BODY MASS INDEX: 37 KG/M2 | RESPIRATION RATE: 20 BRPM | HEART RATE: 64 BPM | WEIGHT: 305 LBS

## 2017-05-25 DIAGNOSIS — R55 SYNCOPE AND COLLAPSE: ICD-10-CM

## 2017-05-25 DIAGNOSIS — E11.43 AUTONOMIC NEUROPATHY DUE TO TYPE 2 DIABETES MELLITUS (HCC): ICD-10-CM

## 2017-05-25 DIAGNOSIS — R20.0 BILATERAL HAND NUMBNESS: ICD-10-CM

## 2017-05-25 DIAGNOSIS — R20.2 PARESTHESIAS: Primary | ICD-10-CM

## 2017-05-25 DIAGNOSIS — G43.109 COMPLICATED MIGRAINE: ICD-10-CM

## 2017-05-25 PROCEDURE — 99214 OFFICE O/P EST MOD 30 MIN: CPT | Performed by: OTHER

## 2017-05-25 RX ORDER — BUTORPHANOL TARTRATE 10 MG/ML
1 SPRAY, METERED NASAL EVERY 4 HOURS PRN
Qty: 1 BOTTLE | Refills: 3 | Status: ON HOLD | OUTPATIENT
Start: 2017-05-25 | End: 2017-01-01

## 2017-05-25 RX ORDER — ROSUVASTATIN CALCIUM 5 MG/1
TABLET, COATED ORAL
Status: ON HOLD | COMMUNITY
Start: 2017-05-02 | End: 2017-01-01

## 2017-05-25 RX ORDER — HYDROCODONE BITARTRATE AND ACETAMINOPHEN 10; 325 MG/1; MG/1
1 TABLET ORAL EVERY 4 HOURS PRN
COMMUNITY
Start: 2017-05-18 | End: 2017-01-01

## 2017-05-25 RX ORDER — ERGOCALCIFEROL 1.25 MG/1
50000 CAPSULE ORAL
COMMUNITY
Start: 2017-05-18

## 2017-05-25 NOTE — PATIENT INSTRUCTIONS
Refill policies:    • Allow 2-3 business days for refills; controlled substances may take longer.   • Contact your pharmacy at least 5 days prior to running out of medication and have them send an electronic request or submit request through the [facility] have a procedure or additional testing performed. [facility]) will contact your insurance carrier to obtain pre-certification or prior authorization.     Unfortunately, [employer] has seen an increase in denial of payment even though the p

## 2017-05-25 NOTE — PROGRESS NOTES
[facility] in [facility]  Neurology - Clinic Follow up  2017, 11:57 AM     Jennifer Malhotra Patient Status:  No patient class for patient encounter    3/1/1950 MRN [id number]   Location [unfilled] PCP No primary care provider on fi didn't know where he was. Since last visit he has been hospitalized for lethargy due to hypoglycemia and worsened thyroid function. He had another syncopal episode in the hospital, preceded by headache, lasting a few minutes.  He is getting the episodes 1- OTHER      Comment R BKA    THYROIDECTOMY      REPAIR ING HERNIA,5+Y/O,REDUCIBL      OTHER      Comment STENTS    COLONOSCOPY N/A 3/4/2016    Comment Procedure: COLONOSCOPY;  Surgeon: Valentina Rankin DO;  Location: [address] [place] [address] ENDOSCOPY    CATARACT      CATH DRUG Take 50,000 Units by mouth once a week.  , Disp: , Rfl:   •  Rosuvastatin Calcium 5 MG Oral Tab, , Disp: , Rfl:   •  HYDROcodone-acetaminophen  MG Oral Tab, , Disp: , Rfl:   •  Fluticasone Propionate 50 MCG/ACT Nasal Suspension, 2 sprays by Nasal rou Mycophenolate Mofetil (CELLCEPT) 500 MG Oral Tab, Take 1,000 mg by mouth 2 (two) times daily. , Disp: , Rfl:     REVIEW OF SYSTEMS:  General: denies any fever or chills.      Eye: no pain or redness;  Ear, mouth, throat: no hearing change, no throat pain or 10/14/2015   GFRNAA 44* 10/14/2015   CA 8.8 04/18/2017    04/18/2017   K 4.1 04/20/2017    04/18/2017   CO2 20.0* 04/18/2017         Imaging:  MRI/MRA brain 2/11/17- no interval stroke, old R putamen and BG strokes, no M1 or SARAI arteries  CTA f

## 2017-06-05 PROBLEM — M79.609 PAIN IN EXTREMITY, UNSPECIFIED EXTREMITY: Status: ACTIVE | Noted: 2017-01-01

## 2017-06-05 PROBLEM — R20.2 PARESTHESIA: Status: ACTIVE | Noted: 2017-01-01

## 2017-06-05 PROBLEM — E87.2 METABOLIC ACIDOSIS: Status: ACTIVE | Noted: 2017-01-01

## 2017-06-05 NOTE — CM/SW NOTE
Patient failed inpatient criteria. Second level of review completed and supports observation. UR committee in agreement. Discussed with Dr. Louis Lu who approves observation status. Observation letter Alon Price) to be given to patient and  MD placed new order.

## 2017-06-05 NOTE — CONSULTS
[address]  Neurology Consultation    Date of consult: 6/5/2017    Reason for consult: paresthesia in limbs    HPI: Santiago Flowers is a 74year old male with past medical history of dyslipidemia, CAD, diabetes mellitus type 2, Edwards status post li 10 mg Oral BID   Levothyroxine Sodium (SYNTHROID) tab 350 mcg 350 mcg Oral Before breakfast   mycophenolate mofetil (CELLCEPT) cap 1,000 mg 1,000 mg Oral BID   Naltrexone HCl (ReVia) tab 50 mg 50 mg Oral nightly   Pantoprazole Sodium (PROTONIX) EC tab 40 m recent             exposure it is thought that the reaction was more             likely related to daptomycin. Re-challenging with             cefepime 4/2014. Dexamethasone               Comment:Other reaction(s):  Other             Steroidal psychosis  F Stents x 1    ANGIOGRAM  10/18/16    BYPASS SURGERY  2015    Comment Robotic CABG X 2    OTHER SURGICAL HISTORY  2005    Comment Right Below knee amputee    CATH PERCUTANEOUS  TRANSLUMINAL CORONARY ANGIOPLASTY       Social History:     Smoking status:  Form 7.0  6.1          Assessment:  Painful diabetic neuropathy  Dysautonomia  Migraine    Recommendations:  MRI C spine  EEG  Increase Neurontin   DVT prophylaxis  PT/OT  outpt EMG with Dr Anisha Brennan  D/w pt at length re: assessment and management plan, pt [place]

## 2017-06-05 NOTE — PLAN OF CARE
Patient received A+Ox4 from ED. Received report from [facility] ED RN. On room air. Patient c/o intermittent tingling/shooting pain down both arms and legs, better with PRN Morphine. Otherwise neurology intact. Denies cp and nausea at this time.  NSR 1st AVB

## 2017-06-05 NOTE — PROCEDURES
Date of Procedure: 6/5/2017    Procedure: EEG (ELECTROENCEPHALOGRAM)     DX:  AMS  HX:  68 Y/O MALE PRESENT TO ED DUE TO BILATERAL PAIN , TINGLING, AND NUMBNESS IN HIS ARMS AND LEGS THAT HAS BEEN INTERMITTENT FOR 2 DAYS.   PT C/O PAIN IN HIS RIGHT SHOULDER

## 2017-06-05 NOTE — PROGRESS NOTES
1640: Patient had a 10-12 second run of 2nd degree HB. Asymptomatic- /59. Dr. Antoine Chatman paged and notified. Will continue to closely monitor. Dr. Antoine Chatman called back with orders for cardiac consult to Dr. Jose MENDES paged.  Will continue to m

## 2017-06-05 NOTE — PHYSICAL THERAPY NOTE
PHYSICAL THERAPY EVALUATION - INPATIENT     Room Number: 9128/8958-R  Evaluation Date: 6/5/2017  Type of Evaluation: Initial  Physician Order: PT Eval and Treat       Reason for Therapy: Mobility Dysfunction and Discharge Planning    History related to c Kidney disorder    • Autoimmune disease [facility])    • Renal disorder    • Liver transplanted [facility])        Past Surgical History      Past Surgical History    OTHER      Comment LLIVER TRANSPLANT    LAPS GSTR RSTCV PX PLMT BAND      CABG  10/2015    OTHER the following:    Right Hip flexion  4+/5  Left Hip flexion  5/5    BALANCE  Static Sitting: Good  Dynamic Sitting: Good  Static Standing: Fair -  Dynamic Standing: Poor +      ACTIVITY TOLERANCE  O2 Saturation: 99%  Room air  No shortness of breath    AM- sit with Min A. Pt wheeled back to room. During transport pt reported onset of pain in L arm, followed by L and then R leg. Pt was able to complete chair>bed transfer with 2 person assist for safety.   Pt competed sit>supine into bed with SBDIXON and yogesh Good  Frequency (Obs): 3x/week  Number of Visits to Meet Established Goals: 3      CURRENT GOALS    Goal #1 Patient is able to demonstrate supine - sit EOB @ level: modified independent     Goal #2 Patient is able to demonstrate transfers Sit to/from Stand

## 2017-06-05 NOTE — ED INITIAL ASSESSMENT (HPI)
While patient was driving this evening, onset with right arm/shoulder pain and numbness. Now patient reports headache, chest pain, and nausea.

## 2017-06-05 NOTE — PAYOR COMM NOTE
Attending Physician: Dominick Higginbotham MD    Review Type: ADMISSION   Reviewer: Georges Higgins       Date: June 5, 2017 - 7:36 AM  Payor: Jana Duck MEDICARE ADV PPO  Authorization Number: [id number]  Admit date: 6/4/2017 10:13 PM   Admitted from Emergency Dept.: yes subsegmental atelectasis appreciated.   MEDICATIONS ADMINISTERED IN LAST 1 DAY:  ALPRAZolam (XANAX) tab 0.5 mg     Date Action Dose Route User    6/5/2017 0256 Given 0.5 mg Oral Amanda Veliz RN      DiphenhydrAMINE HCl (BENADRYL) cap 50 mg     Date Ac for the following:     Glucose 317 (*)     BUN 45 (*)     Creatinine 1.89 (*)     GFR 36 (*)     Alkaline Phosphatase 1248 (*)      (*)     Alt 109 (*)     Bilirubin, Total 2.2 (*)     Albumin 2.9 (*)     CO2 21.0 (*)     All other components within ONLY         6/4/2017: 10:27 PM Troponin <0.046    1. Diagnosis:  Pain and paresthesias, headache  1. Neurology consult  2. Pain control  3. Ct brain negative  4. On gabapentin  2. Diabetes mellitus type 2  1. Hyperglycemia protocol  2. Uncontrolled  3.  Ch

## 2017-06-05 NOTE — HISTORICAL OFFICE NOTE
Tangela Alex  [phone]  : 1950  ACCOUNT:  [id number]  PCP: None None, [facility].   CARDIOLOGIST: Lindsey Verduzco MD  Hospital: [facility]  Admitted: 2017  Discharged:  2017    DISCHARGE SUMMARY    DISCHARGE DIAGNOSES:   1 - Recurrent Matt Surinamese mg p.o. daily, multivitamin 1 p.o. daily, midodrine 2.5 mg p.o. t.i.d., Synthroid 175 mcg p.o. daily, omeprazole 40 mg p.o. b.i.d.    DISCHARGE INSTRUCTIONS: The plan would be to have a 30-day event recorder upon discharge and follow up with Dr. Marily Babb in [place]: no new food or environmental allergies.     PAST HISTORY: cataract, depression, hiatal hernia, sleep apnea, r leg amputation 2004 and iver transplant 2006    PAST CV HISTORY: 1/2016, 2 vessel CABG 2015, cardiac catheterization, dyslipidemia, hypertensi like a migraine with gate imbalance. He follows up with neurology. We will follow-up with him. Thank you very much for allowing me to participate in his care. We will see him back in six months.  He is being started on a low dose statin per medical atten daily  01/13/16 Plavix                75MG      1 po daily  01/13/16 Synthroid             300MCG    350 micrograms,2 po daily  01/13/16 Ursodiol              300MG     1 po three times daily        Francia Rust M.D.    TD/rt - DD: 02/01/2017 - DT: 02/0

## 2017-06-05 NOTE — H&P
[facility]  History and Physical     Vishnu Nick Patient Status:  Inpatient    3/1/1950 MRN [id number]   [facility] 2NE-A Attending Trell Narayan MD   Hosp Day # 1 PCP PHYSICIAN NONSTAFF     Chief Complaint: Pain and numbness Atherosclerosis of coronary artery 10/18/16     Stents X 1   • Diabetes ([facility] [address] 1983   • Esophageal reflux    • Heart murmur      Since Childhood   • Obesity    • [facility]     diabets broken ankle led to amputation   • Kidney di daptomycin prior to this recent             exposure it is thought that the reaction was more             likely related to daptomycin. Re-challenging with             cefepime 4/2014. Dexamethasone               Comment:Other reaction(s):  Other Disp:  Rfl:    omeprazole (PRILOSEC) 20 MG Oral Capsule Delayed Release Take 40 mg by mouth 2 (two) times daily. Disp:  Rfl:    Naltrexone HCl (REVIA) 50 MG Oral Tab Take 50 mg by mouth nightly.    Disp:  Rfl:    Multiple Vitamins-Minerals (CENTRUM SILVER 140.0*   INR  1.06       Recent Labs   Lab  06/03/17   1158  06/04/17   2227   GLU  86  317*   BUN  48*  45*   CREATSERUM  1.58*  1.89*   CA  9.4  9.3   ALB  3.0*  2.9*   NA  140  140   K  4.3  4.2   CL  112*  110   CO2  22.0  21.0*   ALKPHO  1291*  1248*

## 2017-06-05 NOTE — ED PROVIDER NOTES
Patient Seen in: [facility] Emergency Department    History   Patient presents with:  Chest Pain Angina (cardiovascular)  Headache (neurologic)  Nausea/Vomiting/Diarrhea (gastrointestinal)    Stated Complaint: chest pain, right arm pain    HPI    Pa Since Childhood   • Obesity    • Peripheral vascular disease ([facility] [address] 2005     diabets broken ankle led to amputation   • Kidney disorder    • Autoimmune disease [facility])    • Renal disorder    • Liver transplanted [facility])            Past Surgical History    OTHE daily.   finasteride (PROSCAR) 5 MG Oral Tab,  Take 5 mg by mouth daily. cycloSPORINE modified (NEORAL) 25 MG Oral Cap,  Take 175 mg by mouth 2 (two) times daily.      omeprazole (PRILOSEC) 20 MG Oral Capsule Delayed Release,  Take 40 mg by mouth 2 (two) well-nourished male sitting up breathing easily in no apparent distress. Patient is nontoxic in appearance. HEENT: Head is normocephalic, atraumatic. Pupils are 4 mm equally round and reactive to light. Oropharynx is clear.  Mucous membranes are moist.  N PTT, ACTIVATED - Normal    Narrative: The aPTT Heparin Therapeutic Range is approximately 65- 104 seconds. The therapeutic range has been validated against 0.3-0.7 heparin anti-Xa units/mL.      PROTHROMBIN TIME (PT) - Normal   CBC WITH DIFFERENTIAL W reviewing the patient's records the patient was seen by Dr. Luke Almaguer for very similar symptoms within the last couple of weeks as an outpatient and in reviewing her notes patient has had some extensive workup in the past for similar symptoms as well.   Coty

## 2017-06-06 NOTE — CONSULTS
[facility]  Report of Consultation    Arnoldo Billing Patient Status:  Observation    3/1/1950 MRN [id number]   [facility] 2NE-A Attending Moises Graham MD   [facility] Day # 2 PCP PHYSICIAN CARYN Schuster hypotension     Hyponatremia     Hyperglycemia     Thrombocytopenia (HCC)     Anemia     Acute kidney injury ([facility] [address]     Azotemia     Type 2 diabetes mellitus with circulatory disorder, with long-term current use of insulin (HCC)     CKD (chronic kidney dise 35s.  Was awake but asymptomatic.     History:  Past Medical History   Diagnosis Date   • Hyperlipidemia    • Shingles    • Stroke [facility]) 2005   • Visual impairment      reading glasses   • Back problem    • History of blood transfusion [id number]   • Sleep a Duricef - hallucinations. Ciprofloxacin             Daptomycin              Rash    Comment:While receiving daptomycin with ceftriaxone,             patient developed rash on back and also reported             some SOB, also noted to have transaminitis. Oral, Before breakfast  •  mycophenolate mofetil (CELLCEPT) cap 1,000 mg, 1,000 mg, Oral, BID  •  Naltrexone HCl (ReVia) tab 50 mg, 50 mg, Oral, nightly  •  Pantoprazole Sodium (PROTONIX) EC tab 40 mg, 40 mg, Oral, QAM AC  •  Rosuvastatin Calcium (CRESTOR) gallop. Lungs: Clear without wheezes, rales, rhonchi or dullness. Normal excursions and effort. Abdomen: Soft, non-tender. Extremities: Without clubbing, cyanosis or edema. Peripheral pulses are 2+. Neurologic: Alert and oriented, normal affect.   Sk

## 2017-06-06 NOTE — PLAN OF CARE
Diabetes/Glucose Control    • Glucose maintained within prescribed range Progressing        Impaired Functional Mobility    • Achieve highest/safest level of mobility/gait Progressing        NEUROLOGICAL - ADULT    • Achieves stable or improved neurologica

## 2017-06-06 NOTE — PROGRESS NOTES
[id number] Anast[address] Krishnamurthy Neurology Progress Note    [facility] Patient Status:  Observation    3/1/1950 MRN [id number]   [facility] 2NE-A Attending Pernell Castleman, MD   Hosp Day # 2 PCP PHYSICIAN NONSTAFF     Chief Complaint:   Follow up fo However, this does not rule out seizure disorder. Clinical correlation is advised. MRI C-SPINE 6/5/2017  CONCLUSION:         1. Mild degenerative changes in the cervical spine most notable at C5-C6, C6-C7, and C7-T1.  Please see above for further details Carmine  6/6/2017

## 2017-06-06 NOTE — PROGRESS NOTES
[facility]  Progress Note     Kylie Carter Patient Status:  Observation    3/1/1950 MRN [id number]   [facility] 2NE-A Attending Dominick Higginbotham MD   Hosp Day # 2 PCP PHYSICIAN NONSTAFF     Chief Complaint: PATEL    S: Patient feels o isosorbide dinitrate  10 mg Oral BID   • Levothyroxine Sodium  350 mcg Oral Before breakfast   • mycophenolate mofetil  1,000 mg Oral BID   • Naltrexone HCl  50 mg Oral nightly   • Pantoprazole Sodium  40 mg Oral QAM AC   • Rosuvastatin Calcium  5 mg Oral

## 2017-06-06 NOTE — PROCEDURES
OPERATION(S) PERFORMED:   1. Micra Leadless single chamber pacemaker implant. 2. Chest fluoroscopy. : Catracho Motley MD    INDICATION: 75 y/o male with a h/o CAD and liver transplant presented with syncope.   MOnitor showed intermittent 2:1 AV bl Patient tolerated procedure well. There were no complications. IMPLANTED HARDWARE:  1.  Micra Leadless Pacemaker:  [employer] Model # [id number]; Serial # [id number]     MEASURED DATA: RV lead:6.6 mV,0.75V at 0.24 ms, 590 ohms.      CONCLUSIONS:   1. Sta

## 2017-06-07 NOTE — CM/SW NOTE
Arabella Rodriguez, [facility] liaison spoke with patient who confirmed he'd like to resume care with [facility] upon discharge. Resumption of Care orders entered in Epic.

## 2017-06-07 NOTE — PLAN OF CARE
Problem: Diabetes/Glucose Control  Goal: Glucose maintained within prescribed range  INTERVENTIONS:  - Monitor Blood Glucose as ordered  - Assess for signs and symptoms of hyperglycemia and hypoglycemia  - Administer ordered medications to maintain glucose [facility] - ADULT  Goal: Incision(s), wounds(s) or drain site(s) healing without S/S of infection  INTERVENTIONS:  - Assess and document risk factors for pressure ulcer development  - Assess and document skin integrity  - Assess and document dressing/incis

## 2017-06-07 NOTE — DISCHARGE SUMMARY
[facility]PITAL[facility]  DISCHARGE SUMMARY     Toya Starks Patient Status:  Inpatient    3/1/1950 MRN [id number]   [facility] 2NE-A Attending Emelina Walton MD   Hosp Day # 3 PCP PHYSICIAN NONSTAFF     Date of Admission: 2017  Date of Dis vision, nausea or vomiting.  He denies any exacerbating or relieving factors.  He states that it has now resolved.  He also developed headache after this episode.  It was located in his left temporal area.  It was a sharp shooting pain as well.  He denies ALPRAZolam 0.5 MG Tabs   Last time this was given:  0.5 mg on 6/5/2017  8:14 PM   Commonly known as:  XANAX        Take 0.5 mg by mouth every 6 (six) hours as needed.     Refills:  0       aspirin 325 MG Tabs   Last time this was given:  325 mg on 6/7 isosorbide dinitrate 10 MG Tabs   Last time this was given:  10 mg on 6/7/2017  7:52 AM   Commonly known as:  ISORDIL        Take 1 tablet (10 mg total) by mouth 2 (two) times daily.     Quantity:  60 tablet   Refills:  1       Levothyroxine Sodium 175 MCG [facility]  [phone]      keep appointment as scheduled    Alvin Rebolledo MD  [address] [address]  [phone]    On 6/16/2017  Follow up with Dr. Ne Jin on 6/16 a6 1:30      Vital signs:

## 2017-06-07 NOTE — PROGRESS NOTES
[facility]  Cardiology Progress Note    Naga Gallegos Patient Status:  Inpatient    3/1/1950 MRN [id number]   [facility] 2NE-A Attending Shannon Bauer MD   Hosp Day # 3 PCP PHYSICIAN NONSTAFF     Subjective:  No complaints of chest p Daily with breakfast   • Insulin NPH Isophane & Regular  40 Units Subcutaneous QPM   • gabapentin  400 mg Oral TID   • aspirin  324 mg Oral Once     • sodium chloride Stopped (06/06/17 1900)   • sodium chloride Stopped (06/06/17 2318)       Assessment:  1.

## 2017-06-07 NOTE — PROGRESS NOTES
IV and tele discontinued. Pt. Received discharge instructions and follow-up information as well as paper copy of prescription. Questions addressed and answered. Pt. to be transported by wheelchair with tech to d/c transportation.

## 2017-06-07 NOTE — PROGRESS NOTES
[id number] Anastasia Krishnamurthy Neurology Progress Note    [facility] Patient Status:  Inpatient    3/1/1950 MRN [id number]   [facility] 2NE-A Attending Ansley Domingo MD   Hosp Day # 3 PCP PHYSICIAN NONSTAFF     Chief Complaint:   Follow up for p Imaging:  No new imaging to review    Impression  1. Painful diabetic Neuropathy  2. Dysautonomia  3. Migraine    4. Dyslipidemia  5. H/o falls  6. CAD - s/p CABG and multiple stents  7. DM  8. CORTÉS s/p Liver transplant 5 years ago  9.  PVD s/p R BKA

## 2017-06-07 NOTE — PROGRESS NOTES
[facility]  Progress Note     Lonia Rater Patient Status:  Inpatient    3/1/1950 MRN [id number]   [facility] 2NE-A Attending Eben Jc MD   Hosp Day # 3 PCP PHYSICIAN NONSTAFF     Chief Complaint: s/p PPM    S: Patient with Clopidogrel Bisulfate  75 mg Oral Daily   • cycloSPORINE modified  175 mg Oral BID   • ferrous sulfate  325 mg Oral Daily   • isosorbide dinitrate  10 mg Oral BID   • Levothyroxine Sodium  350 mcg Oral Before breakfast   • mycophenolate mofetil  1,000 mg O

## 2017-06-09 NOTE — PAYOR COMM NOTE
--------------  DISCHARGE REVIEW    Payor: [employer] ADV PPO  Authorization Number: [id number]    Admit date: 6/4/2017 10:13 PM  Discharge Date: 6/7/2017  6:55 PM     Admitting Physician: Burgess Nkechi MD            Discharge Summary Notes      Disc arm.  He denies any blurry vision, nausea or vomiting.  He denies any exacerbating or relieving factors.  He states that it has now resolved.  He also developed headache after this episode.  It was located in his left temporal area.  It was a sharp shootin once a week. Refills:  0       ALPRAZolam 0.5 MG Tabs   Last time this was given:  0.5 mg on 6/5/2017  8:14 PM   Commonly known as:  XANAX        Take 0.5 mg by mouth every 6 (six) hours as needed.     Refills:  0       aspirin 325 MG Tabs   Last time th Refills:  3       isosorbide dinitrate 10 MG Tabs   Last time this was given:  10 mg on 6/7/2017  7:52 AM   Commonly known as:  ISORDIL        Take 1 tablet (10 mg total) by mouth 2 (two) times daily.     Quantity:  60 tablet   Refills:  1       Levothyro Guilherme Verma, [address] [facility]  [address]  [address]  [phone]      keep appointment as scheduled    Diego Richards [address]  [address] [address]  [phone]    On 6/16/2017  Follow up with Dr. Ne Jin on 6/1 °F (36.3 °C)-98 °F (36.7 °C)] 97.5 °F (36.4 °C)  Pulse:  [56-64] 57  Resp:  [16-18] 18  BP: (130-144)/(44-59) 130/44 mmHg    Physical Exam:    General: No acute distress.    Respiratory: Clear to auscultation bilaterally. No wheezes. No rhonchi.   [place] Subcutaneous  2 times per day    •  Insulin NPH Isophane & Regular   60 Units  Subcutaneous  Daily with breakfast    •  Insulin NPH Isophane & Regular   40 Units  Subcutaneous  QPM    •  gabapentin   400 mg  Oral  TID    •  aspirin   234 mg  Josephine West     2. Chest fluoroscopy.     : Umesh Munguia MD

## 2017-06-15 ENCOUNTER — MYAURORA ACCOUNT LINK (OUTPATIENT)
Dept: OTHER | Age: 67
End: 2017-06-15

## 2017-06-15 ENCOUNTER — PRIOR ORIGINAL RECORDS (OUTPATIENT)
Dept: OTHER | Age: 67
End: 2017-06-15

## 2017-06-16 PROBLEM — F33.42 RECURRENT MAJOR DEPRESSIVE DISORDER, IN FULL REMISSION (HCC): Status: ACTIVE | Noted: 2017-01-01

## 2017-06-16 PROBLEM — E11.9 DIABETES MELLITUS, CONTROLLED (HCC): Status: ACTIVE | Noted: 2017-01-01

## 2017-06-16 PROBLEM — Z89.9 AMPUTEE: Status: ACTIVE | Noted: 2017-01-01

## 2017-06-16 NOTE — TELEPHONE ENCOUNTER
Diabetic shoes script was mailed to home address. Left detailed message ok per Hipaa to notify pt script was mailed to home address.

## 2017-06-16 NOTE — TELEPHONE ENCOUNTER
LMTCB to notify me if pt would like script for Diabetic shoes to be mailed or if he would like to pick it up.

## 2017-06-16 NOTE — PROGRESS NOTES
Patient presents with:  Hospital F/U: pt lost feeling in both arms and legs. Pt had pacemaker surgery. Establish Care      HPI:  Here for hospital f/u from leadless pacer placement for 2nd degree heart block with le numbness as a symptom.  Pt had w/u with mellitus with complication, with long-term current use of insulin (HCC)     Dyslipidemia     RYAN on CPAP     Anxiety     Hepatorenal syndrome (HCC)     Liver transplanted (HCC)     Transaminitis     OLAYINKA (acute kidney injury) ([facility]     Syncope and collapse depressive disorder, [facility]        Current Outpatient Prescriptions:  nitroGLYCERIN (NITROSTAT) 0.4 MG Sublingual SL Tab Place 0.4 mg under the tongue as needed.  Disp:  Rfl:    Levothyroxine Sodium 300 MCG Oral Tab Take 1 tablet (300 mcg t Clopidogrel Bisulfate (PLAVIX) 75 MG Oral Tab Take 75 mg by mouth daily. Disp:  Rfl:    DiphenhydrAMINE HCl (BENADRYL) 25 MG Oral Cap Take 50 mg by mouth nightly as needed for Itching or Sleep.    Disp:  Rfl:    Mycophenolate Mofetil (CELLCEPT) 500 MG Ora disease) stage 3, gfr 30-59 ml/min-stable continue meds, observation  Thrombocytopenia (hcc)-stabble rpt cbc in 3 mos  Coronary artery disease involving native coronary artery of native heart with angina pectoris (hcc)-stable continue meds, no recent angin

## 2017-07-01 NOTE — ED INITIAL ASSESSMENT (HPI)
Here for generalized weakness & constant nausea, unable to tolerate anything orally @ this time. + urticaria, known liver transplant.  Denies any chest pain or SOB

## 2017-07-01 NOTE — ED NOTES
Dispatch stated that there was an error on the call and it had not been placed, so they are sending an ambulance that will arrive at 0000

## 2017-07-01 NOTE — ED PROVIDER NOTES
Patient Seen in: [facility] Emergency Department    History   Patient presents with:  Nausea/Vomiting/Diarrhea (gastrointestinal)  Fatigue (constitutional, neurologic)    Stated Complaint: nausea and weakness    HPI    Patient presents with nausea a Comment: om  No date: CATH PERCUTANEOUS  TRANSLUMINAL CORONARY ANGIO*  3/4/2016: COLONOSCOPY N/A      Comment: Procedure: COLONOSCOPY;  Surgeon: Osmar Marinelli DO;  Location: [facility] ENDOSCOPY  No date: LAPS GSTR RSTCV [facility] PLMT BAND  No date: O nightly. Multiple Vitamins-Minerals (CENTRUM SILVER) Oral Tab,  Take 1 tablet by mouth daily. DiphenhydrAMINE HCl (BENADRYL) 25 MG Oral Cap,  Take 50 mg by mouth nightly as needed for Itching or Sleep.      Mycophenolate Mofetil (CELLCEPT) 500 MG Oral Alkaline Phosphatase 1,348 (*)      (*)     Alt 177 (*)     Bilirubin, Total 4.6 (*)     Albumin 3.3 (*)     Sodium 133 (*)     Chloride 99 (*)     All other components within normal limits   URINALYSIS WITH CULTURE REFLEX - Abnormal; Notable for th weakness. Prior history of diverticulitis, chronic kidney disease, liver transplant. TECHNIQUE:  Unenhanced multislice CT scanning was performed from the dome of the diaphragm to the pubic symphysis. Dose reduction techniques were used.  Dose information present postsurgical.  Left upper quadrant varices with mild splenomegaly. Benign renal cortical cysts. Pneumobilia consistent which surgical history. No acute abnormalities.   Incidental note is made of a sizable Spegelian hernia containing multiple small

## 2017-07-02 ENCOUNTER — PRIOR ORIGINAL RECORDS (OUTPATIENT)
Dept: OTHER | Age: 67
End: 2017-07-02

## 2017-07-05 ENCOUNTER — MYAURORA ACCOUNT LINK (OUTPATIENT)
Dept: OTHER | Age: 67
End: 2017-07-05

## 2017-07-05 ENCOUNTER — PRIOR ORIGINAL RECORDS (OUTPATIENT)
Dept: OTHER | Age: 67
End: 2017-07-05

## 2017-07-07 ENCOUNTER — PRIOR ORIGINAL RECORDS (OUTPATIENT)
Dept: OTHER | Age: 67
End: 2017-07-07

## 2017-07-07 PROBLEM — E11.40 TYPE 2 DIABETES MELLITUS WITH DIABETIC NEUROPATHY, WITH LONG-TERM CURRENT USE OF INSULIN (HCC): Status: ACTIVE | Noted: 2017-01-01

## 2017-07-07 PROBLEM — Z79.4 TYPE 2 DIABETES MELLITUS WITH DIABETIC NEUROPATHY, WITH LONG-TERM CURRENT USE OF INSULIN (HCC): Status: ACTIVE | Noted: 2017-01-01

## 2017-07-07 NOTE — ED PROVIDER NOTES
Patient Seen in: [facility] Emergency Department    History   Patient presents with:  Chest Pain Angina (cardiovascular)    Stated Complaint: chest pain    HPI    This is a 57-year-old male with extensive past medical history who presents with chest [id number]   • Hyperlipidemia    • Hypothyroidism    • Kidney disorder    • Liver transplanted [facility])    • Myocardial infarction [facility])    • Obesity    • Peripheral vascular disease ([facility] 2005    diabets broken ankle led to amputation   • Renal disorder    • Oral Tab,  Take 0.5 mg by mouth every 6 (six) hours as needed. aspirin 325 MG Oral Tab,  Take 325 mg by mouth daily. Citalopram Hydrobromide (CELEXA) 20 MG Oral Tab,  Take 20 mg by mouth daily.    cycloSPORINE modified (NEORAL) 25 MG Oral Cap,  Take 1 (36.8 °C) (Temporal)   Resp 22   Wt 126.3 kg   SpO2 97%   BMI 33.89 kg/m²         Physical Exam    GENERAL: Awake, alert oriented x3, nontoxic appearing. SKIN: Normal, warm, and dry. HEENT:  Pupils equally round and reactive to light. Conjuctiva clear. WITH DIFFERENTIAL WITH PLATELET.   Procedure                               Abnormality         Status                     ---------                               -----------         ------                     CBC W/ DIFFERENTIAL[[id number]]          Abnormal (Reviewed)  ============================================================  MDM       Patient placed on cardiac monitor, continuous pulse oximetry and IV line was established of normal saline. Basic labs are obtained. CBC: White blood cell count 4.9.   Hemo

## 2017-07-07 NOTE — ED NOTES
Pt initially feeling improved with first dose of nitroglycerin down to 7/10. Felt immediate change.   Pain then returned and back to 8/10

## 2017-07-07 NOTE — H&P
[facility]                                                               History & Physical         Brittasamnatha Carpenter Patient Status:  Emergency    3/1/1950 MRN [id number]   Location [address] [facility] Attending Bam Murillo DO • Essential hypertension    • Heart murmur     Since Childhood   • High cholesterol    • History of blood transfusion [id number]   • Hyperlipidemia    • Hypothyroidism    • Kidney disorder    • Liver transplanted [facility])    • Myocardial infarction ([facility] tolerated ceftriaxone and             cefepime, daptomycin has a 2-3% association with             LFT abnormalities and given that patient had not             been exposed to daptomycin prior to this recent             exposure it is thought that the reac 07/07/2017   HCT 35.3 07/07/2017   .0 07/07/2017   CREATSERUM 2.34 07/07/2017   BUN 40 07/07/2017    07/07/2017   K 5.0 07/07/2017    07/07/2017   CO2 23.0 07/07/2017    07/07/2017   CA 9.9 07/07/2017   ALB 2.8 07/07/2017   AST 95 and family.     Enrrique Galeass will follow from morning tomorrow        Angie Dwyer MD  7/7/2017  5:34 PM

## 2017-07-07 NOTE — HISTORICAL OFFICE NOTE
Cristofer Vann  [phone]  : 1950  ACCOUNT:  [id number]  PCP: None None, MCLAUDE.   CARDIOLOGIST: Lisa Jameson MD  Hospital: [facility]  Admitted: 2017  Discharged:  2017    DISCHARGE SUMMARY    DISCHARGE DIAGNOSES:   1 - Recurrent Scott Sánchez mg p.o. daily, multivitamin 1 p.o. daily, midodrine 2.5 mg p.o. t.i.d., Synthroid 175 mcg p.o. daily, omeprazole 40 mg p.o. b.i.d.    DISCHARGE INSTRUCTIONS: The plan would be to have a 30-day event recorder upon discharge and follow up with Dr. Tia Boxer in hiatal hernia, sleep apnea, r leg amputation 2004 and iver transplant 2006    PAST CV HISTORY: 1/2016, 2 vessel CABG 2015, cardiac catheterization, dyslipidemia, hypertension, LAD/post descending 11/2016, OM 10/2016, PTCA January 2017, PTCA/Stent and robot with him. Thank you very much for allowing me to participate in his care. We will see him back in six months. He is being started on a low dose statin per medical attending once his hepatologist/liver transplant doctors feel it is safe to do so.      ASS 300MCG    350 micrograms,2 po daily  16 Ursodiol              300MG     1 po three times daily        Jesus Sood M.D.    TD/ - DD: 2017 - DT: 2017 - Job ID: [id number]       Alannah Phelps  : 1950  ACCOUNT:  [id number]  [phone] well nourished. WEIGHT: BMI parameters reviewed and discussed. HEAD/FACE: no trauma and normocephalic. EYES: conjunctivae not injected and no xanthelasma. ENT: mucosa pink and moist. NECK: jugular venous pressure not elevated.  RESP: respirations with kelley 01/13/16 HumuLIN N             100UNIT/  as directed                              01/13/16 Naltrexone HCl        50MG      as directed                              01/13/16 Neoral                25MG      7 po twice daily

## 2017-07-07 NOTE — CONSULTS
[facility] at [address] [facility]  Report of Consultation    Mani Self Patient Status:  Emergency    3/1/1950 MRN [id number]   Location [address] [facility] [address] Attending Ruben Salas, [address] [facility] Day # 0 Surgical History:  10/18/16: ANGIOGRAM  10/31/16: ANGIOPLASTY (CORONARY)      Comment: Stents X 4   10/18/16: ANGIOPLASTY (CORONARY)      Comment: Stents x 1  2015: BYPASS SURGERY      Comment: Robotic CABG X 2  10/2015: CABG  No date: CATARACT  10/16: CAT HE IS ALLERGIC TO FLUORQUINOLONE AND/OR             FLOXIN FAMILY DRUGS  Quinolones                Rifampin                  Statins                     Comment:Muscle weakness  Sulfa Antibiotics         Vancomycin                  Comment:Other reaction(s Coronary artery disease involving native heart with unstable angina pectoris (HCC)     Type 2 diabetes mellitus with diabetic neuropathy (HCC)     Chronic kidney disease     Weakness     CAD (coronary artery disease)     Syncope     Hypothyroidism     S/P 35.0-39. 9) with comorbidity (HCC)     Metabolic acidosis     Paresthesia     Pain in extremity, unspecified extremity     Pain in extremity     Chronic intractable headache     Intractable headache     AVB (atrioventricular block)     Liver cirrhosis secon

## 2017-07-08 ENCOUNTER — PRIOR ORIGINAL RECORDS (OUTPATIENT)
Dept: OTHER | Age: 67
End: 2017-07-08

## 2017-07-08 NOTE — ED NOTES
Pain improving on nitro drip. BP stable. Dose increased as pain still 5/10.   Pt appears more comfortable

## 2017-07-08 NOTE — PROGRESS NOTES
07/07/17 2048 07/07/17 2050 07/07/17 2052   Vital Signs   BP (!) 166/73 (!) 161/81 142/72   BP Location Right arm Right arm Right arm   BP Method Automatic Automatic Automatic   Patient Position Lying Sitting Standing     Pt c/o dizziness

## 2017-07-08 NOTE — PLAN OF CARE
CARDIOVASCULAR - ADULT    • Maintains optimal cardiac output and hemodynamic stability Progressing    • Absence of cardiac arrhythmias or at baseline Progressing              NURSING ADMISSION NOTE      Patient admitted via [address] to room.   Safety

## 2017-07-08 NOTE — PLAN OF CARE
CARDIOVASCULAR - ADULT    • Maintains optimal cardiac output and hemodynamic stability Progressing    • Absence of cardiac arrhythmias or at baseline Progressing            A/o x3, denies sob, lightheadedness, dizziness.      C/o chest pain this am. Rated 7

## 2017-07-08 NOTE — PROGRESS NOTES
· [facility] Card[facility] Progress Note     Subjective:  Chest tightness earlier today - now pain free; no sob. Objective:  139/58  151/70  Afebrile  SR  I/O -584  BUN/cr 39/1.91   Hgb 10.6    Neuro:awake/alert; nml affect. HEENT:no JVD; no tm.  Anict

## 2017-07-08 NOTE — PAYOR COMM NOTE
--------------  ADMISSION REVIEW       7/7    ED  LATE    74YEARS OLD      History   Patient presents with:  Chest Pain Angina (cardiovascular)     Stated Complaint: chest pain     HPI     This is a 46-year-old male with extensive past medical history who   All other components within normal limits   POCT GLUCOSE - Abnormal; Notable for the following:      POC Glucose 235 (*)       All other components within normal limits   POCT GLUCOSE - Abnormal; Notable for the following:      POC Glucose 151 (*)   CBC: White blood cell count 4.9. Hemoglobin 11.5. Platelet 321. CMP: BUN 40. Creatinine 2.34. Glucose 402. Alk phos 1, 146. Electrolyte were within normal limits. Troponin was negative. ProBNP 1, 534. Coags within normal limits.   Initially, Dr. Leandro Pierce

## 2017-07-08 NOTE — PROGRESS NOTES
[facility]  Progress Note     Kojodayana Root Patient Status:  Observation    3/1/1950 MRN [id number]   [facility] 8NE-A Attending Erin Mock MD   Hosp Day # 0 PCP Rosie Billingsley MD     Chief Complaint: chest pain    S: Patient Epic.    Medications:   • aspirin  325 mg Oral Daily   • bumetanide  2 mg Oral Daily   • escitalopram  10 mg Oral QAM   • cycloSPORINE modified  175 mg Oral BID   • ferrous sulfate  325 mg Oral Daily   • gabapentin  400 mg Oral TID   • Levothyroxine Sodium

## 2017-07-09 NOTE — PROGRESS NOTES
[facility]  Progress Note     Kojodayana Root Patient Status:  Observation    3/1/1950 MRN [id number]   [facility] 8NE-A Attending Erin Mock MD   Hosp Day # 0 PCP Rosie Billingsley MD     Chief Complaint: chest pain    S: complai bumetanide  2 mg Oral Daily   • escitalopram  10 mg Oral QAM   • cycloSPORINE modified  175 mg Oral BID   • ferrous sulfate  325 mg Oral Daily   • gabapentin  400 mg Oral TID   • Levothyroxine Sodium  300 mcg Oral Before breakfast   • Insulin NPH Isophane

## 2017-07-09 NOTE — PLAN OF CARE
A/o x3, denies chest pain, sob, lightheadedness, dizziness. When asked pt states that his chest pain is on and off but nothing as severe as yesterday. Nitroglycerin drip maintained at 20 mcg. B/P stable. SB 50's. Nasal swab negative for MRSA.   (has h

## 2017-07-09 NOTE — PROGRESS NOTES
· [facility] Progress Note     Subjective:  Recurrent pain last night, again now.   20 minutes 6/10 - now resolved    Objective:  131/65  Afebrile  SB/SR  Rare v pacing  No new labs    Neuro:awake/alert; nml affect  HEENT:no JVD  Cardiac:S1 S2

## 2017-07-09 NOTE — PLAN OF CARE
CARDIOVASCULAR - ADULT    • Maintains optimal cardiac output and hemodynamic stability Progressing    • Absence of cardiac arrhythmias or at baseline Progressing          Pt AOx4. On Ra. SB on tele. Nitro gtt infusing @ 20 mcg/min.  Pt c/o 8/10 chest pain,

## 2017-07-10 NOTE — PROGRESS NOTES
[facility]  Progress Note     Santiagoedy Weathersankit Patient Status:  Observation    3/1/1950 MRN [id number]   [facility] 8NE-A Attending Tam Talamantes MD   Hosp Day # 0 PCP Sandro Howard MD     Chief Complaint: chest pain    S: SOB and Subcutaneous QPM   • metoprolol tartrate  50 mg Oral 2x Daily(Beta Blocker)   • AmLODIPine Besylate  2.5 mg Oral Daily   • aspirin  325 mg Oral Daily   • bumetanide  2 mg Oral Daily   • escitalopram  10 mg Oral QAM   • cycloSPORINE modified  175 mg Oral BI

## 2017-07-10 NOTE — PLAN OF CARE
CARDIOVASCULAR - ADULT    • Maintains optimal cardiac output and hemodynamic stability Progressing    • Absence of cardiac arrhythmias or at baseline Progressing          Pt AOX4. On RA. SB/Vpacing on tele.  Pt c/o 8/10 pain/SOB after BM and walking around

## 2017-07-10 NOTE — PROGRESS NOTES
CARDIODIAGNOSTIC PRELIMINARY REPORT:    Indeterminate Lexiscan injection EKG due to V-paced rhythym; no arrhythmias noted   Base:  112/70, HR 50;  Peak: 110/54, HR 58  C/O mild chest discomfort following injection which subsided slowly in recovery  Second

## 2017-07-10 NOTE — PROGRESS NOTES
[facility]  Cardiology Progress Note    Subjective:  No chest pain or shortness of breath at present. Had recurrent chest pain ~9pm over night requiring morphine IV.       Objective:  /47 (BP Location: Right arm)   Pulse 51   Temp (!) 97.5 °F (3 patent. LV was normal.  Did have in-stent restenosis 2/17 and required stenting.   · HTN  · Hx leadless PPM secondary to syncope type II AVHB  6/6/17  · DM  · Hx Left BKA  · Dyslipidemia  · CKD - creatinine 1.91 today (baseline appears 1.5 - 2.0)  · Hx efrain

## 2017-07-10 NOTE — PLAN OF CARE
Stress test normal. EF 57%. Stable for discharge from a cardiac perspective as outlined by Dr. Sia Riojas.

## 2017-07-10 NOTE — PROGRESS NOTES
Radha MENDES notified that pt complained of midsternal chest pain 8/10 and dizziness after completing his shower at 1000. Returned to bed /53. Oxygen sat 100% on room air. States dizziness improved after returning to bed but still states CP 8/10.  Andrew Mary

## 2017-07-11 NOTE — DISCHARGE SUMMARY
[facility]  DISCHARGE SUMMARY     Vivek Shelton Patient Status:  Observation    3/1/1950 MRN [id number]   [facility] 8NE-A Attending No att. providers found   Hosp Day # 0 PCP Isaac Ramirez MD     Date of Admission: 2017  [place] bedside. Patient also has history of diabetic neuropathy and has had right below knee amputation before and uses straight leg prosthesis. Patient was sent to the emergency room from the cardiologist's office and was admitted.   Patient denies any abdomina Refills:  0     bumetanide 1 MG Tabs  Commonly known as:  BUMEX      Take 2 tablets (2 mg total) by mouth daily. Refills:  0     CENTRUM SILVER Tabs      Take 1 tablet by mouth daily.    Refills:  0     Citalopram Hydrobromide 20 MG Tabs  Commonly known a Cpdr  Commonly known as:  PRILOSEC      Take 40 mg by mouth 2 (two) times daily. Refills:  0     Rosuvastatin Calcium 5 MG Tabs  Commonly known as:  CRESTOR      Take 5 mg by mouth nightly.    Refills:  0     ursodiol 300 MG Caps  Commonly known as:  ACTI

## 2017-07-13 NOTE — PROGRESS NOTES
Initial Post Discharge Follow Up   Discharge Date: 7/10/17  Contact Date: 7/12/2017    Consent Verification:  Assessment Completed With: Patient  HIPAA Verified?   Yes    Discharge Dx:   Atypical chest pain, CAD    General:   • How have you been since yo Take 70 mg by mouth once a week. Disp:  Rfl:    Rosuvastatin Calcium 5 MG Oral Tab Take 5 mg by mouth nightly. Disp:  Rfl:    ergocalciferol 78381 units Oral Cap Take 50,000 Units by mouth once a week.    Disp:  Rfl:    HYDROcodone-acetaminophen  MG O if he needed assistance to  his medications, patient states that he does not. • May I go over your medications with you to make sure we are not missing anything? yes  • Are there any reasons that keep you from taking your medication as prescribed?  Debara Bosworth OTTO    Classes and/or appointments are held at the [facility] located at [address], [address], [place], [place]  If you need to make changes to your appointment or have questions, mario card, 's license/ID, the order for education from your doctor, any pertinent lab results and a list of medications patient is taking. Should you have questions about insurance coverage, call the [phone] on the back of your insurance card.     Sep

## 2017-07-18 NOTE — PROGRESS NOTES
HPI:    Pat Perkins is a 74year old male here today for hospital follow up.    He was discharged from Inpatient hospital, [facility] to Home   Admission Date: 7/7/17   Discharge Date: 7/10/17  Hospital Discharge Diagnosis: headache  TCM Diagnosis by mouth 2x Daily(Beta Blocker). )   Clopidogrel Bisulfate 75 MG Oral Tab Take 75 mg by mouth daily. nitroGLYCERIN (NITROSTAT) 0.4 MG Sublingual SL Tab Place 0.4 mg under the tongue as needed.    Levothyroxine Sodium 300 MCG Oral Tab Take 1 tablet (300 m of coronary artery (10/31/16); Atherosclerosis of coronary artery (10/18/16); Autoimmune disease ([facility] Back problem; Cataract; Congestive heart disease ([facility] (2006); Coronary atherosclerosis; Depression; Diabetes ([facility] (1983);  Disorder of liver; Esophagea denies hx of allergy or asthma    PHYSICAL EXAM:   No LMP for male patient. Estimated body mass index is 34.62 kg/m² as calculated from the following:    Height as of this encounter: 76\". Weight as of this encounter: 284 lb 6.4 oz.    /70   Pulse Management Certification:  I certify that the following are true:  Communication with the patient was made within 2 business days of discharge on date above   Medical Decision Making- Based on service period of discharge to 30 days:   · Number of Possible

## 2017-07-19 NOTE — TELEPHONE ENCOUNTER
LFT's and bilirubin are climbing! . Please call patient and family/wife. Unclear to me as to why this is happening. Need to fax this lab to his liver doc and he needs to see them asap. He has hx of liver transplant.  See if you can add ammonia level to his l

## 2017-07-19 NOTE — TELEPHONE ENCOUNTER
Called pt to advise info per AS that: LFT's and bilirubin are climbing and this is unclear to AS as to why this is happening. Pt is aware that this lab will be faxed to his liver doc and he needs to see them asap d/t he has hx of liver transplant.   Per Pt

## 2017-07-19 NOTE — TELEPHONE ENCOUNTER
S/w Lydia Davenport at [facility] labs who states that ammonia level cannot be added to existing labs since this has to be collected in a special tube, refrigerated and processed within 2 hours of collection.    Would you still like for orders to be placed and have pt com

## 2017-07-19 NOTE — TELEPHONE ENCOUNTER
No that's ok. Please forward labs to his liver doc, and call pt and wife to get him in with his liver doc asap.

## 2017-07-21 NOTE — TELEPHONE ENCOUNTER
Spoke with patient's wife, Leo Andrew (Ok per Inder). Aj states patient has not been himself for the past 6 months.    Per Aj patient has been making unwise choices- he is forgetting things, doing thinks he does not do usually, he stopped taking one

## 2017-07-21 NOTE — PROGRESS NOTES
Attempted to contact pt to introduce myself as CCM, no answer and voice mail full. Will send letter in mail with my information and call back.

## 2017-07-24 NOTE — PATIENT INSTRUCTIONS
Refill policies:    • Allow 2-3 business days for refills; controlled substances may take longer.   • Contact your pharmacy at least 5 days prior to running out of medication and have them send an electronic request or submit request through the [facility] have a procedure or additional testing performed. [facility]) will contact your insurance carrier to obtain pre-certification or prior authorization.     Unfortunately, [employer] has seen an increase in denial of payment even though the p

## 2017-07-24 NOTE — PROGRESS NOTES
[facility] in [place]  Neurology - Clinic Follow up  2017, 11:57 AM     Elena Chopra Patient Status:  No patient class for patient encounter    3/1/1950 MRN [id number]   Location [unfilled] PCP Ck King MD     Patient he has been hospitalized for lethargy due to hypoglycemia and worsened thyroid function. He had another syncopal episode in the hospital, preceded by headache, lasting a few minutes. He is getting the episodes 1-2 time per week.  The triggers are urinating, 1,146 (H) 1,348 (H)    AST (SGOT)      15 - 41 U/L 198 (H) 95 (H) 174 (H)    ALT (SGPT)      17 - 63 U/L 144 (H) 84 (H) 177 (H)    Total Bilirubin      0.1 - 2.0 mg/dL 5.1 (H) 3.2 (H) 4.6 (H)    TOTAL PROTEIN      6.1 - 8.3 g/dL 7.2 7.0 7.8    Albumin [id number]   • Hyperlipidemia    • Hypothyroidism    • Kidney disorder    • Liver transplanted [facility])    • Myocardial infarction [facility])    • Obesity    • Peripheral vascular disease ([facility] [address] 2005    diabets broken ankle led to amputation   • Renal disorder    • 4/2014. Dexamethasone               Comment:Other reaction(s):  Other             Steroidal psychosis  Floxin [Ofloxacin]        Other                       Comment:PT STATES HE IS ALLERGIC TO FLUORQUINOLONE AND/OR             FLOXIN FAMILY DRUGS  Nayana Tenorio Oral Tab, Take 2 tablets (2 mg total) by mouth daily. , Disp: , Rfl: 0  •  Ferrous Sulfate 324 (65 FE) MG Oral Tab EC, Take 1 tablet by mouth daily. , Disp: , Rfl:   •  alprazolam 0.5 MG Oral Tab, Take 0.5 mg by mouth every 6 (six) hours as needed.   , Disp: Pink Conjunctiva;   Neck: supple, no bruits;  Cardiac: S1/S2 RRR  Lungs: CTA B/L;  Musculoskeletal: no joint tenderness, others see neuro exam;  Extremities:  no pitting edema, no cyanosis or skin rash,  pulse is present,  Neurologic Examination  This pat contact his endocrinologist, follow up with liver specialist    Generalized weakness- address BP meds with cardiologist, thyroid testing per endocrinologist    Neuropathy, and neuropathic pain- restart slowly gabapentin 400mg TID, renal function stable

## 2017-07-31 ENCOUNTER — PRIOR ORIGINAL RECORDS (OUTPATIENT)
Dept: OTHER | Age: 67
End: 2017-07-31

## 2017-07-31 NOTE — TELEPHONE ENCOUNTER
LOV 7/18/17 with AS.   Pt's wife Aj(on HIPAA) states pt is 'out of sorts' today, wife concerned pt is confused and asking her to take care of personal calls, appts etc.  Wife is concerned as he is a liver transplant pt, saw Dr Jorge Rosales 7/24/17, pt had a

## 2017-07-31 NOTE — TELEPHONE ENCOUNTER
Pts wife would like to talk to a nurse in regards to pts Creatinine levels and a possible kidney doctor.  10 days ago his creatinine was 1.9 and last week is was 2.6

## 2017-07-31 NOTE — TELEPHONE ENCOUNTER
[facility] SERVICES message sent to pt with Dr. Antonio Martin information and recommendations.

## 2017-08-01 NOTE — ED NOTES
Patient to CT via stretcher by PCT Jag Thornton, patient in stable condition. Report given to next shift RN Alberta Gosselin.

## 2017-08-01 NOTE — ED NOTES
Report received from Jos guerreor [facility]. Patient care assumed at this time. Pt is currently in CT Scan.

## 2017-08-01 NOTE — ED PROVIDER NOTES
Patient Seen in: [facility] Emergency Department    History   Patient presents with:  Abnormal Labs    Stated Complaint: abnormal labs    HPI    Patient brought to the ER by private vehicle by his wife is at the bedside, he is 74years old and has s Autoimmune disease ([facility]    • Back problem    • Cataract    • Congestive heart disease ([facility] 2006   • Coronary atherosclerosis    • Depression    • Diabetes ([facility] 1983   • Disorder of liver    • Esophageal reflux    • Essential hypertension    • Heart murm mg total) by mouth 2x Daily(Beta Blocker). Patient taking differently: Take 50 mg by mouth daily. Clopidogrel Bisulfate 75 MG Oral Tab,  Take 75 mg by mouth daily.    nitroGLYCERIN (NITROSTAT) 0.4 MG Sublingual SL Tab,  Place 0.4 mg under the tongue as Packs/day: 0.00      Years: 0.00         Quit date: 10/18/1993  Smokeless tobacco: Never Used                      Alcohol use: No                Review of Systems    Positive for stated complaint: abnormal labs  Other systems are as noted in HPI. following:        Result Value    BUN 65 (*)     Creatinine 2.47 (*)     GFR 26 (*)     Alkaline Phosphatase 1,087 (*)     AST 80 (*)     Bilirubin, Total 3.1 (*)     Albumin 2.6 (*)     All other components within normal limits   AMMONIA, PLASMA - Abnorma brain. Dose reduction techniques were used. Dose information is transmitted to the [facility] [employer] [facility] Radiology) NRDR ([address]) which includes the Dose Index Registry.   PATIENT STATED HISTORY: (As transcribed by Technologist) HISTORY: (As transcribed by Technologist)  Patient complains of mediastinal chest pain that began at 3:45 this afternoon. Patient states the pain has now subsided. FINDINGS:  The lungs are clear.   Cardiomediastinal silhouette and vascularity are unremar treatment options and Physician follow up plan was discussed. The patient is discharged home in good condition.            Disposition and Plan     Clinical Impression:  Renal insufficiency  (primary encounter diagnosis)  Hyperammonemia ([address]    Disposit

## 2017-08-01 NOTE — ED NOTES
[facility], Dr. Nehemiah Hough was contacted.  Call is being taken by Dr. Claudetta Maxcy.

## 2017-08-02 NOTE — TELEPHONE ENCOUNTER
Rec'd incoming fax from [phone] requesting that Rx for Gabapentin 400mg cap be transferred to their pharmacy with a 90-day qty. Noted that new Rx for this medication was written on 7/24/17 #90/3 and [facility]   Contacted the pharmacy,

## 2017-08-03 ENCOUNTER — PRIOR ORIGINAL RECORDS (OUTPATIENT)
Dept: OTHER | Age: 67
End: 2017-08-03

## 2017-08-03 NOTE — TELEPHONE ENCOUNTER
From: Jennifer Malhotra  To: Kristian Nelson DO  Sent: 8/3/2017 12:51 PM CDT  Subject: Prescription Question    I have been taking the following statin, Rosuvastatin 5MG, that my old primary doctor prescribed.  I need a refill but do not know if you shoul

## 2017-08-03 NOTE — TELEPHONE ENCOUNTER
According to [employer] below, the patient is no longer seeing the PCP that prescribed this medication. Routed to provider.       Medication: Rosuvastatin     Date of last refill: Patient reported, no record  [place]

## 2017-08-03 NOTE — TELEPHONE ENCOUNTER
Noted.  I reviewed chart, unclear to me that he is taking Xanax regularly prn, not Ativan. Will see JJEOVANY tomorrow.

## 2017-08-03 NOTE — TELEPHONE ENCOUNTER
Last office visit :7/18/17 W/AS FOR TCM  Upcoming visit :9/5/17  Last labs:7/31/17 CBC,CMP  Last refill:2/3/17  Per protocol route to provider

## 2017-08-03 NOTE — TELEPHONE ENCOUNTER
Called and s/w pt. States, that he has been using this medication regularly. His past home visiting MD with [facility]?? Has been prescribing it for him regularly. He states he takes it every night before bed because he has trouble falling asleep.  I katie

## 2017-08-04 PROBLEM — E87.1 HYPONATREMIA: Status: RESOLVED | Noted: 2017-03-22 | Resolved: 2017-01-01

## 2017-08-04 PROBLEM — R73.9 HYPERGLYCEMIA: Status: RESOLVED | Noted: 2017-03-22 | Resolved: 2017-01-01

## 2017-08-04 PROBLEM — N17.9 ACUTE KIDNEY INJURY (HCC): Status: RESOLVED | Noted: 2017-03-22 | Resolved: 2017-01-01

## 2017-08-04 PROBLEM — R07.9 CHEST PAIN, RULE OUT ACUTE MYOCARDIAL INFARCTION: Status: RESOLVED | Noted: 2017-01-11 | Resolved: 2017-01-01

## 2017-08-04 NOTE — PROGRESS NOTES
Patient presents with: Anxiety: been taking alprazolam everyday . needs another less addictive drug       HPI:  Presents for med follow up related to alprazolam use.  Stated it was previously prescribed by his home health physician and he has been taking a Disorder of liver    • Esophageal reflux    • Essential hypertension    • Heart murmur     Since Childhood   • High cholesterol    • History of blood transfusion [id number]   • Hyperlipidemia    • Hypothyroidism    • Kidney disorder    • Liver transplanted ([facility] [address]     Metabolic acidosis     Paresthesia     Pain in extremity, unspecified extremity     Pain in extremity     Chronic intractable headache     AVB (atrioventricular block)     Liver cirrhosis secondary to CORTÉS (HCC)     Diabetes mellitus, controlled Disp:  Rfl: 0   Ferrous Sulfate 324 (65 FE) MG Oral Tab EC Take 1 tablet by mouth daily. Disp:  Rfl:    alprazolam 0.5 MG Oral Tab Take 0.5 mg by mouth every 6 (six) hours as needed. Disp:  Rfl:    aspirin 325 MG Oral Tab Take 325 mg by mouth daily.  Disp to liver transplant (higher dose contraindicated)    Difficulty sleeping- Wean off alprazolam. Begin temazepam (see patient instructions). Warned pt about sedation with this medication and advised pt about necessary precautions and activities to avoid.  Uns

## 2017-08-04 NOTE — PATIENT INSTRUCTIONS
Take half tab of alprazolam nightly for 7 days. Then take half tab every other day for 7 days, then stop taking Alprazolam.      On the nights you are not taking alprazolam, may take a temazepam tab at night for sleep.      Do not take alprazolam and temaze

## 2017-08-09 ENCOUNTER — PRIOR ORIGINAL RECORDS (OUTPATIENT)
Dept: OTHER | Age: 67
End: 2017-08-09

## 2017-08-14 NOTE — PROGRESS NOTES
Spoke to pt states he is doing well and we scheduled assessment for Aug 17 th at 11:30 am.   Total time spent with patient including chart review: 3  Time spent with patient this month: 3    Total time spent with communication and chart review this month t

## 2017-08-16 NOTE — TELEPHONE ENCOUNTER
Received fax from [phone] requesting refill for Lorazepam 0.5mg Tablets, pt was recently seen by Chato Griffith in office, do not believe Lorazepam was in plan of treatment.   Last prescribed for imaging procedure related to claustrophobia     Left message for

## 2017-08-16 NOTE — TELEPHONE ENCOUNTER
LOV 8/4/17 With JJEOVANY    Pt c/o of pain on his knee states that on Sunday night while walking he heard a \"crack on his left knee\" and has been in pain ever since. Pain is now getting worst and radiates to both thighs and both arms rates pain 8-9/10.  Pt c/o

## 2017-08-17 ENCOUNTER — PRIOR ORIGINAL RECORDS (OUTPATIENT)
Dept: OTHER | Age: 67
End: 2017-08-17

## 2017-08-17 LAB
ALBUMIN: 2.6 G/DL
ALKALINE PHOSPHATATE(ALK PHOS): 1087 IU/L
BILIRUBIN TOTAL: 3.1 MG/DL
BUN: 39 MG/DL
BUN: 65 MG/DL
CALCIUM: 9.5 MG/DL
CALCIUM: 9.8 MG/DL
CHLORIDE: 109 MEQ/L
CHLORIDE: 111 MEQ/L
CHOLESTEROL, TOTAL: 183 MG/DL
CREATININE, SERUM: 1.91 MG/DL
CREATININE, SERUM: 2.47 MG/DL
GLUCOSE: 188 MG/DL
GLUCOSE: 94 MG/DL
HDL CHOLESTEROL: 29 MG/DL
HEMATOCRIT: 30.7 %
HEMOGLOBIN A1C: 6.9 %
HEMOGLOBIN: 10 G/DL
LDL CHOLESTEROL: 128 MG/DL
MAGNESIUM: 1.9 MG/DL
PLATELETS: 216 K/UL
POTASSIUM, SERUM: 3.9 MEQ/L
POTASSIUM, SERUM: 4.1 MEQ/L
PROBNP: 829 PG/ML
PROTEIN, TOTAL: 6.7 G/DL
RED BLOOD COUNT: 3.45 X 10-6/U
SGOT (AST): 80 IU/L
SGPT (ALT): 61 IU/L
SODIUM: 141 MEQ/L
SODIUM: 143 MEQ/L
TRIGLYCERIDES: 129 MG/DL
WHITE BLOOD COUNT: 8 X 10-3/U

## 2017-08-17 NOTE — ED NOTES
Received report from Kirstin Wilkinson RN. Pt is in Ultrasound at this time. Will assess pt when he is back.

## 2017-08-17 NOTE — ED PROVIDER NOTES
Patient Seen in: [facility] Emergency Department    History   Patient presents with:  Pain (neurologic)  Fall (musculoskeletal, neurologic)    Stated Complaint: generalized pain    HPI    The patient is a 80-year-old male with multiple past medical • Esophageal reflux    • Essential hypertension    • Heart murmur     Since Childhood   • High cholesterol    • History of blood transfusion [id number]   • Hyperlipidemia    • Hypothyroidism    • Kidney disorder    • Liver transplanted [facility])    • Myocardi topically. Metoprolol Tartrate 50 MG Oral Tab,  Take 1 tablet (50 mg total) by mouth 2x Daily(Beta Blocker). Patient taking differently: Take 50 mg by mouth daily. Clopidogrel Bisulfate 75 MG Oral Tab,  Take 75 mg by mouth daily.    nitroGLYCERIN (NI Years: 0.00         Quit date: 10/18/1993  Smokeless tobacco: Never Used                      Alcohol use: No                Review of Systems    Positive for stated complaint: generalized pain  Other systems are as noted in HPI.   Constitutional and vi deformity. He has mild tenderness along the left tibial plateau. He also some mild tenderness of the left foot. The remainder of his extremities have no bony tenderness. Radial and DP pulses are normal and symmetric.   Peripheral color is normal on the CULTURE       ============================================================  ED Course  ------------------------------------------------------------   Blood was obtained and peripheral IV access was established.   Left lower extremity knee, tibia, fibula, an exam.  The small abrasion on the stump of his right lower extremity was dressed with wet-to-dry dressing. He is given Ace wrap. He will be referred to general surgery regarding his abscess and orthopedics for his knee sprain.   He will return if his sympt

## 2017-08-17 NOTE — TELEPHONE ENCOUNTER
Spoke with pt to clarify request for Lorazaepam. Pt stating did not request refill. rx was a one time use for MRI procedure. Informed will not refill. Thanked for call back and clarification. Pt verbalized understanding and agreed with POC.

## 2017-08-17 NOTE — TELEPHONE ENCOUNTER
Future Appointments  Date Time Provider Teo Sparks   8/18/2017 1:30 PM Alfred Williamson NP [facility]   9/5/2017 1:00 PM Moi Ramirez MD [facility] [place]   9/7/2017 1:45 PM Guy Martin MD [facility]   10/17/2017

## 2017-08-17 NOTE — PROGRESS NOTES
Spoke to pt at length about CCM, current care plan and reviewed meds and compliance.  Reviewed Patient Active Problem List:     Coronary artery disease involving native heart with unstable angina pectoris ([facility] [address]     Weakness     Hypothyroidism     S/P drug e insulin ([address]    Pt reports being seen in ED yesterday for generalized pain reported a couple of days of pain along the focal area of swelling over his thigh consistent with an abscess per ED report.    Abscess was drained and dressing put over it per pt he

## 2017-08-17 NOTE — TELEPHONE ENCOUNTER
Triage: Good afternoon,     Pt was seen in ED yesterday and states he was advised to follow up with  [address] [place] [address] in 2 days. Notified pt I would send message to Dr. Meryle Agent office (triage) and someone would contact him to schedule.      Thank you in

## 2017-08-17 NOTE — ED INITIAL ASSESSMENT (HPI)
Patient with generalized pain in arms, thighs, and \"crack\" in left knee and pain with movement. Patient states he has had a few falls at home when his right knee \"acts up\" and he gets dizzy at times.  He also complains of pain at his right knee where a

## 2017-08-18 NOTE — PATIENT INSTRUCTIONS
Change packing every day, once daily. May take Norco prior to dressing change (approximately 30 minutes before dressing change). Take antibiotics as prescribed. See me on Monday or Tuesday next week for wound check.      Do not take Naltrexone whil

## 2017-08-18 NOTE — PROGRESS NOTES
Patient presents with:  ER F/U: dizziness, right leg pain       HPI:  Presents for follow up of ER visit for sudden onset left knee pain and also abscess to right lower buttock/upper posterior thigh region.  Stated as he has been losing weight his prostheti • Back problem    • Cataract    • Congestive heart disease ([facility] 2006   • Coronary atherosclerosis    • Depression    • Diabetes ([facility] 1983   • Disorder of liver    • Esophageal reflux    • Essential hypertension    • Heart murmur     Since Childhood   • disease) stage 3, GFR 30-59 ml/min     Alteration of awareness     Lethargy     Headache(784.0)     Severe obesity (BMI 35.0-39. 9) with comorbidity (HCC)     Metabolic acidosis     Paresthesia     Pain in extremity, unspecified extremity     Pain in extrem Tab Take 5 mg by mouth nightly. Disp:  Rfl:    ergocalciferol 59008 units Oral Cap Take 50,000 Units by mouth once a week.    Disp:  Rfl:    Insulin NPH Isophane & Regular (70-30) 100 UNIT/ML Subcutaneous Suspension 60 units in [id number] units at evening Leo Chau A/Ox3. Cranial nerves II-XII intact. Follows commands appropriately. Neck: Normal range of motion. Neck supple. Lymphadenopathy: No cervical adenopathy. Cardiovascular: Normal rate, regular rhythm. No murmur.    Pulmonary/Chest: No respiratory distres this plan of care. Transient neurological symptom- Follow up with Dr. Luke Almaguer. As above    Transaminitis- Stable. Will check labs next week. Instructed to limit norco use to only prior to dressing changes and no other doses.  Verbalized understanding o

## 2017-08-21 NOTE — PATIENT INSTRUCTIONS
Apply topical antibiotic ointment to wound twice daily. Cover with band aid. See me Friday 8/25/17. Go to ER for fevers/chills, bleeding worsening drainage or worsening dizziness. Call number provided to arrange 24 hours Holter monitor.

## 2017-08-21 NOTE — PROGRESS NOTES
Patient presents with: Follow - Up: ROOM 7/ L. knee pain- elevated, ice, still pain/ R. abcess buttox- wife packed sat, sun unable to find packed gauz/ dizziness worse      HPI:  Presents for follow up of abscess on right lower buttock/upper thigh region. atherosclerosis    • Depression    • Diabetes ([facility]   • Disorder of liver    • Esophageal reflux    • Essential hypertension    • Heart murmur     Since Childhood   • High cholesterol    • History of blood transfusion [id number]   • Hyperlipidemia    • Headache(784.0)     Severe obesity (BMI 35.0-39. 9) with comorbidity (HCC)     Metabolic acidosis     Paresthesia     Pain in extremity, unspecified extremity     Pain in extremity     Chronic intractable headache     AVB (atrioventricular block)     Liver ursodiol 300 MG Oral Cap Take 300 mg by mouth 3 (three) times daily. Disp:  Rfl:    Rosuvastatin Calcium 5 MG Oral Tab Take 5 mg by mouth nightly. Disp:  Rfl:    ergocalciferol 01739 units Oral Cap Take 50,000 Units by mouth once a week.    Disp:  Rfl: Lymphadenopathy: No cervical adenopathy. Cardiovascular: Normal rate, regular rhythm. No murmur. Pulmonary/Chest: No respiratory distress. Effort normal. Breath sounds clear bilaterally.  No wheezes, rhonchi or rales  MS Left knee without erythema,violette answered and the patient understands the plan.

## 2017-08-23 PROBLEM — I63.9 ACUTE ISCHEMIC STROKE (HCC): Status: ACTIVE | Noted: 2017-01-01

## 2017-08-23 NOTE — TELEPHONE ENCOUNTER
Spoke with patient's wife, Aminah Earl. Relayed Dr Devika Garcia recommendations below. She verbalized understanding. She will convey message to patient. Appt scheduled today cancelled .

## 2017-08-23 NOTE — TELEPHONE ENCOUNTER
Patient calling to report he woke up @ 2:30 am today and he couldn't move his whole right side of his body.       Patient also reports that when he was driving this morning at 8 am he experienced paralysis of his right side,  Felt his right side was numb an

## 2017-08-23 NOTE — ED NOTES
Pt updated on plan of care. Pt friend at bedside and is leaving now to  pt's wife. Pt has call light in his hand. Pt in position of comfort.

## 2017-08-23 NOTE — ED INITIAL ASSESSMENT (HPI)
Patient states he was woken up by numbness in his right arm at 2 AM this morning. Patient states he was unable to form words last night to tell his wife what was happening. Numbness is intermittent.  He also states this morning he was driving and the numbne

## 2017-08-23 NOTE — H&P
[facility]  History and Physical     Payal Amos Patient Status:  Emergency    3/1/1950 MRN [id number]   Location [address] [facility] Attending Mavis Leal MD   Hosp Day # 0 PCP Cheyanne Dillard MD     Chief Complaint: Right Hypothyroidism    • Kidney disorder    • Liver transplanted [facility])    • Myocardial infarction [facility])    • Obesity    • Peripheral vascular disease ([facility] [address] 2005    diabets broken ankle led to amputation   • Renal disorder    • Shingles    • Sleep apnea    • Str been exposed to daptomycin prior to this recent             exposure it is thought that the reaction was more             likely related to daptomycin. Re-challenging with             cefepime 4/2014.   Dexamethasone               Comment:Other reac Disp: 60 tablet Rfl: 3   Clopidogrel Bisulfate 75 MG Oral Tab Take 75 mg by mouth daily. Disp:  Rfl:    nitroGLYCERIN (NITROSTAT) 0.4 MG Sublingual SL Tab Place 0.4 mg under the tongue as needed.  Disp:  Rfl:    Levothyroxine Sodium 300 MCG Oral Tab Take 1 General: No acute distress. Alert and oriented x 3. HEENT: Normocephalic, atraumatic. EOM-I. Anicteric. Right facial droop. Neck: No lymphadenopathy. No JVD. No carotid bruits. Respiratory: Good inspiratory effort.   Clear to auscultation bilaterally 14. DL  1. Statin   15. Hypothyroid   1. Synthroid   2.  Check TSH     Quality:  · DVT Prophylaxis: SCDs  · CODE status: Full  · Salguero: No  Plan of care discussed with Patient     Teresa Manning MD  8/23/2017

## 2017-08-23 NOTE — PLAN OF CARE
NURSING ADMISSION NOTE      Patient admitted via Cart  Oriented to room. Safety precautions initiated. Bed in low position. Call light in reach.     Admission navigator done and report given to rn

## 2017-08-23 NOTE — TELEPHONE ENCOUNTER
Attempted to reach patient. Mail box is full. When returns call, please see Dr Jennifer Rubin recommendations below.

## 2017-08-23 NOTE — PROGRESS NOTES
Reviewed pt's chart he is in ED having test done at this time and will follow up after discharge.    Total time spent with patient including chart review: 2  Time spent with patient this month: 26    Total time spent with communication and chart review this

## 2017-08-24 NOTE — PROGRESS NOTES
[facility]  Progress Note     Tinajero Iron Patient Status:  Inpatient    3/1/1950 MRN [id number]   [facility] [place] 7NE-A Attending Lor Galvez MD   Hosp Day # 1 PCP Radha Donald MD     Chief Complaint: CVA  S:  Feeling much be cycloSPORINE modified  175 mg Oral BID   • Naltrexone HCl  50 mg Oral nightly   • multivitamin  1 tablet Oral Daily   • mycophenolate mofetil  1,000 mg Oral BID   • aspirin  325 mg Oral Daily   • ferrous sulfate  325 mg Oral Daily   • [START ON 8/29/2017]

## 2017-08-24 NOTE — OCCUPATIONAL THERAPY NOTE
Per stroke protocol, pt is on bedrest until 17:15 this afternoon. OT will evaluate the patient once the activity level is upgraded. Spoke with RN.

## 2017-08-24 NOTE — PHYSICAL THERAPY NOTE
PHYSICAL THERAPY QUICK EVALUATION - INPATIENT    Room Number: 6835/1867-K  Evaluation Date: 8/24/2017  Presenting Problem: acute ischemic stroke  Physician Order: PT Eval and Treat    Problem List  Principal Problem:    Acute ischemic stroke [facility])  Active History:  10/18/16: ANGIOGRAM  10/31/16: ANGIOPLASTY (CORONARY)      Comment: Stents X 4   10/18/16: ANGIOPLASTY (CORONARY)      Comment: Stents x 1  2015: BYPASS SURGERY      Comment: Robotic CABG X 2  10/2015: CABG  No date: CATARACT  10/16: CATH DRUG EL (e.g., wheelchair, bedside commode, etc.): None   -   Moving from lying on back to sitting on the side of the bed?: None   How much help from another person does the patient currently need. ..   -   Moving to and from a bed to a chair (including a wheelchai ...    Patient was able to transfer At previous, functional level  Safely and independently   Patient able to ambulate on level surfaces At previous, functional level  Safely and independently

## 2017-08-24 NOTE — CM/SW NOTE
Pt on CVA protocol. PT/OT to see once off of bedrest.  Pt was screened during rounds and no needs are identified at this time. RN to contact SW/CM if needs arise.       08/24/17 1300   CM/SW Screening   Referral Source Physician   Information Source Juan

## 2017-08-24 NOTE — PAYOR COMM NOTE
--------------  ADMISSION REVIEW     Payor: [employer] MEDICARE ADV PPO  Subscriber #:  [id number]  Authorization Number: N/A    Admit date: N/A  Admit time: N/A       Admitting Physician: Ashley Gil MD  Attending Physician:  Ashley Gil MD  Primary Ca 10/18/16    Stents X 1   • Autoimmune disease ([facility]    • Back problem    • Cataract    • Congestive heart disease ([facility] 2006   • Coronary atherosclerosis    • Diabetes ([facility] 1983   • Disorder of liver    • Esophageal reflux    • Essential hypertension    • to have transaminitis.              Patient has previously tolerated ceftriaxone and             cefepime, daptomycin has a 2-3% association with             LFT abnormalities and given that patient had not             been exposed to daptomycin prior to th mouth 2x Daily(Beta Blocker). (Patient taking differently: Take 50 mg by mouth daily.  ) Disp: 60 tablet Rfl: 3   Clopidogrel Bisulfate 75 MG Oral Tab Take 75 mg by mouth daily.  Disp:  Rfl:    nitroGLYCERIN (NITROSTAT) 0.4 MG Sublingual SL Tab Place 0.4 mg bruits. Respiratory: Good inspiratory effort. Clear to auscultation bilaterally. No rhonchi. Cardiovascular: S1, S2. Regular rhythm. Bradycardic. No murmurs, rubs or gallops. Equal pulses. Chest and Back: No tenderness or deformity.   Abdomen: Soft, no No  Plan of care discussed with Patient     Sb Pedroza MD  8/23/2017[MD.1]    MEDICATIONS ADMINISTERED IN LAST 1 DAY:  acetaminophen (TYLENOL) tab 650 mg     Date Action Dose Route User    8/23/2017 2057 Given 650 mg Oral Jabari Narvaez RN    8/23/

## 2017-08-24 NOTE — CONSULTS
[address] [place]    PATIENT'S NAME: Isabell Estevez   ATTENDING PHYSICIAN: Ramez Paredes M.D.   CONSULTING PHYSICIAN: Chago Gunderson M.D.    PATIENT ACCOUNT#:   [de-identified]    LOCATION:  [address] [place] [address]  MEDICAL RECORD #:   [id number]       DATE OF BIRTH:  [birth year] of the headaches associated with weakness of the right side or left side, but very short and transient, just heavy, not as pronounced as before. In early June, he was here due to a syncopal event.   At that time, he was found to have heart block and sinus elicited. LUNGS:  Normal.   HEART:  Normal.    EXTREMITIES:  Right below-knee amputation and prosthesis. Left lower extremity revealed significant discoloration and edema due to severe peripheral vascular disease and neuropathy.   He has bilateral hand in management is caba. A detailed discussion and education of stroke prevention was given. He is not a tPA candidate due to 0 NIH stroke scale now. I will start him on amitriptyline 25 mg every evening for so-called complicated migraine prevention.   Side ef

## 2017-08-24 NOTE — CM/SW NOTE
Pt seen for PHQ9. Pt admits to feeling depressed in his current situation. Pt says he is going through marital counseling and has been worrying about finances. Pt has been diagnosed with depression and has taken an anti-depressant since 2005.   He is cur

## 2017-08-24 NOTE — PROGRESS NOTES
[facility] Pharmacy Note:  Renal Dose Adjustment for Metoclopramide (REGLAN)    Alida Posadas has been prescribed Metoclopramide (REGLAN) 10 mg every 8 hours as needed for nausea,vomiting.     Estimated Creatinine Clearance: 38.5 mL/min (based on SCr of 2.35 mg/d

## 2017-08-24 NOTE — PROGRESS NOTES
08/24/17 1737   Clinical Encounter Type   Visited With Patient   Routine Visit Introduction   Continue Visiting No   Patient's Supportive Strategies/Resources  offered non anxious presence and  emotional support.  Pt. shared some of his life narr

## 2017-08-24 NOTE — PLAN OF CARE
Patient alert and oriented times four. Meds given per MAR. Michelle's neuro exams remain the same. Vital signs stable. Patient resting comfortably in bed. Call light in reach.     CARDIOVASCULAR - ADULT    • Maintains optimal cardiac output and hemodynam

## 2017-08-24 NOTE — SLP NOTE
ADULT SWALLOWING EVALUATION    ASSESSMENT & PLAN   ASSESSMENT  Patient was seen for bedside swallow evalution. RN approved session. Patient was alert and sitting in a chair at bedside.  Patient was seen last by this department on 10/19/2016 and presents wit Benign essential HTN    Coronary artery disease involving native heart with angina pectoris, unspecified vessel or lesion type (HCC)    Hyperlipidemia    Acute CVA (cerebrovascular accident) ([facility]    Type 2 diabetes mellitus with circulatory disorder, with acute intracranial process seen.           Dictated by: Robert Guadalupe MD on 8/23/2017 at 16:06       Approved by: Robert Guadalupe MD               CXR 8/23/17:  Impression     CONCLUSION:    1. Cardiomegaly.           Dictated by: Teresa Walden,

## 2017-08-24 NOTE — OCCUPATIONAL THERAPY NOTE
OCCUPATIONAL THERAPY QUICK EVALUATION - INPATIENT    Room Number: 8533/2063-X  Evaluation Date: 8/24/2017     Type of Evaluation: Quick Eval  Presenting Problem: R sided weakness, work-up pending to rule out possible CVA    Physician Order: IP Consult to O History of blood transfusion [id number]   • Hyperlipidemia    • Hypothyroidism    • Kidney disorder    • Liver transplanted [facility])    • Myocardial infarction [facility])    • Obesity    • Peripheral vascular disease ([facility] 2005    diabets broken ankle led to amputa within functional limits     Upper extremity strength is within functional limits     NEUROLOGICAL FINDINGS     Coordination - Finger to Nose: Symmetrical  Coordination - Rapid Alternating Movement: Symmetrical  Coordination - Finger Opposition: Symmetrica of financial restraints. Pt reported not being able to sleep at night. OT Discharge Recommendations: Home  OT Device Recommendations: None    PLAN  Patient has been evaluated and presents with no skilled Occupational Therapy needs at this time.   Daniella Canchola

## 2017-08-25 LAB
HEMATOCRIT: 32.5 %
HEMOGLOBIN: 10.6 G/DL
PLATELETS: 153 K/UL
RED BLOOD COUNT: 3.68 X 10-6/U
WHITE BLOOD COUNT: 5.6 X 10-3/U

## 2017-08-25 NOTE — PAYOR COMM NOTE
--------------  ADMISSION REVIEW     [employer] MEDICARE ADV PPO  Subscriber #:  [id number]  Authorization Number: [id number]    Admit date: 8/23/17  Admit time: 1712       Admitting Physician: Francisco Linares MD  Attending Physician:  Francisco Linares MD breath. He denies abdominal pain, nausea or vomiting. He denies rashes, dysuria or hematuria. Review of Systems:   A comprehensive 14 point review of systems was completed. Pertinent positives and negatives noted in the HPI.   Past Medical History: HERNIA,5+Y/O,REDUCIBL  No date: THYROIDECTOMY  Social History:  reports that he quit smoking about 23 years ago. He has never used smokeless tobacco. He reports that he does not drink alcohol or use drugs.   Family History:   Family History   Problem Relati (three) times daily. Disp: 21 capsule Rfl: 0   SYNTHROID 300 MCG Oral Tab Take 1 tablet (300 mcg total) by mouth before breakfast. Disp: 90 tablet Rfl: 1   FERROUS FUMARATE OR Take 325 mg by mouth daily.  Disp:  Rfl:    Pantoprazole Sodium 40 MG Oral Tab EC Vitamins-Minerals (CENTRUM SILVER) Oral Tab Take 1 tablet by mouth daily. Disp:  Rfl:    DiphenhydrAMINE HCl (BENADRYL) 25 MG Oral Cap Take 50 mg by mouth nightly as needed for Itching or Sleep.    Disp:  Rfl:    Mycophenolate Mofetil (CELLCEPT) 500 MG Oral 3. Hyponatremia  1. IVF  4. Hyperkalemia  1. montior with hydration   5. RYAN with hypoventilation syndrome   1. RYAN protocol   6. Chronic diastolic CHF   1. Holding Bumex with OLAYINKA[MD.1]  7. Bradycardia  1. Holding BB[MD.2]  8.  CAD s/p CABG[MD.1]- no acut 8/25/2017 9789 Given 8 Units Subcutaneous (Right Lower Abdomen) Indy Brito, RN      Insulin Aspart Pen (NOVOLOG) 100 UNIT/ML flexpen 1-10 Units     Date Action Dose Route User    8/25/2017 1303 Given 4 Units Subcutaneous (Right Lower Abdomen) Daryl vertebral arteries.     REVIEWER COMMENTS:     PLEASE FAX ALL INPT DAYS AS CERTIFIED ALONG W/NRD

## 2017-08-25 NOTE — PROGRESS NOTES
[id number] [facility] Progress Note    Gely Durán Patient Status:  Inpatient    3/1/1950 MRN [id number]   [facility] 7NE-A Attending Joby Williamson MD   Hosp Day # 2 PCP Javed Kumar MD     CC: Right sided weakness/Speech d normal study for the patient's age. No epileptiform activity was identified in this recording. 8/24/2017 Carotid US  Less than 50% stenosis bilaterally. There is antegrade flow in both vertebral arteries.      8/23/2017 CT Brain  Chronic microvascula weakness  Probable complex migraine vs. Less likely TIA  History of stroke  Diabetic autonomic neuropathy  Positional orthostatic dizziness and presyncope secondary to above      Recommendations:  Switched amitriptyline to nortriptyline, ok with cardiology

## 2017-08-25 NOTE — PLAN OF CARE
Assumed care of patient at 0700  AOx4 RA, Apaced on tele  Denies pain  VSS  Neuro checks q 4 hours, neurologically intact  NIH-0  Tolerating diet  Right BKA with prosthesis  MRI/MRA today  Up with SBA  Patient updated on POC   Will continue to monitor

## 2017-08-25 NOTE — PROGRESS NOTES
[facility] Cardiology:    Mr. Belia Lucio has a Medtronic leadless pacemaker placed in June. This is MRI compatible. [employer] rep will be available for programming today. Please call if further assistance is needed.   Lindsay Terrell NP

## 2017-08-25 NOTE — SLP NOTE
Attempted to see pt for cognitive communication evaluation. Pt off the floor for testing. Will follow up as scheduling permits. Thank you.     Hawk Marlow M.S. [id number] [facility]  Pager [phone]

## 2017-08-25 NOTE — PROGRESS NOTES
[facility]  Progress Note     Genie Can Patient Status:  Inpatient    3/1/1950 MRN [id number]   [facility] 7NE-A Attending Melissa Carver MD   Hosp Day # 2 PCP Brendan Hill MD     Chief Complaint: CVA  S:  Feeling good. multivitamin  1 tablet Oral Daily   • mycophenolate mofetil  1,000 mg Oral BID   • aspirin  325 mg Oral Daily   • ferrous sulfate  325 mg Oral Daily   • [START ON 8/29/2017] ergocalciferol  50,000 Units Oral Weekly   • Rosuvastatin Calcium  5 mg Oral Night

## 2017-08-25 NOTE — PROCEDURES
[facility], [address] [place] [address]      PATIENT'S NAME: Tianna Pearce   ATTENDING PHYSICIAN: Josh Cervantes M.D.    PATIENT ACCOUNT #: [de-identified] LOCATION: [address] [place] [address]   MEDICAL RECORD #: [id number] DATE OF BIRTH:

## 2017-08-25 NOTE — ED PROVIDER NOTES
Patient Seen in: [facility] 7ne-a    History   Patient presents with:  Numbness Weakness (neurologic)    Stated Complaint: weakness/numbness to right side, intermittently since last night    HPI    72-year-old male presents emergency department who 2  10/2015: CABG  06/06/2017: CARDIAC PACEMAKER PLACEMENT      Comment: Medtronic leadless pacemaker  No date: CATARACT  3/4/2016: COLONOSCOPY N/A      Comment: Procedure: COLONOSCOPY;  Surgeon: Agustin Griffiths DO;  Location: [facility] ENDOSCOPY aspirin 325 MG Oral Tab,  Take 325 mg by mouth daily. Citalopram Hydrobromide (CELEXA) 20 MG Oral Tab,  Take 20 mg by mouth daily. cycloSPORINE modified (NEORAL) 25 MG Oral Cap,  Take 175 mg by mouth 2 (two) times daily.      omeprazole (PRILOSEC) 20 MG 4\")   Wt 127 kg   SpO2 100%   BMI 34.08 kg/m²         Physical Exam  Vital signs reviewed  General appearance: Patient is alert and in no acute distress  HEENT: Pupils equal react to light extraocular muscles intact no scleral icterus, mucous membranes ar following:     Glucose 132 (*)     BUN 71 (*)     Creatinine 2.35 (*)     GFR 28 (*)     CO2 21.0 (*)     All other components within normal limits   SED RATE, WESTERGREN (AUTOMATED) - Abnormal; Notable for the following:     Sed Rate 48 (*)     All other THYROXINE) - Normal   POCT GLUCOSE - Normal   CBC WITH DIFFERENTIAL WITH PLATELET    Narrative: The following orders were created for panel order CBC WITH DIFFERENTIAL WITH PLATELET.   Procedure                               Abnormality         Status Yes    Benign essential HTN I10 Unknown Yes    CKD (chronic kidney disease) stage 3, GFR 30-59 ml/min N18.3 Unknown Yes    Coronary artery disease involving native heart with unstable angina pectoris (HCC) I25.110 Unknown Yes    Coronary artery disease inv

## 2017-08-25 NOTE — PLAN OF CARE
Patient alert and oriented times four. Meds given per MAR. Patient denies any pain or discomfort. Neuro symptoms have all resolved. Resting comfortably in bed. Vital signs stable. Call light in reach.     CARDIOVASCULAR - ADULT    • Maintains optimal ca

## 2017-08-26 NOTE — PLAN OF CARE
NURSING DISCHARGE NOTE    Discharged home via private car  Accompanied by wife, transport via w/c  Belongings packed up by patient and wife and taken with patient    All d/c instructions provided. Medications and f/u appts discussed.  New medications re

## 2017-08-26 NOTE — PLAN OF CARE
Assumed patient care at [address], patient alert & oriented X 4, denies pain  Neuro Q4, no deficits.  Sat >92% on RA, A-Paced 50's monitored on tele  Plan for possible d/c to home  call light within reach, safety precautions in place, will monitor    CARDIOVASCU

## 2017-08-26 NOTE — PLAN OF CARE
Assumed care of patient at 0700  AOx4, VSS, Apaced/faith in the high 50's on tele  Denies pain  Neuro checks q 4 hours, neurologically intact, patient reports occasional \"heaviness in his fingers/hands\"  NIH-0  Tolerating diet  Dr Cha Farnsworth made aware of dec

## 2017-08-27 NOTE — DISCHARGE SUMMARY
[facility]  DISCHARGE SUMMARY     Vishnu Nick Patient Status:  Inpatient    3/1/1950 MRN [id number]   [facility] 7NE-A Attending No att. providers found   Hosp Day # 3 PCP Corrie Morton MD     Date of Admission: 2017  Date subsequently resolved. The patient was then driving this morning at approximately 8 AM in the right arm became numb and heavy again. This led him to the emergency department. Does have a mild diffuse headache of 4/10.   He denies acute vision changes or this  · additional instructions      Take 1 tablet (50 mg total) by mouth 2x Daily(Beta Blocker).    Quantity:  60 tablet  Refills:  3        CONTINUE taking these medications      Instructions Prescription details   ALPRAZolam 0.5 MG Tabs  Commonly known a 0     Insulin NPH Isophane & Regular (70-30) 100 UNIT/ML Susp  Commonly known as:  NOVOLIN 70/30      60 units in morning 35 units at evening   Quantity:  30 mL  Refills:  3     Mycophenolate Mofetil 500 MG Tabs  Commonly known as:  CELLCEPT      Take 1,00 week      -----------------------------------------------------------------------------------------------  PATIENT DISCHARGE INSTRUCTIONS: See electronic chart    Sharla Capone MD 8/27/2017    Time spent:  > 30 minutes

## 2017-08-28 NOTE — CM/SW NOTE
08/28/17 1000   Discharge disposition   Discharged to: Home or Self   Name of [facility] services after discharge None   Discharge transportation Private car

## 2017-08-28 NOTE — PROGRESS NOTES
Initial Post Discharge Follow Up   Discharge Date: 8/26/17  Contact Date: 8/28/2017    Consent Verification:  Assessment Completed With: Patient  HIPAA Verified?   Yes    Discharge Dx:   Suspected TIA vs complex migraine    General:   • How have you been Blocker). (Patient taking differently: Take 50 mg by mouth 2 (two) times daily. Does take twice a day ) Disp: 60 tablet Rfl: 3   Clopidogrel Bisulfate 75 MG Oral Tab Take 75 mg by mouth daily.  Disp:  Rfl:    nitroGLYCERIN (NITROSTAT) 0.4 MG Sublingual SL T yes  o Was the new medication’s side effects discussed? yes  o Do you have any questions about your new medication?  No  • Did you  your discharge medications when you left the hospital? Yes  • May I go over your medications with you to make sure we confirmed TCM HFU on 8/31/17      Have you made all of your follow up appointments? yes    Is there any reason as to why you cannot make your appointments?    No     NCM Reviewed upcoming Specialist Appt with patient     Yes        Interventions by NCM: ARUN

## 2017-08-31 NOTE — PROGRESS NOTES
HPI:    Naga Gallegos is a 74year old male here today for hospital follow up.    He was discharged from Inpatient hospital, [facility] to Home   Admission Date: 8/23/17   Discharge Date: 8/26/17  Hospital Discharge Diagnosis: headache  TCM Diagnosi Prior to Visit:  Nortriptyline HCl 10 MG Oral Cap Take 2 capsules (20 mg total) by mouth nightly. Pyridostigmine Bromide 60 MG Oral Tab Take 0.5 tablets (30 mg total) by mouth daily.    SYNTHROID 300 MCG Oral Tab Take 1 tablet (300 mcg total) by mouth bef 1 tablet by mouth daily. DiphenhydrAMINE HCl (BENADRYL) 25 MG Oral Cap Take 50 mg by mouth nightly as needed for Itching or Sleep. Mycophenolate Mofetil (CELLCEPT) 500 MG Oral Tab Take 1,000 mg by mouth 2 (two) times daily.    HYDROcodone-acetaminophe double vision  HEENT: denies nasal congestion, sinus pain or ST  LUNGS: denies shortness of breath with exertion  CARDIOVASCULAR: denies chest pain on exertion or palpitations  GI: denies abdominal pain, denies heartburn, denies diarrhea  MUSCULOSKELETAL: disease) stage 3, GFR 30-59 ml/min  -     BASIC METABOLIC PANEL (8);  Future  Stable rpt labs  L[facility] ([facility]  Stable   Coronary artery disease involving native coronary artery of native heart with unstable angina pectoris ([facility]  Stable continue m

## 2017-09-05 NOTE — PROGRESS NOTES
Nephrology Consult Note    REASON FOR CONSULT: CKD 4    ASSESSMENT/PLAN:        1) CKD 4- progressive renal dysfunction in the setting of a bland urine sediment with isolated sub-nephrotic range proteinuria is consistent with diabetic nephropathy and calci Carter Nixon is a 74year old male with Patient presents with:  Jhon Nice MD    Presents for evaluation of chronic kidney disease; serum creatinine is increased as above.   Denies any history of acute renal failure, gross or microsc Comment: Stents X 4   10/18/16: ANGIOPLASTY (CORONARY)      Comment: Stents x 1  2015: BYPASS SURGERY      Comment: Robotic CABG X 2  10/2015: CABG  06/06/2017: CARDIAC PACEMAKER PLACEMENT      Comment: Medtronic leadless pacemaker  No date: CATARACT  3 Bisulfate 75 MG Oral Tab Take 75 mg by mouth daily. Disp:  Rfl:    nitroGLYCERIN (NITROSTAT) 0.4 MG Sublingual SL Tab Place 0.4 mg under the tongue as needed. Disp:  Rfl:    ursodiol 300 MG Oral Cap Take 300 mg by mouth 3 (three) times daily.  Disp:  Rfl: cefepime, daptomycin has a 2-3% association with             LFT abnormalities and given that patient had not             been exposed to daptomycin prior to this recent             exposure it is thought that the reaction was more             likely relat MMM  Neck: Supple, no MARC or thyromegaly  Cardiac: Regular rate and rhythm, S1, S2 normal, no murmur or rub  Lungs: Clear without wheezes, rales, rhonchi.     Abdomen: Soft, non-tender. + bowel sounds, no palpable organomegaly  Extremities: Without clubbing

## 2017-09-12 PROBLEM — E66.9 DIABETES MELLITUS TYPE 2 IN OBESE (HCC): Status: ACTIVE | Noted: 2017-01-01

## 2017-09-12 PROBLEM — E11.69 DIABETES MELLITUS TYPE 2 IN OBESE (HCC): Status: ACTIVE | Noted: 2017-01-01

## 2017-09-12 NOTE — ED NOTES
Wife at bedside; states pt presented recently with similar symptoms and was dx with a migraine. Pt c/o frontal headache 9/10 at this time.

## 2017-09-13 NOTE — SLP NOTE
ADULT SWALLOWING EVALUATION    ASSESSMENT & PLAN   ASSESSMENT  Patient was seen at bedside for swallowing evaluation. RN approved session.  Patient is known to this department and was last seen on 8/24/17 and recommended regular consistency diet and thin li place.Instructed to inform RN if any concerns of aspiration or swallowing difficulty arise. Patient will continue to be followed for dysphagia therapy to monitor dietary tolerance. If ruled for CVA patient will participate in a communication evaluation. support (lives with spouse)  Diet Prior to Admission: Regular; Thin liquids  Precautions: None    Comments:     Patient/Family Goals: To eat and drink safely    SWALLOWING HISTORY  Current Diet Consistency: Regular; Thin liquids  Dysphagia History: patient l participate in communication evaluation if ruled positive for CVA.      FOLLOW UP  Treatment Plan: Dysphagia therapy  Number of Visits to Meet Established Goals: 2  Follow Up Needed: Yes  SLP Follow-up Date: 09/14/17    Thank you for your referral.   If you

## 2017-09-13 NOTE — PROGRESS NOTES
[id number] [facility] Progress Note    Santiago Flowers Patient Status:  Inpatient    3/1/1950 MRN [id number]   [facility] 7NE-A Attending Mal Shahid MD   Hosp Day # 1 PCP Sandro Howard MD         Subjective:  Santiago Flowers i PTP 14.5 09/13/2017   PGLU 93 09/13/2017          Imaging:  Unable to obtain CTH/CTA due to poor renal fxn  PPM is MRI-compatible, will obtain DWI today along with plain MRA Head/Neck      Assessment/Plan:  1.  R-sided weakness, no neuro imaging at this t 10mg night  Prn Tylenol for headache  PT and OT    Rodrigo Milian, DO  Neurology and Neuromuscular medicine  [place]  pager [phone]

## 2017-09-13 NOTE — PROGRESS NOTES
[facility]  Progress Note     Alida Posadas Patient Status:  Inpatient    3/1/1950 MRN [id number]   [facility] 7NE-A Attending Ruthy Crump MD   Hosp Day # 1 PCP Yolanda Ceja MD     Chief Complaint: Right-sided weakness    S: with breakfast   • aspirin  300 mg Rectal Daily    Or   • aspirin  325 mg Oral Daily   • Senna  17.2 mg Oral Nightly   • docusate sodium  100 mg Oral BID   • Heparin Sodium (Porcine)  5,000 Units Subcutaneous 2 times per day   • escitalopram  10 mg Oral QA discussed with patient and RN.     Radhika Villalobos MD

## 2017-09-13 NOTE — PLAN OF CARE
NURSING ADMISSION NOTE    Pt admitted from ED c/o headache, slurred speech and weakness. Patient admitted via [address] to room. Safety precautions initiated. Bed in low position. Call light in reach. Pt a/ox4. Speech clear.   Stroke protoco

## 2017-09-13 NOTE — ED PROVIDER NOTES
Patient Seen in: [facility] Emergency Department    History   Patient presents with:  Stroke (neurologic)    Stated Complaint: stroke    HPI    33-year-old white male who presents to the emergency room today for complaint of possible stroke.   Giovanna (CORONARY)      Comment: Stents X 4   10/18/16: ANGIOPLASTY (CORONARY)      Comment: Stents x 1  2015: BYPASS SURGERY      Comment: Robotic CABG X 2  10/2015: CABG  06/06/2017: CARDIAC PACEMAKER PLACEMENT      Comment: Medtronic leadless pacemaker  No date reactive to light extraocular motions are intact. Patient has normal facial symmetry. Neck is supple. Lungs are clear to auscultation bilaterally. Heart rates regular rate and rhythm without rub. Abdomen is obese soft nondistended nontender.   Right up -----------         ------                     CBC W/ DIFFERENTIAL[[id number]]          Abnormal            Final result                 Please view results for these tests on the individual orders.    RAINBOW DRAW BLUE   RAINBOW D his right upper extremity exam.  I contacted Dr. Dany Benites, his neurologist and we discussed the case. Patient is very complicated considering he has multiple medical problems including this episodes of complex migraine.   She requested that I also speak wi

## 2017-09-13 NOTE — OCCUPATIONAL THERAPY NOTE
Order was received for OT evaluation. Per stroke protocol, pt is on bedrest until 23:48 tonight. OT will evaluate the pt once the activity level is upgraded. Spoke with RN.

## 2017-09-13 NOTE — CONSULTS
[facility]    Report of Consultation    Santiago Flowers Patient Status:  Emergency    3/1/1950 MRN [id number]   Location [address] [facility] Attending Cat Kowalski MD   Hosp Day # 0 PCP Sandro Howard MD     Date of Admission hypertension    • Heart murmur     Since Childhood   • High cholesterol    • History of blood transfusion [id number]   • Hyperlipidemia    • Hypothyroidism    • Kidney disorder    • Liver transplanted [facility])    • Myocardial infarction [facility])    • Obesity    • LFT abnormalities and given that patient had not             been exposed to daptomycin prior to this recent             exposure it is thought that the reaction was more             likely related to daptomycin.  Re-challenging with             cef time, there were inconsistencies- initially tone in the RUE followed by no tone, and then tone again, LUE 5/5, RLE 4/5, LLE 5/5. Finger-to-nose coordination is intact. Gait deferred.     Diagnostic Data:   Lab Results  Component Value Date    09/12/ 3.0 % SAT 1.4   METHEMOGLOBIN      0.4 - 1.5 % SAT 0.6   IONIZED CALCIUM      1.12 - 1.32 mmol/L 1.29   ALLENS TEST       Positive   SAMPLE SITE       Left Radial   ARTERIAL BLOOD GAS DEVICE       Room Air   FiO2      % 21   POTASSIUM BLOOD GAS      3.6 - with unstable angina pectoris ([facility] [address]     Acute CVA (cerebrovascular accident) ([facility] [address]     Transient neurological symptoms     Complicated migraine     Awareness alteration, transient     Orthostatic hypotension     Thrombocytopenia (HCC)     Anemia     Azotem previously recommended.      Thank you for allowing me to participate in the evaluation of your patient,    Rob Nixon DO  Neurology and Neuromuscular medicine  [facility]   pager [phone]

## 2017-09-13 NOTE — PHYSICAL THERAPY NOTE
Pt consult received per stroke protocol, pt is on bedrest until 23:48 tonight. PT will evaluate the pt once the activity level is upgraded. OT spoke with RN.

## 2017-09-13 NOTE — CONSULTS
[facility]  Report of Consultation    [facility] Patient Status:  Inpatient    3/1/1950 MRN [id number]   [facility] 7NE-A Attending Jeffrey Harmon MD   Hosp Day # 1 PCP Slim Arriola MD     Reason for Consultation:  CKD 4    His Comment: [employer]tr[employer] leadless pacemaker  No date: CATARACT  3/4/2016: COLONOSCOPY N/A      Comment: Procedure: COLONOSCOPY;  Surgeon: Jl Tripathi DO;  Location: [facility] ENDOSCOPY  No date: LAPS GSTR RSTCV [facility] PLMT BAND  No date: OTHER      Com Vancomycin                  Comment:Other reaction(s): Joint Pain    Medications:    Current Facility-Administered Medications:   •  0.9%  NaCl infusion, , Intravenous, Continuous  •  Insulin NPH Isophane & Regular (NOVOLIN 70/30) (70-30) 100 UNIT/ML Nightly PRN  •  ferrous sulfate EC tab 325 mg, 325 mg, Oral, Daily  •  gabapentin (NEURONTIN) cap 400 mg, 400 mg, Oral, TID  •  HYDROcodone-acetaminophen (NORCO) 5-325 MG per tab 1 tablet, 1 tablet, Oral, Q6H PRN  •  HydrOXYzine HCl (ATARAX) tab 25 mg, 25 0800   Gross per 24 hour   Intake                0 ml   Output              650 ml   Net             -650 ml     Last 3 Weights  09/12/17 1843 : 291 lb 0.1 oz  09/12/17 2054 : 285 lb  09/05/17 1016 : 293 lb  08/31/17 1112 : 295 lb 6.4 oz  08/23/17 1309 : 2 1845 09/13/17   0534   NA  136  140   K  4.0  3.8   CL  104  112*   CO2  18.0*  18.0*   BUN  77*  81*   CREATSERUM  2.81*  2.70*   CA  9.5  9.2   GLU  337*  62*       Recent Labs   Lab  09/12/17 1845 09/13/17   0534   ALT  52  52   AST  72*  99*   ALB

## 2017-09-13 NOTE — PAYOR COMM NOTE
--------------  ADMISSION REVIEW     Payor: [employer] AD[employer] PPO  Subscriber #:  [id number]  Authorization Number: N/A    Admit date: 9/12/17  Admit time: 2340       Admitting Physician: Erika Snowden MD  Attending Physician:  MD Karrie Garcia Disorder of liver    • Esophageal reflux    • Essential hypertension    • Heart murmur     Since Childhood   • High cholesterol    • History of blood transfusion [id number]   • Hyperlipidemia    • Hypothyroidism    • Kidney disorder    • Liver transplanted kg   SpO2 98%   BMI 35.42 kg/m²      Physical Exam  White male who is sitting on the gurney he is awake and alert he appears in some acute discomfort but no acute distress.   Vital signs are stable he is afebrile  Head is normocephalic atraumatic conjunctiv components within normal limits   CBC WITH DIFFERENTIAL WITH PLATELET     EKG  Rate, intervals and axes as noted on EKG Report. Rate: 60 bpm  Rhythm: Sinus Rhythm  Reading: Normal sinus rhythm with right bundle branch block.   No acute ischemic changes are stating he has had a couple of times in the past and is actually had complications from TPA including hemorrhage into the stump. Dr. Sofía Soto did come in and see and evaluate the patient.   At this time patient cannot get an MRI because of his pacemaker a directly to the hospital for evaluation. She states that he has had 6 or 7 episodes of this which were starting to be attributed to complex migraine.   He has had TPA given twice in the past.  He follows closely with neurology and his neurologist evaluated of systems was completed. Pertinent positives and negatives noted in the HPI. Physical Exam:    /57   Pulse 54   Resp 20   Wt 291 lb 0.1 oz (132 kg)   SpO2 98%   BMI 35.42 kg/m²    General: No acute distress. Alert and oriented x 3.   HEENT: Nor hypertension  7. Neuropathy  8. Hypothyroidism  9. Chronic pruritis[MP.2]   Quality:  · DVT Prophylaxis:[MP.1] heparin[MP. 2]  · CODE status:[MP.1] full[MP. 2]  · Salguero:[MP.1] no[MP.2]  Plan of care discussed with[MP.1] pt and ER[MP.2]  Jozef Crump MD  9/12 Dose Route User    9/13/2017 0124 Given 1000 mg Oral Luis E Adorno, RN      Nortriptyline HCl (PAMELOR) cap 20 mg     Date Action Dose Route User    9/13/2017 0125 Given 20 mg Oral Darius Holder, RN      Pantoprazole Sodium (PROTONIX) EC tab 40 mg     Date A

## 2017-09-14 NOTE — PROGRESS NOTES
[facility]  Nephrology Progress Note    Radha Carpenter Patient Status:  Inpatient    3/1/1950 MRN [id number]   [facility] 7NE-A Attending Nuno Marsh MD   Hosp Day # 2 PCP Deangelo Dooley MD       SUBJECTIVE:  Pt able to move R rtina 09/13/17   1617  09/13/17   2100  09/14/17   0657  09/14/17   1142  09/14/17   1256   PGLU  122*  233*  235*  260*  262*       Meds:     Current Facility-Administered Medications:  Insulin NPH Isophane & Regular (NOVOLIN 70/30) (70-30) 100 UNIT/ML injectio mg 325 mg Oral Daily   gabapentin (NEURONTIN) cap 400 mg 400 mg Oral TID   HYDROcodone-acetaminophen (NORCO) 5-325 MG per tab 1 tablet 1 tablet Oral Q6H PRN   HydrOXYzine HCl (ATARAX) tab 25 mg 25 mg Oral Q6H PRN   mycophenolate mofetil (CELLCEPT) cap 1,00

## 2017-09-14 NOTE — OCCUPATIONAL THERAPY NOTE
OCCUPATIONAL THERAPY EVALUATION - INPATIENT     Room Number: 8182/1223-B  Evaluation Date: 9/14/2017  Type of Evaluation: Initial  Presenting Problem: R sided weakness, work-up for stroke negative    Physician Order: IP Consult to Occupational Therapy  Magdiel Orr due to chronic kidney disease    ESRD (end stage renal disease) ([facility] [address]    Obesity (BMI 30.0-34. 9)    Liver transplant recipient [facility])      Past Medical History  Past Medical History:   Diagnosis Date   • Acute, but ill-defined, cerebrovascular disease    • SITUATION  Type of Home: Apartment  Home Layout: One level  Lives With: Spouse    Toilet and Equipment: Standard height toilet  Shower/Tub and Equipment: Tub-shower combo       Occupation/Status: computer work  Hand Dominance: Right  Drives: Yes (uses hand 99%    ACTIVITIES OF DAILY LIVING ASSESSMENT  AM-PAC ‘6-Clicks’ Inpatient Daily Activity Short Form  How much help from another person does the patient currently need…  -   Putting on and taking off regular lower body clothing?: A Little  -   Bathing (incl In this OT evaluation patient presents with the following performance deficits: impaired R hand fine motor coordination, decreased activity endurance, and decreased standing balance.  These deficits impact the patient’s ability to participate in ADL, instr strengthening/ROM; Patient/Family education;Patient/Family training;Equipment eval/education; Neuromuscluar reeducation; Fine motor coordination activities; Compensatory technique education  Rehab Potential : Good  Frequency (Obs): 5x/week  Number of Visits to

## 2017-09-14 NOTE — PROGRESS NOTES
Pt's wife Aj left message asking for a call back because pt is in ER and she has some questions.    [facility] she reports that Esme People still in the ER and she was wondering how the program worked because he had only spoke to me three reports she will call me with any updates and I advised her I would follow up as well.    Total time spent with patient including chart review: 33  Time spent with patient this month: 33    Total time spent with communication and chart review this month to

## 2017-09-14 NOTE — PLAN OF CARE
Complains of severe headache early this morning, then declined medications thereafter  Ride side weakness noted in neuro assessment  Complains of dizziness for a short time, then ceased and has not returned  Patient resting comfortably in bed, will continu

## 2017-09-14 NOTE — PROGRESS NOTES
[facility]  Progress Note     Jennifer Malhotra Patient Status:  Inpatient    3/1/1950 MRN [id number]   [facility] 7NE-A Attending Mary Pro MD   Hosp Day # 2 PCP Flor Luciano MD     Chief Complaint: Right-sided weakness    S: Isophane & Regular  30 Units Subcutaneous Daily with breakfast   • aspirin  300 mg Rectal Daily    Or   • aspirin  325 mg Oral Daily   • Senna  17.2 mg Oral Nightly   • docusate sodium  100 mg Oral BID   • Heparin Sodium (Porcine)  5,000 Units Subcutaneous MD

## 2017-09-14 NOTE — CM/SW NOTE
09/14/17 1100   CM/SW Referral Data   Referral Source    Reason for Referral Discharge planning   Informant Patient   Readmission Assessment   Factors that patient feels contributed to this readmission Other (only choose if nothing else appl driving. He is a  at [email or web address] in [place]. PT eval pending~ discharge needs pending at this time. CM/SW to follow.

## 2017-09-14 NOTE — PLAN OF CARE
Diabetes/Glucose Control    • Glucose maintained within prescribed range Progressing        Impaired Swallowing    • Minimize aspiration risk Progressing        Patient/Family Goals    • Patient/Family Long Term Goal Progressing    • Patient/Family Short T

## 2017-09-14 NOTE — PHYSICAL THERAPY NOTE
PHYSICAL THERAPY EVALUATION - INPATIENT     Room Number: 0209/1092-G  Evaluation Date: 9/14/2017  Type of Evaluation: Initial  Physician Order: PT Eval and Treat    Presenting Problem: r/o CVA  Reason for Therapy: Mobility Dysfunction and Discharge [facility]) 2005    diabets broken ankle led to amputation   • Renal disorder    • Shingles    • Sleep apnea    • Stroke [facility]) 2005   • Visual impairment     reading glasses       Past Surgical History  Past Surgical History:  10/31/16: ANGIOPLASTY (CORONARY) and strength are within functional limits     Lower extremity ROM is within functional limits R knee/ankle Not applicable secondary to prosthetic    Lower extremity strength is within functional limits Right Hip flexion  3+/5  Left Hip flexion  4/5  Left K Exercise/Education Provided:  Bed mobility  Functional activity tolerated  Gait training  Transfer training    Patient End of Session: Up in chair;Needs met;Call light within reach;RN aware of session/findings; All patient questions and concerns add

## 2017-09-14 NOTE — TELEPHONE ENCOUNTER
Pt's wife called stated pt is at the hospital and wants to speak to AS nurse reason unknown (she only wants to speak to the nurse).  I informed her because pt is at the hospital she would have to speak to the nurse at the hospital. Pt insists in talking to

## 2017-09-14 NOTE — PROGRESS NOTES
[facility]    Progress Note    Arnoldo Garcia Patient Status:  Inpatient    3/1/1950 MRN [id number]   [facility] 7NE-A Attending Kush Gautam MD   Hosp Day # 2 PCP Jarred Sue MD     Subjective:  Arnoldo Garcia is a(n) 74 year 650 MG rectal suppository 650 mg 650 mg Rectal Q4H PRN   morphINE sulfate (PF) 4 MG/ML injection 1 mg 1 mg Intravenous Q2H PRN   Or      morphINE sulfate (PF) 4 MG/ML injection 2 mg 2 mg Intravenous Q2H PRN   Labetalol HCl (TRANDATE) injection 10 mg 10 mg Before breakfast   temazepam (RESTORIL) cap 7.5 mg 7.5 mg Oral Nightly PRN   ursodiol (ACTIGALL) cap 300 mg 300 mg Oral TID   glucose (DEX4) oral liquid 15 g 15 g Oral Q15 Min PRN   Or      Glucose-Vitamin C (DEX-4) 4-0.006 g chewable tab 4 tablet 4 tablet artery disease involving native heart with angina pectoris, unspecified vessel or lesion type [facility])     Coronary artery disease involving native heart with angina pectoris (HCC)     Hyperlipidemia     Benign prostatic hyperplasia     Coronary artery diseas long for this to be a TIA  Though obviously he has stroke risk factors and would advise stroke work up if something similar happens again  Continue ASA and Plavix  Restart nortriptyline 20mg for headache prophylaxis  10 Norco for d/c for headache, discusse

## 2017-09-14 NOTE — PLAN OF CARE
Fatigued, arouses to verbal stimuli and is able to maintain level of alertness, oriented x4. vss on room air. Vpaced 40-50 on tele. Medicated with norco for back pain. Right side weakness improving, but still slightly weaker than left.  Mepilex applied to r

## 2017-09-15 PROBLEM — E11.649 TYPE 2 DIABETES MELLITUS WITH HYPOGLYCEMIA (HCC): Status: ACTIVE | Noted: 2017-01-01

## 2017-09-15 NOTE — CODE DOCUMENTATION
[facility]  RAPID RESPONSE NOTE     Familia Lobo Patient Status:  Inpatient    3/1/1950 MRN [id number]   Location Room 7621 Attending Alexia Curling, MD   Hosp Day # 3 PCP Ismael Domingo MD   Rapid response called by RN at 2:12 AM on

## 2017-09-15 NOTE — PLAN OF CARE
Recieved report on patient at 1900. Patient sleeping in bed at that time. Previous RN states patient has Right sided weakness and slurred speech in report. NIH 1. Assessed patient at 2100, right sided weakness noted, strength 4/5, with clear speech.  Coty Both of patient arms fell to bed. RN asked patient to lift arms from bed. Patient lifted right arm up (4/5), and RN noted left arm lift slightly up (1-2 inches off bed).  When asked to distinguish left/right/both, patient appropriately recognized face left

## 2017-09-15 NOTE — PLAN OF CARE
Problem: SLP ADULT - COMMUNICATION, IMPAIRED  Goal: By Discharge: Demonstrates cognitive skills at highest level of function for planned discharge setting. See evaluation for individualized goals.   Outcome: Progressing

## 2017-09-15 NOTE — BH LEVEL OF CARE ASSESSMENT
Level of Care Assessment Note                General Questions  Why are you here?: The pt states he came into the hospital due to right sided weakness. History of Present Illness: 73 y/o with a history of depression and anxiety.   He admits to this since 2 No  Past Harm Toward Others: No  Current or Past Harm Toward Animals: No  History or Allegations of Inappropriate Physical Contact: No  Current/Recent Destructive Behavior Toward Property: No  Past Destructive Behavior Toward Property: No    Access to Mean Inpatient  Name: Aleksandar Muniz   Dates of Treatment: 2005  Date Last Seen: 2005  Reason: suicide attempt  Effectiveness: not helpful           Current/Previous MH/CD Treatment  Recovery Support Groups: Denies Past History  History of Seclusion/Restraint: No Fair    Assessment Summary  Assessment Summary: 75 y/o with a history of depression and anxiety. He admits to this since 2004. That is the year both his 1st wife and mother passed. He said, he also lost his leg that year due to amputation.   He stated in

## 2017-09-15 NOTE — PROGRESS NOTES
Neurology Progress Note    Lior Chris Patient Status:  Inpatient    3/1/1950 MRN [id number]   [facility] 7NE-A Attending Vick Claudio MD   Hosp Day # 3 PCP Laure Jason MD     Subjective:  Lior Chris is a(n) 74year old male orientated x 3. Speech fluent. Able to follow simple commands. Face is symmetrical. Pupils equally round and reactive to light. 3+ brisk bilaterally. EOMs intact though he makes minimal effort to move them. Tongue is midline.  Shrug shoulders normally o significant carotid stenosis by NASCET criteria. Assessment: This is a 75 y/o male with multiple medical problems including uncontrolled DM-II, h/o liver transplant, OLAYINKA on CKD -IV and episodic hypotension.  He has repeated admission for either R trina Uncontrolled DM-II  - reports very poor compliance with diabetic diet at home   - Management per hospitalist      Camryn Suarez, DO  Neurology

## 2017-09-15 NOTE — PROGRESS NOTES
[facility]  Progress Note     Santiago Flowers Patient Status:  Inpatient    3/1/1950 MRN [id number]   [facility] 7NE-A Attending Mal Shahid MD   Hosp Day # 3 PCP Sandro Howard MD     Chief Complaint: Right-sided weakness    S: Imaging: Imaging data reviewed in Epic.     Medications:   • Insulin NPH Isophane & Regular  20 Units Subcutaneous Nightly   • Insulin NPH Isophane & Regular  55 Units Subcutaneous Daily with breakfast   • aspirin  300 mg Rectal Daily    Or   • aspirin electrolytes  14. Anxiety  1. Lexapro  15. Hypothyroidism  1. Synthroid  16. Anemia, suspect chronic disease    Quality:  · DVT Prophylaxis: Heparin    Plan of care discussed with patient and RN.     aYni Villalobos MD

## 2017-09-15 NOTE — PROGRESS NOTES
[facility]  Nephrology Progress Note    Jennifer Malhotra Patient Status:  Inpatient    3/1/1950 MRN [id number]   [facility] 7NE-A Attending Mary Pro MD   Hosp Day # 3 PCP Flor Luciano MD       SUBJECTIVE:  Overnight events note Lab  09/12/17   1845  09/13/17   0534  09/14/17   0523   ALT  52  52  48   AST  72*  99*  87*   ALB  3.1*  2.7*  2.5*       Recent Labs   Lab  09/15/17   0305  09/15/17   0402  09/15/17   0558  09/15/17   0653  09/15/17   0806   PGLU  103*  99  106*  116 HCl (BENADRYL) cap 50 mg 50 mg Oral Nightly PRN   ferrous sulfate EC tab 325 mg 325 mg Oral Daily   gabapentin (NEURONTIN) cap 400 mg 400 mg Oral TID   HYDROcodone-acetaminophen (NORCO) 5-325 MG per tab 1 tablet 1 tablet Oral Q6H PRN   HydrOXYzine HCl (MELODY

## 2017-09-15 NOTE — PHYSICAL THERAPY NOTE
PHYSICAL THERAPY TREATMENT NOTE - INPATIENT    Room Number: 8108/9223-N     Session: 1   Number of Visits to Meet Established Goals: 4    Presenting Problem: r/o CVA   History related to current admission:       Per Neuro assessment  Less likely complex m disease ([facility] [address] 2005    diabets broken ankle led to amputation   • Renal disorder    • Shingles    • Sleep apnea    • Stroke [facility]) 2005   • Visual impairment     reading glasses       Past Surgical History  Past Surgical History:  10/31/16: ANGIOPLASTY (LAYLA from a chair with arms (e.g., wheelchair, bedside commode, etc.): A Little   -   Moving from lying on back to sitting on the side of the bed?: A Little   How much help from another person does the patient currently need. ..   -   Moving to and from a bed to limitations in independent bed mobility, transfers, and gait. Noted inconsistencies in patients strength/AROM assessment and functional strength/observation during functional mobility.  The patient is below baseline and would benefit from skilled inpatient

## 2017-09-15 NOTE — CM/SW NOTE
CM faxed progress update to [facility] RN #[phone]. Please contact [facility] needs.

## 2017-09-15 NOTE — OCCUPATIONAL THERAPY NOTE
OCCUPATIONAL THERAPY TREATMENT NOTE - INPATIENT     Room Number: 6058/5210-T  Session: 1   Number of Visits to Meet Established Goals: 5    Presenting Problem: R sided weakness, work-up for stroke negative    History related to current admission:Pt is 74 y transplanted    Suppression of immune system subtherapeutic (HCC)    Thrombocytosis (HCC)    History of anemia due to chronic kidney disease    ESRD (end stage renal disease) (HCC)    Obesity (BMI 30.0-34. 9)    Liver transplant recipient [facility])    Martita Comment: STENTS total of 13 stents   2005: OTHER SURGICAL HISTORY      Comment: Right Below knee amputee  No date: REPAIR ING HERNIA,5+Y/O,REDUCIBL  No date: THYROIDECTOMY    SUBJECTIVE  \"Oh, you know, my left side doesn't want to move today. \" Pt was donned shoe on with min A. Pt placed full force to position his L foot into the shoe. CGA to stand. Once standing, pt started to roll his eyes and sway forward/backward.  As soon as OT diverted his attention to a random topic, pt started to joke around an with bilateral AROM HEP (home exercise program). -ongoing     Additional Goals:  Pt will participate in B  strength test.-MET 9/15, but most likely not an accurate finding. Pt will perform at least 3 fine motor coordination exercises.  - ongoing

## 2017-09-15 NOTE — CONSULTS
Pt. Is a 74year old man with a history of depression and anxiety. Most recently he was assessed for stroke after reporting symptoms of right sided weakness.  Following the work up and given his history he was referred for consult with a concern that his sy outpatient psychiatrist who could review his current course of medication. Lastly he may also benefit from evolvement in a structured and intensive outpatient program that could help him to better manage his mood.    Thank you for allowing me to participate

## 2017-09-15 NOTE — CODE DOCUMENTATION
Called to 7621/7621-A at 0212 for Rapid response. Arrived to the room at [address] [place] [address] Road. Pt C/o severe headache (chronic) and new left side weakness. LKN was 0100.  Upon arrival pt lying in bed eyes closed, responds to simple questions( LOC 1) Doesn't know the month

## 2017-09-15 NOTE — SLP NOTE
SPEECH/LANGUAGE/COGNITIVE EVALUATION AND DYSPHAGIA FOLLOW UP - INPATIENT    Admission Date: 9/12/2017  Evaluation Date: 09/15/17    Reason for Referral: Stroke protocol    ASSESSMENT & PLAN   ASSESSMENT  Patient seen for cognitive evaluation.  Per chart rev Yes  Pain Ratin   Pain Location:  (localized left frontal headache)     Nursing Aware of Pain?: Yes    GOALS  Goal #1 The patient will tolerate regular consistency and thin liquids without overt signs or symptoms of aspiration with 100 % accuracy over

## 2017-09-16 NOTE — PLAN OF CARE
Pt A/Ox4, on RA, NSR per tele, denies any pain  Pt up and ambulating ad napoleon with walker  PLAN: ok for discharge   Will con to monitor    Diabetes/Glucose Control    • Glucose maintained within prescribed range Adequate for Discharge        Impaired Activit

## 2017-09-16 NOTE — PROGRESS NOTES
[facility]  Nephrology Progress Note    Gely Durán Patient Status:  Inpatient    3/1/1950 MRN [id number]   [facility] 7NE-A Attending Savage Acosta MD   Hosp Day # 4 PCP Javed Kumar MD       SUBJECTIVE:  No new events, no com 09/15/17   2048  09/16/17   0644   PGLU  111*  128*  152*  217*  137*       Meds:     Current Facility-Administered Medications:  Insulin NPH Isophane & Regular (NOVOLIN 70/30) (70-30) 100 UNIT/ML injection 60 Units 60 Units Subcutaneous Daily with breakfa 1,000 mg Oral BID   Nortriptyline HCl (PAMELOR) cap 20 mg 20 mg Oral Nightly   Pantoprazole Sodium (PROTONIX) EC tab 40 mg 40 mg Oral QAM AC   Pyridostigmine Bromide (MESTINON) tab 30 mg 30 mg Oral Daily   Rosuvastatin Calcium (CRESTOR) tab 5 mg 5 mg Oral

## 2017-09-16 NOTE — PROGRESS NOTES
[id number] Anastasia Krishnamurthy Neurology Progress Note    Vincent Cornejo Patient Status:  Inpatient    3/1/1950 MRN [id number]   [facility] 7NE-A Attending Sonya Elliott MD   Hosp Day # 4 PCP Aleixs Manuel MD         Subjective:  Vincent Cornejo i ischemic changes in the cerebral white matter and sayra.  Stable small old infarcts within the right putamen and right superior frontal gyrus.      3. Stable punctate focus of gradient susceptibility within the parasagittal right frontal lobe may represent f work up s in the past for these events and EEGs all of which have been unrevealing.  Its possible that this is some complex migraine variant but I suspect a component of conversion as he has these spells on alternating sides of his bady several times even d

## 2017-09-16 NOTE — DISCHARGE SUMMARY
[facility] [place] HOSPITALIST  DISCHARGE SUMMARY     Mani Self Patient Status:  Inpatient    3/1/1950 MRN [id number]   [facility] 7NE-A Attending Prudencio Buerger, MD   [address] Day # 4 PCP John Hyman MD     Date of Admission: 2017  Date of D resolved. Patient then with LEFT sided weakness during admission, imaging negative, resolved. Patient felt to have conversion disorder. Course complicated by OLAYINKA on CKD, improved with IVF. Patient had chest pain, recent ECHO noted, troponin negative.  Sveta Zhou OR      Take 325 mg by mouth daily. Refills:  0     gabapentin 400 MG Caps  Commonly known as:  NEURONTIN      Take 1 capsule (400 mg total) by mouth 3 (three) times daily.    Quantity:  270 capsule  Refills:  0     HYDROcodone-acetaminophen 5-325 MG Tabs ACTIGALL      Take 300 mg by mouth 3 (three) times daily.    Refills:  0            Follow-up appointment:   Ne Pedroza DO  [employer]  [address] [place], [place] [id number] [phone]    Go on 9/25/2017  appointment time 14:20    Chidi Shows

## 2017-09-16 NOTE — CONSULTS
[facility]  Report of Consultation    Familia Lobo Patient Status:  Inpatient    3/1/1950 MRN [id number]   [facility] 7NE-A Attending Alexia Curling, MD   Hosp Day # 4 PCP Ismael Domingo MD     Reason reading glasses     Past Surgical History:  10/31/16: ANGIOPLASTY (CORONARY)      Comment: Stents X 4   10/18/16: ANGIOPLASTY (CORONARY)      Comment: Stents x 1  2015: BYPASS SURGERY      Comment: Robotic CABG X 2  10/2015: CABG  06/06/2017: CARDIAC PACEM psychosis  Floxin [Ofloxacin]        Other                       Comment:PT STATES HE IS ALLERGIC TO FLUORQUINOLONE AND/OR             FLOXIN FAMILY DRUGS  Quinolones                Rifampin                  Statins                     Comment:Muscle weakn (PLAVIX) tab 75 mg, 75 mg, Oral, Daily  •  cycloSPORINE modified (NEORAL) cap 175 mg, 175 mg, Oral, BID  •  DiphenhydrAMINE HCl (BENADRYL) cap 50 mg, 50 mg, Oral, Nightly PRN  •  ferrous sulfate EC tab 325 mg, 325 mg, Oral, Daily  •  gabapentin (NEURONTIN) JVD, carotids 2+ no bruits. Cardiac: Regular rate and rhythm, S1, S2 normal, no murmur, rub or gallop. Lungs: Clear without wheezes, rales, rhonchi or dullness. Normal excursions and effort. Abdomen: Soft, non-tender.    Extremities: Without clubbing, c

## 2017-09-16 NOTE — PROGRESS NOTES
[facility]PITAL[facility]  Progress Note     Delfina Sussy Patient Status:  Inpatient    3/1/1950 MRN [id number]   [facility] [place] 7NE-A Attending Theresa Rollins MD   Hosp Day # 4 PCP Maggy Arreaga MD     Chief Complaint: Right-sided weakness    S: • docusate sodium  100 mg Oral BID   • Heparin Sodium (Porcine)  5,000 Units Subcutaneous 2 times per day   • escitalopram  10 mg Oral QAM   • Clopidogrel Bisulfate  75 mg Oral Daily   • cycloSPORINE modified  175 mg Oral BID   • ferrous sulfate  325 mg

## 2017-09-16 NOTE — PLAN OF CARE
NURSING DISCHARGE NOTE    Discharged Home via Ambulatory. Accompanied by RN  Belongings Taken by patient/family. Discharge instructions given to patient, all questions and concerns answered.

## 2017-09-18 NOTE — PROGRESS NOTES
Reviewed pt's chart discharged 9/16 will follow up in couple weeks.   Total time spent with patient including chart review: 2  Time spent with patient this month: 35    Total time spent with communication and chart review this month to date: 28

## 2017-09-18 NOTE — PROGRESS NOTES
Initial Post Discharge Follow Up   Discharge Date: 9/16/17  Contact Date: 9/18/2017    Consent Verification:  Assessment Completed With: Patient  HIPAA Verified?   Yes    Discharge Dx:  Weakness, possible conversion disorder    General:   • How have you mouth every 6 (six) hours as needed. Disp:  Rfl:    camphor-menthol 0.5-0.5 % External Lotion Apply topically. Disp:  Rfl:    Metoprolol Tartrate 50 MG Oral Tab Take 1 tablet (50 mg total) by mouth 2x Daily(Beta Blocker). (Patient taking differently:  Nickie Sterling changes discussed with you? yes  • If you were prescribed a new medication:   o Was the new medication’s purpose explained? yes  o Was the new medication’s side effects discussed? yes  o Do you have any questions about your new medication?  No  • Did you pi Follow up appointments:   ARUN scheduled HFU appt with PCP for 9/22/17 at 11:30am.  All appts reviewed with pt. He denies any barriers to keeping/getting to HFU appts.       Your appointments     Date & Time Appointment Department [facility])    Sep 25, 20 receive printed scripts for these medications at d/c. [facility] called neuro regarding medication issue. Spoke to Darrell Rivera RN who states both scripts will be sent to [facility] electronically as requested.   Called pt back and LMOM per HIPPA advising that s

## 2017-09-18 NOTE — TELEPHONE ENCOUNTER
Joel, nurse at Dr. Luis Alfredo Rodriguez office, states the patient was never provided a prescription for Pyridostigmine and Nortiptyline. Orders placed on 08/26/17. Informed Joel we will contact pharmacy to provide prescriptions.  She states she will inform pa

## 2017-09-18 NOTE — CM/SW NOTE
09/18/17 0900   Discharge disposition   Discharged to: Home or Self   Name of [facility] services after discharge None   Discharge transportation Private car

## 2017-09-19 NOTE — TELEPHONE ENCOUNTER
Pharmacy wanting to know who the doctor signing for QUYEN [place], [place]. Informed it is Dr Tara Fernando.

## 2017-09-20 NOTE — TELEPHONE ENCOUNTER
Called and spoke with pt's wife, [facility] per HIPAA), to discuss any further concerns. Wife stating would like AS to address the following at upcoming TCM/HFU OV on 9/22.   Review all current medications   Referral to Ortho for prostatic leg  Review meds

## 2017-09-21 NOTE — TELEPHONE ENCOUNTER
S/w pts wife. States she would like to discuss everything in more detail with you before seeing any additional MDs.

## 2017-09-22 NOTE — PROGRESS NOTES
Reviewed pt's chart discharged recently and will follow up in a couple weeks.    Total time spent with patient including chart review: 2  Time spent with patient this month: 37    Total time spent with communication and chart review this month to date: 40

## 2017-09-22 NOTE — PROGRESS NOTES
HPI:    Krysta Dunlap is a 74year old male here today for hospital follow up.    He was discharged from Inpatient hospital, [facility] to Home   Admission Date: 9/12/17   Discharge Date: 9/16/17  Hospital Discharge Diagnosis: Weakness  TCM Diagnosi Take 2 capsules (20 mg total) by mouth nightly. Pyridostigmine Bromide 60 MG Oral Tab Take 0.5 tablets (30 mg total) by mouth daily.    SYNTHROID 300 MCG Oral Tab Take 1 tablet (300 mcg total) by mouth before breakfast.   FERROUS FUMARATE OR Take 325 mg b for Itching or Sleep. Mycophenolate Mofetil (CELLCEPT) 500 MG Oral Tab Take 1,000 mg by mouth 2 (two) times daily. HYDROcodone-acetaminophen 5-325 MG Oral Tab Take 1 tablet by mouth every 6 (six) hours as needed for Pain.      No current facility-admi exertion  CARDIOVASCULAR: denies chest pain on exertion or palpitations  GI: denies abdominal pain, denies heartburn, denies diarrhea  MUSCULOSKELETAL: denies pain, normal range of motion of extremities  NEURO: denies headaches, denies dizziness, denies we Consults:  FLU VACCINE ADJUVANT IM       Transitional Care Management Certification:  I certify that the following are true:  Communication with the patient was made within 2 business days of discharge on date above   Medical Decision Making- Based on serv

## 2017-09-25 NOTE — PATIENT INSTRUCTIONS
Refill policies:    • Allow 2-3 business days for refills; controlled substances may take longer.   • Contact your pharmacy at least 5 days prior to running out of medication and have them send an electronic request or submit request through the [facility] have a procedure or additional testing performed. [facility]) will contact your insurance carrier to obtain pre-certification or prior authorization.     Unfortunately, INGRID has seen an increase in denial of payment even though the p

## 2017-09-25 NOTE — PROGRESS NOTES
[facility] in [place]  Neurology - Clinic Follow up      Meghan Figueredo Patient Status:  No patient class for patient encounter    3/1/1950 MRN [id number]   Location @[facility]@ PCP Ella Snider MD     Patient presents with:  [facility] lethargy due to hypoglycemia and worsened thyroid function. He had another syncopal episode in the hospital, preceded by headache, lasting a few minutes. He is getting the episodes 1-2 time per week.  The triggers are urinating, and recently when he lifted 20 mg/dL 46 (H) 40 (H) 66 (H) 41 (H)   CREATININE      0.70 - 1.30 mg/dL 1.99 (H) 2.34 (H) 2.76 (H) 1.46 (H)   GFR      >=60 34 (L) 28 (L) 23 (L) 50 (L)   CALCIUM      8.3 - 10.3 mg/dL 10.2 9.9 9.9 8.9   ALKALINE PHOSPHATASE      45 - 117 U/L 1,235 (H) 1,1 disease [facility]) 2006   • Coronary atherosclerosis    • Depression    • Diabetes ([facility] [address] 1983   • Disorder of liver    • Esophageal reflux    • Essential hypertension    • Heart murmur     Since Childhood   • High cholesterol    • History of blood transfusion 2 given that patient had not             been exposed to daptomycin prior to this recent             exposure it is thought that the reaction was more             likely related to daptomycin. Re-challenging with             cefepime 4/2014.   Dexamethasone Clopidogrel Bisulfate 75 MG Oral Tab, Take 75 mg by mouth daily. , Disp: , Rfl:   •  nitroGLYCERIN (NITROSTAT) 0.4 MG Sublingual SL Tab, Place 0.4 mg under the tongue as needed. , Disp: , Rfl:   •  ursodiol 300 MG Oral Cap, Take 300 mg by mouth 3 (three) riccardo diarrhea;  : no incontinence; Neurological:  See history; relevant items discussed in the history.   Psychiatric: no depression, no suicidal attempt,   Musculoskeletal:  NO current complaint,  Endo: no cold intolerance, appetite is normal, no weight jensen stroke  Recent acute onset right sided hemiplegia  Conversion disorder  Syncope, cardiogenic  Paresthesias in hands, radiating  Autonomic neuropathy due to type 2 diabetes mellitus with symptomatic orthostatic hypotension  Chronic headaches  History of freedom spent counseling the patient and/or coordinating care. We discussed in depth regarding the diagnosis, prognosis, treatment. The patient was given ample opportunity to ask questions. All questions and concerns were addressed.      Ephraim Garcia DO  N

## 2017-09-26 NOTE — TELEPHONE ENCOUNTER
ASAP. Levin Claude Levin Claude Levin Claude Pt needs script form AS to  PROSTHETICS, has to be written to state \"inspect and repair anything and everything for right leg prosthetic\" Pt has sore on the end of stump and wants to avoid any future wounds.  Pt has appt today 9-26-17 at [facility]

## 2017-09-27 NOTE — TELEPHONE ENCOUNTER
LOV 9/22/17- AS    Received fax from [facility] phone [phone]  Fax [phone]    Requesting additional compliant letter of medical necessity.  Please review/complete if appropriate

## 2017-10-02 NOTE — PROGRESS NOTES
[phone] (home)   Telephone Information:  Mobile          [phone]  Patient informed of Dr. Wily Enriquez result note. Patient verbalized understanding and agrees with plan.

## 2017-10-06 NOTE — TELEPHONE ENCOUNTER
Received fax from [employer] and [address] [place] [address].  Please fill out and fax back. [phone], fax [phone]. Sent paperwork back to AS for attention.

## 2017-10-09 NOTE — TELEPHONE ENCOUNTER
AS returned incomplete- needs additional signature on DM statement form  AS folder for review and signature.

## 2017-10-10 PROBLEM — E66.9 DIABETES MELLITUS TYPE 2 IN OBESE (HCC): Status: RESOLVED | Noted: 2017-01-01 | Resolved: 2017-01-01

## 2017-10-10 PROBLEM — E11.69 DIABETES MELLITUS TYPE 2 IN OBESE (HCC): Status: RESOLVED | Noted: 2017-01-01 | Resolved: 2017-01-01

## 2017-10-18 NOTE — TELEPHONE ENCOUNTER
Medication: Rosuvastatin    Date of last refill: Unknown   [place]    Last office visit: 9/25/2017  Due back to clinic per last office note: 10 weeks   Date next office visit scheduled:  Future Appointments  Date Time nortriptyline     Neuropathy, and neuropathic pain- continue gabapentin 400mg TID and nortriptyline     Secondary stroke prevention- continue ASA and plavix, and statin     Hand paresthesias- carpal tunnel vs. C6/7 radiculopathy, still needs to obtain EMG,

## 2017-10-19 NOTE — PROGRESS NOTES
Attempted to contact pt no answer left detailed message for pt to call back.    Total time spent with patient including chart review: 2  Time spent with patient this month: 2    Total time spent with communication and chart review this month to date: 2

## 2017-10-19 NOTE — TELEPHONE ENCOUNTER
Informed patient of doctors recommendations to follow up with PCP. Patient verbalized understanding and has no further questions or concerns at this time.

## 2017-10-30 NOTE — TELEPHONE ENCOUNTER
Spoke with patient's wife Shelly Rashid who is on hipaa consent, states that  was admitted to the hospital for CHF, has been trying to get Bumex and Rosuvastatin filled for the past two weeks without resolution.   Was advised by Dr. Bienvenido Her office that t

## 2017-10-30 NOTE — TELEPHONE ENCOUNTER
Re Bumetanide . .. Pt's wife is on the phone. Stated Pt attempted to have refilled, via pharmacy, for 3 weeks. Pt is out of script and has been for 3 weeks.  Now Pt in hospital

## 2017-10-31 NOTE — PROGRESS NOTES
Attempted to contact pt to see how he is doing, no answer left detailed message for pt to call back.    Total time spent with patient including chart review: 2  Time spent with patient this month: 4    Total time spent with communication and chart review th

## 2017-11-02 NOTE — PROGRESS NOTES
Patient presents with:  Hospital F/U: due to fall. DX Congestive heart failure       HPI:  Here for f/u from hospital stay from a fall on [facility] risb, no fracture and chf with some pulomonary edema, treated at [facility], improved.  Pt yet to see cards post disc Hepatorenal syndrome ([address]     Liver transplanted ([address]     Transaminitis     OLAYINKA (acute kidney injury) ([address]     Syncope and collapse     Intractable migraine with status migrainosus, unspecified migraine type     At risk for falling     Peripheral vascul Obesity (BMI 30.0-34. 9)     Liver transplant recipient [facility])     Conversion disorder     Type 2 diabetes mellitus with hypoglycemia ([facility] [address]     Acute left-sided weakness        Current Outpatient Prescriptions:  bumetanide 1 MG Oral Tab Take 2 tablets (2 mg 0.5 mg by mouth every 6 (six) hours as needed. Disp:  Rfl:    aspirin 325 MG Oral Tab Take 325 mg by mouth daily. Disp:  Rfl:    Citalopram Hydrobromide (CELEXA) 20 MG Oral Tab Take 20 mg by mouth daily.  Disp:  Rfl:    cycloSPORINE modified (NEORAL) 25 M insulin (hcc)-stable rpt labs in 3 mos see me then  Ckd (chronic kidney disease) stage 3, gfr 30-59 ml/min-see above  Hospital discharge follow-up    Pt starting PT next week for fall    No orders of the defined types were placed in this encounter.       Me

## 2017-12-07 NOTE — PROGRESS NOTES
Patient here for follow up visit. States his balance is worse, patient keeps falling, at least 5-6 falls per week. Patient experienced an episode of extreme headache two months ago lasting 20 minutes and since noticed his balance is worse.

## 2017-12-07 NOTE — PATIENT INSTRUCTIONS
Refill policies:    • Allow 2-3 business days for refills; controlled substances may take longer.   • Contact your pharmacy at least 5 days prior to running out of medication and have them send an electronic request or submit request through the [facility] have a procedure or additional testing performed. [facility]) will contact your insurance carrier to obtain pre-certification or prior authorization.     Unfortunately, INGRID has seen an increase in denial of payment even though the p

## 2017-12-07 NOTE — PROGRESS NOTES
[facility] in [place]  Neurology - Clinic Follow up      Blanche Venegas Patient Status:  No patient class for patient encounter    3/1/1950 MRN [id number]   [facility] Sourav [facility]     Patient presents with:  Margaret hypoglycemia and worsened thyroid function. He had another syncopal episode in the hospital, preceded by headache, lasting a few minutes. He is getting the episodes 1-2 time per week.  The triggers are urinating, and recently when he lifted his arms overhea better on the nortriptyline.         Data review:  MRI 9/2017- no new strokes, reviewed independently the images, MRA H and N- patent vessels        Lab tests:    Component      Latest Ref Rng & Units 6/3/2017 4/18/2017   T4,Free (Direct)      0.9 - 1.8 ng/ 14.3 seconds 13.8   INR      0.89 - 1.11 1.06   APTT      25.0 - 34.0 seconds 31.1         PAST MEDICAL HISTORY:   Past Medical History:   Diagnosis Date   • Acute, but ill-defined, cerebrovascular disease    • Anemia     on Iron Suppliments    • Anxiety s Cephalosporins          Hives    Comment:Hives to ceftriaxone. Duricef - hallucinations.   Ciprofloxacin             Daptomycin              Rash    Comment:While receiving daptomycin with ceftriaxone,             patient developed rash on back and also r Disp: 30 capsule, Rfl: 0  •  gabapentin 400 MG Oral Cap, Take 1 capsule (400 mg total) by mouth 3 (three) times daily. , Disp: 270 capsule, Rfl: 0  •  Metoprolol Tartrate 50 MG Oral Tab, Take 1 tablet (50 mg total) by mouth 2x Daily(Beta Blocker).  (Patient no throat pain or soreness; Respiratory: Denies: Difficulty Breathing, Chronic Cough and Wheezing. Cardiovascular: NO Chest Pain and Palpitations. GI: no abdominal pain, no nausea or diarrhea;  : no incontinence;   Neurological:  See history; relevant brain 2/11/17- no interval stroke, old R putamen and BG strokes, no M1 or SARAI arteries  CTA from 10/2016- mild narrowing of R vertebral artery at the origin  EEG 10/2016- no epileptiform      Assessment/Plan:   1. Sensory ataxia  2.  Autonomic neuropathy du given ample opportunity to ask questions. All questions and concerns were addressed.      Herminio Mcmahon DO  Neurology and Neuromuscular medicine  [facility]

## 2017-12-11 NOTE — TELEPHONE ENCOUNTER
LOV:  11/2/17   Future office visit: no upcoming visit  Last labs: 9/29/17 Tsh Vit B1 Vit B12 9/16/17 Bmp Cbc   Last RX: Bumetanide 11/2/17 #180 No refills    Nortriptyline 10/18/17 #60 #1 Refill  Per protocol: Route to provider

## 2017-12-12 NOTE — PROGRESS NOTES
FALL SCREEN EVALUATION   Referring Physician: Dr. Dany Benites  Diagnosis: Sensory ataxia (R27.8)  Vertigo (R42)     Date of Service: 12/12/2017     PATIENT SUMMARY   Kylie Carter is a 74year old y/o male who presents to therapy today with complaints of contributing to his gait instability and recurrent falls. He presents with impaired posture control/balance, gait, somatosensory input to LEs and motor control.   He also has a positive left [employer] which is symptomatic without nystagmus indicating an flexed, mildly unsteady without AD. Steadiness improves significantly with r. Walker.      TUG (AD, time): 16 sec, no AD CGA/SBA  Fall Risk: yes  [Performance exceeding the upper limit of confidence intervals are considered increased risk for falls; [employer] smoothly with slight change in gait velocity, i.e., minor disruption to smooth gait path or uses walking aid. (1) Moderate Impairment: Preforms head turns with moderate change in gait velocity, slows down, staggers but recovers, can continue to walk.   (0) · Pt will be independent and compliant with comprehensive HEP to maintain progress achieved in PT    Frequency / Duration: Patient will be seen for 2 x/week or a total of 10 visits over a 90 day period.  Treatment will include: Neuromuscular Re-education;

## 2017-12-14 NOTE — PROGRESS NOTES
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   Reports that he hasn't fallen but did have 2 instan

## 2017-12-19 PROBLEM — R07.9 CHEST PAIN OF UNCERTAIN ETIOLOGY: Status: ACTIVE | Noted: 2017-01-01

## 2017-12-19 PROBLEM — R74.8 ABNORMAL SERUM LEVEL OF ALKALINE PHOSPHATASE: Status: ACTIVE | Noted: 2017-01-01

## 2017-12-19 PROBLEM — I20.8 ANGINA AT REST (HCC): Status: ACTIVE | Noted: 2017-01-01

## 2017-12-19 PROBLEM — E11.00 UNCONTROLLED TYPE 2 DIABETES MELLITUS WITH HYPEROSMOLAR NONKETOTIC HYPERGLYCEMIA (HCC): Status: ACTIVE | Noted: 2017-01-01

## 2017-12-19 NOTE — CONSULTS
[facility]  Report of Consultation    Mani Self Patient Status:  Emergency    3/1/1950 MRN [id number]   Location [facility] Attending Kallie Clarke MD   Hosp Day # 0 PCP Kerry Kapoor MD     Reason for Consultat • Myocardial infarction    • Obesity    • Peripheral vascular disease ([facility] [address] 2005    diabets broken ankle led to amputation   • Renal disorder    • Shingles    • Sleep apnea    • Stroke [facility]) 2005   • Visual impairment     reading glasses     Past [facility]urg[facility] daptomycin. Re-challenging with             cefepime 4/2014. Dexamethasone               Comment:Other reaction(s):  Other             Steroidal psychosis  Dexamethasone Sodiu*    Other (See Comments)    Comment:Steroidal psychosis  Floxin [Ofloxacin] 12/19/2017   CO2 21.0 12/19/2017    12/19/2017   CA 8.4 12/19/2017   ALB 2.7 12/19/2017   ALKPHO 1,177 12/19/2017   BILT 1.7 12/19/2017   TP 6.8 12/19/2017   AST 88 12/19/2017   ALT 64 12/19/2017   TROP <0.046 12/19/2017   PGLU 215 12/19/2017     As unspecified extremity     Pain in extremity     Chronic intractable headache     AVB (atrioventricular block)     Liver cirrhosis secondary to CORTÉS (HCC)     Diabetes mellitus, controlled (HCC)     Recurrent major depressive disorder, in full remission (HC

## 2017-12-19 NOTE — HISTORICAL OFFICE NOTE
Kym Sorto  : 1950  ACCOUNT:  [id number]  [id number]  PCP:  None   TODAY'S DATE: 2017  DICTATED BY:  Joe Choi MD]    CHIEF COMPLAINT: [Followup of CAD, of native vessels, Followup of Chest discomfort/tightness, Followup of Hyperchole use. used to smoke but quit >5 years ago >20 years ago. CAFFEINE: 2 beverages daily. ALCOHOL: denies drinking. EXERCISE: no regular exercise. DIET: no special diet. MARITAL STATUS: . OCCUPATION: disabled.      ALLERGIES: Cipro - Intravenous and Sulfa History of CABG  7. Hypercholesteremia, pure  8. Hypertension, benign  9. Pacemaker, not dependent  10.  Pacer reprogramming    PRESCRIPTIONS:   08/04/17 *AmLODIPine Besylate  2.5MG     1 TABLET DAILY AS DIRECTED.              08/04/17 *Metoprolol Tartrate much for letting me participate in the care of Mr. Lala Schwartz.       Lindsey Verduzco MD TD/rip - DD: 08/09/2017 - DT: 08/10/2017 - Job ID: [id number]

## 2017-12-19 NOTE — PROGRESS NOTES
Genie Can  3/1/1950    Patient presents with:  Fatigue: since last week wed. Nausea: for several days . Edema: hands and  feet . . since last week wed      Rica Cotter is a 74year old male with HFpEF (LV Ef: 50-55%), CAD with stent 300 MCG Oral Tab Take 1 tablet (300 mcg total) by mouth before breakfast. Disp: 90 tablet Rfl: 1   temazepam 7.5 MG Oral Cap Take 1 capsule (7.5 mg total) by mouth nightly as needed for Sleep.  Disp: 30 capsule Rfl: 0   gabapentin 400 MG Oral Cap Take 1 cap Cephalosporins          Hives    Comment:Hives to ceftriaxone. Duricef - hallucinations.   Ciprofloxacin             Daptomycin              Rash    Comment:While receiving daptomycin with ceftriaxone,             patient developed rash on back and also Myocardial infarction    • Obesity    • Peripheral vascular disease ([facility] 2005    diabets broken ankle led to amputation   • Renal disorder    • Shingles    • Sleep apnea    • Stroke [facility]) 2005   • Visual impairment     reading glasses      Patient Active extremity     Chronic intractable headache     AVB (atrioventricular block)     Liver cirrhosis secondary to CORTÉS (HCC)     Diabetes mellitus, controlled ([facility]     Recurrent major depressive disorder, in full remission ([facility] [address] [facility]     Amputee     Acute ischemic and time. No distress. Cardiovascular: Normal rate and regular sinus rhythm. Grade 3/6 systolic murmur over right second ICS. No JVD appreciated. Pulmonary/Chest: No crackles or abnormal breath sounds, however, reduced chest expansion.    ffort normal a answered and the patient agrees with the plan.      Thank you,  Moreno Cardenas MD

## 2017-12-19 NOTE — ED PROVIDER NOTES
Patient Seen in: [facility] Emergency Department    History   Patient presents with:  Chest Pain Angina (cardiovascular)    Stated Complaint: chest pain    HPI    73-year-old male presents to the emerge department after being seen by his primary car transfusion [id number]   • Hyperlipidemia    • Hypothyroidism    • Kidney disorder    • Liver transplanted [facility])    • Myocardial infarction    • Obesity    • Peripheral vascular disease ([facility] [address] 2005    diabets broken ankle led to amputation   • Renal disorder Physical Exam   Constitutional: He is oriented to person, place, and time. He appears well-developed and well-nourished. No distress. HENT:   Head: Normocephalic and atraumatic. Neck: Normal range of motion. Neck supple.    Cardiovascular: Mellissa Collazo DIFFERENTIAL WITH PLATELET.   Procedure                               Abnormality         Status                     ---------                               -----------         ------                     CBC W/ DIFFERENTIAL[[id number]]          Abnormal phosphatase    Disposition:  Admit  12/19/2017  4:24 pm    Follow-up:  No follow-up provider specified.       Medications Prescribed:  Current Discharge Medication List        Present on Admission  Date Reviewed: 12/19/2017          ICD-10-CM Noted POA    C

## 2017-12-19 NOTE — ED INITIAL ASSESSMENT (HPI)
Pt had PCP appointment where he had chest pain and NANCY; 12 lead EKG NSR. To ED for further cardiac workup. 0.4mg Nitro given and 50mcg Fentanyl IVP given en route. Pt took aspirin PTA. Chest pain gone after nitro.

## 2017-12-20 NOTE — CONSULTS
[facility]                       Gastroenterology [address] [facility] Gastroenterology    [facility] Patient Status:  Inpatient    3/1/1950 MRN [id number]   [facility]-A Attending Stef Sampson, [address] [facility] Day # 1 PCP Frank Wood [phone]   • Hyperlipidemia    • Hypothyroidism    • Kidney disorder    • Liver transplanted [facility])    • Myocardial infarction    • Obesity    • Peripheral vascular disease ([facility] [address] 2005    diabets broken ankle led to amputation   • Renal disorder    • Shingl mg Oral Nightly PRN   Levothyroxine Sodium (SYNTHROID) tab 300 mcg 300 mcg Oral Before breakfast   Rosuvastatin Calcium (CRESTOR) tab 5 mg 5 mg Oral Nightly   isosorbide dinitrate (ISORDIL) tab 10 mg 10 mg Oral BID   Nortriptyline HCl (PAMELOR) cap 20 mg 2 likely related to daptomycin. Re-challenging with             cefepime 4/2014. Dexamethasone               Comment:Other reaction(s):  Other             Steroidal psychosis  Dexamethasone Sodiu*    Other (See Comments)    Comment:Steroidal psychosis (36.4 °C) (Oral)   Resp 18   Ht 6' 4\" (1.93 m)   Wt 291 lb 3.6 oz (132.1 kg)   SpO2 96%   BMI 35.45 kg/m²   Gen: AAO x 3, able to speak in complete sentences  HENT: EOMI, PERRL, oropharynx is clear with moist mucosal membranes  Eyes: Sclerae are anicteric GI has been consulted to evaluate elevated LFTs (AST 88, ALT 64, alk phos 1177, and a total bilirubin of 1.7). Epic review shows chronic elevations however higher now compared to 9/14 however consistent to levels in 7/17 when he reports a liver bx.  No pain

## 2017-12-20 NOTE — CONSULTS
ENDOCRINOLOGY CONSULTATION    Attending physician:  Prasanth Mauerr DO  Consulting physican:  Angelika Bell MD    Admission Date:  12/19/2017  Consultation Date:  12/20/2017      Reason for consultation: Mgmt of Type 2 DM    Chief Complaint:  Johnell Romberg problem    • Cataract    • Congestive heart disease ([facility] 2006   • Coronary atherosclerosis    • Depression    • Diabetes ([facility] 1983   • Disorder of liver    • Esophageal reflux    • Essential hypertension    • Heart murmur     Since Childhood   • High cho daily. Disp:  Rfl:    ursodiol 300 MG Oral Cap Take 300 mg by mouth 3 (three) times daily. Disp:  Rfl:    ergocalciferol 62978 units Oral Cap Take 50,000 Units by mouth once a week.    Disp:  Rfl:    alprazolam 0.5 MG Oral Tab Take 0.5 mg by mouth every 6 ( Oral Nightly PRN   Levothyroxine Sodium (SYNTHROID) tab 300 mcg 300 mcg Oral Before breakfast   Rosuvastatin Calcium (CRESTOR) tab 5 mg 5 mg Oral Nightly   isosorbide dinitrate (ISORDIL) tab 10 mg 10 mg Oral BID   Nortriptyline HCl (PAMELOR) cap 20 mg 20 m likely related to daptomycin. Re-challenging with             cefepime 4/2014. Dexamethasone               Comment:Other reaction(s):  Other             Steroidal psychosis  Dexamethasone Sodiu*    Other (See Comments)    Comment:Steroidal psychosis biceps.   Skin:  Dry skin        Laboratory Data      Component      Latest Ref Rng & Units 12/19/2017   HEMOGLOBIN A1c      <5.7 % 8.9 (H)       Component      Latest Ref Rng & Units 12/20/2017   CHOLESTEROL, TOTAL      <200 mg/dL 140   Triglycerides transplant for CORTÉS cirrhosis    · Continue immunosuppressive meds. 8. Postsurgical hypothyroidism: TSH normal    · Continue levothyroxine 300 mcg daily       We will follow with you. Thank you for the consultation.     Froilan Almonte MD  Endocrinolo

## 2017-12-20 NOTE — CONSULTS
Attending physician addendum:   I personally saw and examined the patient, agree with the above findings, assessment and plan. Mr. Corinne Haro is a 74year-old male who is being seen in consultation for elevated liver enzymes.  His abdomen is soft and n

## 2017-12-20 NOTE — PAYOR COMM NOTE
--------------  ADMISSION REVIEW         12/19    ED    Stated Complaint: chest pain          47-year-old male presents to the emerge department after being seen by his primary care physician.     Patient states that for the last 2 days he has had intermitt exhibits normal muscle tone.         Diagnosis Date   • Acute, but ill-defined, cerebrovascular disease     • Anemia       on Iron Suppliments          • Atherosclerosis of coronary artery 10/31/16     Stents X 4   • Atherosclerosis of coronary artery 10/18 limits   POCT GLUCOSE - Abnormal; Notable for the following:      POC Glucose 215 (*)       All other components within normal limits   CBC W/ DIFFERENTIAL - Abnormal; Notable for the following:      RBC 3.67 (*)       HGB 10.8 (*)       HCT 32.5 (*)

## 2017-12-20 NOTE — RESPIRATORY THERAPY NOTE
RYAN - Equipment Use Daily Summary:                  . Set Mode:                . Usage in hours:                . 90% Pressure (EPAP) level:                . 90% Insp. Pressure (IPAP): Bhavesh Arguelles AHI:                .  Supplemental Oxygen:

## 2017-12-20 NOTE — PLAN OF CARE
Diabetes/Glucose Control    • Glucose maintained within prescribed range Progressing        Patient/Family Goals    • Patient/Family Long Term Goal Progressing    • Patient/Family Short Term Goal Progressing            Pt is A&Ox4. Denies pain.  Linda Fierro

## 2017-12-20 NOTE — PLAN OF CARE
Patient is alert and oriented. On room air, NSR on the tele. Denies any pain or SOB. Right BKA. Per GI patient it have ultrasound. Unable to have it done today due to having to be NPO for at least 8 hours. It will be done in the morning.  Patient is to be

## 2017-12-20 NOTE — PLAN OF CARE
Admission AVS completed with patient upon arrival to the unit  Call light/room orientation completed  Patient ordered dinner

## 2017-12-20 NOTE — PROGRESS NOTES
[facility]  Cardiology Progress Note    Subjective:  No chest pain or shortness of breath this morning. Still feels \"swollen\".     Objective:  /51 (BP Location: Left arm)   Pulse 60   Temp 97.6 °F (36.4 °C) (Oral)   Resp 18   Ht 193 cm (6' 4\" diag2/distal LAD patent. LV was normal.  Did have in-stent restenosis 2/17 and required stenting.   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

## 2017-12-20 NOTE — H&P
[facility]                                                               History & Physical         Shayy Joseph Patient Status:  Inpatient    3/1/1950 MRN [id number]   [facility] 8NE-A Attending Vanessa Fletcher MD   [facility] Day # 0 P transplanted [facility])    • Myocardial infarction    • Obesity    • Peripheral vascular disease ([facility] [address] 2005    diabets broken ankle led to amputation   • Renal disorder    • Shingles    • Sleep apnea    • Stroke [facility]) 2005   • Visual impairment     reading glas is thought that the reaction was more             likely related to daptomycin. Re-challenging with             cefepime 4/2014. Dexamethasone               Comment:Other reaction(s):  Other             Steroidal psychosis  Dexamethasone Sodiu*    Other (S (400 mg total) by mouth 3 (three) times daily. Disp: 270 capsule Rfl: 0 12/19/2017 at 0900   Clopidogrel Bisulfate 75 MG Oral Tab Take 75 mg by mouth daily.  Disp:  Rfl:  12/19/2017 at 0900   ursodiol 300 MG Oral Cap Take 300 mg by mouth 3 (three) times ricardo carotid bruits. Respiratory: Clear to auscultation bilaterally. No wheezes. No rhonchi. Cardiovascular: S1, S2.  Regular rate and rhythm. No murmurs. Equal pulses   Abdomen: Soft, nontender, nondistended. Positive bowel sounds.  No rebound tenderness; possibly secondary unstable angina– first troponin negative. Cardiology consult. Will rule out with serial cardiac enzymes. Will check 2D echo done on 8/24/2017 with EF of 50-55% with no regional wall motion abnormalities, mild MR, trivial AR.   2. Liver

## 2017-12-21 ENCOUNTER — PRIOR ORIGINAL RECORDS (OUTPATIENT)
Dept: OTHER | Age: 67
End: 2017-12-21

## 2017-12-21 NOTE — PROGRESS NOTES
[facility]  Progress Note     Jennifer Bunn Patient Status:  Inpatient    3/1/1950 MRN [id number]   [facility] 8NE-A Attending Dav Vivar, [address] [facility] Day # 2 PCP Rubin Wheeler MD     Chief Complaint: chest pain    S: Patient se QAM   • cycloSPORINE modified  175 mg Oral BID   • Naltrexone HCl  50 mg Oral nightly   • mycophenolate mofetil  1,000 mg Oral BID   • aspirin  325 mg Oral Daily   • ursodiol  300 mg Oral TID   • Clopidogrel Bisulfate  75 mg Oral Daily   • gabapentin  400

## 2017-12-21 NOTE — PROGRESS NOTES
Ultrasound of liver noted. Recent biopsy at [facility] ER benign. Recommend close follow up at [facility].     Annalisa Mccord  Gastroenterology/[facility] [address] Gastroenterology

## 2017-12-21 NOTE — PLAN OF CARE
CARDIOVASCULAR - ADULT    • Maintains optimal cardiac output and hemodynamic stability Progressing    • Absence of cardiac arrhythmias or at baseline Progressing          Pt axox4. Denies chest pain or sob. Sinus Rhythm/ V paced on tele.  +2 pitting edema t

## 2017-12-21 NOTE — PROGRESS NOTES
[facility]    Gastroenterology Follow-Up Note      Alida Moniteau Patient Status:  Inpatient    3/1/1950 MRN [id number]   [facility] 8NE-A Attending Mindy Melton, [address] [facility] Day # 2 PCP Adnie Berry MD     Chief Complaint/Reason f

## 2017-12-21 NOTE — DISCHARGE SUMMARY
[facility]  DISCHARGE SUMMARY     [facility] Patient Status:  Inpatient    3/1/1950 MRN [id number]   [facility] 8NE-A Attending Tyree Maher, [address] [facility] Day # 2 PCP Frank Mccarthy MD     Date of Admission: 2017  Date of D noticed that his blood sugar was uncontrolled today. Denies any abdominal pain. Denies any nausea vomiting. Denies any constipation or diarrhea. Denies any fever chills. Denies any focal weakness or numbness. Denies any dysuria frequency.   Patient ha 75 MG Tabs  Commonly known as:  PLAVIX     cycloSPORINE modified 25 MG Caps  Commonly known as:  NEORAL     DiphenhydrAMINE HCl 25 MG Caps  Commonly known as:  BENADRYL     ergocalciferol 67221 units Caps  Commonly known as:  DRISDOL/VITAMIN D2     roen

## 2017-12-21 NOTE — PROGRESS NOTES
ENDOCRINOLOGY PROGRESS NOTE    Typed by David Walls MD on 12/21/2017      S:  Pt had BS of 66 this morning. Is NPO for abdominal U/S. Pt only took half his Levemir last night as bedtime glucose was 116.        O:  /46 (BP Location: Left a uncontrolled with hyperglycemia: Above lower glucose noted. HgA1C 8.9%  2.  Long term insulin use       · Adjust regimen as follows:     Levemir 20 Units with breakfast and bedtime     Novolog 1 Unit for every 12 grams of carbs with meals     Novolog correc

## 2017-12-21 NOTE — PLAN OF CARE
Patient is alert and oriented. On room air. V-paced on the tele. Denies any pain or SOB. Up ad napoleon. Results from ultrasound come back per GI patient may go. IV was discontinued. Tele was taken back and returned.    Discharge instructions were given a

## 2017-12-21 NOTE — PROGRESS NOTES
[facility]  Cardiology Progress Note    Subjective:  No chest pain or shortness of breath. Feels better. Noted bilirubin and LFT's.     Objective:  /63 (BP Location: Right arm)   Pulse 59   Temp 97.8 °F (36.6 °C) (Oral)   Resp 20   Ht 193 cm (6 normal.  Did have in-stent restenosis 2/17 and required stenting.   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

## 2017-12-28 NOTE — PROGRESS NOTES
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   Reports that he hasn't had any falls but a couple o x10  Standing: high marches x10       Sit<>stand 2x5  Sit<>stand airex on chair 2x5  Sit<>stand airex on chair 2x5       Mini squats x10  Mini squats x10  Mini squats x12        Posture control exercises listed in objective  Posture control exercises liste

## 2017-12-28 NOTE — TELEPHONE ENCOUNTER
Mertha Schwab from PT called. Patient was in the hospital last week. Mertha Schwab needs something put in patient's chart so he can resume Physical Therapy. Patient does have an appointment this evening at 5:30.

## 2017-12-28 NOTE — TELEPHONE ENCOUNTER
Confirmed with Angelica Hopper needs verbal ok to resume PT as referred by Dr. Dameon Barraza regarding vertigo and ataxia   Please advise. Thank you.

## 2018-01-01 ENCOUNTER — NURSE ONLY (OUTPATIENT)
Dept: LAB | Age: 68
End: 2018-01-01
Attending: FAMILY MEDICINE
Payer: MEDICARE

## 2018-01-01 ENCOUNTER — TELEPHONE (OUTPATIENT)
Dept: INTERNAL MEDICINE CLINIC | Facility: CLINIC | Age: 68
End: 2018-01-01

## 2018-01-01 ENCOUNTER — OFFICE VISIT (OUTPATIENT)
Dept: WOUND CARE | Facility: HOSPITAL | Age: 68
End: 2018-01-01
Attending: PODIATRIST
Payer: MEDICARE

## 2018-01-01 ENCOUNTER — HOSPITAL ENCOUNTER (EMERGENCY)
Facility: HOSPITAL | Age: 68
Discharge: HOME | End: 2018-01-01
Attending: EMERGENCY MEDICINE
Payer: MEDICARE

## 2018-01-01 ENCOUNTER — SNF VISIT (OUTPATIENT)
Dept: INTERNAL MEDICINE CLINIC | Age: 68
End: 2018-01-01

## 2018-01-01 ENCOUNTER — APPOINTMENT (OUTPATIENT)
Dept: GENERAL RADIOLOGY | Facility: HOSPITAL | Age: 68
DRG: 629 | End: 2018-01-01
Attending: EMERGENCY MEDICINE
Payer: MEDICARE

## 2018-01-01 ENCOUNTER — APPOINTMENT (OUTPATIENT)
Dept: GENERAL RADIOLOGY | Facility: HOSPITAL | Age: 68
DRG: 480 | End: 2018-01-01
Attending: ORTHOPAEDIC SURGERY
Payer: MEDICARE

## 2018-01-01 ENCOUNTER — OFFICE VISIT (OUTPATIENT)
Dept: NEUROLOGY | Facility: CLINIC | Age: 68
End: 2018-01-01

## 2018-01-01 ENCOUNTER — HOSPITAL ENCOUNTER (OUTPATIENT)
Facility: HOSPITAL | Age: 68
Setting detail: OBSERVATION
Discharge: NURSING, REHAB OR LONG-TERM CARE | End: 2018-01-01
Attending: EMERGENCY MEDICINE | Admitting: INTERNAL MEDICINE
Payer: MEDICARE

## 2018-01-01 ENCOUNTER — APPOINTMENT (OUTPATIENT)
Dept: GENERAL RADIOLOGY | Facility: HOSPITAL | Age: 68
End: 2018-01-01
Attending: EMERGENCY MEDICINE
Payer: MEDICARE

## 2018-01-01 ENCOUNTER — TELEPHONE (OUTPATIENT)
Dept: NEUROLOGY | Facility: CLINIC | Age: 68
End: 2018-01-01

## 2018-01-01 ENCOUNTER — APPOINTMENT (OUTPATIENT)
Dept: INTERVENTIONAL RADIOLOGY/VASCULAR | Facility: HOSPITAL | Age: 68
DRG: 287 | End: 2018-01-01
Attending: INTERNAL MEDICINE
Payer: MEDICARE

## 2018-01-01 ENCOUNTER — APPOINTMENT (OUTPATIENT)
Dept: ULTRASOUND IMAGING | Facility: HOSPITAL | Age: 68
DRG: 480 | End: 2018-01-01
Attending: HOSPITALIST
Payer: MEDICARE

## 2018-01-01 ENCOUNTER — PATIENT OUTREACH (OUTPATIENT)
Dept: CASE MANAGEMENT | Age: 68
End: 2018-01-01

## 2018-01-01 ENCOUNTER — HOSPITAL ENCOUNTER (INPATIENT)
Facility: HOSPITAL | Age: 68
LOS: 10 days | Discharge: NURSING, REHAB OR LONG-TERM CARE | DRG: 629 | End: 2018-01-01
Attending: EMERGENCY MEDICINE | Admitting: INTERNAL MEDICINE
Payer: MEDICARE

## 2018-01-01 ENCOUNTER — APPOINTMENT (OUTPATIENT)
Dept: CT IMAGING | Facility: HOSPITAL | Age: 68
DRG: 300 | End: 2018-01-01
Attending: INTERNAL MEDICINE
Payer: MEDICARE

## 2018-01-01 ENCOUNTER — APPOINTMENT (OUTPATIENT)
Dept: CV DIAGNOSTICS | Facility: HOSPITAL | Age: 68
DRG: 629 | End: 2018-01-01
Attending: HOSPITALIST
Payer: MEDICARE

## 2018-01-01 ENCOUNTER — APPOINTMENT (OUTPATIENT)
Dept: ULTRASOUND IMAGING | Facility: HOSPITAL | Age: 68
DRG: 287 | End: 2018-01-01
Attending: HOSPITALIST
Payer: MEDICARE

## 2018-01-01 ENCOUNTER — APPOINTMENT (OUTPATIENT)
Dept: WOUND CARE | Facility: HOSPITAL | Age: 68
End: 2018-01-01
Attending: INTERNAL MEDICINE
Payer: MEDICARE

## 2018-01-01 ENCOUNTER — APPOINTMENT (OUTPATIENT)
Dept: WOUND CARE | Facility: HOSPITAL | Age: 68
End: 2018-01-01
Attending: PODIATRIST

## 2018-01-01 ENCOUNTER — APPOINTMENT (OUTPATIENT)
Dept: ULTRASOUND IMAGING | Facility: HOSPITAL | Age: 68
DRG: 629 | End: 2018-01-01
Attending: INTERNAL MEDICINE
Payer: MEDICARE

## 2018-01-01 ENCOUNTER — OFFICE VISIT (OUTPATIENT)
Dept: INTERNAL MEDICINE CLINIC | Facility: CLINIC | Age: 68
End: 2018-01-01

## 2018-01-01 ENCOUNTER — OFFICE VISIT (OUTPATIENT)
Dept: ELECTROPHYSIOLOGY | Facility: HOSPITAL | Age: 68
End: 2018-01-01
Attending: Other
Payer: MEDICARE

## 2018-01-01 ENCOUNTER — HOSPITAL ENCOUNTER (INPATIENT)
Facility: HOSPITAL | Age: 68
LOS: 1 days | Discharge: HOME HEALTH | DRG: 287 | End: 2018-01-01
Attending: EMERGENCY MEDICINE | Admitting: HOSPITALIST
Payer: MEDICARE

## 2018-01-01 ENCOUNTER — APPOINTMENT (OUTPATIENT)
Dept: CT IMAGING | Facility: HOSPITAL | Age: 68
End: 2018-01-01
Attending: EMERGENCY MEDICINE
Payer: MEDICARE

## 2018-01-01 ENCOUNTER — MED REC SCAN ONLY (OUTPATIENT)
Dept: INTERNAL MEDICINE CLINIC | Facility: CLINIC | Age: 68
End: 2018-01-01

## 2018-01-01 ENCOUNTER — APPOINTMENT (OUTPATIENT)
Dept: CT IMAGING | Facility: HOSPITAL | Age: 68
DRG: 442 | End: 2018-01-01
Attending: EMERGENCY MEDICINE
Payer: MEDICARE

## 2018-01-01 ENCOUNTER — OFFICE VISIT (OUTPATIENT)
Dept: PHYSICAL THERAPY | Age: 68
End: 2018-01-01
Attending: Other
Payer: MEDICARE

## 2018-01-01 ENCOUNTER — PATIENT MESSAGE (OUTPATIENT)
Dept: CASE MANAGEMENT | Age: 68
End: 2018-01-01

## 2018-01-01 ENCOUNTER — APPOINTMENT (OUTPATIENT)
Dept: LAB | Age: 68
End: 2018-01-01
Attending: FAMILY MEDICINE
Payer: MEDICARE

## 2018-01-01 ENCOUNTER — APPOINTMENT (OUTPATIENT)
Dept: INTERVENTIONAL RADIOLOGY/VASCULAR | Facility: HOSPITAL | Age: 68
DRG: 480 | End: 2018-01-01
Attending: INTERNAL MEDICINE
Payer: MEDICARE

## 2018-01-01 ENCOUNTER — LAB ENCOUNTER (OUTPATIENT)
Dept: LAB | Age: 68
End: 2018-01-01
Attending: NURSE PRACTITIONER
Payer: MEDICARE

## 2018-01-01 ENCOUNTER — APPOINTMENT (OUTPATIENT)
Dept: CT IMAGING | Facility: HOSPITAL | Age: 68
DRG: 629 | End: 2018-01-01
Attending: HOSPITALIST
Payer: MEDICARE

## 2018-01-01 ENCOUNTER — APPOINTMENT (OUTPATIENT)
Dept: NUCLEAR MEDICINE | Facility: HOSPITAL | Age: 68
End: 2018-01-01
Attending: EMERGENCY MEDICINE
Payer: MEDICARE

## 2018-01-01 ENCOUNTER — LAB REQUISITION (OUTPATIENT)
Dept: LAB | Facility: HOSPITAL | Age: 68
End: 2018-01-01
Attending: FAMILY MEDICINE
Payer: MEDICARE

## 2018-01-01 ENCOUNTER — ANESTHESIA (OUTPATIENT)
Dept: SURGERY | Facility: HOSPITAL | Age: 68
DRG: 480 | End: 2018-01-01
Payer: MEDICARE

## 2018-01-01 ENCOUNTER — APPOINTMENT (OUTPATIENT)
Dept: GENERAL RADIOLOGY | Facility: HOSPITAL | Age: 68
DRG: 287 | End: 2018-01-01
Attending: EMERGENCY MEDICINE
Payer: MEDICARE

## 2018-01-01 ENCOUNTER — SNF ADMIT/H&P (OUTPATIENT)
Dept: FAMILY MEDICINE CLINIC | Facility: CLINIC | Age: 68
End: 2018-01-01

## 2018-01-01 ENCOUNTER — APPOINTMENT (OUTPATIENT)
Dept: MRI IMAGING | Facility: HOSPITAL | Age: 68
DRG: 480 | End: 2018-01-01
Attending: HOSPITALIST
Payer: MEDICARE

## 2018-01-01 ENCOUNTER — APPOINTMENT (OUTPATIENT)
Dept: MRI IMAGING | Facility: HOSPITAL | Age: 68
DRG: 629 | End: 2018-01-01
Attending: HOSPITALIST
Payer: MEDICARE

## 2018-01-01 ENCOUNTER — ANESTHESIA EVENT (OUTPATIENT)
Dept: SURGERY | Facility: HOSPITAL | Age: 68
DRG: 480 | End: 2018-01-01
Payer: MEDICARE

## 2018-01-01 ENCOUNTER — HOSPITAL ENCOUNTER (INPATIENT)
Facility: HOSPITAL | Age: 68
LOS: 1 days | Discharge: ANOTHER HOSPITAL | DRG: 442 | End: 2018-01-01
Attending: EMERGENCY MEDICINE | Admitting: STUDENT IN AN ORGANIZED HEALTH CARE EDUCATION/TRAINING PROGRAM
Payer: MEDICARE

## 2018-01-01 ENCOUNTER — HOSPITAL ENCOUNTER (OUTPATIENT)
Age: 68
Discharge: ANOTHER HOSPITAL | DRG: 300 | End: 2018-01-01
Attending: FAMILY MEDICINE
Payer: MEDICARE

## 2018-01-01 ENCOUNTER — ANESTHESIA (OUTPATIENT)
Dept: SURGERY | Facility: HOSPITAL | Age: 68
DRG: 629 | End: 2018-01-01
Payer: MEDICARE

## 2018-01-01 ENCOUNTER — SURGERY (OUTPATIENT)
Age: 68
End: 2018-01-01

## 2018-01-01 ENCOUNTER — APPOINTMENT (OUTPATIENT)
Dept: GENERAL RADIOLOGY | Facility: HOSPITAL | Age: 68
DRG: 300 | End: 2018-01-01
Attending: INTERNAL MEDICINE
Payer: MEDICARE

## 2018-01-01 ENCOUNTER — APPOINTMENT (OUTPATIENT)
Dept: ULTRASOUND IMAGING | Facility: HOSPITAL | Age: 68
DRG: 442 | End: 2018-01-01
Attending: STUDENT IN AN ORGANIZED HEALTH CARE EDUCATION/TRAINING PROGRAM
Payer: MEDICARE

## 2018-01-01 ENCOUNTER — APPOINTMENT (OUTPATIENT)
Dept: GENERAL RADIOLOGY | Facility: HOSPITAL | Age: 68
End: 2018-01-01
Payer: MEDICARE

## 2018-01-01 ENCOUNTER — APPOINTMENT (OUTPATIENT)
Dept: CV DIAGNOSTICS | Facility: HOSPITAL | Age: 68
DRG: 287 | End: 2018-01-01
Attending: INTERNAL MEDICINE
Payer: MEDICARE

## 2018-01-01 ENCOUNTER — APPOINTMENT (OUTPATIENT)
Dept: GENERAL RADIOLOGY | Facility: HOSPITAL | Age: 68
DRG: 629 | End: 2018-01-01
Attending: PODIATRIST
Payer: MEDICARE

## 2018-01-01 ENCOUNTER — HOSPITAL ENCOUNTER (INPATIENT)
Facility: HOSPITAL | Age: 68
LOS: 1 days | Discharge: HOME HEALTH | DRG: 300 | End: 2018-01-01
Attending: EMERGENCY MEDICINE | Admitting: HOSPITALIST
Payer: MEDICARE

## 2018-01-01 ENCOUNTER — APPOINTMENT (OUTPATIENT)
Dept: MRI IMAGING | Facility: HOSPITAL | Age: 68
DRG: 629 | End: 2018-01-01
Attending: INTERNAL MEDICINE
Payer: MEDICARE

## 2018-01-01 ENCOUNTER — APPOINTMENT (OUTPATIENT)
Dept: PHYSICAL THERAPY | Age: 68
End: 2018-01-01
Attending: Other
Payer: MEDICARE

## 2018-01-01 ENCOUNTER — APPOINTMENT (OUTPATIENT)
Dept: CT IMAGING | Facility: HOSPITAL | Age: 68
DRG: 480 | End: 2018-01-01
Attending: STUDENT IN AN ORGANIZED HEALTH CARE EDUCATION/TRAINING PROGRAM
Payer: MEDICARE

## 2018-01-01 ENCOUNTER — HOSPITAL ENCOUNTER (INPATIENT)
Facility: HOSPITAL | Age: 68
LOS: 8 days | Discharge: ANOTHER HOSPITAL | DRG: 480 | End: 2018-01-01
Attending: INTERNAL MEDICINE | Admitting: INTERNAL MEDICINE
Payer: MEDICARE

## 2018-01-01 ENCOUNTER — ANESTHESIA EVENT (OUTPATIENT)
Dept: SURGERY | Facility: HOSPITAL | Age: 68
DRG: 629 | End: 2018-01-01
Payer: MEDICARE

## 2018-01-01 VITALS
DIASTOLIC BLOOD PRESSURE: 60 MMHG | OXYGEN SATURATION: 93 % | SYSTOLIC BLOOD PRESSURE: 130 MMHG | HEART RATE: 62 BPM | TEMPERATURE: 98 F | RESPIRATION RATE: 20 BRPM

## 2018-01-01 VITALS
BODY MASS INDEX: 35.33 KG/M2 | SYSTOLIC BLOOD PRESSURE: 150 MMHG | HEART RATE: 65 BPM | WEIGHT: 290.13 LBS | RESPIRATION RATE: 18 BRPM | DIASTOLIC BLOOD PRESSURE: 53 MMHG | TEMPERATURE: 98 F | OXYGEN SATURATION: 94 % | HEIGHT: 76 IN

## 2018-01-01 VITALS
TEMPERATURE: 98 F | HEART RATE: 61 BPM | DIASTOLIC BLOOD PRESSURE: 67 MMHG | WEIGHT: 285 LBS | HEIGHT: 76 IN | BODY MASS INDEX: 34.7 KG/M2 | RESPIRATION RATE: 16 BRPM | SYSTOLIC BLOOD PRESSURE: 155 MMHG | OXYGEN SATURATION: 98 %

## 2018-01-01 VITALS
RESPIRATION RATE: 16 BRPM | DIASTOLIC BLOOD PRESSURE: 54 MMHG | WEIGHT: 278 LBS | OXYGEN SATURATION: 100 % | BODY MASS INDEX: 33.85 KG/M2 | HEART RATE: 54 BPM | HEIGHT: 76 IN | SYSTOLIC BLOOD PRESSURE: 123 MMHG

## 2018-01-01 VITALS
SYSTOLIC BLOOD PRESSURE: 160 MMHG | BODY MASS INDEX: 34.1 KG/M2 | DIASTOLIC BLOOD PRESSURE: 72 MMHG | OXYGEN SATURATION: 100 % | HEIGHT: 76 IN | HEART RATE: 72 BPM | TEMPERATURE: 98 F | WEIGHT: 280 LBS | RESPIRATION RATE: 16 BRPM

## 2018-01-01 VITALS
RESPIRATION RATE: 20 BRPM | DIASTOLIC BLOOD PRESSURE: 75 MMHG | WEIGHT: 284.31 LBS | SYSTOLIC BLOOD PRESSURE: 145 MMHG | HEART RATE: 70 BPM | OXYGEN SATURATION: 99 % | BODY MASS INDEX: 35 KG/M2 | TEMPERATURE: 98 F

## 2018-01-01 VITALS
SYSTOLIC BLOOD PRESSURE: 134 MMHG | RESPIRATION RATE: 16 BRPM | TEMPERATURE: 98 F | OXYGEN SATURATION: 98 % | HEART RATE: 76 BPM | WEIGHT: 284 LBS | DIASTOLIC BLOOD PRESSURE: 70 MMHG | BODY MASS INDEX: 38.47 KG/M2 | HEIGHT: 72 IN

## 2018-01-01 VITALS
RESPIRATION RATE: 18 BRPM | TEMPERATURE: 98 F | DIASTOLIC BLOOD PRESSURE: 75 MMHG | OXYGEN SATURATION: 97 % | SYSTOLIC BLOOD PRESSURE: 144 MMHG | BODY MASS INDEX: 33.92 KG/M2 | HEIGHT: 76 IN | RESPIRATION RATE: 16 BRPM | DIASTOLIC BLOOD PRESSURE: 62 MMHG | HEART RATE: 82 BPM | SYSTOLIC BLOOD PRESSURE: 134 MMHG | HEART RATE: 66 BPM | WEIGHT: 278.5 LBS

## 2018-01-01 VITALS
SYSTOLIC BLOOD PRESSURE: 146 MMHG | TEMPERATURE: 98 F | OXYGEN SATURATION: 96 % | HEART RATE: 80 BPM | RESPIRATION RATE: 20 BRPM | DIASTOLIC BLOOD PRESSURE: 80 MMHG

## 2018-01-01 VITALS
BODY MASS INDEX: 35 KG/M2 | DIASTOLIC BLOOD PRESSURE: 80 MMHG | SYSTOLIC BLOOD PRESSURE: 140 MMHG | WEIGHT: 285 LBS | HEART RATE: 64 BPM

## 2018-01-01 VITALS
WEIGHT: 280.5 LBS | DIASTOLIC BLOOD PRESSURE: 61 MMHG | DIASTOLIC BLOOD PRESSURE: 82 MMHG | TEMPERATURE: 98 F | SYSTOLIC BLOOD PRESSURE: 143 MMHG | OXYGEN SATURATION: 95 % | RESPIRATION RATE: 12 BRPM | HEIGHT: 76 IN | BODY MASS INDEX: 34 KG/M2 | HEART RATE: 50 BPM | BODY MASS INDEX: 36.11 KG/M2 | TEMPERATURE: 98 F | RESPIRATION RATE: 18 BRPM | SYSTOLIC BLOOD PRESSURE: 146 MMHG | HEART RATE: 63 BPM | WEIGHT: 296.5 LBS | OXYGEN SATURATION: 94 %

## 2018-01-01 VITALS
TEMPERATURE: 98 F | RESPIRATION RATE: 16 BRPM | HEART RATE: 68 BPM | DIASTOLIC BLOOD PRESSURE: 60 MMHG | SYSTOLIC BLOOD PRESSURE: 114 MMHG

## 2018-01-01 VITALS
SYSTOLIC BLOOD PRESSURE: 150 MMHG | BODY MASS INDEX: 36.57 KG/M2 | WEIGHT: 270 LBS | TEMPERATURE: 97 F | HEIGHT: 72 IN | RESPIRATION RATE: 20 BRPM | OXYGEN SATURATION: 100 % | DIASTOLIC BLOOD PRESSURE: 65 MMHG | HEART RATE: 53 BPM

## 2018-01-01 VITALS
HEART RATE: 66 BPM | TEMPERATURE: 98 F | BODY MASS INDEX: 33.61 KG/M2 | OXYGEN SATURATION: 99 % | HEIGHT: 76 IN | SYSTOLIC BLOOD PRESSURE: 136 MMHG | DIASTOLIC BLOOD PRESSURE: 67 MMHG | RESPIRATION RATE: 17 BRPM | WEIGHT: 276 LBS

## 2018-01-01 VITALS
HEART RATE: 71 BPM | SYSTOLIC BLOOD PRESSURE: 112 MMHG | RESPIRATION RATE: 18 BRPM | TEMPERATURE: 98 F | OXYGEN SATURATION: 97 % | BODY MASS INDEX: 33.49 KG/M2 | HEIGHT: 76 IN | DIASTOLIC BLOOD PRESSURE: 44 MMHG | WEIGHT: 275 LBS

## 2018-01-01 VITALS
HEART RATE: 64 BPM | SYSTOLIC BLOOD PRESSURE: 133 MMHG | DIASTOLIC BLOOD PRESSURE: 68 MMHG | RESPIRATION RATE: 18 BRPM | OXYGEN SATURATION: 96 %

## 2018-01-01 VITALS
HEIGHT: 72 IN | SYSTOLIC BLOOD PRESSURE: 152 MMHG | WEIGHT: 285 LBS | HEART RATE: 60 BPM | DIASTOLIC BLOOD PRESSURE: 88 MMHG | TEMPERATURE: 98 F | BODY MASS INDEX: 38.6 KG/M2 | RESPIRATION RATE: 16 BRPM

## 2018-01-01 VITALS
BODY MASS INDEX: 34 KG/M2 | RESPIRATION RATE: 20 BRPM | WEIGHT: 281.38 LBS | OXYGEN SATURATION: 99 % | DIASTOLIC BLOOD PRESSURE: 71 MMHG | SYSTOLIC BLOOD PRESSURE: 148 MMHG | HEART RATE: 58 BPM

## 2018-01-01 VITALS
WEIGHT: 284 LBS | BODY MASS INDEX: 35 KG/M2 | RESPIRATION RATE: 16 BRPM | DIASTOLIC BLOOD PRESSURE: 62 MMHG | TEMPERATURE: 98 F | HEART RATE: 72 BPM | SYSTOLIC BLOOD PRESSURE: 118 MMHG

## 2018-01-01 VITALS
HEART RATE: 53 BPM | DIASTOLIC BLOOD PRESSURE: 55 MMHG | OXYGEN SATURATION: 98 % | SYSTOLIC BLOOD PRESSURE: 111 MMHG | RESPIRATION RATE: 18 BRPM

## 2018-01-01 VITALS
BODY MASS INDEX: 35 KG/M2 | WEIGHT: 287 LBS | DIASTOLIC BLOOD PRESSURE: 58 MMHG | OXYGEN SATURATION: 98 % | HEART RATE: 53 BPM | SYSTOLIC BLOOD PRESSURE: 130 MMHG

## 2018-01-01 DIAGNOSIS — R07.89 CHEST PAIN, ATYPICAL: Primary | ICD-10-CM

## 2018-01-01 DIAGNOSIS — E11.59 TYPE 2 DIABETES MELLITUS WITH CIRCULATORY DISORDER (HCC): ICD-10-CM

## 2018-01-01 DIAGNOSIS — R11.2 INTRACTABLE VOMITING WITH NAUSEA, UNSPECIFIED VOMITING TYPE: ICD-10-CM

## 2018-01-01 DIAGNOSIS — E11.43 DIABETIC AUTONOMIC NEUROPATHY ASSOCIATED WITH TYPE 2 DIABETES MELLITUS (HCC): ICD-10-CM

## 2018-01-01 DIAGNOSIS — I10 ESSENTIAL HYPERTENSION: ICD-10-CM

## 2018-01-01 DIAGNOSIS — Z79.4 TYPE 2 DIABETES MELLITUS WITH OTHER CIRCULATORY COMPLICATION, WITH LONG-TERM CURRENT USE OF INSULIN (HCC): ICD-10-CM

## 2018-01-01 DIAGNOSIS — R74.8 ELEVATED LIVER ENZYMES: ICD-10-CM

## 2018-01-01 DIAGNOSIS — Z94.4 LIVER TRANSPLANT RECIPIENT (HCC): ICD-10-CM

## 2018-01-01 DIAGNOSIS — N17.9 AKI (ACUTE KIDNEY INJURY) (HCC): ICD-10-CM

## 2018-01-01 DIAGNOSIS — L97.522 NON-PRESSURE CHRONIC ULCER OF OTHER PART OF LEFT FOOT WITH FAT LAYER EXPOSED (HCC): Primary | ICD-10-CM

## 2018-01-01 DIAGNOSIS — E08.621 DIABETIC ULCER OF OTHER PART OF LEFT FOOT ASSOCIATED WITH DIABETES MELLITUS DUE TO UNDERLYING CONDITION, WITH OTHER ULCER SEVERITY (HCC): Primary | ICD-10-CM

## 2018-01-01 DIAGNOSIS — R53.1 GENERALIZED WEAKNESS: ICD-10-CM

## 2018-01-01 DIAGNOSIS — I25.700 CORONARY ARTERY DISEASE INVOLVING CORONARY BYPASS GRAFT OF NATIVE HEART WITH UNSTABLE ANGINA PECTORIS (HCC): Primary | ICD-10-CM

## 2018-01-01 DIAGNOSIS — G43.911 INTRACTABLE MIGRAINE WITH STATUS MIGRAINOSUS, UNSPECIFIED MIGRAINE TYPE: ICD-10-CM

## 2018-01-01 DIAGNOSIS — E11.621 DIABETIC ULCER OF LEFT MIDFOOT ASSOCIATED WITH TYPE 2 DIABETES MELLITUS, UNSPECIFIED ULCER STAGE (HCC): ICD-10-CM

## 2018-01-01 DIAGNOSIS — R41.9 ALTERATION OF AWARENESS: ICD-10-CM

## 2018-01-01 DIAGNOSIS — L97.528 DIABETIC ULCER OF OTHER PART OF LEFT FOOT ASSOCIATED WITH DIABETES MELLITUS DUE TO UNDERLYING CONDITION, WITH OTHER ULCER SEVERITY (HCC): Primary | ICD-10-CM

## 2018-01-01 DIAGNOSIS — F41.9 ANXIETY: ICD-10-CM

## 2018-01-01 DIAGNOSIS — J40 BRONCHITIS: ICD-10-CM

## 2018-01-01 DIAGNOSIS — R41.3 SHORT-TERM MEMORY LOSS: ICD-10-CM

## 2018-01-01 DIAGNOSIS — I25.119 CORONARY ARTERY DISEASE INVOLVING NATIVE CORONARY ARTERY OF NATIVE HEART WITH ANGINA PECTORIS (HCC): ICD-10-CM

## 2018-01-01 DIAGNOSIS — E16.2 HYPOGLYCEMIA: ICD-10-CM

## 2018-01-01 DIAGNOSIS — Z86.2 HISTORY OF ANEMIA DUE TO CHRONIC KIDNEY DISEASE: ICD-10-CM

## 2018-01-01 DIAGNOSIS — M62.81 MUSCLE WEAKNESS (GENERALIZED): Primary | ICD-10-CM

## 2018-01-01 DIAGNOSIS — R25.1 TREMOR: ICD-10-CM

## 2018-01-01 DIAGNOSIS — L03.116 CELLULITIS OF LEFT FOOT: ICD-10-CM

## 2018-01-01 DIAGNOSIS — Z79.4 TYPE 2 DIABETES MELLITUS WITH OTHER CIRCULATORY COMPLICATION, WITH LONG-TERM CURRENT USE OF INSULIN (HCC): Primary | ICD-10-CM

## 2018-01-01 DIAGNOSIS — N18.30 CKD (CHRONIC KIDNEY DISEASE) STAGE 3, GFR 30-59 ML/MIN (HCC): ICD-10-CM

## 2018-01-01 DIAGNOSIS — R07.9 CHEST PAIN OF UNCERTAIN ETIOLOGY: ICD-10-CM

## 2018-01-01 DIAGNOSIS — I63.9 CEREBROVASCULAR ACCIDENT (CVA), UNSPECIFIED MECHANISM (HCC): ICD-10-CM

## 2018-01-01 DIAGNOSIS — Z95.5 S/P DRUG ELUTING CORONARY STENT PLACEMENT: ICD-10-CM

## 2018-01-01 DIAGNOSIS — I24.9 ACUTE CORONARY SYNDROME (HCC): Primary | ICD-10-CM

## 2018-01-01 DIAGNOSIS — L97.426 DIABETIC ULCER OF LEFT MIDFOOT ASSOCIATED WITH TYPE 2 DIABETES MELLITUS, WITH BONE INVOLVEMENT WITHOUT EVIDENCE OF NECROSIS (HCC): ICD-10-CM

## 2018-01-01 DIAGNOSIS — E78.5 DYSLIPIDEMIA: ICD-10-CM

## 2018-01-01 DIAGNOSIS — L97.528 DIABETIC ULCER OF OTHER PART OF LEFT FOOT ASSOCIATED WITH DIABETES MELLITUS DUE TO UNDERLYING CONDITION, WITH OTHER ULCER SEVERITY (HCC): ICD-10-CM

## 2018-01-01 DIAGNOSIS — Z99.89 OSA ON CPAP: ICD-10-CM

## 2018-01-01 DIAGNOSIS — E11.9 DIABETES MELLITUS (HCC): Primary | ICD-10-CM

## 2018-01-01 DIAGNOSIS — N40.0 BENIGN PROSTATIC HYPERPLASIA, UNSPECIFIED WHETHER LOWER URINARY TRACT SYMPTOMS PRESENT: ICD-10-CM

## 2018-01-01 DIAGNOSIS — N18.9 HISTORY OF ANEMIA DUE TO CHRONIC KIDNEY DISEASE: ICD-10-CM

## 2018-01-01 DIAGNOSIS — Z94.4 LIVER TRANSPLANTED (HCC): ICD-10-CM

## 2018-01-01 DIAGNOSIS — L97.522 NON-PRESSURE CHRONIC ULCER OF OTHER PART OF LEFT FOOT WITH FAT LAYER EXPOSED (HCC): ICD-10-CM

## 2018-01-01 DIAGNOSIS — I25.110 CORONARY ARTERY DISEASE INVOLVING NATIVE CORONARY ARTERY OF NATIVE HEART WITH UNSTABLE ANGINA PECTORIS (HCC): ICD-10-CM

## 2018-01-01 DIAGNOSIS — D63.8 ANEMIA OF CHRONIC DISEASE: ICD-10-CM

## 2018-01-01 DIAGNOSIS — G43.109 COMPLICATED MIGRAINE: ICD-10-CM

## 2018-01-01 DIAGNOSIS — E87.2 METABOLIC ACIDOSIS: ICD-10-CM

## 2018-01-01 DIAGNOSIS — Z86.69 HISTORY OF MIGRAINE: ICD-10-CM

## 2018-01-01 DIAGNOSIS — R60.0 LOCALIZED EDEMA: ICD-10-CM

## 2018-01-01 DIAGNOSIS — R53.1 ACUTE LEFT-SIDED WEAKNESS: ICD-10-CM

## 2018-01-01 DIAGNOSIS — G62.9 NEUROPATHY: ICD-10-CM

## 2018-01-01 DIAGNOSIS — Z89.9 AMPUTEE: ICD-10-CM

## 2018-01-01 DIAGNOSIS — E11.621 DM FOOT ULCER (HCC): Primary | ICD-10-CM

## 2018-01-01 DIAGNOSIS — R19.7 DIARRHEA: Primary | ICD-10-CM

## 2018-01-01 DIAGNOSIS — R74.01 TRANSAMINITIS: ICD-10-CM

## 2018-01-01 DIAGNOSIS — G44.59 OTHER COMPLICATED HEADACHE SYNDROME: ICD-10-CM

## 2018-01-01 DIAGNOSIS — D89.89 SUPPRESSION OF IMMUNE SYSTEM SUBTHERAPEUTIC (HCC): ICD-10-CM

## 2018-01-01 DIAGNOSIS — F33.42 RECURRENT MAJOR DEPRESSIVE DISORDER, IN FULL REMISSION (HCC): ICD-10-CM

## 2018-01-01 DIAGNOSIS — N28.9 RENAL INSUFFICIENCY: ICD-10-CM

## 2018-01-01 DIAGNOSIS — D64.9 ANEMIA, UNSPECIFIED TYPE: ICD-10-CM

## 2018-01-01 DIAGNOSIS — L97.509 DM FOOT ULCER (HCC): Primary | ICD-10-CM

## 2018-01-01 DIAGNOSIS — Z94.4 LIVER TRANSPLANT STATUS (HCC): ICD-10-CM

## 2018-01-01 DIAGNOSIS — R74.8 ABNORMAL SERUM LEVEL OF ALKALINE PHOSPHATASE: ICD-10-CM

## 2018-01-01 DIAGNOSIS — L03.116 CELLULITIS OF LEFT LOWER EXTREMITY: Primary | ICD-10-CM

## 2018-01-01 DIAGNOSIS — R79.9 ELEVATED BUN: Primary | ICD-10-CM

## 2018-01-01 DIAGNOSIS — I63.9 ACUTE CVA (CEREBROVASCULAR ACCIDENT) (HCC): ICD-10-CM

## 2018-01-01 DIAGNOSIS — E11.42 DIABETIC POLYNEUROPATHY ASSOCIATED WITH TYPE 2 DIABETES MELLITUS (HCC): ICD-10-CM

## 2018-01-01 DIAGNOSIS — I10 BENIGN ESSENTIAL HTN: ICD-10-CM

## 2018-01-01 DIAGNOSIS — E11.59 TYPE 2 DIABETES MELLITUS WITH OTHER CIRCULATORY COMPLICATION, WITH LONG-TERM CURRENT USE OF INSULIN (HCC): Primary | ICD-10-CM

## 2018-01-01 DIAGNOSIS — R29.818 TRANSIENT NEUROLOGICAL SYMPTOMS: ICD-10-CM

## 2018-01-01 DIAGNOSIS — Z94.4 LIVER REPLACED BY TRANSPLANT (HCC): ICD-10-CM

## 2018-01-01 DIAGNOSIS — R07.9 ACUTE CHEST PAIN: ICD-10-CM

## 2018-01-01 DIAGNOSIS — K92.0 HEMATEMESIS WITH NAUSEA: ICD-10-CM

## 2018-01-01 DIAGNOSIS — Z00.00 ROUTINE GENERAL MEDICAL EXAMINATION AT A HEALTH CARE FACILITY: Primary | ICD-10-CM

## 2018-01-01 DIAGNOSIS — E11.43 DIABETIC AUTONOMIC NEUROPATHY ASSOCIATED WITH TYPE 2 DIABETES MELLITUS (HCC): Primary | ICD-10-CM

## 2018-01-01 DIAGNOSIS — I25.700 CORONARY ARTERY DISEASE INVOLVING CORONARY BYPASS GRAFT OF NATIVE HEART WITH UNSTABLE ANGINA PECTORIS (HCC): ICD-10-CM

## 2018-01-01 DIAGNOSIS — Z79.4 TYPE 2 DIABETES MELLITUS WITH HYPERGLYCEMIA, WITH LONG-TERM CURRENT USE OF INSULIN (HCC): ICD-10-CM

## 2018-01-01 DIAGNOSIS — I73.9 PVD (PERIPHERAL VASCULAR DISEASE) (HCC): ICD-10-CM

## 2018-01-01 DIAGNOSIS — Z89.432 PARTIAL NONTRAUMATIC AMPUTATION OF LEFT FOOT (HCC): Primary | ICD-10-CM
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
## 2018-01-01 DIAGNOSIS — I25.710 CORONARY ARTERY DISEASE INVOLVING AUTOLOGOUS VEIN CORONARY BYPASS GRAFT WITH UNSTABLE ANGINA PECTORIS (HCC): ICD-10-CM

## 2018-01-01 DIAGNOSIS — E66.9 OBESITY (BMI 30-39.9): ICD-10-CM

## 2018-01-01 DIAGNOSIS — E11.621 DIABETIC ULCER OF LEFT MIDFOOT ASSOCIATED WITH TYPE 2 DIABETES MELLITUS, WITH BONE INVOLVEMENT WITHOUT EVIDENCE OF NECROSIS (HCC): ICD-10-CM

## 2018-01-01 DIAGNOSIS — G47.33 OSA (OBSTRUCTIVE SLEEP APNEA): ICD-10-CM

## 2018-01-01 DIAGNOSIS — R25.9 MIXED ACTION AND RESTING TREMOR: Primary | ICD-10-CM

## 2018-01-01 DIAGNOSIS — R73.9 HYPERGLYCEMIA: ICD-10-CM

## 2018-01-01 DIAGNOSIS — M86.9 OSTEOMYELITIS OF LEFT FOOT, UNSPECIFIED TYPE (HCC): Primary | ICD-10-CM

## 2018-01-01 DIAGNOSIS — N17.9 ACUTE KIDNEY INJURY (HCC): ICD-10-CM

## 2018-01-01 DIAGNOSIS — N18.30 CONTROLLED OTHER SPECIFIED DIABETES MELLITUS WITH STAGE 3 CHRONIC KIDNEY DISEASE, UNSPECIFIED WHETHER LONG TERM INSULIN USE (HCC): ICD-10-CM

## 2018-01-01 DIAGNOSIS — R16.1 SPLENOMEGALY: ICD-10-CM

## 2018-01-01 DIAGNOSIS — Z02.9 ENCOUNTERS FOR ADMINISTRATIVE PURPOSE: ICD-10-CM

## 2018-01-01 DIAGNOSIS — E11.59 TYPE 2 DIABETES MELLITUS WITH OTHER CIRCULATORY COMPLICATION, WITH LONG-TERM CURRENT USE OF INSULIN (HCC): ICD-10-CM

## 2018-01-01 DIAGNOSIS — I50.33 ACUTE ON CHRONIC DIASTOLIC CONGESTIVE HEART FAILURE (HCC): ICD-10-CM

## 2018-01-01 DIAGNOSIS — R40.4 AWARENESS ALTERATION, TRANSIENT: ICD-10-CM

## 2018-01-01 DIAGNOSIS — R05.9 COUGH: ICD-10-CM

## 2018-01-01 DIAGNOSIS — I69.393 ATAXIA DUE TO RECENT STROKE: ICD-10-CM

## 2018-01-01 DIAGNOSIS — R53.1 WEAKNESS: Primary | ICD-10-CM

## 2018-01-01 DIAGNOSIS — E66.01 SEVERE OBESITY (BMI 35.0-39.9) WITH COMORBIDITY (HCC): ICD-10-CM

## 2018-01-01 DIAGNOSIS — N18.30 ANEMIA IN STAGE 3 CHRONIC KIDNEY DISEASE (HCC): ICD-10-CM

## 2018-01-01 DIAGNOSIS — G89.29 CHRONIC INTRACTABLE HEADACHE, UNSPECIFIED HEADACHE TYPE: ICD-10-CM

## 2018-01-01 DIAGNOSIS — E08.621 DIABETIC ULCER OF OTHER PART OF LEFT FOOT ASSOCIATED WITH DIABETES MELLITUS DUE TO UNDERLYING CONDITION, WITH OTHER ULCER SEVERITY (HCC): ICD-10-CM

## 2018-01-01 DIAGNOSIS — R41.82 ALTERED MENTAL STATUS: ICD-10-CM

## 2018-01-01 DIAGNOSIS — E66.01 SEVERE OBESITY (BMI 35.0-39.9) WITH COMORBIDITY (HCC): Chronic | ICD-10-CM

## 2018-01-01 DIAGNOSIS — N18.4 CKD (CHRONIC KIDNEY DISEASE) STAGE 4, GFR 15-29 ML/MIN (HCC): ICD-10-CM

## 2018-01-01 DIAGNOSIS — Z91.81 AT RISK FOR FALLING: ICD-10-CM

## 2018-01-01 DIAGNOSIS — R51.9 CHRONIC INTRACTABLE HEADACHE, UNSPECIFIED HEADACHE TYPE: ICD-10-CM

## 2018-01-01 DIAGNOSIS — G47.33 OSA ON CPAP: ICD-10-CM

## 2018-01-01 DIAGNOSIS — I67.9 CEREBRAL VASCULAR DISEASE: Primary | ICD-10-CM

## 2018-01-01 DIAGNOSIS — I25.119 CORONARY ARTERY DISEASE INVOLVING NATIVE HEART WITH ANGINA PECTORIS, UNSPECIFIED VESSEL OR LESION TYPE (HCC): ICD-10-CM

## 2018-01-01 DIAGNOSIS — N17.9 ACUTE RENAL FAILURE, UNSPECIFIED ACUTE RENAL FAILURE TYPE (HCC): ICD-10-CM

## 2018-01-01 DIAGNOSIS — E11.8 DIABETIC COMPLICATION (HCC): Primary | ICD-10-CM

## 2018-01-01 DIAGNOSIS — I63.9 ACUTE ISCHEMIC STROKE (HCC): ICD-10-CM

## 2018-01-01 DIAGNOSIS — R79.89 AZOTEMIA: ICD-10-CM

## 2018-01-01 DIAGNOSIS — G62.9 NEUROPATHY: Primary | ICD-10-CM

## 2018-01-01 DIAGNOSIS — I20.8 ANGINA AT REST (HCC): ICD-10-CM

## 2018-01-01 DIAGNOSIS — I24.9 ACUTE CORONARY SYNDROME (HCC): ICD-10-CM

## 2018-01-01 DIAGNOSIS — R39.198 DIFFICULTY VOIDING: ICD-10-CM

## 2018-01-01 DIAGNOSIS — E55.9 VITAMIN D DEFICIENCY: ICD-10-CM

## 2018-01-01 DIAGNOSIS — R11.2 INTRACTABLE VOMITING WITH NAUSEA, UNSPECIFIED VOMITING TYPE: Primary | ICD-10-CM

## 2018-01-01 DIAGNOSIS — L03.116 CELLULITIS OF LEFT FOOT: Primary | ICD-10-CM

## 2018-01-01 DIAGNOSIS — Z89.511 HISTORY OF AMPUTATION OF RIGHT LOWER EXTREMITY THROUGH TIBIA AND FIBULA (HCC): ICD-10-CM

## 2018-01-01 DIAGNOSIS — E11.65 TYPE 2 DIABETES MELLITUS WITH HYPERGLYCEMIA, WITH LONG-TERM CURRENT USE OF INSULIN (HCC): ICD-10-CM

## 2018-01-01 DIAGNOSIS — I50.22 CHRONIC SYSTOLIC CONGESTIVE HEART FAILURE (HCC): ICD-10-CM

## 2018-01-01 DIAGNOSIS — E13.22 CONTROLLED OTHER SPECIFIED DIABETES MELLITUS WITH STAGE 3 CHRONIC KIDNEY DISEASE, UNSPECIFIED WHETHER LONG TERM INSULIN USE (HCC): ICD-10-CM

## 2018-01-01 DIAGNOSIS — E11.8 TYPE 2 DIABETES MELLITUS WITH COMPLICATION, WITH LONG-TERM CURRENT USE OF INSULIN (HCC): ICD-10-CM

## 2018-01-01 DIAGNOSIS — G93.40 ENCEPHALOPATHY: ICD-10-CM

## 2018-01-01 DIAGNOSIS — F44.9 CONVERSION DISORDER: ICD-10-CM

## 2018-01-01 DIAGNOSIS — E11.00 UNCONTROLLED TYPE 2 DIABETES MELLITUS WITH HYPEROSMOLAR NONKETOTIC HYPERGLYCEMIA (HCC): ICD-10-CM

## 2018-01-01 DIAGNOSIS — D69.6 THROMBOCYTOPENIA (HCC): ICD-10-CM

## 2018-01-01 DIAGNOSIS — I44.30 AVB (ATRIOVENTRICULAR BLOCK): ICD-10-CM

## 2018-01-01 DIAGNOSIS — K74.60 LIVER CIRRHOSIS SECONDARY TO NASH (HCC): ICD-10-CM

## 2018-01-01 DIAGNOSIS — K75.81 LIVER CIRRHOSIS SECONDARY TO NASH (HCC): ICD-10-CM

## 2018-01-01 DIAGNOSIS — E03.9 HYPOTHYROIDISM, UNSPECIFIED TYPE: Chronic | ICD-10-CM

## 2018-01-01 DIAGNOSIS — R53.1 ACUTE RIGHT-SIDED WEAKNESS: ICD-10-CM

## 2018-01-01 DIAGNOSIS — R07.9 ACUTE CHEST PAIN: Primary | ICD-10-CM

## 2018-01-01 DIAGNOSIS — G47.9 DIFFICULTY SLEEPING: ICD-10-CM

## 2018-01-01 DIAGNOSIS — E86.0 DEHYDRATION: Primary | ICD-10-CM

## 2018-01-01 DIAGNOSIS — Z79.4 TYPE 2 DIABETES MELLITUS WITH COMPLICATION, WITH LONG-TERM CURRENT USE OF INSULIN (HCC): ICD-10-CM

## 2018-01-01 DIAGNOSIS — L03.116 LEFT LEG CELLULITIS: Primary | ICD-10-CM

## 2018-01-01 DIAGNOSIS — R53.83 LETHARGY: ICD-10-CM

## 2018-01-01 DIAGNOSIS — L03.116 CELLULITIS OF LEFT LOWER EXTREMITY: ICD-10-CM

## 2018-01-01 DIAGNOSIS — R06.02 SOB (SHORTNESS OF BREATH) ON EXERTION: ICD-10-CM

## 2018-01-01 DIAGNOSIS — R74.01 TRANSAMINITIS: Chronic | ICD-10-CM

## 2018-01-01 DIAGNOSIS — L97.429 DIABETIC ULCER OF LEFT MIDFOOT ASSOCIATED WITH TYPE 2 DIABETES MELLITUS, UNSPECIFIED ULCER STAGE (HCC): ICD-10-CM

## 2018-01-01 DIAGNOSIS — R40.4 TRANSIENT ALTERATION OF AWARENESS: ICD-10-CM

## 2018-01-01 DIAGNOSIS — E78.5 HYPERLIPIDEMIA, UNSPECIFIED HYPERLIPIDEMIA TYPE: ICD-10-CM

## 2018-01-01 DIAGNOSIS — G43.009 ATYPICAL MIGRAINE: ICD-10-CM

## 2018-01-01 DIAGNOSIS — M79.609 PAIN IN EXTREMITY, UNSPECIFIED EXTREMITY: ICD-10-CM

## 2018-01-01 DIAGNOSIS — L03.032 CELLULITIS OF TOE OF LEFT FOOT: Primary | ICD-10-CM

## 2018-01-01 DIAGNOSIS — R55 SYNCOPE, UNSPECIFIED SYNCOPE TYPE: ICD-10-CM

## 2018-01-01 DIAGNOSIS — Z94.0 KIDNEY TRANSPLANTED: ICD-10-CM

## 2018-01-01 DIAGNOSIS — R55 VASOVAGAL SYNCOPE: ICD-10-CM

## 2018-01-01 DIAGNOSIS — R69 DIAGNOSIS UNKNOWN: Primary | ICD-10-CM

## 2018-01-01 DIAGNOSIS — R10.9 ABDOMINAL PAIN OF UNKNOWN ETIOLOGY: ICD-10-CM

## 2018-01-01 DIAGNOSIS — R20.2 PARESTHESIA: ICD-10-CM

## 2018-01-01 DIAGNOSIS — R42 DIZZINESS: ICD-10-CM

## 2018-01-01 DIAGNOSIS — Z94.4 LIVER REPLACED BY TRANSPLANT (HCC): Primary | ICD-10-CM

## 2018-01-01 DIAGNOSIS — M86.9 OSTEOMYELITIS OF LEFT FOOT, UNSPECIFIED TYPE (HCC): ICD-10-CM

## 2018-01-01 DIAGNOSIS — D63.1 ANEMIA IN STAGE 3 CHRONIC KIDNEY DISEASE (HCC): ICD-10-CM

## 2018-01-01 DIAGNOSIS — E03.9 HYPOTHYROIDISM: ICD-10-CM

## 2018-01-01 DIAGNOSIS — E87.1 HYPONATREMIA: ICD-10-CM

## 2018-01-01 DIAGNOSIS — K76.7 HEPATORENAL SYNDROME (HCC): ICD-10-CM

## 2018-01-01 LAB
25-HYDROXYVITAMIN D (TOTAL): 12.6 NG/ML (ref 30–100)
A/G RATIO: 1.07
ALBUMIN SERPL-MCNC: 2.1 G/DL (ref 3.5–4.8)
ALBUMIN SERPL-MCNC: 2.2 G/DL (ref 3.5–4.8)
ALBUMIN SERPL-MCNC: 2.4 G/DL (ref 3.5–4.8)
ALBUMIN SERPL-MCNC: 2.5 G/DL (ref 3.5–4.8)
ALBUMIN SERPL-MCNC: 2.6 G/DL (ref 3.5–4.8)
ALBUMIN SERPL-MCNC: 2.7 G/DL (ref 3.5–4.8)
ALBUMIN SERPL-MCNC: 2.7 G/DL (ref 3.5–4.8)
ALBUMIN SERPL-MCNC: 2.9 G/DL (ref 3.5–4.8)
ALBUMIN SERPL-MCNC: 3 G/DL (ref 3.5–4.8)
ALBUMIN, SERUM: 2.85 G/DL (ref 3.5–4.8)
ALP LIVER SERPL-CCNC: 1054 U/L (ref 45–117)
ALP LIVER SERPL-CCNC: 1132 U/L (ref 45–117)
ALP LIVER SERPL-CCNC: 1240 U/L (ref 45–117)
ALP LIVER SERPL-CCNC: 1474 U/L (ref 45–117)
ALP LIVER SERPL-CCNC: 882 U/L (ref 45–117)
ALP LIVER SERPL-CCNC: 882 U/L (ref 45–117)
ALP LIVER SERPL-CCNC: 905 U/L (ref 45–117)
ALP LIVER SERPL-CCNC: 926 U/L (ref 45–117)
ALP LIVER SERPL-CCNC: 933 U/L (ref 45–117)
ALP LIVER SERPL-CCNC: 953 U/L (ref 45–117)
ALP LIVER SERPL-CCNC: 957 U/L (ref 45–117)
ALP LIVER SERPL-CCNC: 958 U/L (ref 45–117)
ALP LIVER SERPL-CCNC: 982 U/L (ref 45–117)
ALP LIVER SERPL-CCNC: 989 U/L (ref 45–117)
ALPHA-1 GLOBULIN: 0.25 G/DL (ref 0.1–0.3)
ALPHA-2 GLOBULIN: 0.74 G/DL (ref 0.6–1)
ALT SERPL-CCNC: 24 U/L (ref 17–63)
ALT SERPL-CCNC: 30 U/L (ref 17–63)
ALT SERPL-CCNC: 32 U/L (ref 17–63)
ALT SERPL-CCNC: 33 U/L (ref 17–63)
ALT SERPL-CCNC: 36 U/L (ref 17–63)
ALT SERPL-CCNC: 37 U/L (ref 17–63)
ALT SERPL-CCNC: 38 U/L (ref 17–63)
ALT SERPL-CCNC: 43 U/L (ref 17–63)
ALT SERPL-CCNC: 45 U/L (ref 17–63)
ALT SERPL-CCNC: 48 U/L (ref 17–63)
ALT SERPL-CCNC: 52 U/L (ref 17–63)
ALT SERPL-CCNC: 73 U/L (ref 17–63)
ALT SERPL-CCNC: 78 U/L (ref 17–63)
ALT SERPL-CCNC: 93 U/L (ref 17–63)
APTT PPP: 33.1 SECONDS (ref 25–34)
APTT PPP: 35.8 SECONDS (ref 25–34)
AST SERPL-CCNC: 105 U/L (ref 15–41)
AST SERPL-CCNC: 116 U/L (ref 15–41)
AST SERPL-CCNC: 129 U/L (ref 15–41)
AST SERPL-CCNC: 46 U/L (ref 15–41)
AST SERPL-CCNC: 58 U/L (ref 15–41)
AST SERPL-CCNC: 61 U/L (ref 15–41)
AST SERPL-CCNC: 62 U/L (ref 15–41)
AST SERPL-CCNC: 62 U/L (ref 15–41)
AST SERPL-CCNC: 65 U/L (ref 15–41)
AST SERPL-CCNC: 69 U/L (ref 15–41)
AST SERPL-CCNC: 69 U/L (ref 15–41)
AST SERPL-CCNC: 70 U/L (ref 15–41)
AST SERPL-CCNC: 73 U/L (ref 15–41)
AST SERPL-CCNC: 75 U/L (ref 15–41)
ATRIAL RATE: 51 BPM
ATRIAL RATE: 58 BPM
ATRIAL RATE: 58 BPM
ATRIAL RATE: 61 BPM
ATRIAL RATE: 90 BPM
ATRIAL RATE: 93 BPM
BASOPHILS # BLD AUTO: 0 X10(3) UL (ref 0–0.1)
BASOPHILS # BLD AUTO: 0.01 X10(3) UL (ref 0–0.1)
BASOPHILS # BLD AUTO: 0.02 X10(3) UL (ref 0–0.1)
BASOPHILS # BLD AUTO: 0.02 X10(3) UL (ref 0–0.1)
BASOPHILS NFR BLD AUTO: 0 %
BASOPHILS NFR BLD AUTO: 0.1 %
BASOPHILS NFR BLD AUTO: 0.2 %
BASOPHILS NFR BLD AUTO: 0.3 %
BASOPHILS NFR BLD AUTO: 0.4 %
BETA GLOBULIN: 0.68 G/DL (ref 0.7–1.2)
BETA-HYDROXYBUTYRIC ACID: 0.7 MG/DL
BILIRUB SERPL-MCNC: 2.3 MG/DL (ref 0.1–2)
BILIRUB SERPL-MCNC: 2.3 MG/DL (ref 0.1–2)
BILIRUB SERPL-MCNC: 2.5 MG/DL (ref 0.1–2)
BILIRUB SERPL-MCNC: 2.7 MG/DL (ref 0.1–2)
BILIRUB SERPL-MCNC: 2.8 MG/DL (ref 0.1–2)
BILIRUB SERPL-MCNC: 2.9 MG/DL (ref 0.1–2)
BILIRUB SERPL-MCNC: 2.9 MG/DL (ref 0.1–2)
BILIRUB SERPL-MCNC: 3.1 MG/DL (ref 0.1–2)
BILIRUB SERPL-MCNC: 3.5 MG/DL (ref 0.1–2)
BILIRUB SERPL-MCNC: 4.3 MG/DL (ref 0.1–2)
BILIRUB UR QL STRIP.AUTO: NEGATIVE
BILIRUB UR QL STRIP.AUTO: NEGATIVE
BUN BLD-MCNC: 30 MG/DL (ref 8–20)
BUN BLD-MCNC: 30 MG/DL (ref 8–20)
BUN BLD-MCNC: 35 MG/DL (ref 8–20)
BUN BLD-MCNC: 45 MG/DL (ref 8–20)
BUN BLD-MCNC: 48 MG/DL (ref 8–20)
BUN BLD-MCNC: 61 MG/DL (ref 8–20)
BUN BLD-MCNC: 61 MG/DL (ref 8–20)
BUN BLD-MCNC: 67 MG/DL (ref 8–20)
BUN BLD-MCNC: 68 MG/DL (ref 8–20)
BUN BLD-MCNC: 74 MG/DL (ref 8–20)
BUN BLD-MCNC: 78 MG/DL (ref 8–20)
BUN BLD-MCNC: 78 MG/DL (ref 8–20)
BUN BLD-MCNC: 79 MG/DL (ref 8–20)
BUN BLD-MCNC: 80 MG/DL (ref 8–20)
BUN BLD-MCNC: 81 MG/DL (ref 8–20)
BUN BLD-MCNC: 82 MG/DL (ref 8–20)
BUN BLD-MCNC: 87 MG/DL (ref 8–20)
BUN BLD-MCNC: 91 MG/DL (ref 8–20)
BUN BLD-MCNC: 93 MG/DL (ref 8–20)
C-REACTIVE PROTEIN: 4.88 MG/DL (ref ?–1)
CALCIUM BLD-MCNC: 7.6 MG/DL (ref 8.3–10.3)
CALCIUM BLD-MCNC: 7.7 MG/DL (ref 8.3–10.3)
CALCIUM BLD-MCNC: 7.7 MG/DL (ref 8.3–10.3)
CALCIUM BLD-MCNC: 7.9 MG/DL (ref 8.3–10.3)
CALCIUM BLD-MCNC: 8 MG/DL (ref 8.3–10.3)
CALCIUM BLD-MCNC: 8.1 MG/DL (ref 8.3–10.3)
CALCIUM BLD-MCNC: 8.6 MG/DL (ref 8.3–10.3)
CALCIUM BLD-MCNC: 8.6 MG/DL (ref 8.3–10.3)
CALCIUM BLD-MCNC: 8.7 MG/DL (ref 8.3–10.3)
CALCIUM BLD-MCNC: 8.7 MG/DL (ref 8.3–10.3)
CALCIUM BLD-MCNC: 8.8 MG/DL (ref 8.3–10.3)
CALCIUM BLD-MCNC: 8.9 MG/DL (ref 8.3–10.3)
CALCIUM BLD-MCNC: 9 MG/DL (ref 8.3–10.3)
CALCIUM BLD-MCNC: 9.1 MG/DL (ref 8.3–10.3)
CALCIUM BLD-MCNC: 9.2 MG/DL (ref 8.3–10.3)
CALCIUM BLD-MCNC: 9.5 MG/DL (ref 8.3–10.3)
CHLORIDE: 101 MMOL/L (ref 101–111)
CHLORIDE: 101 MMOL/L (ref 101–111)
CHLORIDE: 102 MMOL/L (ref 101–111)
CHLORIDE: 104 MMOL/L (ref 101–111)
CHLORIDE: 104 MMOL/L (ref 101–111)
CHLORIDE: 105 MMOL/L (ref 101–111)
CHLORIDE: 106 MMOL/L (ref 101–111)
CHLORIDE: 107 MMOL/L (ref 101–111)
CHLORIDE: 107 MMOL/L (ref 101–111)
CHLORIDE: 108 MMOL/L (ref 101–111)
CHLORIDE: 109 MMOL/L (ref 101–111)
CHLORIDE: 109 MMOL/L (ref 101–111)
CHLORIDE: 113 MMOL/L (ref 101–111)
CHLORIDE: 95 MMOL/L (ref 101–111)
CHOLEST SMN-MCNC: 126 MG/DL (ref ?–200)
CHOLEST SMN-MCNC: 147 MG/DL (ref ?–200)
CLARITY UR REFRACT.AUTO: CLEAR
CLARITY UR REFRACT.AUTO: CLEAR
CO2: 19 MMOL/L (ref 22–32)
CO2: 19 MMOL/L (ref 22–32)
CO2: 20 MMOL/L (ref 22–32)
CO2: 21 MMOL/L (ref 22–32)
CO2: 22 MMOL/L (ref 22–32)
CO2: 23 MMOL/L (ref 22–32)
CO2: 23 MMOL/L (ref 22–32)
CO2: 24 MMOL/L (ref 22–32)
CO2: 25 MMOL/L (ref 22–32)
CO2: 26 MMOL/L (ref 22–32)
CO2: 27 MMOL/L (ref 22–32)
COLOR UR AUTO: YELLOW
CREAT BLD-MCNC: 1.44 MG/DL (ref 0.7–1.3)
CREAT BLD-MCNC: 1.55 MG/DL (ref 0.7–1.3)
CREAT BLD-MCNC: 1.81 MG/DL (ref 0.7–1.3)
CREAT BLD-MCNC: 1.84 MG/DL (ref 0.7–1.3)
CREAT BLD-MCNC: 1.84 MG/DL (ref 0.7–1.3)
CREAT BLD-MCNC: 2.04 MG/DL (ref 0.7–1.3)
CREAT BLD-MCNC: 2.09 MG/DL (ref 0.7–1.3)
CREAT BLD-MCNC: 2.17 MG/DL (ref 0.7–1.3)
CREAT BLD-MCNC: 2.25 MG/DL (ref 0.7–1.3)
CREAT BLD-MCNC: 2.26 MG/DL (ref 0.7–1.3)
CREAT BLD-MCNC: 2.26 MG/DL (ref 0.7–1.3)
CREAT BLD-MCNC: 2.41 MG/DL (ref 0.7–1.3)
CREAT BLD-MCNC: 2.63 MG/DL (ref 0.7–1.3)
CREAT BLD-MCNC: 2.64 MG/DL (ref 0.7–1.3)
CREAT BLD-MCNC: 2.67 MG/DL (ref 0.7–1.3)
CREAT BLD-MCNC: 2.68 MG/DL (ref 0.7–1.3)
CREAT BLD-MCNC: 2.79 MG/DL (ref 0.7–1.3)
CREAT BLD-MCNC: 2.82 MG/DL (ref 0.7–1.3)
CREAT BLD-MCNC: 3.02 MG/DL (ref 0.7–1.3)
DEPRECATED HBV CORE AB SER IA-ACNC: 125.3 NG/ML (ref 22–322)
EOSINOPHIL # BLD AUTO: 0.14 X10(3) UL (ref 0–0.3)
EOSINOPHIL # BLD AUTO: 0.18 X10(3) UL (ref 0–0.3)
EOSINOPHIL # BLD AUTO: 0.21 X10(3) UL (ref 0–0.3)
EOSINOPHIL # BLD AUTO: 0.23 X10(3) UL (ref 0–0.3)
EOSINOPHIL # BLD AUTO: 0.24 X10(3) UL (ref 0–0.3)
EOSINOPHIL # BLD AUTO: 0.27 X10(3) UL (ref 0–0.3)
EOSINOPHIL # BLD AUTO: 0.29 X10(3) UL (ref 0–0.3)
EOSINOPHIL # BLD AUTO: 0.31 X10(3) UL (ref 0–0.3)
EOSINOPHIL # BLD AUTO: 0.32 X10(3) UL (ref 0–0.3)
EOSINOPHIL # BLD AUTO: 0.32 X10(3) UL (ref 0–0.3)
EOSINOPHIL # BLD AUTO: 0.37 X10(3) UL (ref 0–0.3)
EOSINOPHIL # BLD AUTO: 0.38 X10(3) UL (ref 0–0.3)
EOSINOPHIL # BLD AUTO: 0.39 X10(3) UL (ref 0–0.3)
EOSINOPHIL # BLD AUTO: 0.5 X10(3) UL (ref 0–0.3)
EOSINOPHIL # BLD AUTO: 0.53 X10(3) UL (ref 0–0.3)
EOSINOPHIL NFR BLD AUTO: 11 %
EOSINOPHIL NFR BLD AUTO: 2.6 %
EOSINOPHIL NFR BLD AUTO: 3.7 %
EOSINOPHIL NFR BLD AUTO: 4.7 %
EOSINOPHIL NFR BLD AUTO: 4.9 %
EOSINOPHIL NFR BLD AUTO: 5.3 %
EOSINOPHIL NFR BLD AUTO: 5.7 %
EOSINOPHIL NFR BLD AUTO: 5.7 %
EOSINOPHIL NFR BLD AUTO: 6 %
EOSINOPHIL NFR BLD AUTO: 6.3 %
EOSINOPHIL NFR BLD AUTO: 8 %
EOSINOPHIL NFR BLD AUTO: 8.3 %
EOSINOPHIL NFR BLD AUTO: 8.7 %
EOSINOPHIL NFR BLD AUTO: 9 %
EOSINOPHIL NFR BLD AUTO: 9.3 %
ERYTHROCYTE [DISTWIDTH] IN BLOOD BY AUTOMATED COUNT: 13.1 % (ref 11.5–16)
ERYTHROCYTE [DISTWIDTH] IN BLOOD BY AUTOMATED COUNT: 13.2 % (ref 11.5–16)
ERYTHROCYTE [DISTWIDTH] IN BLOOD BY AUTOMATED COUNT: 13.3 % (ref 11.5–16)
ERYTHROCYTE [DISTWIDTH] IN BLOOD BY AUTOMATED COUNT: 13.4 % (ref 11.5–16)
ERYTHROCYTE [DISTWIDTH] IN BLOOD BY AUTOMATED COUNT: 13.4 % (ref 11.5–16)
ERYTHROCYTE [DISTWIDTH] IN BLOOD BY AUTOMATED COUNT: 13.5 % (ref 11.5–16)
ERYTHROCYTE [DISTWIDTH] IN BLOOD BY AUTOMATED COUNT: 13.6 % (ref 11.5–16)
ERYTHROCYTE [DISTWIDTH] IN BLOOD BY AUTOMATED COUNT: 13.6 % (ref 11.5–16)
ERYTHROCYTE [DISTWIDTH] IN BLOOD BY AUTOMATED COUNT: 13.7 % (ref 11.5–16)
ERYTHROCYTE [DISTWIDTH] IN BLOOD BY AUTOMATED COUNT: 13.9 % (ref 11.5–16)
EST. AVERAGE GLUCOSE BLD GHB EST-MCNC: 226 MG/DL (ref 68–126)
EST. AVERAGE GLUCOSE BLD GHB EST-MCNC: 272 MG/DL (ref 68–126)
EST. AVERAGE GLUCOSE BLD GHB EST-MCNC: 283 MG/DL (ref 68–126)
FREE T4: 1.4 NG/DL (ref 0.9–1.8)
GAMMA GLOBULIN: 0.97 G/DL (ref 0.6–1.6)
GLUCOSE BLD-MCNC: 102 MG/DL (ref 70–99)
GLUCOSE BLD-MCNC: 107 MG/DL (ref 65–99)
GLUCOSE BLD-MCNC: 111 MG/DL (ref 65–99)
GLUCOSE BLD-MCNC: 114 MG/DL (ref 65–99)
GLUCOSE BLD-MCNC: 116 MG/DL (ref 65–99)
GLUCOSE BLD-MCNC: 117 MG/DL (ref 65–99)
GLUCOSE BLD-MCNC: 119 MG/DL (ref 65–99)
GLUCOSE BLD-MCNC: 121 MG/DL (ref 65–99)
GLUCOSE BLD-MCNC: 121 MG/DL (ref 70–99)
GLUCOSE BLD-MCNC: 121 MG/DL (ref 70–99)
GLUCOSE BLD-MCNC: 127 MG/DL (ref 65–99)
GLUCOSE BLD-MCNC: 127 MG/DL (ref 70–99)
GLUCOSE BLD-MCNC: 131 MG/DL (ref 65–99)
GLUCOSE BLD-MCNC: 133 MG/DL (ref 70–99)
GLUCOSE BLD-MCNC: 134 MG/DL (ref 65–99)
GLUCOSE BLD-MCNC: 136 MG/DL (ref 65–99)
GLUCOSE BLD-MCNC: 140 MG/DL (ref 70–99)
GLUCOSE BLD-MCNC: 142 MG/DL (ref 70–99)
GLUCOSE BLD-MCNC: 153 MG/DL (ref 70–99)
GLUCOSE BLD-MCNC: 160 MG/DL (ref 70–99)
GLUCOSE BLD-MCNC: 164 MG/DL (ref 65–99)
GLUCOSE BLD-MCNC: 168 MG/DL (ref 65–99)
GLUCOSE BLD-MCNC: 169 MG/DL (ref 70–99)
GLUCOSE BLD-MCNC: 170 MG/DL (ref 65–99)
GLUCOSE BLD-MCNC: 171 MG/DL (ref 65–99)
GLUCOSE BLD-MCNC: 172 MG/DL (ref 65–99)
GLUCOSE BLD-MCNC: 175 MG/DL (ref 65–99)
GLUCOSE BLD-MCNC: 175 MG/DL (ref 65–99)
GLUCOSE BLD-MCNC: 177 MG/DL (ref 65–99)
GLUCOSE BLD-MCNC: 177 MG/DL (ref 70–99)
GLUCOSE BLD-MCNC: 182 MG/DL (ref 65–99)
GLUCOSE BLD-MCNC: 186 MG/DL (ref 70–99)
GLUCOSE BLD-MCNC: 187 MG/DL (ref 70–99)
GLUCOSE BLD-MCNC: 191 MG/DL (ref 65–99)
GLUCOSE BLD-MCNC: 192 MG/DL (ref 65–99)
GLUCOSE BLD-MCNC: 193 MG/DL (ref 65–99)
GLUCOSE BLD-MCNC: 197 MG/DL (ref 65–99)
GLUCOSE BLD-MCNC: 200 MG/DL (ref 65–99)
GLUCOSE BLD-MCNC: 202 MG/DL (ref 65–99)
GLUCOSE BLD-MCNC: 203 MG/DL (ref 65–99)
GLUCOSE BLD-MCNC: 212 MG/DL (ref 65–99)
GLUCOSE BLD-MCNC: 220 MG/DL (ref 65–99)
GLUCOSE BLD-MCNC: 221 MG/DL (ref 65–99)
GLUCOSE BLD-MCNC: 222 MG/DL (ref 65–99)
GLUCOSE BLD-MCNC: 222 MG/DL (ref 70–99)
GLUCOSE BLD-MCNC: 226 MG/DL (ref 65–99)
GLUCOSE BLD-MCNC: 227 MG/DL (ref 65–99)
GLUCOSE BLD-MCNC: 227 MG/DL (ref 70–99)
GLUCOSE BLD-MCNC: 230 MG/DL (ref 65–99)
GLUCOSE BLD-MCNC: 233 MG/DL (ref 65–99)
GLUCOSE BLD-MCNC: 235 MG/DL (ref 70–99)
GLUCOSE BLD-MCNC: 238 MG/DL (ref 65–99)
GLUCOSE BLD-MCNC: 241 MG/DL (ref 65–99)
GLUCOSE BLD-MCNC: 243 MG/DL (ref 65–99)
GLUCOSE BLD-MCNC: 263 MG/DL (ref 65–99)
GLUCOSE BLD-MCNC: 264 MG/DL (ref 65–99)
GLUCOSE BLD-MCNC: 272 MG/DL (ref 65–99)
GLUCOSE BLD-MCNC: 286 MG/DL (ref 65–99)
GLUCOSE BLD-MCNC: 291 MG/DL (ref 65–99)
GLUCOSE BLD-MCNC: 297 MG/DL (ref 65–99)
GLUCOSE BLD-MCNC: 321 MG/DL (ref 65–99)
GLUCOSE BLD-MCNC: 324 MG/DL (ref 65–99)
GLUCOSE BLD-MCNC: 420 MG/DL (ref 65–99)
GLUCOSE BLD-MCNC: 492 MG/DL (ref 65–99)
GLUCOSE BLD-MCNC: 507 MG/DL (ref 65–99)
GLUCOSE BLD-MCNC: 589 MG/DL (ref 65–99)
GLUCOSE BLD-MCNC: 635 MG/DL (ref 70–99)
GLUCOSE BLD-MCNC: 67 MG/DL (ref 65–99)
GLUCOSE BLD-MCNC: 70 MG/DL (ref 70–99)
GLUCOSE BLD-MCNC: 78 MG/DL (ref 65–99)
GLUCOSE BLD-MCNC: 78 MG/DL (ref 65–99)
GLUCOSE BLD-MCNC: 94 MG/DL (ref 65–99)
GLUCOSE BLD-MCNC: 97 MG/DL (ref 70–99)
GLUCOSE UR STRIP.AUTO-MCNC: >=500 MG/DL
GLUCOSE UR STRIP.AUTO-MCNC: NEGATIVE MG/DL
HAV AB SERPL IA-ACNC: 1469 PG/ML (ref 193–986)
HAV IGM SER QL: 1.9 MG/DL (ref 1.7–3)
HAV IGM SER QL: 2.6 MG/DL (ref 1.7–3)
HBA1C MFR BLD HPLC: 11.1 % (ref ?–5.7)
HBA1C MFR BLD HPLC: 11.5 % (ref ?–5.7)
HBA1C MFR BLD HPLC: 9.5 % (ref ?–5.7)
HCT VFR BLD AUTO: 25.2 % (ref 37–53)
HCT VFR BLD AUTO: 26.4 % (ref 37–53)
HCT VFR BLD AUTO: 26.5 % (ref 37–53)
HCT VFR BLD AUTO: 26.9 % (ref 37–53)
HCT VFR BLD AUTO: 27.6 % (ref 37–53)
HCT VFR BLD AUTO: 28.6 % (ref 37–53)
HCT VFR BLD AUTO: 29 % (ref 37–53)
HCT VFR BLD AUTO: 29.1 % (ref 37–53)
HCT VFR BLD AUTO: 30.2 % (ref 37–53)
HCT VFR BLD AUTO: 30.3 % (ref 37–53)
HCT VFR BLD AUTO: 30.8 % (ref 37–53)
HCT VFR BLD AUTO: 31.4 % (ref 37–53)
HCT VFR BLD AUTO: 32.4 % (ref 37–53)
HCT VFR BLD AUTO: 32.6 % (ref 37–53)
HCT VFR BLD AUTO: 36.8 % (ref 37–53)
HDLC SERPL-MCNC: 16 MG/DL (ref 45–?)
HDLC SERPL-MCNC: 19 MG/DL (ref 45–?)
HDLC SERPL: 6.63 {RATIO} (ref ?–4.97)
HDLC SERPL: 9.19 {RATIO} (ref ?–4.97)
HGB BLD-MCNC: 10 G/DL (ref 13–17)
HGB BLD-MCNC: 10 G/DL (ref 13–17)
HGB BLD-MCNC: 10.8 G/DL (ref 13–17)
HGB BLD-MCNC: 12.4 G/DL (ref 13–17)
HGB BLD-MCNC: 8.1 G/DL (ref 13–17)
HGB BLD-MCNC: 8.1 G/DL (ref 13–17)
HGB BLD-MCNC: 8.6 G/DL (ref 13–17)
HGB BLD-MCNC: 8.6 G/DL (ref 13–17)
HGB BLD-MCNC: 8.9 G/DL (ref 13–17)
HGB BLD-MCNC: 8.9 G/DL (ref 13–17)
HGB BLD-MCNC: 9.3 G/DL (ref 13–17)
HGB BLD-MCNC: 9.4 G/DL (ref 13–17)
HGB BLD-MCNC: 9.5 G/DL (ref 13–17)
HGB BLD-MCNC: 9.7 G/DL (ref 13–17)
HGB BLD-MCNC: 9.9 G/DL (ref 13–17)
IMMATURE GRANULOCYTE COUNT: 0.01 X10(3) UL (ref 0–1)
IMMATURE GRANULOCYTE COUNT: 0.02 X10(3) UL (ref 0–1)
IMMATURE GRANULOCYTE COUNT: 0.03 X10(3) UL (ref 0–1)
IMMATURE GRANULOCYTE COUNT: 0.03 X10(3) UL (ref 0–1)
IMMATURE GRANULOCYTE COUNT: 0.05 X10(3) UL (ref 0–1)
IMMATURE GRANULOCYTE RATIO %: 0.2 %
IMMATURE GRANULOCYTE RATIO %: 0.3 %
IMMATURE GRANULOCYTE RATIO %: 0.3 %
IMMATURE GRANULOCYTE RATIO %: 0.4 %
IMMATURE GRANULOCYTE RATIO %: 0.5 %
IMMATURE GRANULOCYTE RATIO %: 0.6 %
IMMATURE GRANULOCYTE RATIO %: 0.8 %
INR BLD: 0.93 (ref 0.89–1.11)
INR BLD: 1.19 (ref 0.89–1.11)
IRON SATURATION: 12 % (ref 13–45)
IRON: 31 UG/DL (ref 45–182)
IRON: 93 UG/DL (ref 45–182)
KAPPA FREE LIGHT CHAIN: 10.27 MG/DL (ref 0.33–1.94)
KAPPA/LAMBDA FLC RATIO: 1.88 (ref 0.26–1.65)
KETONES UR STRIP.AUTO-MCNC: NEGATIVE MG/DL
KETONES UR STRIP.AUTO-MCNC: NEGATIVE MG/DL
LAMBDA FREE LIGHT CHAIN: 5.46 MG/DL (ref 0.57–2.63)
LDH: 164 U/L (ref 84–249)
LDLC SERPL CALC-MCNC: 120 MG/DL (ref ?–130)
LDLC SERPL CALC-MCNC: 86 MG/DL (ref ?–130)
LEUKOCYTE ESTERASE UR QL STRIP.AUTO: NEGATIVE
LEUKOCYTE ESTERASE UR QL STRIP.AUTO: NEGATIVE
LIPASE: 114 U/L (ref 73–393)
LYMPHOCYTES # BLD AUTO: 0.58 X10(3) UL (ref 0.9–4)
LYMPHOCYTES # BLD AUTO: 0.61 X10(3) UL (ref 0.9–4)
LYMPHOCYTES # BLD AUTO: 0.62 X10(3) UL (ref 0.9–4)
LYMPHOCYTES # BLD AUTO: 0.64 X10(3) UL (ref 0.9–4)
LYMPHOCYTES # BLD AUTO: 0.69 X10(3) UL (ref 0.9–4)
LYMPHOCYTES # BLD AUTO: 0.7 X10(3) UL (ref 0.9–4)
LYMPHOCYTES # BLD AUTO: 0.71 X10(3) UL (ref 0.9–4)
LYMPHOCYTES # BLD AUTO: 0.73 X10(3) UL (ref 0.9–4)
LYMPHOCYTES # BLD AUTO: 0.78 X10(3) UL (ref 0.9–4)
LYMPHOCYTES # BLD AUTO: 0.8 X10(3) UL (ref 0.9–4)
LYMPHOCYTES # BLD AUTO: 0.81 X10(3) UL (ref 0.9–4)
LYMPHOCYTES # BLD AUTO: 0.81 X10(3) UL (ref 0.9–4)
LYMPHOCYTES # BLD AUTO: 0.86 X10(3) UL (ref 0.9–4)
LYMPHOCYTES # BLD AUTO: 0.89 X10(3) UL (ref 0.9–4)
LYMPHOCYTES # BLD AUTO: 1.18 X10(3) UL (ref 0.9–4)
LYMPHOCYTES NFR BLD AUTO: 10.2 %
LYMPHOCYTES NFR BLD AUTO: 10.6 %
LYMPHOCYTES NFR BLD AUTO: 11.9 %
LYMPHOCYTES NFR BLD AUTO: 13.6 %
LYMPHOCYTES NFR BLD AUTO: 14.1 %
LYMPHOCYTES NFR BLD AUTO: 14.4 %
LYMPHOCYTES NFR BLD AUTO: 15 %
LYMPHOCYTES NFR BLD AUTO: 16.9 %
LYMPHOCYTES NFR BLD AUTO: 16.9 %
LYMPHOCYTES NFR BLD AUTO: 17.7 %
LYMPHOCYTES NFR BLD AUTO: 17.9 %
LYMPHOCYTES NFR BLD AUTO: 18.1 %
LYMPHOCYTES NFR BLD AUTO: 20.6 %
LYMPHOCYTES NFR BLD AUTO: 22.3 %
LYMPHOCYTES NFR BLD AUTO: 22.7 %
M PROTEIN MFR SERPL ELPH: 5.9 G/DL (ref 6.1–8.3)
M PROTEIN MFR SERPL ELPH: 6.1 G/DL (ref 6.1–8.3)
M PROTEIN MFR SERPL ELPH: 6.2 G/DL (ref 6.1–8.3)
M PROTEIN MFR SERPL ELPH: 6.5 G/DL (ref 6.1–8.3)
M PROTEIN MFR SERPL ELPH: 6.6 G/DL (ref 6.1–8.3)
M PROTEIN MFR SERPL ELPH: 6.8 G/DL (ref 6.1–8.3)
M PROTEIN MFR SERPL ELPH: 7 G/DL (ref 6.1–8.3)
M PROTEIN MFR SERPL ELPH: 7.1 G/DL (ref 6.1–8.3)
M PROTEIN MFR SERPL ELPH: 7.3 G/DL (ref 6.1–8.3)
M PROTEIN MFR SERPL ELPH: 7.5 G/DL (ref 6.1–8.3)
M PROTEIN MFR SERPL ELPH: 7.8 G/DL (ref 6.1–8.3)
MCH RBC QN AUTO: 28.5 PG (ref 27–33.2)
MCH RBC QN AUTO: 28.6 PG (ref 27–33.2)
MCH RBC QN AUTO: 28.7 PG (ref 27–33.2)
MCH RBC QN AUTO: 28.9 PG (ref 27–33.2)
MCH RBC QN AUTO: 28.9 PG (ref 27–33.2)
MCH RBC QN AUTO: 29.1 PG (ref 27–33.2)
MCH RBC QN AUTO: 29.1 PG (ref 27–33.2)
MCH RBC QN AUTO: 29.2 PG (ref 27–33.2)
MCH RBC QN AUTO: 29.3 PG (ref 27–33.2)
MCH RBC QN AUTO: 29.4 PG (ref 27–33.2)
MCH RBC QN AUTO: 29.4 PG (ref 27–33.2)
MCHC RBC AUTO-ENTMCNC: 29.9 G/DL (ref 31–37)
MCHC RBC AUTO-ENTMCNC: 30.6 G/DL (ref 31–37)
MCHC RBC AUTO-ENTMCNC: 30.7 G/DL (ref 31–37)
MCHC RBC AUTO-ENTMCNC: 31 G/DL (ref 31–37)
MCHC RBC AUTO-ENTMCNC: 31.5 G/DL (ref 31–37)
MCHC RBC AUTO-ENTMCNC: 31.8 G/DL (ref 31–37)
MCHC RBC AUTO-ENTMCNC: 32 G/DL (ref 31–37)
MCHC RBC AUTO-ENTMCNC: 32 G/DL (ref 31–37)
MCHC RBC AUTO-ENTMCNC: 32.1 G/DL (ref 31–37)
MCHC RBC AUTO-ENTMCNC: 32.2 G/DL (ref 31–37)
MCHC RBC AUTO-ENTMCNC: 32.5 G/DL (ref 31–37)
MCHC RBC AUTO-ENTMCNC: 32.6 G/DL (ref 31–37)
MCHC RBC AUTO-ENTMCNC: 33.1 G/DL (ref 31–37)
MCHC RBC AUTO-ENTMCNC: 33.7 G/DL (ref 31–37)
MCHC RBC AUTO-ENTMCNC: 34.6 G/DL (ref 31–37)
MCV RBC AUTO: 84.9 FL (ref 80–99)
MCV RBC AUTO: 87 FL (ref 80–99)
MCV RBC AUTO: 88.6 FL (ref 80–99)
MCV RBC AUTO: 89.7 FL (ref 80–99)
MCV RBC AUTO: 89.8 FL (ref 80–99)
MCV RBC AUTO: 90.1 FL (ref 80–99)
MCV RBC AUTO: 90.2 FL (ref 80–99)
MCV RBC AUTO: 90.9 FL (ref 80–99)
MCV RBC AUTO: 91 FL (ref 80–99)
MCV RBC AUTO: 91.2 FL (ref 80–99)
MCV RBC AUTO: 92.7 FL (ref 80–99)
MCV RBC AUTO: 93.2 FL (ref 80–99)
MCV RBC AUTO: 93.3 FL (ref 80–99)
MCV RBC AUTO: 94.2 FL (ref 80–99)
MCV RBC AUTO: 96.4 FL (ref 80–99)
MONOCYTES # BLD AUTO: 0.31 X10(3) UL (ref 0.1–1)
MONOCYTES # BLD AUTO: 0.34 X10(3) UL (ref 0.1–1)
MONOCYTES # BLD AUTO: 0.35 X10(3) UL (ref 0.1–1)
MONOCYTES # BLD AUTO: 0.4 X10(3) UL (ref 0.1–0.6)
MONOCYTES # BLD AUTO: 0.4 X10(3) UL (ref 0.1–1)
MONOCYTES # BLD AUTO: 0.42 X10(3) UL (ref 0.1–0.6)
MONOCYTES # BLD AUTO: 0.42 X10(3) UL (ref 0.1–1)
MONOCYTES # BLD AUTO: 0.46 X10(3) UL (ref 0.1–1)
MONOCYTES # BLD AUTO: 0.49 X10(3) UL (ref 0.1–0.6)
MONOCYTES # BLD AUTO: 0.5 X10(3) UL (ref 0.1–0.6)
MONOCYTES # BLD AUTO: 0.52 X10(3) UL (ref 0.1–0.6)
MONOCYTES # BLD AUTO: 0.52 X10(3) UL (ref 0.1–0.6)
MONOCYTES # BLD AUTO: 0.53 X10(3) UL (ref 0.1–0.6)
MONOCYTES # BLD AUTO: 0.55 X10(3) UL (ref 0.1–0.6)
MONOCYTES # BLD AUTO: 0.61 X10(3) UL (ref 0.1–0.6)
MONOCYTES NFR BLD AUTO: 10 %
MONOCYTES NFR BLD AUTO: 10.4 %
MONOCYTES NFR BLD AUTO: 11 %
MONOCYTES NFR BLD AUTO: 11 %
MONOCYTES NFR BLD AUTO: 11.4 %
MONOCYTES NFR BLD AUTO: 11.7 %
MONOCYTES NFR BLD AUTO: 12.1 %
MONOCYTES NFR BLD AUTO: 12.4 %
MONOCYTES NFR BLD AUTO: 5.8 %
MONOCYTES NFR BLD AUTO: 7.3 %
MONOCYTES NFR BLD AUTO: 7.5 %
MONOCYTES NFR BLD AUTO: 7.8 %
MONOCYTES NFR BLD AUTO: 8.5 %
MONOCYTES NFR BLD AUTO: 8.6 %
MONOCYTES NFR BLD AUTO: 8.7 %
NEUTROPHIL ABS PRELIM: 2.11 X10 (3) UL (ref 1.3–6.7)
NEUTROPHIL ABS PRELIM: 2.13 X10 (3) UL (ref 1.3–6.7)
NEUTROPHIL ABS PRELIM: 2.38 X10 (3) UL (ref 1.3–6.7)
NEUTROPHIL ABS PRELIM: 2.65 X10 (3) UL (ref 1.3–6.7)
NEUTROPHIL ABS PRELIM: 2.73 X10 (3) UL (ref 1.3–6.7)
NEUTROPHIL ABS PRELIM: 2.77 X10 (3) UL (ref 1.3–6.7)
NEUTROPHIL ABS PRELIM: 2.85 X10 (3) UL (ref 1.3–6.7)
NEUTROPHIL ABS PRELIM: 3.2 X10 (3) UL (ref 1.3–6.7)
NEUTROPHIL ABS PRELIM: 3.26 X10 (3) UL (ref 1.3–6.7)
NEUTROPHIL ABS PRELIM: 3.61 X10 (3) UL (ref 1.3–6.7)
NEUTROPHIL ABS PRELIM: 3.66 X10 (3) UL (ref 1.3–6.7)
NEUTROPHIL ABS PRELIM: 3.83 X10 (3) UL (ref 1.3–6.7)
NEUTROPHIL ABS PRELIM: 4.33 X10 (3) UL (ref 1.3–6.7)
NEUTROPHIL ABS PRELIM: 4.36 X10 (3) UL (ref 1.3–6.7)
NEUTROPHIL ABS PRELIM: 5.4 X10 (3) UL (ref 1.3–6.7)
NEUTROPHILS # BLD AUTO: 2.11 X10(3) UL (ref 1.3–6.7)
NEUTROPHILS # BLD AUTO: 2.13 X10(3) UL (ref 1.3–6.7)
NEUTROPHILS # BLD AUTO: 2.38 X10(3) UL (ref 1.3–6.7)
NEUTROPHILS # BLD AUTO: 2.65 X10(3) UL (ref 1.3–6.7)
NEUTROPHILS # BLD AUTO: 2.73 X10(3) UL (ref 1.3–6.7)
NEUTROPHILS # BLD AUTO: 2.77 X10(3) UL (ref 1.3–6.7)
NEUTROPHILS # BLD AUTO: 2.85 X10(3) UL (ref 1.3–6.7)
NEUTROPHILS # BLD AUTO: 3.2 X10(3) UL (ref 1.3–6.7)
NEUTROPHILS # BLD AUTO: 3.26 X10(3) UL (ref 1.3–6.7)
NEUTROPHILS # BLD AUTO: 3.61 X10(3) UL (ref 1.3–6.7)
NEUTROPHILS # BLD AUTO: 3.66 X10(3) UL (ref 1.3–6.7)
NEUTROPHILS # BLD AUTO: 3.83 X10(3) UL (ref 1.3–6.7)
NEUTROPHILS # BLD AUTO: 4.33 X10(3) UL (ref 1.3–6.7)
NEUTROPHILS # BLD AUTO: 4.36 X10(3) UL (ref 1.3–6.7)
NEUTROPHILS # BLD AUTO: 5.4 X10(3) UL (ref 1.3–6.7)
NEUTROPHILS NFR BLD AUTO: 57 %
NEUTROPHILS NFR BLD AUTO: 58.6 %
NEUTROPHILS NFR BLD AUTO: 59 %
NEUTROPHILS NFR BLD AUTO: 61.1 %
NEUTROPHILS NFR BLD AUTO: 64.2 %
NEUTROPHILS NFR BLD AUTO: 65.4 %
NEUTROPHILS NFR BLD AUTO: 66 %
NEUTROPHILS NFR BLD AUTO: 66.2 %
NEUTROPHILS NFR BLD AUTO: 66.3 %
NEUTROPHILS NFR BLD AUTO: 68.9 %
NEUTROPHILS NFR BLD AUTO: 70.2 %
NEUTROPHILS NFR BLD AUTO: 71.6 %
NEUTROPHILS NFR BLD AUTO: 73.4 %
NEUTROPHILS NFR BLD AUTO: 78.8 %
NEUTROPHILS NFR BLD AUTO: 79.1 %
NITRITE UR QL STRIP.AUTO: NEGATIVE
NITRITE UR QL STRIP.AUTO: NEGATIVE
NONHDLC SERPL-MCNC: 107 MG/DL (ref ?–130)
NONHDLC SERPL-MCNC: 131 MG/DL (ref ?–130)
P AXIS: 37 DEGREES
P AXIS: 47 DEGREES
P AXIS: 59 DEGREES
P AXIS: 60 DEGREES
P-R INTERVAL: 210 MS
P-R INTERVAL: 224 MS
P-R INTERVAL: 240 MS
PH UR STRIP.AUTO: 5 [PH] (ref 4.5–8)
PH UR STRIP.AUTO: 5 [PH] (ref 4.5–8)
PLATELET # BLD AUTO: 111 10(3)UL (ref 150–450)
PLATELET # BLD AUTO: 118 10(3)UL (ref 150–450)
PLATELET # BLD AUTO: 119 10(3)UL (ref 150–450)
PLATELET # BLD AUTO: 123 10(3)UL (ref 150–450)
PLATELET # BLD AUTO: 126 10(3)UL (ref 150–450)
PLATELET # BLD AUTO: 142 10(3)UL (ref 150–450)
PLATELET # BLD AUTO: 144 10(3)UL (ref 150–450)
PLATELET # BLD AUTO: 144 10(3)UL (ref 150–450)
PLATELET # BLD AUTO: 145 10(3)UL (ref 150–450)
PLATELET # BLD AUTO: 157 10(3)UL (ref 150–450)
PLATELET # BLD AUTO: 158 10(3)UL (ref 150–450)
PLATELET # BLD AUTO: 163 10(3)UL (ref 150–450)
PLATELET # BLD AUTO: 165 10(3)UL (ref 150–450)
PLATELET # BLD AUTO: 166 10(3)UL (ref 150–450)
PLATELET # BLD AUTO: 180 10(3)UL (ref 150–450)
POTASSIUM SERPL-SCNC: 4 MMOL/L (ref 3.6–5.1)
POTASSIUM SERPL-SCNC: 4 MMOL/L (ref 3.6–5.1)
POTASSIUM SERPL-SCNC: 4.1 MMOL/L (ref 3.6–5.1)
POTASSIUM SERPL-SCNC: 4.3 MMOL/L (ref 3.6–5.1)
POTASSIUM SERPL-SCNC: 4.5 MMOL/L (ref 3.6–5.1)
POTASSIUM SERPL-SCNC: 4.6 MMOL/L (ref 3.6–5.1)
POTASSIUM SERPL-SCNC: 4.7 MMOL/L (ref 3.6–5.1)
POTASSIUM SERPL-SCNC: 4.7 MMOL/L (ref 3.6–5.1)
POTASSIUM SERPL-SCNC: 4.8 MMOL/L (ref 3.6–5.1)
POTASSIUM SERPL-SCNC: 4.8 MMOL/L (ref 3.6–5.1)
POTASSIUM SERPL-SCNC: 4.9 MMOL/L (ref 3.6–5.1)
POTASSIUM SERPL-SCNC: 5 MMOL/L (ref 3.6–5.1)
POTASSIUM SERPL-SCNC: 5 MMOL/L (ref 3.6–5.1)
POTASSIUM SERPL-SCNC: 5.1 MMOL/L (ref 3.6–5.1)
POTASSIUM SERPL-SCNC: 5.4 MMOL/L (ref 3.6–5.1)
POTASSIUM SERPL-SCNC: 5.8 MMOL/L (ref 3.6–5.1)
PROLACTIN: 16.4 NG/ML (ref 2.5–17.4)
PROT UR STRIP.AUTO-MCNC: 30 MG/DL
PROT UR STRIP.AUTO-MCNC: NEGATIVE MG/DL
PSA SERPL DL<=0.01 NG/ML-MCNC: 12.5 SECONDS (ref 12–14.3)
PSA SERPL DL<=0.01 NG/ML-MCNC: 15.2 SECONDS (ref 12–14.3)
Q-T INTERVAL: 478 MS
Q-T INTERVAL: 488 MS
Q-T INTERVAL: 492 MS
Q-T INTERVAL: 492 MS
Q-T INTERVAL: 502 MS
Q-T INTERVAL: 558 MS
QRS DURATION: 138 MS
QRS DURATION: 142 MS
QRS DURATION: 144 MS
QRS DURATION: 176 MS
QRS DURATION: 182 MS
QRS DURATION: 206 MS
QTC CALCULATION (BEZET): 469 MS
QTC CALCULATION (BEZET): 492 MS
QTC CALCULATION (BEZET): 495 MS
QTC CALCULATION (BEZET): 495 MS
QTC CALCULATION (BEZET): 499 MS
QTC CALCULATION (BEZET): 523 MS
R AXIS: 23 DEGREES
R AXIS: 28 DEGREES
R AXIS: 29 DEGREES
R AXIS: 29 DEGREES
R AXIS: 3 DEGREES
R AXIS: 38 DEGREES
RBC # BLD AUTO: 2.77 X10(6)UL (ref 3.8–5.8)
RBC # BLD AUTO: 2.84 X10(6)UL (ref 3.8–5.8)
RBC # BLD AUTO: 2.93 X10(6)UL (ref 3.8–5.8)
RBC # BLD AUTO: 2.96 X10(6)UL (ref 3.8–5.8)
RBC # BLD AUTO: 3.06 X10(6)UL (ref 3.8–5.8)
RBC # BLD AUTO: 3.08 X10(6)UL (ref 3.8–5.8)
RBC # BLD AUTO: 3.19 X10(6)UL (ref 3.8–5.8)
RBC # BLD AUTO: 3.24 X10(6)UL (ref 3.8–5.8)
RBC # BLD AUTO: 3.27 X10(6)UL (ref 3.8–5.8)
RBC # BLD AUTO: 3.36 X10(6)UL (ref 3.8–5.8)
RBC # BLD AUTO: 3.37 X10(6)UL (ref 3.8–5.8)
RBC # BLD AUTO: 3.43 X10(6)UL (ref 3.8–5.8)
RBC # BLD AUTO: 3.5 X10(6)UL (ref 3.8–5.8)
RBC # BLD AUTO: 3.68 X10(6)UL (ref 3.8–5.8)
RBC # BLD AUTO: 4.23 X10(6)UL (ref 3.8–5.8)
RBC UR QL AUTO: NEGATIVE
RED CELL DISTRIBUTION WIDTH-SD: 40.1 FL (ref 35.1–46.3)
RED CELL DISTRIBUTION WIDTH-SD: 41.3 FL (ref 35.1–46.3)
RED CELL DISTRIBUTION WIDTH-SD: 43.9 FL (ref 35.1–46.3)
RED CELL DISTRIBUTION WIDTH-SD: 44 FL (ref 35.1–46.3)
RED CELL DISTRIBUTION WIDTH-SD: 44.2 FL (ref 35.1–46.3)
RED CELL DISTRIBUTION WIDTH-SD: 44.4 FL (ref 35.1–46.3)
RED CELL DISTRIBUTION WIDTH-SD: 44.6 FL (ref 35.1–46.3)
RED CELL DISTRIBUTION WIDTH-SD: 44.8 FL (ref 35.1–46.3)
RED CELL DISTRIBUTION WIDTH-SD: 45 FL (ref 35.1–46.3)
RED CELL DISTRIBUTION WIDTH-SD: 45 FL (ref 35.1–46.3)
RED CELL DISTRIBUTION WIDTH-SD: 45.5 FL (ref 35.1–46.3)
RED CELL DISTRIBUTION WIDTH-SD: 45.8 FL (ref 35.1–46.3)
RED CELL DISTRIBUTION WIDTH-SD: 45.9 FL (ref 35.1–46.3)
RED CELL DISTRIBUTION WIDTH-SD: 46.7 FL (ref 35.1–46.3)
RED CELL DISTRIBUTION WIDTH-SD: 47.8 FL (ref 35.1–46.3)
RETIC ABS CALC AUTO: 115.2 X10(3) UL (ref 22.5–147.5)
RETIC IRF CALC: 0.19 RATIO (ref 0.09–0.3)
RETIC%: 3.3 % (ref 0.5–2.5)
RETICULOCYTE HEMOGLOBIN EQUIVALENT: 35.6 PG (ref 28.2–36.3)
SED RATE-ML: 107 MM/HR (ref 0–12)
SED RATE-ML: 76 MM/HR (ref 0–12)
SODIUM SERPL-SCNC: 127 MMOL/L (ref 136–144)
SODIUM SERPL-SCNC: 131 MMOL/L (ref 136–144)
SODIUM SERPL-SCNC: 132 MMOL/L (ref 136–144)
SODIUM SERPL-SCNC: 133 MMOL/L (ref 136–144)
SODIUM SERPL-SCNC: 135 MMOL/L (ref 136–144)
SODIUM SERPL-SCNC: 137 MMOL/L (ref 136–144)
SODIUM SERPL-SCNC: 137 MMOL/L (ref 136–144)
SODIUM SERPL-SCNC: 138 MMOL/L (ref 136–144)
SODIUM SERPL-SCNC: 138 MMOL/L (ref 136–144)
SODIUM SERPL-SCNC: 139 MMOL/L (ref 136–144)
SODIUM SERPL-SCNC: 139 MMOL/L (ref 136–144)
SODIUM SERPL-SCNC: 140 MMOL/L (ref 136–144)
SODIUM SERPL-SCNC: 141 MMOL/L (ref 136–144)
SODIUM SERPL-SCNC: 141 MMOL/L (ref 136–144)
SODIUM SERPL-SCNC: 142 MMOL/L (ref 136–144)
SODIUM SERPL-SCNC: 142 MMOL/L (ref 136–144)
SP GR UR STRIP.AUTO: 1.01 (ref 1–1.03)
SP GR UR STRIP.AUTO: 1.02 (ref 1–1.03)
T AXIS: 14 DEGREES
T AXIS: 19 DEGREES
T AXIS: 19 DEGREES
T AXIS: 21 DEGREES
T AXIS: 23 DEGREES
T AXIS: 80 DEGREES
TOTAL IRON BINDING CAPACITY: 249 UG/DL (ref 298–536)
TOTAL PROTEIN,SERUM: 5.5 G/DL (ref 6.1–8.3)
TRANSFERRIN: 167 MG/DL (ref 200–360)
TRIGL SERPL-MCNC: 103 MG/DL (ref ?–150)
TRIGL SERPL-MCNC: 54 MG/DL (ref ?–150)
TROPONIN: <0.046 NG/ML (ref ?–0.05)
TSI SER-ACNC: 0.32 MIU/ML (ref 0.35–5.5)
UROBILINOGEN UR STRIP.AUTO-MCNC: 4 MG/DL
UROBILINOGEN UR STRIP.AUTO-MCNC: 4 MG/DL
VENTRICULAR RATE: 53 BPM
VENTRICULAR RATE: 58 BPM
VENTRICULAR RATE: 58 BPM
VENTRICULAR RATE: 61 BPM
VENTRICULAR RATE: 61 BPM
VENTRICULAR RATE: 63 BPM
VLDLC SERPL CALC-MCNC: 11 MG/DL (ref 5–40)
VLDLC SERPL CALC-MCNC: 21 MG/DL (ref 5–40)
WBC # BLD AUTO: 3.5 X10(3) UL (ref 4–13)
WBC # BLD AUTO: 3.6 X10(3) UL (ref 4–13)
WBC # BLD AUTO: 4.2 X10(3) UL (ref 4–13)
WBC # BLD AUTO: 4.2 X10(3) UL (ref 4–13)
WBC # BLD AUTO: 4.3 X10(3) UL (ref 4–13)
WBC # BLD AUTO: 4.3 X10(3) UL (ref 4–13)
WBC # BLD AUTO: 4.5 X10(3) UL (ref 4–13)
WBC # BLD AUTO: 4.6 X10(3) UL (ref 4–13)
WBC # BLD AUTO: 4.9 X10(3) UL (ref 4–13)
WBC # BLD AUTO: 4.9 X10(3) UL (ref 4–13)
WBC # BLD AUTO: 5.1 X10(3) UL (ref 4–13)
WBC # BLD AUTO: 5.5 X10(3) UL (ref 4–13)
WBC # BLD AUTO: 5.6 X10(3) UL (ref 4–13)
WBC # BLD AUTO: 6.7 X10(3) UL (ref 4–13)
WBC # BLD AUTO: 6.9 X10(3) UL (ref 4–13)

## 2018-01-01 PROCEDURE — 99213 OFFICE O/P EST LOW 20 MIN: CPT | Performed by: OTHER

## 2018-01-01 PROCEDURE — 70551 MRI BRAIN STEM W/O DYE: CPT | Performed by: HOSPITALIST

## 2018-01-01 PROCEDURE — 71045 X-RAY EXAM CHEST 1 VIEW: CPT | Performed by: EMERGENCY MEDICINE

## 2018-01-01 PROCEDURE — 99291 CRITICAL CARE FIRST HOUR: CPT | Performed by: INTERNAL MEDICINE

## 2018-01-01 PROCEDURE — 99309 SBSQ NF CARE MODERATE MDM 30: CPT | Performed by: NURSE PRACTITIONER

## 2018-01-01 PROCEDURE — 99213 OFFICE O/P EST LOW 20 MIN: CPT

## 2018-01-01 PROCEDURE — B2181ZZ FLUOROSCOPY OF LEFT INTERNAL MAMMARY BYPASS GRAFT USING LOW OSMOLAR CONTRAST: ICD-10-PCS | Performed by: INTERNAL MEDICINE

## 2018-01-01 PROCEDURE — 73552 X-RAY EXAM OF FEMUR 2/>: CPT | Performed by: ORTHOPAEDIC SURGERY

## 2018-01-01 PROCEDURE — 0HBNXZZ EXCISION OF LEFT FOOT SKIN, EXTERNAL APPROACH: ICD-10-PCS | Performed by: PODIATRIST

## 2018-01-01 PROCEDURE — 80053 COMPREHEN METABOLIC PANEL: CPT

## 2018-01-01 PROCEDURE — 99233 SBSQ HOSP IP/OBS HIGH 50: CPT | Performed by: INTERNAL MEDICINE

## 2018-01-01 PROCEDURE — 93306 TTE W/DOPPLER COMPLETE: CPT | Performed by: INTERNAL MEDICINE

## 2018-01-01 PROCEDURE — 81001 URINALYSIS AUTO W/SCOPE: CPT | Performed by: EMERGENCY MEDICINE

## 2018-01-01 PROCEDURE — 73560 X-RAY EXAM OF KNEE 1 OR 2: CPT | Performed by: ORTHOPAEDIC SURGERY

## 2018-01-01 PROCEDURE — 85025 COMPLETE CBC W/AUTO DIFF WBC: CPT

## 2018-01-01 PROCEDURE — 99223 1ST HOSP IP/OBS HIGH 75: CPT | Performed by: OTHER

## 2018-01-01 PROCEDURE — 99232 SBSQ HOSP IP/OBS MODERATE 35: CPT | Performed by: HOSPITALIST

## 2018-01-01 PROCEDURE — 73090 X-RAY EXAM OF FOREARM: CPT | Performed by: ORTHOPAEDIC SURGERY

## 2018-01-01 PROCEDURE — 70496 CT ANGIOGRAPHY HEAD: CPT | Performed by: HOSPITALIST

## 2018-01-01 PROCEDURE — 84443 ASSAY THYROID STIM HORMONE: CPT

## 2018-01-01 PROCEDURE — 99223 1ST HOSP IP/OBS HIGH 75: CPT | Performed by: INTERNAL MEDICINE

## 2018-01-01 PROCEDURE — 99285 EMERGENCY DEPT VISIT HI MDM: CPT

## 2018-01-01 PROCEDURE — 87070 CULTURE OTHR SPECIMN AEROBIC: CPT

## 2018-01-01 PROCEDURE — 99220 INITIAL OBSERVATION CARE,LEVL III: CPT | Performed by: INTERNAL MEDICINE

## 2018-01-01 PROCEDURE — 36415 COLL VENOUS BLD VENIPUNCTURE: CPT

## 2018-01-01 PROCEDURE — 87086 URINE CULTURE/COLONY COUNT: CPT | Performed by: FAMILY MEDICINE

## 2018-01-01 PROCEDURE — 70450 CT HEAD/BRAIN W/O DYE: CPT | Performed by: STUDENT IN AN ORGANIZED HEALTH CARE EDUCATION/TRAINING PROGRAM

## 2018-01-01 PROCEDURE — 70450 CT HEAD/BRAIN W/O DYE: CPT | Performed by: INTERNAL MEDICINE

## 2018-01-01 PROCEDURE — 99232 SBSQ HOSP IP/OBS MODERATE 35: CPT | Performed by: INTERNAL MEDICINE

## 2018-01-01 PROCEDURE — 4B02XSZ MEASUREMENT OF CARDIAC PACEMAKER, EXTERNAL APPROACH: ICD-10-PCS | Performed by: INTERNAL MEDICINE

## 2018-01-01 PROCEDURE — 30233N1 TRANSFUSION OF NONAUTOLOGOUS RED BLOOD CELLS INTO PERIPHERAL VEIN, PERCUTANEOUS APPROACH: ICD-10-PCS | Performed by: INTERNAL MEDICINE

## 2018-01-01 PROCEDURE — 1111F DSCHRG MED/CURRENT MED MERGE: CPT | Performed by: NURSE PRACTITIONER

## 2018-01-01 PROCEDURE — 99232 SBSQ HOSP IP/OBS MODERATE 35: CPT | Performed by: OTHER

## 2018-01-01 PROCEDURE — 99496 TRANSJ CARE MGMT HIGH F2F 7D: CPT | Performed by: NURSE PRACTITIONER

## 2018-01-01 PROCEDURE — 93306 TTE W/DOPPLER COMPLETE: CPT | Performed by: HOSPITALIST

## 2018-01-01 PROCEDURE — 93975 VASCULAR STUDY: CPT | Performed by: HOSPITALIST

## 2018-01-01 PROCEDURE — 84439 ASSAY OF FREE THYROXINE: CPT

## 2018-01-01 PROCEDURE — 99223 1ST HOSP IP/OBS HIGH 75: CPT | Performed by: HOSPITALIST

## 2018-01-01 PROCEDURE — 99306 1ST NF CARE HIGH MDM 50: CPT | Performed by: FAMILY MEDICINE

## 2018-01-01 PROCEDURE — 87077 CULTURE AEROBIC IDENTIFY: CPT

## 2018-01-01 PROCEDURE — 95816 EEG AWAKE AND DROWSY: CPT | Performed by: OTHER

## 2018-01-01 PROCEDURE — 95911 NRV CNDJ TEST 9-10 STUDIES: CPT | Performed by: OTHER

## 2018-01-01 PROCEDURE — 99310 SBSQ NF CARE HIGH MDM 45: CPT | Performed by: NURSE PRACTITIONER

## 2018-01-01 PROCEDURE — 99214 OFFICE O/P EST MOD 30 MIN: CPT | Performed by: NURSE PRACTITIONER

## 2018-01-01 PROCEDURE — 93005 ELECTROCARDIOGRAM TRACING: CPT

## 2018-01-01 PROCEDURE — 3E0T3BZ INTRODUCTION OF ANESTHETIC AGENT INTO PERIPHERAL NERVES AND PLEXI, PERCUTANEOUS APPROACH: ICD-10-PCS | Performed by: ANESTHESIOLOGY

## 2018-01-01 PROCEDURE — 95819 EEG AWAKE AND ASLEEP: CPT | Performed by: OTHER

## 2018-01-01 PROCEDURE — 73110 X-RAY EXAM OF WRIST: CPT | Performed by: ORTHOPAEDIC SURGERY

## 2018-01-01 PROCEDURE — 11042 DBRDMT SUBQ TIS 1ST 20SQCM/<: CPT

## 2018-01-01 PROCEDURE — 85610 PROTHROMBIN TIME: CPT

## 2018-01-01 PROCEDURE — 84484 ASSAY OF TROPONIN QUANT: CPT | Performed by: EMERGENCY MEDICINE

## 2018-01-01 PROCEDURE — 96360 HYDRATION IV INFUSION INIT: CPT

## 2018-01-01 PROCEDURE — 84484 ASSAY OF TROPONIN QUANT: CPT

## 2018-01-01 PROCEDURE — 99233 SBSQ HOSP IP/OBS HIGH 50: CPT | Performed by: HOSPITALIST

## 2018-01-01 PROCEDURE — 73630 X-RAY EXAM OF FOOT: CPT | Performed by: EMERGENCY MEDICINE

## 2018-01-01 PROCEDURE — 99239 HOSP IP/OBS DSCHRG MGMT >30: CPT | Performed by: HOSPITALIST

## 2018-01-01 PROCEDURE — 99212 OFFICE O/P EST SF 10 MIN: CPT

## 2018-01-01 PROCEDURE — 73502 X-RAY EXAM HIP UNI 2-3 VIEWS: CPT | Performed by: ORTHOPAEDIC SURGERY

## 2018-01-01 PROCEDURE — 99239 HOSP IP/OBS DSCHRG MGMT >30: CPT | Performed by: INTERNAL MEDICINE

## 2018-01-01 PROCEDURE — 76700 US EXAM ABDOM COMPLETE: CPT | Performed by: HOSPITALIST

## 2018-01-01 PROCEDURE — 0FB13ZX EXCISION OF RIGHT LOBE LIVER, PERCUTANEOUS APPROACH, DIAGNOSTIC: ICD-10-PCS | Performed by: RADIOLOGY

## 2018-01-01 PROCEDURE — 82310 ASSAY OF CALCIUM: CPT | Performed by: EMERGENCY MEDICINE

## 2018-01-01 PROCEDURE — 85025 COMPLETE CBC W/AUTO DIFF WBC: CPT | Performed by: EMERGENCY MEDICINE

## 2018-01-01 PROCEDURE — 76700 US EXAM ABDOM COMPLETE: CPT | Performed by: INTERNAL MEDICINE

## 2018-01-01 PROCEDURE — 99214 OFFICE O/P EST MOD 30 MIN: CPT | Performed by: OTHER

## 2018-01-01 PROCEDURE — 93010 ELECTROCARDIOGRAM REPORT: CPT

## 2018-01-01 PROCEDURE — 73501 X-RAY EXAM HIP UNI 1 VIEW: CPT | Performed by: ORTHOPAEDIC SURGERY

## 2018-01-01 PROCEDURE — 99232 SBSQ HOSP IP/OBS MODERATE 35: CPT | Performed by: CLINICAL NURSE SPECIALIST

## 2018-01-01 PROCEDURE — 80053 COMPREHEN METABOLIC PANEL: CPT | Performed by: EMERGENCY MEDICINE

## 2018-01-01 PROCEDURE — 99308 SBSQ NF CARE LOW MDM 20: CPT | Performed by: NURSE PRACTITIONER

## 2018-01-01 PROCEDURE — 4A023N7 MEASUREMENT OF CARDIAC SAMPLING AND PRESSURE, LEFT HEART, PERCUTANEOUS APPROACH: ICD-10-PCS | Performed by: INTERNAL MEDICINE

## 2018-01-01 PROCEDURE — 70544 MR ANGIOGRAPHY HEAD W/O DYE: CPT | Performed by: HOSPITALIST

## 2018-01-01 PROCEDURE — 87205 SMEAR GRAM STAIN: CPT

## 2018-01-01 PROCEDURE — 83735 ASSAY OF MAGNESIUM: CPT

## 2018-01-01 PROCEDURE — 70496 CT ANGIOGRAPHY HEAD: CPT | Performed by: INTERNAL MEDICINE

## 2018-01-01 PROCEDURE — 87493 C DIFF AMPLIFIED PROBE: CPT

## 2018-01-01 PROCEDURE — 74176 CT ABD & PELVIS W/O CONTRAST: CPT | Performed by: EMERGENCY MEDICINE

## 2018-01-01 PROCEDURE — 0QBP0ZZ EXCISION OF LEFT METATARSAL, OPEN APPROACH: ICD-10-PCS | Performed by: PODIATRIST

## 2018-01-01 PROCEDURE — 73720 MRI LWR EXTREMITY W/O&W/DYE: CPT | Performed by: INTERNAL MEDICINE

## 2018-01-01 PROCEDURE — 70450 CT HEAD/BRAIN W/O DYE: CPT | Performed by: HOSPITALIST

## 2018-01-01 PROCEDURE — 99233 SBSQ HOSP IP/OBS HIGH 50: CPT | Performed by: OTHER

## 2018-01-01 PROCEDURE — 80048 BASIC METABOLIC PNL TOTAL CA: CPT

## 2018-01-01 PROCEDURE — 99215 OFFICE O/P EST HI 40 MIN: CPT

## 2018-01-01 PROCEDURE — 99202 OFFICE O/P NEW SF 15 MIN: CPT

## 2018-01-01 PROCEDURE — 0QS636Z REPOSITION RIGHT UPPER FEMUR WITH INTRAMEDULLARY INTERNAL FIXATION DEVICE, PERCUTANEOUS APPROACH: ICD-10-PCS | Performed by: ORTHOPAEDIC SURGERY

## 2018-01-01 PROCEDURE — 82306 VITAMIN D 25 HYDROXY: CPT

## 2018-01-01 PROCEDURE — 76001 XR FLUOROSCOPE EXAM >1 HR EXTENSIVE (CPT=76001): CPT | Performed by: ORTHOPAEDIC SURGERY

## 2018-01-01 PROCEDURE — 73660 X-RAY EXAM OF TOE(S): CPT | Performed by: INTERNAL MEDICINE

## 2018-01-01 PROCEDURE — 70498 CT ANGIOGRAPHY NECK: CPT | Performed by: HOSPITALIST

## 2018-01-01 PROCEDURE — 85652 RBC SED RATE AUTOMATED: CPT | Performed by: EMERGENCY MEDICINE

## 2018-01-01 PROCEDURE — 99238 HOSP IP/OBS DSCHRG MGMT 30/<: CPT | Performed by: HOSPITALIST

## 2018-01-01 PROCEDURE — 76000 FLUOROSCOPY <1 HR PHYS/QHP: CPT | Performed by: PODIATRIST

## 2018-01-01 PROCEDURE — 81003 URINALYSIS AUTO W/O SCOPE: CPT | Performed by: FAMILY MEDICINE

## 2018-01-01 PROCEDURE — 99233 SBSQ HOSP IP/OBS HIGH 50: CPT | Performed by: CLINICAL NURSE SPECIALIST

## 2018-01-01 PROCEDURE — 97112 NEUROMUSCULAR REEDUCATION: CPT

## 2018-01-01 PROCEDURE — 99291 CRITICAL CARE FIRST HOUR: CPT | Performed by: OTHER

## 2018-01-01 PROCEDURE — 70547 MR ANGIOGRAPHY NECK W/O DYE: CPT | Performed by: HOSPITALIST

## 2018-01-01 PROCEDURE — 99291 CRITICAL CARE FIRST HOUR: CPT | Performed by: HOSPITALIST

## 2018-01-01 PROCEDURE — 76700 US EXAM ABDOM COMPLETE: CPT | Performed by: STUDENT IN AN ORGANIZED HEALTH CARE EDUCATION/TRAINING PROGRAM

## 2018-01-01 PROCEDURE — 99222 1ST HOSP IP/OBS MODERATE 55: CPT | Performed by: INTERNAL MEDICINE

## 2018-01-01 PROCEDURE — 78582 LUNG VENTILAT&PERFUS IMAGING: CPT | Performed by: EMERGENCY MEDICINE

## 2018-01-01 PROCEDURE — 70498 CT ANGIOGRAPHY NECK: CPT | Performed by: INTERNAL MEDICINE

## 2018-01-01 PROCEDURE — 70450 CT HEAD/BRAIN W/O DYE: CPT | Performed by: EMERGENCY MEDICINE

## 2018-01-01 PROCEDURE — 97597 DBRDMT OPN WND 1ST 20 CM/<: CPT

## 2018-01-01 PROCEDURE — 71045 X-RAY EXAM CHEST 1 VIEW: CPT

## 2018-01-01 PROCEDURE — 73564 X-RAY EXAM KNEE 4 OR MORE: CPT | Performed by: ORTHOPAEDIC SURGERY

## 2018-01-01 PROCEDURE — 93975 VASCULAR STUDY: CPT | Performed by: STUDENT IN AN ORGANIZED HEALTH CARE EDUCATION/TRAINING PROGRAM

## 2018-01-01 PROCEDURE — B2111ZZ FLUOROSCOPY OF MULTIPLE CORONARY ARTERIES USING LOW OSMOLAR CONTRAST: ICD-10-PCS | Performed by: INTERNAL MEDICINE

## 2018-01-01 DEVICE — ZNN CMN NAIL 13MMX42CM 125 R
Type: IMPLANTABLE DEVICE | Site: HIP | Status: FUNCTIONAL
Brand: ZIMMER® NATURAL NAIL® SYSTEM

## 2018-01-01 DEVICE — ZNN CMN LAG SCREW 10.5X120
Type: IMPLANTABLE DEVICE | Site: HIP | Status: FUNCTIONAL
Brand: ZIMMER® NATURAL NAIL® SYSTEM

## 2018-01-01 DEVICE — IMPLANTABLE DEVICE: Type: IMPLANTABLE DEVICE | Site: HIP | Status: FUNCTIONAL

## 2018-01-01 RX ORDER — ACETAMINOPHEN 325 MG/1
650 TABLET ORAL EVERY 6 HOURS PRN
Status: DISCONTINUED | OUTPATIENT
Start: 2018-01-01 | End: 2018-01-01

## 2018-01-01 RX ORDER — AMOXICILLIN AND CLAVULANATE POTASSIUM 875; 125 MG/1; MG/1
1 TABLET, FILM COATED ORAL ONCE
Status: COMPLETED | OUTPATIENT
Start: 2018-01-01 | End: 2018-01-01

## 2018-01-01 RX ORDER — GABAPENTIN 300 MG/1
600 CAPSULE ORAL NIGHTLY
COMMUNITY

## 2018-01-01 RX ORDER — HYDROCODONE BITARTRATE AND ACETAMINOPHEN 5; 325 MG/1; MG/1
2 TABLET ORAL EVERY 4 HOURS PRN
Status: DISCONTINUED | OUTPATIENT
Start: 2018-01-01 | End: 2018-01-01

## 2018-01-01 RX ORDER — ONDANSETRON 2 MG/ML
4 INJECTION INTRAMUSCULAR; INTRAVENOUS EVERY 6 HOURS PRN
Status: DISCONTINUED | OUTPATIENT
Start: 2018-01-01 | End: 2018-01-01

## 2018-01-01 RX ORDER — ALPRAZOLAM 0.5 MG/1
0.5 TABLET ORAL 3 TIMES DAILY PRN
Status: DISCONTINUED | OUTPATIENT
Start: 2018-01-01 | End: 2018-01-01

## 2018-01-01 RX ORDER — DEXTROSE MONOHYDRATE 25 G/50ML
50 INJECTION, SOLUTION INTRAVENOUS
Status: DISCONTINUED | OUTPATIENT
Start: 2018-01-01 | End: 2018-01-01 | Stop reason: HOSPADM

## 2018-01-01 RX ORDER — ACETAMINOPHEN 325 MG/1
325 TABLET ORAL EVERY 6 HOURS PRN
Status: DISCONTINUED | OUTPATIENT
Start: 2018-01-01 | End: 2018-01-01

## 2018-01-01 RX ORDER — LIDOCAINE HYDROCHLORIDE 10 MG/ML
INJECTION, SOLUTION INFILTRATION; PERINEURAL
Status: COMPLETED
Start: 2018-01-01 | End: 2018-01-01

## 2018-01-01 RX ORDER — HYDROMORPHONE HYDROCHLORIDE 1 MG/ML
0.4 INJECTION, SOLUTION INTRAMUSCULAR; INTRAVENOUS; SUBCUTANEOUS EVERY 2 HOUR PRN
Status: DISCONTINUED | OUTPATIENT
Start: 2018-01-01 | End: 2018-01-01

## 2018-01-01 RX ORDER — NITROGLYCERIN 0.4 MG/1
0.4 TABLET SUBLINGUAL ONCE
Status: COMPLETED | OUTPATIENT
Start: 2018-01-01 | End: 2018-01-01

## 2018-01-01 RX ORDER — ROSUVASTATIN CALCIUM 5 MG/1
5 TABLET, COATED ORAL NIGHTLY
Status: DISCONTINUED | OUTPATIENT
Start: 2018-01-01 | End: 2018-01-01

## 2018-01-01 RX ORDER — FAMOTIDINE 20 MG/1
20 TABLET ORAL NIGHTLY
Status: DISCONTINUED | OUTPATIENT
Start: 2018-01-01 | End: 2018-01-01

## 2018-01-01 RX ORDER — NALOXONE HYDROCHLORIDE 0.4 MG/ML
80 INJECTION, SOLUTION INTRAMUSCULAR; INTRAVENOUS; SUBCUTANEOUS AS NEEDED
Status: DISCONTINUED | OUTPATIENT
Start: 2018-01-01 | End: 2018-01-01 | Stop reason: HOSPADM

## 2018-01-01 RX ORDER — GABAPENTIN 300 MG/1
CAPSULE ORAL
Qty: 240 CAPSULE | Refills: 3 | Status: SHIPPED | OUTPATIENT
Start: 2018-01-01 | End: 2018-01-01

## 2018-01-01 RX ORDER — NORTRIPTYLINE HYDROCHLORIDE 10 MG/1
20 CAPSULE ORAL NIGHTLY
Status: DISCONTINUED | OUTPATIENT
Start: 2018-01-01 | End: 2018-01-01

## 2018-01-01 RX ORDER — HYDROCODONE BITARTRATE AND ACETAMINOPHEN 5; 325 MG/1; MG/1
1 TABLET ORAL EVERY 6 HOURS PRN
Status: DISCONTINUED | OUTPATIENT
Start: 2018-01-01 | End: 2018-01-01

## 2018-01-01 RX ORDER — ALPRAZOLAM 0.5 MG/1
0.5 TABLET ORAL NIGHTLY PRN
Qty: 30 TABLET | Refills: 0 | Status: SHIPPED | OUTPATIENT
Start: 2018-01-01 | End: 2018-01-01

## 2018-01-01 RX ORDER — ASPIRIN 81 MG/1
324 TABLET, CHEWABLE ORAL ONCE
Status: DISCONTINUED | OUTPATIENT
Start: 2018-01-01 | End: 2018-01-01

## 2018-01-01 RX ORDER — LIDOCAINE HYDROCHLORIDE 10 MG/ML
INJECTION, SOLUTION EPIDURAL; INFILTRATION; INTRACAUDAL; PERINEURAL
Status: COMPLETED
Start: 2018-01-01 | End: 2018-01-01

## 2018-01-01 RX ORDER — ESCITALOPRAM OXALATE 10 MG/1
10 TABLET ORAL EVERY MORNING
Status: DISCONTINUED | OUTPATIENT
Start: 2018-01-01 | End: 2018-01-01

## 2018-01-01 RX ORDER — ASPIRIN 81 MG/1
324 TABLET, CHEWABLE ORAL ONCE
Status: DISCONTINUED | OUTPATIENT
Start: 2018-01-01 | End: 2018-01-01 | Stop reason: HOSPADM

## 2018-01-01 RX ORDER — LEVOTHYROXINE SODIUM 0.15 MG/1
300 TABLET ORAL
Status: DISCONTINUED | OUTPATIENT
Start: 2018-01-01 | End: 2018-01-01

## 2018-01-01 RX ORDER — HYDRALAZINE HYDROCHLORIDE 20 MG/ML
5 INJECTION INTRAMUSCULAR; INTRAVENOUS EVERY 6 HOURS PRN
Status: DISCONTINUED | OUTPATIENT
Start: 2018-01-01 | End: 2018-01-01

## 2018-01-01 RX ORDER — GABAPENTIN 100 MG/1
200 CAPSULE ORAL 2 TIMES DAILY
Status: DISCONTINUED | OUTPATIENT
Start: 2018-01-01 | End: 2018-01-01

## 2018-01-01 RX ORDER — HYDROMORPHONE HYDROCHLORIDE 1 MG/ML
0.5 INJECTION, SOLUTION INTRAMUSCULAR; INTRAVENOUS; SUBCUTANEOUS EVERY 2 HOUR PRN
Status: DISCONTINUED | OUTPATIENT
Start: 2018-01-01 | End: 2018-01-01

## 2018-01-01 RX ORDER — CLINDAMYCIN PHOSPHATE 600 MG/50ML
600 INJECTION INTRAVENOUS ONCE
Status: COMPLETED | OUTPATIENT
Start: 2018-01-01 | End: 2018-01-01

## 2018-01-01 RX ORDER — METOPROLOL TARTRATE 50 MG/1
50 TABLET, FILM COATED ORAL
Status: DISCONTINUED | OUTPATIENT
Start: 2018-01-01 | End: 2018-01-01

## 2018-01-01 RX ORDER — SODIUM CHLORIDE 9 MG/ML
INJECTION, SOLUTION INTRAVENOUS ONCE
Status: COMPLETED | OUTPATIENT
Start: 2018-01-01 | End: 2018-01-01

## 2018-01-01 RX ORDER — METOCLOPRAMIDE HYDROCHLORIDE 5 MG/ML
10 INJECTION INTRAMUSCULAR; INTRAVENOUS EVERY 8 HOURS PRN
Status: DISCONTINUED | OUTPATIENT
Start: 2018-01-01 | End: 2018-01-01

## 2018-01-01 RX ORDER — ISOSORBIDE DINITRATE 5 MG/1
5 TABLET ORAL 2 TIMES DAILY
Qty: 60 TABLET | Refills: 0 | Status: SHIPPED | OUTPATIENT
Start: 2018-01-01

## 2018-01-01 RX ORDER — MORPHINE SULFATE 4 MG/ML
2 INJECTION, SOLUTION INTRAMUSCULAR; INTRAVENOUS EVERY 2 HOUR PRN
Status: DISCONTINUED | OUTPATIENT
Start: 2018-01-01 | End: 2018-01-01

## 2018-01-01 RX ORDER — DIPHENHYDRAMINE HYDROCHLORIDE 50 MG/ML
25 INJECTION INTRAMUSCULAR; INTRAVENOUS ONCE
Status: COMPLETED | OUTPATIENT
Start: 2018-01-01 | End: 2018-01-01

## 2018-01-01 RX ORDER — SODIUM PHOSPHATE, DIBASIC AND SODIUM PHOSPHATE, MONOBASIC 7; 19 G/133ML; G/133ML
1 ENEMA RECTAL ONCE AS NEEDED
Status: DISCONTINUED | OUTPATIENT
Start: 2018-01-01 | End: 2018-01-01

## 2018-01-01 RX ORDER — HYDROCODONE BITARTRATE AND ACETAMINOPHEN 5; 325 MG/1; MG/1
2 TABLET ORAL AS NEEDED
Status: DISCONTINUED | OUTPATIENT
Start: 2018-01-01 | End: 2018-01-01 | Stop reason: HOSPADM

## 2018-01-01 RX ORDER — INSULIN ASPART 100 [IU]/ML
0.2 INJECTION, SOLUTION INTRAVENOUS; SUBCUTANEOUS ONCE
Status: COMPLETED | OUTPATIENT
Start: 2018-01-01 | End: 2018-01-01

## 2018-01-01 RX ORDER — MORPHINE SULFATE 2 MG/ML
0.5 INJECTION, SOLUTION INTRAMUSCULAR; INTRAVENOUS EVERY 4 HOURS PRN
Status: DISCONTINUED | OUTPATIENT
Start: 2018-01-01 | End: 2018-01-01 | Stop reason: SDUPTHER

## 2018-01-01 RX ORDER — DEXTROSE MONOHYDRATE 25 G/50ML
50 INJECTION, SOLUTION INTRAVENOUS
Status: DISCONTINUED | OUTPATIENT
Start: 2018-01-01 | End: 2018-01-01

## 2018-01-01 RX ORDER — TEMAZEPAM 7.5 MG/1
7.5 CAPSULE ORAL NIGHTLY PRN
Qty: 30 CAPSULE | Refills: 0
Start: 2018-01-01 | End: 2018-01-01

## 2018-01-01 RX ORDER — ONDANSETRON 2 MG/ML
4 INJECTION INTRAMUSCULAR; INTRAVENOUS ONCE
Status: COMPLETED | OUTPATIENT
Start: 2018-01-01 | End: 2018-01-01

## 2018-01-01 RX ORDER — TEMAZEPAM 7.5 MG/1
7.5 CAPSULE ORAL NIGHTLY PRN
Status: DISCONTINUED | OUTPATIENT
Start: 2018-01-01 | End: 2018-01-01

## 2018-01-01 RX ORDER — GABAPENTIN 300 MG/1
900 CAPSULE ORAL EVERY MORNING
Status: DISCONTINUED | OUTPATIENT
Start: 2018-01-01 | End: 2018-01-01

## 2018-01-01 RX ORDER — GABAPENTIN 600 MG/1
600 TABLET ORAL 3 TIMES DAILY
Status: DISCONTINUED | OUTPATIENT
Start: 2018-01-01 | End: 2018-01-01

## 2018-01-01 RX ORDER — METOCLOPRAMIDE HYDROCHLORIDE 5 MG/ML
10 INJECTION INTRAMUSCULAR; INTRAVENOUS AS NEEDED
Status: DISCONTINUED | OUTPATIENT
Start: 2018-01-01 | End: 2018-01-01 | Stop reason: HOSPADM

## 2018-01-01 RX ORDER — ACETAMINOPHEN 325 MG/1
650 TABLET ORAL EVERY 4 HOURS PRN
Status: DISCONTINUED | OUTPATIENT
Start: 2018-01-01 | End: 2018-01-01

## 2018-01-01 RX ORDER — ACETAMINOPHEN 650 MG/1
650 SUPPOSITORY RECTAL EVERY 4 HOURS PRN
Status: DISCONTINUED | OUTPATIENT
Start: 2018-01-01 | End: 2018-01-01

## 2018-01-01 RX ORDER — ASPIRIN 325 MG
325 TABLET ORAL DAILY
Status: DISCONTINUED | OUTPATIENT
Start: 2018-01-01 | End: 2018-01-01

## 2018-01-01 RX ORDER — BISACODYL 10 MG
10 SUPPOSITORY, RECTAL RECTAL
Status: DISCONTINUED | OUTPATIENT
Start: 2018-01-01 | End: 2018-01-01

## 2018-01-01 RX ORDER — ASPIRIN 81 MG/1
324 TABLET, CHEWABLE ORAL ONCE
Status: COMPLETED | OUTPATIENT
Start: 2018-01-01 | End: 2018-01-01

## 2018-01-01 RX ORDER — SODIUM CHLORIDE 9 MG/ML
INJECTION, SOLUTION INTRAVENOUS CONTINUOUS
Status: DISCONTINUED | OUTPATIENT
Start: 2018-01-01 | End: 2018-01-01

## 2018-01-01 RX ORDER — AMOXICILLIN AND CLAVULANATE POTASSIUM 875; 125 MG/1; MG/1
1 TABLET, FILM COATED ORAL 2 TIMES DAILY
Qty: 20 TABLET | Refills: 0 | Status: ON HOLD | OUTPATIENT
Start: 2018-01-01 | End: 2018-01-01

## 2018-01-01 RX ORDER — DOCUSATE SODIUM 100 MG/1
100 CAPSULE, LIQUID FILLED ORAL 2 TIMES DAILY
Status: DISCONTINUED | OUTPATIENT
Start: 2018-01-01 | End: 2018-01-01

## 2018-01-01 RX ORDER — MYCOPHENOLATE MOFETIL 250 MG/1
1000 CAPSULE ORAL 2 TIMES DAILY
Status: DISCONTINUED | OUTPATIENT
Start: 2018-01-01 | End: 2018-01-01

## 2018-01-01 RX ORDER — DOXYCYCLINE HYCLATE 100 MG/1
100 CAPSULE ORAL EVERY 12 HOURS SCHEDULED
Status: DISCONTINUED | OUTPATIENT
Start: 2018-01-01 | End: 2018-01-01

## 2018-01-01 RX ORDER — ALPRAZOLAM 0.5 MG/1
0.5 TABLET ORAL EVERY 6 HOURS PRN
Status: DISCONTINUED | OUTPATIENT
Start: 2018-01-01 | End: 2018-01-01

## 2018-01-01 RX ORDER — SODIUM CHLORIDE, SODIUM LACTATE, POTASSIUM CHLORIDE, CALCIUM CHLORIDE 600; 310; 30; 20 MG/100ML; MG/100ML; MG/100ML; MG/100ML
INJECTION, SOLUTION INTRAVENOUS CONTINUOUS
Status: DISCONTINUED | OUTPATIENT
Start: 2018-01-01 | End: 2018-01-01

## 2018-01-01 RX ORDER — GARLIC EXTRACT 500 MG
1 CAPSULE ORAL DAILY
Qty: 10 CAPSULE | Refills: 0 | Status: SHIPPED | OUTPATIENT
Start: 2018-01-01 | End: 2018-01-01

## 2018-01-01 RX ORDER — LORAZEPAM 2 MG/ML
INJECTION INTRAMUSCULAR
Status: COMPLETED
Start: 2018-01-01 | End: 2018-01-01

## 2018-01-01 RX ORDER — ONDANSETRON 2 MG/ML
4 INJECTION INTRAMUSCULAR; INTRAVENOUS EVERY 4 HOURS PRN
Status: DISCONTINUED | OUTPATIENT
Start: 2018-01-01 | End: 2018-01-01

## 2018-01-01 RX ORDER — HYDROCODONE BITARTRATE AND ACETAMINOPHEN 5; 325 MG/1; MG/1
2 TABLET ORAL EVERY 6 HOURS PRN
Status: DISCONTINUED | OUTPATIENT
Start: 2018-01-01 | End: 2018-01-01

## 2018-01-01 RX ORDER — GABAPENTIN 400 MG/1
400 CAPSULE ORAL 3 TIMES DAILY
Status: DISCONTINUED | OUTPATIENT
Start: 2018-01-01 | End: 2018-01-01

## 2018-01-01 RX ORDER — SODIUM CHLORIDE 9 MG/ML
INJECTION, SOLUTION INTRAVENOUS CONTINUOUS
Status: DISCONTINUED | OUTPATIENT
Start: 2018-01-01 | End: 2018-01-01 | Stop reason: HOSPADM

## 2018-01-01 RX ORDER — FINASTERIDE 5 MG/1
5 TABLET, FILM COATED ORAL NIGHTLY
Status: DISCONTINUED | OUTPATIENT
Start: 2018-01-01 | End: 2018-01-01

## 2018-01-01 RX ORDER — BUMETANIDE 2 MG/1
2 TABLET ORAL DAILY
Status: DISCONTINUED | OUTPATIENT
Start: 2018-01-01 | End: 2018-01-01

## 2018-01-01 RX ORDER — URSODIOL 300 MG/1
300 CAPSULE ORAL 3 TIMES DAILY
Status: DISCONTINUED | OUTPATIENT
Start: 2018-01-01 | End: 2018-01-01

## 2018-01-01 RX ORDER — HYDROCODONE BITARTRATE AND ACETAMINOPHEN 5; 325 MG/1; MG/1
1 TABLET ORAL EVERY 4 HOURS PRN
Status: DISCONTINUED | OUTPATIENT
Start: 2018-01-01 | End: 2018-01-01

## 2018-01-01 RX ORDER — HEPARIN SODIUM 5000 [USP'U]/ML
5000 INJECTION, SOLUTION INTRAVENOUS; SUBCUTANEOUS EVERY 12 HOURS SCHEDULED
Status: DISCONTINUED | OUTPATIENT
Start: 2018-01-01 | End: 2018-01-01

## 2018-01-01 RX ORDER — SENNOSIDES 8.6 MG
17.2 TABLET ORAL NIGHTLY
Status: DISCONTINUED | OUTPATIENT
Start: 2018-01-01 | End: 2018-01-01

## 2018-01-01 RX ORDER — LORAZEPAM 2 MG/ML
1 INJECTION INTRAMUSCULAR ONCE
Status: DISCONTINUED | OUTPATIENT
Start: 2018-01-01 | End: 2018-01-01

## 2018-01-01 RX ORDER — HEPARIN SODIUM 5000 [USP'U]/ML
5000 INJECTION, SOLUTION INTRAVENOUS; SUBCUTANEOUS EVERY 8 HOURS SCHEDULED
Status: DISCONTINUED | OUTPATIENT
Start: 2018-01-01 | End: 2018-01-01

## 2018-01-01 RX ORDER — PANTOPRAZOLE SODIUM 20 MG/1
20 TABLET, DELAYED RELEASE ORAL
Status: DISCONTINUED | OUTPATIENT
Start: 2018-01-01 | End: 2018-01-01

## 2018-01-01 RX ORDER — TRAZODONE HYDROCHLORIDE 50 MG/1
50 TABLET ORAL NIGHTLY PRN
Status: DISCONTINUED | OUTPATIENT
Start: 2018-01-01 | End: 2018-01-01

## 2018-01-01 RX ORDER — ENOXAPARIN SODIUM 100 MG/ML
40 INJECTION SUBCUTANEOUS DAILY
Status: DISCONTINUED | OUTPATIENT
Start: 2018-01-01 | End: 2018-01-01

## 2018-01-01 RX ORDER — DIPHENHYDRAMINE HCL 25 MG
25 CAPSULE ORAL EVERY 8 HOURS PRN
Status: DISCONTINUED | OUTPATIENT
Start: 2018-01-01 | End: 2018-01-01

## 2018-01-01 RX ORDER — GABAPENTIN 300 MG/1
900 CAPSULE ORAL EVERY MORNING
COMMUNITY

## 2018-01-01 RX ORDER — ACETAMINOPHEN 325 MG/1
650 TABLET ORAL ONCE
Status: COMPLETED | OUTPATIENT
Start: 2018-01-01 | End: 2018-01-01

## 2018-01-01 RX ORDER — ONDANSETRON 2 MG/ML
4 INJECTION INTRAMUSCULAR; INTRAVENOUS ONCE
Status: DISCONTINUED | OUTPATIENT
Start: 2018-01-01 | End: 2018-01-01

## 2018-01-01 RX ORDER — ATORVASTATIN CALCIUM 80 MG/1
80 TABLET, FILM COATED ORAL NIGHTLY
Status: DISCONTINUED | OUTPATIENT
Start: 2018-01-01 | End: 2018-01-01

## 2018-01-01 RX ORDER — HYDROMORPHONE HYDROCHLORIDE 1 MG/ML
1 INJECTION, SOLUTION INTRAMUSCULAR; INTRAVENOUS; SUBCUTANEOUS
Status: DISCONTINUED | OUTPATIENT
Start: 2018-01-01 | End: 2018-01-01

## 2018-01-01 RX ORDER — DIPHENHYDRAMINE HCL 25 MG
25 CAPSULE ORAL EVERY 4 HOURS PRN
Status: DISCONTINUED | OUTPATIENT
Start: 2018-01-01 | End: 2018-01-01

## 2018-01-01 RX ORDER — NORTRIPTYLINE HYDROCHLORIDE 10 MG/1
20 CAPSULE ORAL NIGHTLY
Qty: 180 CAPSULE | Refills: 1 | Status: SHIPPED | OUTPATIENT
Start: 2018-01-01

## 2018-01-01 RX ORDER — LIDOCAINE 50 MG/G
2 PATCH TOPICAL
Status: DISCONTINUED | OUTPATIENT
Start: 2018-01-01 | End: 2018-01-01

## 2018-01-01 RX ORDER — DIPHENHYDRAMINE HCL 25 MG
25 CAPSULE ORAL EVERY 6 HOURS PRN
Status: DISCONTINUED | OUTPATIENT
Start: 2018-01-01 | End: 2018-01-01

## 2018-01-01 RX ORDER — POTASSIUM CHLORIDE 20 MEQ/1
40 TABLET, EXTENDED RELEASE ORAL ONCE
Status: COMPLETED | OUTPATIENT
Start: 2018-01-01 | End: 2018-01-01

## 2018-01-01 RX ORDER — MIDAZOLAM HYDROCHLORIDE 1 MG/ML
INJECTION INTRAMUSCULAR; INTRAVENOUS
Status: COMPLETED
Start: 2018-01-01 | End: 2018-01-01

## 2018-01-01 RX ORDER — HYDROCODONE BITARTRATE AND ACETAMINOPHEN 5; 325 MG/1; MG/1
1 TABLET ORAL AS NEEDED
Status: DISCONTINUED | OUTPATIENT
Start: 2018-01-01 | End: 2018-01-01 | Stop reason: HOSPADM

## 2018-01-01 RX ORDER — PANTOPRAZOLE SODIUM 40 MG/1
40 TABLET, DELAYED RELEASE ORAL
Status: DISCONTINUED | OUTPATIENT
Start: 2018-01-01 | End: 2018-01-01

## 2018-01-01 RX ORDER — TEMAZEPAM 7.5 MG/1
7.5 CAPSULE ORAL NIGHTLY PRN
Qty: 30 CAPSULE | Refills: 0 | Status: SHIPPED | OUTPATIENT
Start: 2018-01-01

## 2018-01-01 RX ORDER — LORAZEPAM 2 MG/ML
1 INJECTION INTRAMUSCULAR ONCE
Status: COMPLETED | OUTPATIENT
Start: 2018-01-01 | End: 2018-01-01

## 2018-01-01 RX ORDER — NITROGLYCERIN 0.4 MG/1
0.4 TABLET SUBLINGUAL EVERY 5 MIN PRN
Status: DISCONTINUED | OUTPATIENT
Start: 2018-01-01 | End: 2018-01-01

## 2018-01-01 RX ORDER — GABAPENTIN 600 MG/1
600 TABLET ORAL 3 TIMES DAILY
COMMUNITY
End: 2018-01-01

## 2018-01-01 RX ORDER — FAMOTIDINE 10 MG/ML
20 INJECTION, SOLUTION INTRAVENOUS DAILY
Status: DISCONTINUED | OUTPATIENT
Start: 2018-01-01 | End: 2018-01-01

## 2018-01-01 RX ORDER — ONDANSETRON 2 MG/ML
4 INJECTION INTRAMUSCULAR; INTRAVENOUS AS NEEDED
Status: DISCONTINUED | OUTPATIENT
Start: 2018-01-01 | End: 2018-01-01 | Stop reason: HOSPADM

## 2018-01-01 RX ORDER — HYDROCODONE BITARTRATE AND ACETAMINOPHEN 10; 325 MG/1; MG/1
1 TABLET ORAL EVERY 4 HOURS PRN
Qty: 15 TABLET | Refills: 0 | Status: SHIPPED | OUTPATIENT
Start: 2018-01-01

## 2018-01-01 RX ORDER — MORPHINE SULFATE 2 MG/ML
INJECTION, SOLUTION INTRAMUSCULAR; INTRAVENOUS
Status: COMPLETED
Start: 2018-01-01 | End: 2018-01-01

## 2018-01-01 RX ORDER — CLOPIDOGREL BISULFATE 75 MG/1
75 TABLET ORAL DAILY
Status: DISCONTINUED | OUTPATIENT
Start: 2018-01-01 | End: 2018-01-01

## 2018-01-01 RX ORDER — NALTREXONE HYDROCHLORIDE 50 MG/1
50 TABLET, FILM COATED ORAL NIGHTLY
Status: DISCONTINUED | OUTPATIENT
Start: 2018-01-01 | End: 2018-01-01

## 2018-01-01 RX ORDER — CLINDAMYCIN PHOSPHATE 600 MG/50ML
600 INJECTION INTRAVENOUS EVERY 8 HOURS
Status: DISCONTINUED | OUTPATIENT
Start: 2018-01-01 | End: 2018-01-01

## 2018-01-01 RX ORDER — GABAPENTIN 300 MG/1
CAPSULE ORAL
Qty: 720 CAPSULE | Refills: 1 | Status: SHIPPED
Start: 2018-01-01 | End: 2018-01-01

## 2018-01-01 RX ORDER — MEPERIDINE HYDROCHLORIDE 25 MG/ML
12.5 INJECTION INTRAMUSCULAR; INTRAVENOUS; SUBCUTANEOUS AS NEEDED
Status: DISCONTINUED | OUTPATIENT
Start: 2018-01-01 | End: 2018-01-01 | Stop reason: HOSPADM

## 2018-01-01 RX ORDER — PHENYLEPHRINE HCL IN 0.9% NACL 50MG/250ML
PLASTIC BAG, INJECTION (ML) INTRAVENOUS CONTINUOUS PRN
Status: DISCONTINUED | OUTPATIENT
Start: 2018-01-01 | End: 2018-01-01

## 2018-01-01 RX ORDER — FAMOTIDINE 20 MG/1
20 TABLET ORAL DAILY
Status: DISCONTINUED | OUTPATIENT
Start: 2018-01-01 | End: 2018-01-01

## 2018-01-01 RX ORDER — ENOXAPARIN SODIUM 100 MG/ML
30 INJECTION SUBCUTANEOUS DAILY
Status: DISCONTINUED | OUTPATIENT
Start: 2018-01-01 | End: 2018-01-01

## 2018-01-01 RX ORDER — BACITRACIN 50000 [USP'U]/1
INJECTION, POWDER, LYOPHILIZED, FOR SOLUTION INTRAMUSCULAR AS NEEDED
Status: DISCONTINUED | OUTPATIENT
Start: 2018-01-01 | End: 2018-01-01 | Stop reason: HOSPADM

## 2018-01-01 RX ORDER — AMLODIPINE BESYLATE 2.5 MG/1
2.5 TABLET ORAL DAILY
Status: DISCONTINUED | OUTPATIENT
Start: 2018-01-01 | End: 2018-01-01

## 2018-01-01 RX ORDER — SODIUM CHLORIDE 9 MG/ML
INJECTION, SOLUTION INTRAVENOUS CONTINUOUS
Status: ACTIVE | OUTPATIENT
Start: 2018-01-01 | End: 2018-01-01

## 2018-01-01 RX ORDER — FINASTERIDE 5 MG/1
5 TABLET, FILM COATED ORAL NIGHTLY
COMMUNITY
End: 2018-01-01

## 2018-01-01 RX ORDER — HYDROMORPHONE HYDROCHLORIDE 1 MG/ML
0.3 INJECTION, SOLUTION INTRAMUSCULAR; INTRAVENOUS; SUBCUTANEOUS EVERY 2 HOUR PRN
Status: DISCONTINUED | OUTPATIENT
Start: 2018-01-01 | End: 2018-01-01

## 2018-01-01 RX ORDER — HYDROMORPHONE HYDROCHLORIDE 1 MG/ML
INJECTION, SOLUTION INTRAMUSCULAR; INTRAVENOUS; SUBCUTANEOUS
Status: DISCONTINUED
Start: 2018-01-01 | End: 2018-01-01 | Stop reason: WASHOUT

## 2018-01-01 RX ORDER — ISOSORBIDE DINITRATE 10 MG/1
10 TABLET ORAL 2 TIMES DAILY
Status: DISCONTINUED | OUTPATIENT
Start: 2018-01-01 | End: 2018-01-01

## 2018-01-01 RX ORDER — DIPHENHYDRAMINE HYDROCHLORIDE 50 MG/ML
12.5 INJECTION INTRAMUSCULAR; INTRAVENOUS AS NEEDED
Status: DISCONTINUED | OUTPATIENT
Start: 2018-01-01 | End: 2018-01-01 | Stop reason: HOSPADM

## 2018-01-01 RX ORDER — HYDROCODONE BITARTRATE AND ACETAMINOPHEN 5; 325 MG/1; MG/1
2 TABLET ORAL EVERY 6 HOURS PRN
Qty: 30 TABLET | Refills: 0 | Status: SHIPPED | OUTPATIENT
Start: 2018-01-01 | End: 2018-01-01 | Stop reason: DRUGHIGH

## 2018-01-01 RX ORDER — IPRATROPIUM BROMIDE AND ALBUTEROL SULFATE 2.5; .5 MG/3ML; MG/3ML
SOLUTION RESPIRATORY (INHALATION)
Refills: 0 | COMMUNITY
Start: 2018-01-01 | End: 2018-01-01

## 2018-01-01 RX ORDER — HYDROMORPHONE HYDROCHLORIDE 1 MG/ML
INJECTION, SOLUTION INTRAMUSCULAR; INTRAVENOUS; SUBCUTANEOUS
Status: COMPLETED
Start: 2018-01-01 | End: 2018-01-01

## 2018-01-01 RX ORDER — ASPIRIN 81 MG/1
81 TABLET ORAL DAILY
Status: DISCONTINUED | OUTPATIENT
Start: 2018-01-01 | End: 2018-01-01

## 2018-01-01 RX ORDER — GARLIC EXTRACT 500 MG
1 CAPSULE ORAL DAILY
Qty: 10 CAPSULE | Refills: 0 | Status: SHIPPED | OUTPATIENT
Start: 2018-01-01

## 2018-01-01 RX ORDER — MORPHINE SULFATE 4 MG/ML
4 INJECTION, SOLUTION INTRAMUSCULAR; INTRAVENOUS ONCE
Status: COMPLETED | OUTPATIENT
Start: 2018-01-01 | End: 2018-01-01

## 2018-01-01 RX ORDER — SODIUM HYPOCHLORITE 2.5 MG/ML
SOLUTION TOPICAL 2 TIMES DAILY
Status: DISCONTINUED | OUTPATIENT
Start: 2018-01-01 | End: 2018-01-01

## 2018-01-01 RX ORDER — HYDROCODONE BITARTRATE AND ACETAMINOPHEN 10; 325 MG/1; MG/1
1 TABLET ORAL EVERY 4 HOURS PRN
Status: DISCONTINUED | OUTPATIENT
Start: 2018-01-01 | End: 2018-01-01

## 2018-01-01 RX ORDER — HYDROCODONE BITARTRATE AND ACETAMINOPHEN 5; 325 MG/1; MG/1
1 TABLET ORAL EVERY 4 HOURS PRN
COMMUNITY
End: 2018-01-01 | Stop reason: ALTCHOICE

## 2018-01-01 RX ORDER — HYDROMORPHONE HYDROCHLORIDE 1 MG/ML
0.8 INJECTION, SOLUTION INTRAMUSCULAR; INTRAVENOUS; SUBCUTANEOUS EVERY 2 HOUR PRN
Status: DISCONTINUED | OUTPATIENT
Start: 2018-01-01 | End: 2018-01-01

## 2018-01-01 RX ORDER — NALTREXONE HYDROCHLORIDE 50 MG/1
50 TABLET, FILM COATED ORAL
Status: DISCONTINUED | OUTPATIENT
Start: 2018-01-01 | End: 2018-01-01

## 2018-01-01 RX ORDER — BUPIVACAINE HYDROCHLORIDE 5 MG/ML
INJECTION, SOLUTION EPIDURAL; INTRACAUDAL AS NEEDED
Status: DISCONTINUED | OUTPATIENT
Start: 2018-01-01 | End: 2018-01-01 | Stop reason: HOSPADM

## 2018-01-01 RX ORDER — ERGOCALCIFEROL 1.25 MG/1
50000 CAPSULE ORAL
Status: DISCONTINUED | OUTPATIENT
Start: 2018-01-01 | End: 2018-01-01

## 2018-01-01 RX ORDER — MORPHINE SULFATE 4 MG/ML
0.5 INJECTION, SOLUTION INTRAMUSCULAR; INTRAVENOUS EVERY 4 HOURS PRN
Status: DISCONTINUED | OUTPATIENT
Start: 2018-01-01 | End: 2018-01-01

## 2018-01-01 RX ORDER — LACTULOSE 20 G/30ML
20 SOLUTION ORAL 3 TIMES DAILY
Qty: 1 BOTTLE | Refills: 0 | Status: SHIPPED | OUTPATIENT
Start: 2018-01-01

## 2018-01-01 RX ORDER — DIPHENHYDRAMINE HCL 50 MG
50 CAPSULE ORAL NIGHTLY PRN
Status: DISCONTINUED | OUTPATIENT
Start: 2018-01-01 | End: 2018-01-01

## 2018-01-01 RX ORDER — HYDROMORPHONE HYDROCHLORIDE 1 MG/ML
1 INJECTION, SOLUTION INTRAMUSCULAR; INTRAVENOUS; SUBCUTANEOUS EVERY 2 HOUR PRN
Status: DISCONTINUED | OUTPATIENT
Start: 2018-01-01 | End: 2018-01-01

## 2018-01-01 RX ORDER — CYCLOSPORINE 25 MG/1
175 CAPSULE, LIQUID FILLED ORAL 2 TIMES DAILY
COMMUNITY
Start: 2018-01-01

## 2018-01-01 RX ORDER — MORPHINE SULFATE 2 MG/ML
2 INJECTION, SOLUTION INTRAMUSCULAR; INTRAVENOUS EVERY 2 HOUR PRN
Status: DISCONTINUED | OUTPATIENT
Start: 2018-01-01 | End: 2018-01-01

## 2018-01-01 RX ORDER — LACTULOSE 20 G/30ML
20 SOLUTION ORAL 3 TIMES DAILY
Status: DISCONTINUED | OUTPATIENT
Start: 2018-01-01 | End: 2018-01-01

## 2018-01-01 RX ORDER — LABETALOL HYDROCHLORIDE 5 MG/ML
10 INJECTION, SOLUTION INTRAVENOUS EVERY 10 MIN PRN
Status: DISCONTINUED | OUTPATIENT
Start: 2018-01-01 | End: 2018-01-01

## 2018-01-01 RX ORDER — NALOXONE HYDROCHLORIDE 0.4 MG/ML
INJECTION, SOLUTION INTRAMUSCULAR; INTRAVENOUS; SUBCUTANEOUS
Status: COMPLETED
Start: 2018-01-01 | End: 2018-01-01

## 2018-01-01 RX ORDER — GABAPENTIN 300 MG/1
900 CAPSULE ORAL
COMMUNITY

## 2018-01-01 RX ORDER — DOXYCYCLINE HYCLATE 100 MG/1
100 CAPSULE ORAL 2 TIMES DAILY
Status: DISCONTINUED | OUTPATIENT
Start: 2018-01-01 | End: 2018-01-01

## 2018-01-01 RX ORDER — MIDAZOLAM HYDROCHLORIDE 1 MG/ML
1 INJECTION INTRAMUSCULAR; INTRAVENOUS EVERY 5 MIN PRN
Status: DISCONTINUED | OUTPATIENT
Start: 2018-01-01 | End: 2018-01-01 | Stop reason: HOSPADM

## 2018-01-01 RX ORDER — MORPHINE SULFATE 2 MG/ML
4 INJECTION, SOLUTION INTRAMUSCULAR; INTRAVENOUS EVERY 30 MIN PRN
Status: DISCONTINUED | OUTPATIENT
Start: 2018-01-01 | End: 2018-01-01

## 2018-01-01 RX ORDER — ROSUVASTATIN CALCIUM 5 MG/1
5 TABLET, COATED ORAL NIGHTLY
Status: CANCELLED | OUTPATIENT
Start: 2018-01-01

## 2018-01-01 RX ORDER — ISOSORBIDE DINITRATE 20 MG/1
5 TABLET ORAL 2 TIMES DAILY
Status: DISCONTINUED | OUTPATIENT
Start: 2018-01-01 | End: 2018-01-01

## 2018-01-01 RX ORDER — HYDROCODONE BITARTRATE AND ACETAMINOPHEN 10; 325 MG/1; MG/1
2 TABLET ORAL EVERY 4 HOURS PRN
Status: DISCONTINUED | OUTPATIENT
Start: 2018-01-01 | End: 2018-01-01

## 2018-01-01 RX ORDER — POLYETHYLENE GLYCOL 3350 17 G/17G
17 POWDER, FOR SOLUTION ORAL DAILY PRN
Status: DISCONTINUED | OUTPATIENT
Start: 2018-01-01 | End: 2018-01-01

## 2018-01-01 RX ORDER — HEPARIN SODIUM 5000 [USP'U]/ML
INJECTION, SOLUTION INTRAVENOUS; SUBCUTANEOUS
Status: COMPLETED
Start: 2018-01-01 | End: 2018-01-01

## 2018-01-01 RX ORDER — ISOSORBIDE DINITRATE 5 MG/1
5 TABLET ORAL 2 TIMES DAILY
Status: DISCONTINUED | OUTPATIENT
Start: 2018-01-01 | End: 2018-01-01

## 2018-01-01 RX ORDER — MORPHINE SULFATE 2 MG/ML
1 INJECTION, SOLUTION INTRAMUSCULAR; INTRAVENOUS ONCE
Status: COMPLETED | OUTPATIENT
Start: 2018-01-01 | End: 2018-01-01

## 2018-01-01 RX ORDER — ALPRAZOLAM 0.5 MG/1
0.5 TABLET ORAL 3 TIMES DAILY PRN
Qty: 30 TABLET | Refills: 0 | Status: ON HOLD | OUTPATIENT
Start: 2018-01-01 | End: 2018-01-01

## 2018-01-01 RX ORDER — INSULIN ASPART 100 [IU]/ML
0.15 INJECTION, SOLUTION INTRAVENOUS; SUBCUTANEOUS ONCE
Status: COMPLETED | OUTPATIENT
Start: 2018-01-01 | End: 2018-01-01

## 2018-01-01 RX ORDER — SODIUM CHLORIDE 9 MG/ML
125 INJECTION, SOLUTION INTRAVENOUS CONTINUOUS
Status: DISCONTINUED | OUTPATIENT
Start: 2018-01-01 | End: 2018-01-01

## 2018-01-01 RX ORDER — HYDROMORPHONE HYDROCHLORIDE 1 MG/ML
0.2 INJECTION, SOLUTION INTRAMUSCULAR; INTRAVENOUS; SUBCUTANEOUS EVERY 2 HOUR PRN
Status: DISCONTINUED | OUTPATIENT
Start: 2018-01-01 | End: 2018-01-01

## 2018-01-01 RX ORDER — SODIUM POLYSTYRENE SULFONATE 15 G/60ML
30 SUSPENSION ORAL; RECTAL ONCE
Status: COMPLETED | OUTPATIENT
Start: 2018-01-01 | End: 2018-01-01

## 2018-01-01 RX ORDER — CLINDAMYCIN PHOSPHATE 900 MG/50ML
INJECTION INTRAVENOUS
Status: DISCONTINUED | OUTPATIENT
Start: 2018-01-01 | End: 2018-01-01

## 2018-01-01 RX ORDER — NITROGLYCERIN 0.4 MG/1
TABLET SUBLINGUAL
Status: COMPLETED
Start: 2018-01-01 | End: 2018-01-01

## 2018-01-01 RX ORDER — MYCOPHENOLATE MOFETIL 500 MG/1
1000 TABLET ORAL 2 TIMES DAILY
Status: DISCONTINUED | OUTPATIENT
Start: 2018-01-01 | End: 2018-01-01

## 2018-01-01 RX ORDER — ROSUVASTATIN CALCIUM 20 MG/1
5 TABLET, COATED ORAL NIGHTLY
Status: DISCONTINUED | OUTPATIENT
Start: 2018-01-01 | End: 2018-01-01

## 2018-01-01 RX ORDER — IPRATROPIUM BROMIDE AND ALBUTEROL SULFATE 2.5; .5 MG/3ML; MG/3ML
3 SOLUTION RESPIRATORY (INHALATION)
COMMUNITY
Start: 2018-01-01 | End: 2018-01-01

## 2018-01-01 RX ORDER — FINASTERIDE 5 MG/1
5 TABLET, FILM COATED ORAL NIGHTLY
Qty: 90 TABLET | Refills: 0 | Status: SHIPPED | OUTPATIENT
Start: 2018-01-01

## 2018-01-01 RX ORDER — DOXYCYCLINE HYCLATE 100 MG/1
100 CAPSULE ORAL 2 TIMES DAILY
Qty: 20 CAPSULE | Refills: 0 | Status: ON HOLD | OUTPATIENT
Start: 2018-01-01 | End: 2018-01-01

## 2018-01-01 RX ORDER — DOXEPIN HYDROCHLORIDE 50 MG/1
1 CAPSULE ORAL DAILY
Status: DISCONTINUED | OUTPATIENT
Start: 2018-01-01 | End: 2018-01-01

## 2018-01-01 RX ORDER — FAMOTIDINE 10 MG/ML
20 INJECTION, SOLUTION INTRAVENOUS NIGHTLY
Status: DISCONTINUED | OUTPATIENT
Start: 2018-01-01 | End: 2018-01-01

## 2018-01-01 RX ORDER — HYDROCODONE BITARTRATE AND ACETAMINOPHEN 5; 325 MG/1; MG/1
1-2 TABLET ORAL EVERY 6 HOURS PRN
Status: DISCONTINUED | OUTPATIENT
Start: 2018-01-01 | End: 2018-01-01

## 2018-01-01 RX ORDER — AMLODIPINE BESYLATE 5 MG/1
5 TABLET ORAL DAILY
Status: DISCONTINUED | OUTPATIENT
Start: 2018-01-01 | End: 2018-01-01

## 2018-01-01 RX ORDER — AMLODIPINE BESYLATE 2.5 MG/1
2.5 TABLET ORAL DAILY
COMMUNITY
Start: 2018-01-01 | End: 2018-01-01 | Stop reason: ALTCHOICE

## 2018-01-01 RX ORDER — GUAIFENESIN 600 MG
1200 TABLET, EXTENDED RELEASE 12 HR ORAL 2 TIMES DAILY
COMMUNITY
Start: 2018-01-01 | End: 2018-01-01

## 2018-01-01 RX ORDER — CLINDAMYCIN PHOSPHATE 600 MG/50ML
600 INJECTION INTRAVENOUS EVERY 8 HOURS
Status: COMPLETED | OUTPATIENT
Start: 2018-01-01 | End: 2018-01-01

## 2018-01-01 RX ORDER — GABAPENTIN 300 MG/1
900 CAPSULE ORAL
Status: DISCONTINUED | OUTPATIENT
Start: 2018-01-01 | End: 2018-01-01

## 2018-01-01 RX ORDER — DOXYCYCLINE HYCLATE 100 MG/1
100 CAPSULE ORAL 2 TIMES DAILY
Qty: 20 CAPSULE | Refills: 0 | Status: SHIPPED | OUTPATIENT
Start: 2018-01-01 | End: 2018-01-01

## 2018-01-01 RX ORDER — MORPHINE SULFATE 4 MG/ML
1 INJECTION, SOLUTION INTRAMUSCULAR; INTRAVENOUS
Status: DISCONTINUED | OUTPATIENT
Start: 2018-01-01 | End: 2018-01-01

## 2018-01-01 RX ORDER — GABAPENTIN 300 MG/1
600 CAPSULE ORAL NIGHTLY
Status: DISCONTINUED | OUTPATIENT
Start: 2018-01-01 | End: 2018-01-01

## 2018-01-01 RX ORDER — DIPHENHYDRAMINE HYDROCHLORIDE 50 MG/ML
25 INJECTION INTRAMUSCULAR; INTRAVENOUS EVERY 4 HOURS PRN
Status: DISCONTINUED | OUTPATIENT
Start: 2018-01-01 | End: 2018-01-01

## 2018-01-01 RX ORDER — HYDROCODONE BITARTRATE AND ACETAMINOPHEN 7.5; 325 MG/1; MG/1
TABLET ORAL
Refills: 0 | COMMUNITY
Start: 2018-01-01 | End: 2018-01-01 | Stop reason: ALTCHOICE

## 2018-01-01 RX ORDER — NITROGLYCERIN 20 MG/100ML
INJECTION INTRAVENOUS
Status: DISCONTINUED | OUTPATIENT
Start: 2018-01-01 | End: 2018-01-01

## 2018-01-01 RX ORDER — ONDANSETRON 2 MG/ML
INJECTION INTRAMUSCULAR; INTRAVENOUS
Status: DISPENSED
Start: 2018-01-01 | End: 2018-01-01

## 2018-01-01 RX ORDER — FINASTERIDE 5 MG/1
5 TABLET, FILM COATED ORAL NIGHTLY
Qty: 30 TABLET | Refills: 0 | Status: SHIPPED | OUTPATIENT
Start: 2018-01-01 | End: 2018-01-01

## 2018-01-01 RX ORDER — CLINDAMYCIN HYDROCHLORIDE 300 MG/1
300 CAPSULE ORAL 3 TIMES DAILY
Qty: 30 CAPSULE | Refills: 0 | Status: SHIPPED | OUTPATIENT
Start: 2018-01-01 | End: 2018-01-01

## 2018-01-01 RX ORDER — TACROLIMUS 1 MG/1
5 CAPSULE ORAL 2 TIMES DAILY
Status: DISCONTINUED | OUTPATIENT
Start: 2018-01-01 | End: 2018-01-01

## 2018-01-01 RX ORDER — HYDROMORPHONE HYDROCHLORIDE 1 MG/ML
0.4 INJECTION, SOLUTION INTRAMUSCULAR; INTRAVENOUS; SUBCUTANEOUS EVERY 5 MIN PRN
Status: DISCONTINUED | OUTPATIENT
Start: 2018-01-01 | End: 2018-01-01 | Stop reason: HOSPADM

## 2018-01-01 RX ORDER — HYDROCODONE BITARTRATE AND ACETAMINOPHEN 7.5; 325 MG/1; MG/1
2 TABLET ORAL EVERY 4 HOURS PRN
COMMUNITY
End: 2018-01-01

## 2018-01-01 RX ORDER — MORPHINE SULFATE 2 MG/ML
4 INJECTION, SOLUTION INTRAMUSCULAR; INTRAVENOUS EVERY 2 HOUR PRN
Status: DISCONTINUED | OUTPATIENT
Start: 2018-01-01 | End: 2018-01-01

## 2018-01-03 ENCOUNTER — PRIOR ORIGINAL RECORDS (OUTPATIENT)
Dept: OTHER | Age: 68
End: 2018-01-03

## 2018-01-09 NOTE — PROGRESS NOTES
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   Has followed u Seated : Left toe raise x10  Seated : Left toe raise with 1# weight on dorsum of foot 2x10  Seated : Left toe raise with 1# weight on dorsum of foot 2x10  Seated : Left toe raise with 1# weight on dorsum of foot 2x10      Standing: [facility] x10  [facility]

## 2018-01-10 ENCOUNTER — PRIOR ORIGINAL RECORDS (OUTPATIENT)
Dept: OTHER | Age: 68
End: 2018-01-10

## 2018-01-10 ENCOUNTER — MYAURORA ACCOUNT LINK (OUTPATIENT)
Dept: OTHER | Age: 68
End: 2018-01-10

## 2018-01-15 NOTE — PROGRESS NOTES
Patient presents with: Foot Pain: off and on 6 weeks. possible ulcer.  w/ odor , blood on skin noticed when he went in for new pair of shoes ROOM 2 (new heart medicine however is not taking it and does not recall which one, per wife is not taking it due to Essential hypertension    • Heart murmur     Since Childhood   • High cholesterol    • History of blood transfusion [id number]   • Hyperlipidemia    • Hypothyroidism    • Kidney disorder    • Liver transplanted [facility])    • Myocardial infarction    • Obesity disease     Alteration of awareness     Lethargy     Headache(784.0)     Severe obesity (BMI 35.0-39. 9) with comorbidity (HCC)     Metabolic acidosis     Paresthesia     Pain in extremity, unspecified extremity     Pain in extremity     Chronic intractable Rosuvastatin Calcium 5 MG Oral Tab Take 1 tablet (5 mg total) by mouth nightly.  Disp: 30 tablet Rfl: 1   SYNTHROID 300 MCG Oral Tab Take 1 tablet (300 mcg total) by mouth before breakfast. Disp: 90 tablet Rfl: 1   temazepam 7.5 MG Oral Cap Take 1 capsule Normal rate, regular rhythm. No murmur. Pulmonary/Chest: No respiratory distress. Effort normal. Breath sounds clear bilaterally. No wheezes, rhonchi or rales  Abdominal: Soft, non-tender, non-distended, w/o masses, organomegaly or hernias.  No rebound, this visit. All questions were answered and the patient understands the plan.

## 2018-01-18 LAB
ALBUMIN: 2.6 G/DL
ALBUMIN: 2.6 G/DL
ALKALINE PHOSPHATATE(ALK PHOS): 1105 IU/L
ALKALINE PHOSPHATATE(ALK PHOS): 997 IU/L
BILIRUBIN TOTAL: 2 MG/DL
BILIRUBIN TOTAL: 2.3 MG/DL
BUN: 26 MG/DL
BUN: 29 MG/DL
CALCIUM: 8 MG/DL
CALCIUM: 8.4 MG/DL
CHLORIDE: 108 MEQ/L
CHLORIDE: 109 MEQ/L
CHOLESTEROL, TOTAL: 140 MG/DL
CREATININE, SERUM: 1.68 MG/DL
CREATININE, SERUM: 1.73 MG/DL
GLUCOSE: 126 MG/DL
GLUCOSE: 69 MG/DL
HDL CHOLESTEROL: 51 MG/DL
HEMATOCRIT: 30.7 %
HEMOGLOBIN: 10.3 G/DL
LDL CHOLESTEROL: 72 MG/DL
MAGNESIUM: 2.1 MG/DL
PLATELETS: 162 K/UL
POTASSIUM, SERUM: 4 MEQ/L
POTASSIUM, SERUM: 4.1 MEQ/L
PROTEIN, TOTAL: 6.4 G/DL
PROTEIN, TOTAL: 6.5 G/DL
RED BLOOD COUNT: 3.42 X 10-6/U
SGOT (AST): 103 IU/L
SGOT (AST): 115 IU/L
SGPT (ALT): 63 IU/L
SGPT (ALT): 64 IU/L
SODIUM: 140 MEQ/L
SODIUM: 141 MEQ/L
TRIGLYCERIDES: 84 MG/DL
WHITE BLOOD COUNT: 7.4 X 10-3/U

## 2018-01-19 PROBLEM — L97.429 DIABETIC ULCER OF LEFT MIDFOOT ASSOCIATED WITH TYPE 2 DIABETES MELLITUS, UNSPECIFIED ULCER STAGE (HCC): Status: ACTIVE | Noted: 2018-01-01

## 2018-01-19 PROBLEM — E11.621 DIABETIC ULCER OF LEFT MIDFOOT ASSOCIATED WITH TYPE 2 DIABETES MELLITUS, UNSPECIFIED ULCER STAGE (HCC): Status: ACTIVE | Noted: 2018-01-01

## 2018-01-19 PROBLEM — L03.116 CELLULITIS OF LEFT LOWER EXTREMITY: Status: ACTIVE | Noted: 2018-01-01

## 2018-01-19 PROBLEM — R73.9 HYPERGLYCEMIA: Status: ACTIVE | Noted: 2018-01-01

## 2018-01-19 NOTE — PROGRESS NOTES
Chief Complaint  This information was obtained from the patient  Patient is here for an initial consult. He presents with a diabetic ulcer on his left foot.     Allergies  Cephalosporins (Reaction: Hives), Ciprofloxacin, Daptomycin (Reaction: Rash, SOB), Stroke - No History, Thyroid Problems - Sibling, Tuberculosis - No History, Seizures - No History, Autoimmune Disease - No History    Social History  This information was obtained from the patient  Former smoker - Quit in [address] [place] [address], Marital Status - , [facility] breakfast  temazepam 7.5 mg capsule oral 1 1 capsule oral once daily at night as needed for sleep  Synthroid 300 mcg tablet oral 1 1 tablet oral once daily  ergocalciferol (vitamin D2) 50,000 unit capsule oral 1 1 capsule oral once a week        Objective Warm  Capillary Refill: < 3 seconds  Erythema: No  Dependent Rubor: No  Hyperpigmentation: No  Lipodermatosclerosis: No      Additional Information  LEFT- Heel to back of knee Measurement: 19        Assessment    Active Problems    ICD-10  (Encounter Diagn labs.  Discussed the Plan of Care at bedside with patient. The patient verbally acknowledges understanding of all instructions and all questions were answered. Reviewed hospital records to include: - LABS / NOTES.      Laboratory:   Aerobic & Anaerobic Cu

## 2018-01-19 NOTE — ED PROVIDER NOTES
Patient Seen in: [facility] Emergency Department    History   Patient presents with:  Hypertension (cardiovascular)  Hyperglycemia (metabolic)  Hemoptysis (respiratory)  Cellulitis (integumentary, infectious)    Stated Complaint: HTN    HPI    The p vascular disease ([facility] [address] 2005    diabets broken ankle led to amputation   • Renal disorder    • Shingles    • Sleep apnea    • Stroke [facility]) 2005   • Visual impairment     reading glasses       Past Surgical History:  10/31/16: ANGIOPLASTY (CORONARY)      Com Diminished sensation of his left lower extremity up to his knee, he states is chronic from his neuropathy. HEENT: No lymphadenopathy. Mucus membranes moist.   Supple neck.    Cardiovascular:    Regular rhythm without murmurs rubs or gallops, no peripheral other components within normal limits   CBC W/ DIFFERENTIAL - Abnormal; Notable for the following:     HGB 12.4 (*)     HCT 36.8 (*)     Lymphocyte Absolute 0.70 (*)     Eosinophil Absolute 0.32 (*)     All other components within normal limits   CBC WITH interphalangeal joints and MTP joints. There also degenerative changes in the TMT joints. There has been amputation of the 2nd digit. There is stable chronic deformity of the 3rd metatarsal head.   There is stable chronic deformity of the base of the pro abdomen. B-mode images, Doppler color flow, and spectral waveform analysis were performed of the portal vein, hepatic artery, hepatic vein, and splenic vein.   The exam includes images of the liver, gallbladder, common bile duct, pancreas, spleen, kidneys, to his extensive allergy list.  He will need to be admitted for IV antibiotics. I discussed patient with Dr. Nikko Mahoeny and he was admitted in a stable condition.         Admission disposition: 1/19/2018  4:31 PM           Disposition and Plan     Clinical

## 2018-01-19 NOTE — ED INITIAL ASSESSMENT (HPI)
Patient said was at the wound center today and said he was coughing up blood on Saturday and Sunday. Denies fevers. pt sts nausea for the past few weeks.  Wound on bottom of left foot and when they were cleaning it they noticed it went down to the bone and

## 2018-01-19 NOTE — H&P
[facility]  History and Physical     [facility]  Patient Status:  Emergency    3/1/1950 MRN [id number]   Location [address] [facility] Attending WillEmi MD   Hosp Day # 0 PCP Rosie Billingsley MD     Chief Complaint: [facility]) 2005   • Visual impairment     reading glasses        Past Surgical History: Past Surgical History:  10/31/16: ANGIOPLASTY (CORONARY)      Comment: Stents X 4   10/18/16: ANGIOPLASTY (CORONARY)      Comment: Stents x 1  2015: [address] Steroidal psychosis  Dexamethasone Sodiu*    Other (See Comments)    Comment:Steroidal psychosis  Floxin [Ofloxacin]        Other                       Comment:PT STATES HE IS ALLERGIC TO FLUORQUINOLONE AND/OR             FLOXIN FAMILY DRUGS  Jose Lacks Disp:  Rfl:    ergocalciferol 15492 units Oral Cap Take 50,000 Units by mouth once a week. Disp:  Rfl:    alprazolam 0.5 MG Oral Tab Take 0.5 mg by mouth every 6 (six) hours as needed. Disp:  Rfl:    aspirin 325 MG Oral Tab Take 325 mg by mouth daily. Psychiatric: Appropriate mood and affect.       Diagnostic Data:      Labs:  Recent Labs   Lab  01/19/18   1549   WBC  6.9   HGB  12.4*   MCV  87.0   PLT  165.0       Recent Labs   Lab  01/19/18   1549   GLU  635*   BUN  30*   CREATSERUM  2.25*   CA  8.6

## 2018-01-20 NOTE — PAYOR COMM NOTE
--------------  ADMISSION REVIEW     Payor: [employer] ADV PPO  Subscriber #:  [id number]    Admit date: 1/19/18  Admit time: 1830       Admitting Physician: Julia Vega MD  Attending Physician:  Gokul Gregory MD  Primary Care Physician: Delfino Mireles Back problem    • Cataract    • Congestive heart disease ([facility] 2006             • Diabetes ([facility] 1983   • Disorder of liver    • Disorder of thyroid    • Diverticulosis of large intestine    • Esophageal reflux    • Essential hypertension    • Heart murmur HTN  Other systems are as noted in HPI. Constitutional and vital signs reviewed. All other systems reviewed and negative except as noted above. Physical Exam[KW. 1]   ED Triage Vitals [01/19/18 1538]  BP: (!) 173/83  Pulse: 72  Resp: 20  Temp: 97. 9 components within normal limits   URINALYSIS WITH CULTURE REFLEX - Abnormal; Notable for the following:     Glucose Urine >=500 (*)     Blood Urine Small (*)     Protein Urine 30  (*)     Urobilinogen Urine 4.0 (*)     All other components within normal li Views), Left (cpt=73630)    Result Date: 1/19/2018  PROCEDURE:  XR FOOT, COMPLETE (MIN 3 VIEWS), LEFT (CPT=73630)  TECHNIQUE:  AP, oblique, and lateral views were obtained.   COMPARISON:  [facility] , XR FOOT, COMPLETE (MIN 3 VIEWS), LEFT (CPT=73630), 8/16/2017 consolidation. Stable chronic interstitial changes in both lungs. CONCLUSION:  No lobar pneumonia or overt congestive failure. Stable chronic interstitial changes in both lungs.     Dictated by: Yann Geller MD on 1/19/2018 at 16:37     Approved b is concerning for hepatic infiltrative process. No evidence of discrete mass. Gallbladder is surgically absent. No biliary ductal dilatation. Splenomegaly. Bilateral renal cysts.   Doppler evaluation demonstrates flow in the appropriate direction invol Attending Will, Lenoard Leyden, MD   Hosp Day #[AR.1] 0[AR.2] PCP[AR.1] Katie Reyez MD[AR.2]     Chief Complaint: L foot ulcer    History of Present Illness:[AR.1] Sachahelio Davis[AR.2] is a[AR.3 74year old[AR.2] male with DM2, anxiety, CAD, CHF, HTN, DL, ANGIOPLASTY (CORONARY)      Comment: Stents X 4   10/18/16: ANGIOPLASTY (CORONARY)      Comment: Stents x 1  2015: BYPASS SURGERY      Comment: Robotic CABG X 2  10/2015: CABG  06/06/2017: CARDIAC PACEMAKER PLACEMENT      Comment: Medtronic leadless [place] December Comment:Steroidal psychosis  Floxin [Ofloxacin]        Other                       Comment:PT STATES HE IS ALLERGIC TO FLUORQUINOLONE AND/OR             FLOXIN FAMILY DRUGS  Quinolones                Rifampin                  Statins                     Co Units by mouth once a week. Disp:  Rfl:    alprazolam 0.5 MG Oral Tab Take 0.5 mg by mouth every 6 (six) hours as needed. Disp:  Rfl:    aspirin 325 MG Oral Tab Take 325 mg by mouth daily.  Disp:  Rfl:    Citalopram Hydrobromide (CELEXA) 20 MG Oral Tab affect. Diagnostic Data:      Labs:[AR.1]  Recent Labs   Lab  01/19/18   1549   WBC  6.9   HGB  12.4*   MCV  87.0   PLT  165.0       Recent Labs   Lab  01/19/18   1549   GLU  635*   BUN  30*   CREATSERUM  2.25*   CA  8.6   ALB  2.9*   NA  127*   K  4. 1/19/2018 2133 Given 25 mg Oral Graciela Irwin RN      escitalopram Otelia Dykes) tablet 10 mg     Date Action Dose Route User    1/20/2018 4742 Given 10 mg Oral Beto García RN      gabapentin (NEURONTIN) cap 400 mg     Date Action Dose Route User GEMA, RN      Metoprolol Tartrate (LOPRESSOR) tab 50 mg     Date Action Dose Route User    1/20/2018 0625 Given 50 mg Oral [facility]    1/19/2018 2108 Given 50 mg Oral Amelia KEILA Kapoor      mycophenolate mofetil (CELLCEPT) cap 1,000 mg     Da

## 2018-01-20 NOTE — PROGRESS NOTES
[facility] Pharmacy Note:  Renal Adjustment for meropenem (MERREM)    Toya Starks is a 74year old male who has been prescribed meropenem (MERREM) 500 mg every 8 hrs. CrCl is estimated creatinine clearance is 40.2 mL/min (based on SCr of 2.25 mg/dL (H)).  so t

## 2018-01-20 NOTE — CONSULTS
[facility]  Report of Consultation    Payla Amos Patient Status:  Inpatient    3/1/1950 MRN [id number]   [facility] 4NW-A Attending Monika Mike MD   Hosp Day # 1 PCP Cheyanne Dillard MD     Date of Admission:  2018  Date of infarction    • Neuropathy    • Obesity    • Peripheral vascular disease ([facility] 2005    diabets broken ankle led to amputation   • Renal disorder    • Shingles    • Sleep apnea    • Stroke [facility]) 2005   • Visual impairment     reading glasses     Past Surgic exposed to daptomycin prior to this recent             exposure it is thought that the reaction was more             likely related to daptomycin. Re-challenging with             cefepime 4/2014. Dexamethasone               Comment:Other reaction(s):  Othe 300 mcg, Oral, Before breakfast  •  temazepam (RESTORIL) cap 7.5 mg, 7.5 mg, Oral, Nightly PRN  •  ursodiol (ACTIGALL) cap 300 mg, 300 mg, Oral, TID  •  glucose (DEX4) oral liquid 15 g, 15 g, Oral, Q15 Min PRN **OR** Glucose-Vitamin C (DEX-4) 4-0.006 g sumanth obvious sign of osteomyelitis which were taken 1/19/2018  We are awaiting MRI evidently he has a pacemaker that is compatible with MRI but we have to wait until Monday for someone to come out in the just a return office pacemaker and then restarted.     Lab Alteration of awareness     Lethargy     Headache(784.0)     Severe obesity (BMI 35.0-39. 9) with comorbidity (HCC)     Metabolic acidosis     Paresthesia     Pain in extremity, unspecified extremity     Pain in extremity     Chronic intractable headache continue IV antibiotics per infectious disease  4. At this point in time my recommendation was to get the MRI results and that will help decide whether or not he requires surgery. I did discuss with him that it depends what the MRI shows. He understood.

## 2018-01-20 NOTE — CONSULTS
INFECTIOUS DISEASE CONSULTATION    Max Lowebs Patient Status:  Inpatient    3/1/1950 MRN [id number]   [facility] 4NW-A Attending Chris Cornejo MD   Hosp Day # 1 PCP Katie Reyez MD History:  10/31/16: ANGIOPLASTY (CORONARY)      Comment: Stents X 4   10/18/16: ANGIOPLASTY (CORONARY)      Comment: Stents x 1  2015: BYPASS SURGERY      Comment: Robotic CABG X 2  10/2015: CABG  06/06/2017: CARDIAC PACEMAKER PLACEMENT      Comment: Medtr Steroidal psychosis  Dexamethasone Sodiu*    Other (See Comments)    Comment:Steroidal psychosis  Floxin [Ofloxacin]        Other                       Comment:PT STATES HE IS ALLERGIC TO FLUORQUINOLONE AND/OR             FLOXIN FAMILY DRUGS  Shirleykelle Pugh 4 tablet, 4 tablet, Oral, Q15 Min PRN **OR** dextrose 50% injection 50 mL, 50 mL, Intravenous, Q15 Min PRN **OR** glucose (DEX4) oral liquid 30 g, 30 g, Oral, Q15 Min PRN **OR** Glucose-Vitamin C (DEX-4) 4-0.006 g chewable tab 8 tablet, 8 tablet, Oral, Q15 ALKPHO  1,474*  1,240*   AST  105*  129*   ALT  93*  78*   BILT  4.3*  3.5*   TP  7.8  6.1           Lab Results  Component Value Date   ESRML 76 01/19/2018   CRP 4.88 01/19/2018   PGLU 324 01/20/2018         Microbiologic Data:   Blood Culture Once [204 Awareness alteration, transient     Orthostatic hypotension     Thrombocytopenia (HCC)     Anemia     Azotemia     Type 2 diabetes mellitus with circulatory disorder, with long-term current use of insulin (HCC)     Stage 3 chronic kidney disease     Altera ulceration/necrotic suspected chronic OM  MRI pending  cx wound sent  Continue meropenem, multiple allergies    2. CRI/medication dose adjusted    3. DM    4.  SP PPM, per ANA Bruner MD  [facility]  [phone]

## 2018-01-20 NOTE — PROGRESS NOTES
[facility]  Progress Note     Gely Durán Patient Status:  Inpatient    3/1/1950 MRN [id number]   [facility] 4NW-A Attending Steffanie Quintero MD   Hosp Day # 1 PCP Javed Kumar MD     Chief Complaint: foot ulcer    S: Patient wi Bisulfate  75 mg Oral Daily   • cycloSPORINE modified  175 mg Oral BID   • gabapentin  400 mg Oral TID   • Insulin NPH & Regular 70/30  40 Units Subcutaneous Daily with breakfast   • Insulin NPH & Regular 70/30  30 Units Subcutaneous Daily with dinner   • Full  · Salguero: none       Estimated date of discharge: TBD  Discharge is dependent on: course  At this point Mr. Gabby Haywood is expected to be discharge to: home     Plan of care discussed with patient and RN    Gladis Alberts MD          **Certification      [facility]

## 2018-01-20 NOTE — PROGRESS NOTES
No complaints of pain. Dressing to left foot C/D/I. Afebrile. MRI unable to be done this weekend because of pts pacemaker and need for someone from the company to interrogate the pacemaker before/after the MRI.   Dr Tj Ivey address MRI issu

## 2018-01-20 NOTE — PLAN OF CARE
Pt Alertx4 denies pain. Bedrest up to the bathroom only. GI consulted, no bleeding noted since admission. Pt on tele bradycardic. Pt is scheduled for mri. Per Mri will be on Monday after pacemaker company interrogates the pacemaker. Dr Margarito Alberts is aware.  Pt

## 2018-01-20 NOTE — PROGRESS NOTES
NURSING ADMISSION NOTE      Patient admitted via Cart  Oriented to room. Safety precautions initiated. Bed in low position. Call light in reach.   Pt admitted from ED, aaox4, denies pain, pt has a hx of Cdiff, denies diarrhea, not on any treatment, l

## 2018-01-21 PROBLEM — R16.1 SPLENOMEGALY: Status: ACTIVE | Noted: 2018-01-01

## 2018-01-21 PROBLEM — D64.9 ANEMIA: Status: ACTIVE | Noted: 2018-01-01

## 2018-01-21 NOTE — PROGRESS NOTES
Called to pateints room for chest tightness and pain down left leg. Denies chest pain or pressure. EKG, troponins,nitro given. Tylenol given for leg pain. Dr Sydni Leone notified. Vitals are stable. No shortness of breath. Blood sugar 170.

## 2018-01-21 NOTE — PROGRESS NOTES
Morphine given for cardiac symptoms and xanax at 0400. Pt slept for several hours pain free. Stated the chest tightness woke him up again and now had pain. Also pain radiating down his left leg. hospitalist paged.   EKG,nitro,Morphine and troponins repe

## 2018-01-21 NOTE — HISTORICAL OFFICE NOTE
Miriam Osborne  : 1950  ACCOUNT:  [id number]  [phone]  PCP: Dr. Uday Velarde     TODAY'S DATE: 2018  DICTATED BY:  [Dr. Flores Palestinian: [Followup of CAD, of native vessels.]    HPI:    [On 2018, marcos Munoz 6 AAA.  SOCIAL HISTORY: SMOKING: Former tobacco use. used to smoke but quit >5 years ago >20 years ago. CAFFEINE: 2 beverages daily. ALCOHOL: denies drinking. EXERCISE: no regular exercise. DIET: no special diet. MARITAL STATUS: .  OCCUPATION: disabled restriction    ASSESSMENT:  1. CAD, of native vessels  2. Angina, stable  3. Chest discomfort/tightness  4. DM, Type I  5. Dyspnea  6. History of CABG  7. Hypercholesteremia, pure  8. Hypertension, benign  9. Pacemaker, not dependent  10.  Pacer reprogrammi 100UNIT/  as directed                              01/13/16 Omeprazole            40MG      1 po daily                               01/13/16 Synthroid             300MCG    350 micrograms,2 po daily                01/13/16 Ursodiol              300

## 2018-01-21 NOTE — PROGRESS NOTES
[facility]  Progress Note     Meghan Butler Patient Status:  Inpatient    3/1/1950 MRN [id number]   [facility] 4NW-A Attending Shaylee Dinh MD   Hosp Day # 2 PCP Teodora Rodriguez MD     Chief Complaint: chest pain today    S: Coty Intravenous Q12H   • aspirin  325 mg Oral Daily   • bumetanide  2 mg Oral Daily   • escitalopram  10 mg Oral QAM   • Clopidogrel Bisulfate  75 mg Oral Daily   • cycloSPORINE modified  175 mg Oral BID   • gabapentin  400 mg Oral TID   • Insulin NPH & Regula meds  17. Hx of BKA of right leg in 2004  18. Hx of CORTÉS cirrhosis  s/p Liver Transplant  1.  Continue home immunosuppressant meds         Quality:  · DVT Prophylaxis: HSQ  · CODE status: Full  · Salguero: none        Estimated date of discharge: TBD  Discharg

## 2018-01-21 NOTE — PLAN OF CARE
0730Pt c/o chest pain/tightness this am. States pain is 8/10 morphine and nitro sl given by previous rn. Pt still c/o pain. Md notified orders recd. EKG, stat troponin, and cardiology consulted.  Pt resing comfortably at this time

## 2018-01-21 NOTE — CONSULTS
[address]  Report of GI Consultation    Mary Kay Smith Patient Status:  Inpatient    3/1/1950 MRN [id number]   [facility] 4NW-A Attending Eugene Chaney MD   Hosp Day # 1 PCP Uday Velarde MD     Date of Admission:  2018  Date blood pressure    • High cholesterol    • History of blood transfusion [id number]   • Hyperlipidemia    • Hypothyroidism    • Kidney disorder    • Liver transplanted [facility])    • Migraines    • Muscle weakness    • Myocardial infarction    • Neuropathy    • O tab 0.5 mg 0.5 mg Oral Q6H PRN   aspirin tab 325 mg 325 mg Oral Daily   bumetanide (BUMEX) tab 2 mg 2 mg Oral Daily   escitalopram (LEXAPRO) tablet 10 mg 10 mg Oral QAM   Clopidogrel Bisulfate (PLAVIX) tab 75 mg 75 mg Oral Daily   cycloSPORINE modified (NE mg Oral Q6H PRN       Allergies    Cephalosporins          Hives    Comment:Hives to ceftriaxone. Duricef - hallucinations.   Ciprofloxacin             Daptomycin              Rash    Comment:While receiving daptomycin with ceftriaxone,             patient Markedly obese limiting exam.  EXT: RLE BKA, LLE in bandage around foot  SKIN: No rash, no jaundice, no spider angiomas    Results:     Laboratory Data:    Lab Results  Component Value Date   WBC 6.7 01/20/2018   HGB 9.9 (L) 01/20/2018   .0 01/20/20 Portable  (cpt=71045)    Result Date: 1/19/2018  CONCLUSION:  No lobar pneumonia or overt congestive failure. Stable chronic interstitial changes in both lungs.     Dictated by: Eber Bonner MD on 1/19/2018 at 16:37     Approved by: Eber Bonner MD

## 2018-01-21 NOTE — PROGRESS NOTES
[facility]                INFECTIOUS DISEASE PROGRESS NOTE    Jennifer Bunn Patient Status:  Inpatient    3/1/1950 MRN [id number]   [facility] 4NW-A Attending Ariel Kumar MD   Hosp Day # 2 PCP Rubin Wheeler MD     Antibiotic Preliminary result Updated: 01/20/18 2000   Specimen: Blood from Blood,peripheral     Blood Culture Result No Growth 1 Day           1/19/2018 16:38   AEROBIC BACTERIAL CULTURE  Insufficient growth at 18-24 hours       Stain     1+ WBCs seen      3+ Gram v Metabolic acidosis     Paresthesia     Pain in extremity, unspecified extremity     Pain in extremity     Chronic intractable headache     AVB (atrioventricular block)     Liver cirrhosis secondary to CORTÉS (HCC)     Diabetes mellitus, controlled ([facility]

## 2018-01-21 NOTE — CONSULTS
[facility]  Report of Consultation    Kylie Carter Patient Status:  Inpatient    3/1/1950 MRN [id number]   [facility] 4NW-A Attending Gil Butler MD   Hosp Day # 2 PCP Dimitri Chacon MD     Reason for Consultation:  Chest pain Comment: Robotic CABG X 2  10/2015: CABG  06/06/2017: CARDIAC PACEMAKER PLACEMENT      Comment: Medtronic leadless pacemaker  No date: CATARACT  No date: CATH BARE METAL STENT (BMS)  3/4/2016: COLONOSCOPY N/A      Comment: Procedure: COLONOSCOPY;  Surgeon Comment:PT STATES HE IS ALLERGIC TO FLUORQUINOLONE AND/OR             FLOXIN FAMILY DRUGS  Quinolones                Rifampin                  Statins                     Comment:Muscle weakness  Sulfa Antibiotics         Vancomycin                  C injection 50 mL, 50 mL, Intravenous, Q15 Min PRN **OR** glucose (DEX4) oral liquid 30 g, 30 g, Oral, Q15 Min PRN **OR** Glucose-Vitamin C (DEX-4) 4-0.006 g chewable tab 8 tablet, 8 tablet, Oral, Q15 Min PRN  •  Heparin Sodium (Porcine) 5000 UNIT/ML injecti pectoris (HCC)     CKD (chronic kidney disease) stage 4, GFR 15-29 ml/min (HCC)     Weakness     Hypothyroidism     S/P drug eluting coronary stent placement     Type 2 diabetes mellitus with complication, with long-term current use of insulin [facility]     Dy Acute right-sided weakness     Other complicated headache syndrome     Atypical migraine     Kidney transplanted     Suppression of immune system subtherapeutic (HCC)     Thrombocytosis (HCC)     History of anemia due to chronic kidney disease     ESRD (en

## 2018-01-21 NOTE — PROGRESS NOTES
[address]  Report of GI Consultation    Vishnu Nick Patient Status:  Inpatient    3/1/1950 MRN [id number]   [facility] 4NW-A Attending Leno Pisano MD   Hosp Day # 2 PCP Corrie Morton MD     Date of Admission:  2018  PCP: (SYNTHROID) tab 300 mcg 300 mcg Oral Before breakfast   temazepam (RESTORIL) cap 7.5 mg 7.5 mg Oral Nightly PRN   ursodiol (ACTIGALL) cap 300 mg 300 mg Oral TID   glucose (DEX4) oral liquid 15 g 15 g Oral Q15 Min PRN   Or      Glucose-Vitamin C (DEX-4) 4-0 FLOXIN FAMILY DRUGS  Quinolones                Rifampin                  Statins                     Comment:Muscle weakness  Sulfa Antibiotics         Vancomycin                  Comment:Other reaction(s): Joint Pain      Physical Exam:   Blood pressure 1 of the liver could be due to changes of cirrhosis, hepatitis, with other etiologies not excluded. Clinical correlation recommended. No focal hepatic lesion identified. 2.  Splenomegaly.   3.  Multiple bilateral renal cyst.  4.  Status post cholecystectom rejection.   Because of this, would favor continuing immunosuppression unless these absolutely must be held for management of osteomyelitis   - Trend LFs while in house      Tracie Rivera MD  1/21/2018

## 2018-01-21 NOTE — CONSULTS
[facility]  Report of Consultation    Krysta Whitingyari Patient Status:  Inpatient    3/1/1950 MRN [id number]   [facility] 4NW-A Attending Marcos Giles MD   Hosp Day # 2 PCP Graham Gonsalez MD     Reason for Consultation:    Splenomegal Comment: Robotic CABG X 2  10/2015: CABG  06/06/2017: CARDIAC PACEMAKER PLACEMENT      Comment: Medtronic leadless pacemaker  No date: CATARACT  No date: CATH BARE METAL STENT (BMS)  3/4/2016: COLONOSCOPY N/A      Comment: Procedure: COLONOSCOPY;  Surgeon: Comment:PT STATES HE IS ALLERGIC TO FLUORQUINOLONE AND/OR             FLOXIN FAMILY DRUGS  Quinolones                Rifampin                  Statins                     Comment:Muscle weakness  Sulfa Antibiotics         Vancomycin                  Co Respiratory: Clear to auscultation bilaterally. No wheezes. No rhonchi. Cardiovascular: S1, S2.  Regular rate and rhythm. No murmurs. Abdomen: Soft, nontender, nondistended. Positive bowel sounds.  No rebound tenderness  Neurologic: No focal neurolo Visualized portions unremarkable.     =====  CONCLUSION:       1. Stable mildly coarsened echotexture of the liver could be due to changes of cirrhosis, hepatitis, with other etiologies not excluded. Clinical correlation recommended.   No focal hepatic les

## 2018-01-22 NOTE — PROGRESS NOTES
[facility]  Progress Note    Anna Loera Patient Status:  Inpatient    3/1/1950 MRN [id number]   [facility] 4NW-A Attending Ella Varela MD   Hosp Day # 3 PCP Toya Garcia MD       Assessment:    · CAD  · HTN  · Hyperlipidemia modified  175 mg Oral BID   • gabapentin  400 mg Oral TID   • isosorbide dinitrate  10 mg Oral BID   • Metoprolol Tartrate  50 mg Oral 2x Daily(Beta Blocker)   • mycophenolate mofetil  1,000 mg Oral BID   • Naltrexone HCl  50 mg Oral nightly   • Nortriptyl

## 2018-01-22 NOTE — PROGRESS NOTES
[facility]  Report of GI Consultation    Shayy Ruiz Patient Status:  Inpatient    3/1/1950 MRN [id number]   [facility] 4NW-A Attending Jose Luis Her MD   Hosp Day # 3 PCP Binu Herzog MD     Date of Admission:  2018  PCP: 300 mcg 300 mcg Oral Before breakfast   temazepam (RESTORIL) cap 7.5 mg 7.5 mg Oral Nightly PRN   ursodiol (ACTIGALL) cap 300 mg 300 mg Oral TID   glucose (DEX4) oral liquid 15 g 15 g Oral Q15 Min PRN   Or      Glucose-Vitamin C (DEX-4) 4-0.006 g chewable DRUGS  Quinolones                Rifampin                  Statins                     Comment:Muscle weakness  Sulfa Antibiotics         Vancomycin                  Comment:Other reaction(s): Joint Pain      Physical Exam:   Blood pressure 110/54, pulse ( setting of gastroparesis (acute, possibly chronic), secondary to his DM which has been uncontrolled in setting of infection.   Despite being on an antiplatelet agent, he has no significant drop in Hgb, so I suspect symptoms are related to gastritis/esophagi

## 2018-01-22 NOTE — PROGRESS NOTES
[facility]  Progress Note     Abdelrahmanmarcos Clemente Patient Status:  Inpatient    3/1/1950 MRN [id number]   [facility] 4NW-A Attending Sangeetha Pandey MD   Hosp Day # 3 PCP Alexis Manuel MD     Chief Complaint: foot ulcer    S: Patient wi Daily with breakfast   • Pantoprazole Sodium  20 mg Oral BID AC   • meropenem  1 g Intravenous Q12H   • aspirin  325 mg Oral Daily   • bumetanide  2 mg Oral Daily   • escitalopram  10 mg Oral QAM   • Clopidogrel Bisulfate  75 mg Oral Daily   • cycloSPORINE Full  · Salguero: none        Estimated date of discharge: TBD  Discharge is dependent on: course  [facility]      Plan of care discussed with patient and RN  Nati Funes MD

## 2018-01-22 NOTE — PLAN OF CARE
Glucose maintained within prescribed range Not Progressing      Absence of cardiac arrhythmias or at baseline Progressing      Achieve highest/safest level of mobility/gait Progressing      Maintain proper alignment of affected body part Progressing      I

## 2018-01-22 NOTE — CM/SW NOTE
01/22/18 1100   CM/SW Referral Data   Referral Source Social Work (self-referral)   Reason for Referral Discharge planning   Informant Patient   Readmission Assessment   Factors that patient feels contributed to this readmission (wound)   Were medicatio

## 2018-01-22 NOTE — CONSULTS
[facility]  Diabetes Clinical Nurse Specialist Consult Note    Alida Posadas Patient Status:  Inpatient    3/1/1950 MRN [id number]   [facility] 4NW-A Attending Carlos Kessler MD   Hosp Day # 3 PCP Yolanda Ceja MD     Reason for Co Paresthesia     Pain in extremity, unspecified extremity     Pain in extremity     Chronic intractable headache     AVB (atrioventricular block)     Liver cirrhosis secondary to CORTÉS (HCC)     Diabetes mellitus, controlled ([facility]     Recurrent major depres Discussion:  Pt is 74 YOM who was sent to the ED on 1/19/2018 from the [facility] with a diabetic foot ulcer on left lateral foot with associated cellulitis of LLE. Serum glucose ws 635.  Pt has hx of liver transplant, is on anti-rejection drug

## 2018-01-22 NOTE — PAYOR COMM NOTE
--------------  CONTINUED STAY REVIEW    Payor: [facility] MEDICARE ADV PPO  Subscriber #:  [id number]  Authorization Number: N/A    Admit date: 1/19/18  Admit time: 1574    Admitting Physician: Gustavo Moreno MD  Attending Physician:  MD Erin Nixon 109 01/21/2018   CO2 21.0 01/21/2018    01/21/2018   CA 7.9 01/21/2018   ALB 2.1 01/21/2018   ALKPHO 1,132 01/21/2018   BILT 3.1 01/21/2018   TP 5.9 01/21/2018    01/21/2018   ALT 73 01/21/2018   TROP <0.046 01/21/2018   PGLU 191 01/21/2018 dilatation.        Assessment/Plan:     # Splenomegaly: Mild and stable. On review of his imaging, splenomegaly has been noted as far back as 2004 in outside system. I suspect this is because of cirrhotic liver.   He has no B type symptoms that suggest ly continuing immunosuppression unless these absolutely must be held for management of osteomyelitis   - Trend LFs while in house     1/22/18    Patient was seen resting comfortably in bed awaiting MRI which is going to be done around 2-230 this afternoon.     1325 Given 5000 Units Subcutaneous (Right Lower Abdomen) Shameka Snider, KEILA    1/22/2018 0622 Given 5000 Units Subcutaneous (Left Upper Abdomen) Donato Bonilla RN    1/21/2018 2059 Given 5000 Units Subcutaneous (Left Lower Abdomen) Gayatri Mansfield MG/ML injection 2 mg     Date Action Dose Route User    1/22/2018 1347 Given 2 mg Intravenous Gentry Ambriz RN      mycophenolate mofetil (CELLCEPT) cap 1,000 mg     Date Action Dose Route User    1/22/2018 0853 Given 1000 mg Oral Darinel Ambriz

## 2018-01-22 NOTE — PROGRESS NOTES
01/22/18  1154   BP: 110/54   Pulse: (!) 49   Resp: 18   Temp: 97.6 °F (36.4 °C)   Patient was seen resting comfortably in bed awaiting MRI which is going to be done around 2-230 this afternoon.     Exam: The patient had his foot in a dependent position wa

## 2018-01-22 NOTE — PROGRESS NOTES
[facility]                INFECTIOUS DISEASE PROGRESS NOTE    Max Stein Patient Status:  Inpatient    3/1/1950 MRN [id number]   [facility] 4NW-A Attending Chris Cornejo MD   Hosp Day # 3 PCP Katie Reyez MD     Antibiotic Order Status: Completed Lab Status: Preliminary result Updated: 01/20/18 2000   Specimen: Blood from Blood,peripheral     Blood Culture Result No Growth 1 Day           1/19/2018 16:38   AEROBIC BACTERIAL CULTURE  I      Stain     1+ WBCs seen      3+ Gram Severe obesity (BMI 35.0-39. 9) with comorbidity (HCC)     Metabolic acidosis     Paresthesia     Pain in extremity, unspecified extremity     Pain in extremity     Chronic intractable headache     AVB (atrioventricular block)     Liver cirrhosis seconda [phone]

## 2018-01-23 NOTE — PROGRESS NOTES
[facility]                INFECTIOUS DISEASE PROGRESS NOTE    [facility] Patient Status:  Inpatient    3/1/1950 MRN [id number]   [facility] 4NW-A Attending Gibson Stockton MD   Hosp Day # 4 PCP Slim Arriola MD     Antibiotic Type 2 diabetes mellitus with complication, with long-term current use of insulin (HCC)     Dyslipidemia     RYAN (obstructive sleep apnea)     Anxiety     Hepatorenal syndrome (HCC)     Liver transplanted (HCC)     Transaminitis     OLAYINKA (acute kidney in Suppression of immune system subtherapeutic (HCC)     Thrombocytosis (HCC)     History of anemia due to chronic kidney disease     ESRD (end stage renal disease) (HCC)     Obesity (BMI 30.0-34. 9)     Liver transplant recipient [facility])     Conversion disor

## 2018-01-23 NOTE — PROGRESS NOTES
[address]  Diabetes Follow Up      [place]  [id number]       Patient seen and evaluated.     Recent Labs   Lab  01/22/18   1153  01/22/18   1721  01/22/18   2133  01/23/18   0727  01/23/18   1143   PGLU  297*  222*  136*  121*  117* evaluation. Taught the pathophysiology of diabetes and assessed the patient's knowledge of carbohydrate counting, meal planning, timing of insulin with meals and blood glucose monitoring.   He reported he generally tests his blood glucose before bedtim patient reports he does experience hypoglycemia, symptoms being blurred vision, then shaking, sweating. He states these symptoms occur when his blood glucose is in the 40s. He states he treats with glucose tablets.   Reviewed the rule of 15 and he verbali minutes were spent face-to-face with the patient, 100% was spent counseling and coordinating care for diabetes self-management including nutrition, blood glucose monitoring, insulin administration, medications, treatment options, follow-up coordination and

## 2018-01-23 NOTE — PROGRESS NOTES
[facility]  Progress Note     Jennifer Bunn Patient Status:  Inpatient    3/1/1950 MRN [id number]   [facility] 4NW-A Attending Ariel Kumar MD   Hosp Day # 4 PCP Rubin Wheeler MD     Chief Complaint: OM left toe    S: Patient w TID   • isosorbide dinitrate  10 mg Oral BID   • Metoprolol Tartrate  50 mg Oral 2x Daily(Beta Blocker)   • mycophenolate mofetil  1,000 mg Oral BID   • Naltrexone HCl  50 mg Oral nightly   • Nortriptyline HCl  20 mg Oral Nightly   • Rosuvastatin Calcium

## 2018-01-23 NOTE — PLAN OF CARE
Absence of cardiac arrhythmias or at baseline Progressing      Achieve highest/safest level of mobility/gait Progressing      Glucose maintained within prescribed range Progressing      Maintain proper alignment of affected body part Progressing      Incis

## 2018-01-23 NOTE — PLAN OF CARE
Assumed care@ 0730. Patient c/o severe pain on left foot this morning. Left foot with 2+ edema, dressing dry and intact. Left leg kept elevated. Norco given as needed, with good relief. Patient afebrile.

## 2018-01-24 NOTE — PROGRESS NOTES
No events overnite. Dressing piper with dakins solution to lft foot ulcr. No drainag noted. No pain during dressing procedure, reports pain at times that shoots up his leg from the foot. Afbrile. Given morphine for leg pain.

## 2018-01-24 NOTE — ANESTHESIA PREPROCEDURE EVALUATION
PRE-OP EVALUATION    Patient Name: Familia Lobo    Pre-op Diagnosis: OSTEOMYELITIS    Procedure(s):  5TH METATARSAL HEAD RESECTION LEFT FOOT    Surgeon(s) and Role:     * Tiffanie Morgan DPM - Primary    Pre-op vitals reviewed.   Temp: 97.7 °F (36.5 ° cap 175 mg 175 mg Oral BID   gabapentin (NEURONTIN) cap 400 mg 400 mg Oral TID   isosorbide dinitrate (ISORDIL) tab 10 mg 10 mg Oral BID   Metoprolol Tartrate (LOPRESSOR) tab 50 mg 50 mg Oral 2x Daily(Beta Blocker)   mycophenolate mofetil (CELLCEPT) cap 1, Tab Take 50 mg by mouth 2 (two) times daily. Disp:  Rfl:    bumetanide 1 MG Oral Tab Take 2 mg by mouth daily. Disp:  Rfl:    Rosuvastatin Calcium 5 MG Oral Tab Take 1 tablet (5 mg total) by mouth nightly.  Disp: 30 tablet Rfl: 1   SYNTHROID 300 MCG Oral Ta Evaluation    Patient summary reviewed.     Anesthetic Complications  (-) history of anesthetic complications         GI/Hepatic/Renal  Comment: History of liver transplant for CORTÉS cirrhosis    (+) GERD                           Cardiovascular  Comment: PV RDW 13.6 01/22/2018   .0 01/22/2018       Lab Results  Component Value Date    01/21/2018   K 4.0 01/21/2018    01/21/2018   CO2 21.0 (L) 01/21/2018   BUN 35 (H) 01/21/2018   CREATSERUM 1.84 (H) 01/21/2018    (H) 01/21/2018   CA

## 2018-01-24 NOTE — BH LEVEL OF CARE ASSESSMENT
Level of Care Assessment Note    General Questions  Why are you here?: The pt states he came into the hospital due to right sided weakness. History of Present Illness: 75 y/o with a history of depression and anxiety.   He admits to this since 23 No  Past Harm Toward Others: No  Current or Past Harm Toward Animals: No  History or Allegations of Inappropriate Physical Contact: No  Current/Recent Destructive Behavior Toward Property: No  Past Destructive Behavior Toward Property: No    Access to Mean Inpatient  Name: Kush Goldsmith   Dates of Treatment: 2005  Date Last Seen: 2005  Reason: suicide attempt  Effectiveness: not helpful           Current/Previous MH/CD Treatment  Recovery Support Groups: Denies Past History  History of Seclusion/Restraint: No Fair    Assessment Summary  Assessment Summary: 73 y/o with a history of depression and anxiety. He admits to this since 2004. That is the year both his 1st wife and mother passed. He said, he also lost his leg that year due to amputation.   He stated in

## 2018-01-24 NOTE — PROGRESS NOTES
01/23/18  1145   BP: 106/53   Pulse: 54   Resp: 18   Temp: 97.6 °F (36.4 °C)   Patient was seen resting comfortably sitting in his chair with his foot elevated as instructed. His vital signs shows that he is stable and afebrile. Objective:  Today I rev

## 2018-01-24 NOTE — PROGRESS NOTES
[facility]                INFECTIOUS DISEASE PROGRESS NOTE    Georgessybil Loera Patient Status:  Inpatient    3/1/1950 MRN [id number]   [facility] 4NW-A Attending Ella Varela MD   Hosp Day # 5 PCP Toya Garcia MD     Antibiotic RYAN (obstructive sleep apnea)     Anxiety     Hepatorenal syndrome (HCC)     Liver transplanted (HCC)     Transaminitis     OLAYINKA (acute kidney injury) ([facility] [address]     Syncope and collapse     Intractable migraine with status migrainosus, unspecified migraine t ESRD (end stage renal disease) ([facility] [address]     Obesity (BMI 30.0-34. 9)     Liver transplant recipient [facility])     Conversion disorder     Type 2 diabetes mellitus with hypoglycemia ([facility] [address]     Acute left-sided weakness     Chest pain of uncertain etiology     Unc

## 2018-01-24 NOTE — PROGRESS NOTES
[facility]  Progress Note     Jennifer Malhotra Patient Status:  Inpatient    3/1/1950 MRN [id number]   [facility] 4NW-A Attending Mary Quinones MD   Hosp Day # 5 PCP Flor Luciano MD     Chief Complaint: OM  S:  Patient denies c Oral BID   • Naltrexone HCl  50 mg Oral nightly   • Nortriptyline HCl  20 mg Oral Nightly   • Rosuvastatin Calcium  5 mg Oral Nightly   • Levothyroxine Sodium  300 mcg Oral Before breakfast   • ursodiol  300 mg Oral TID   • Heparin Sodium (Porcine)  5,000

## 2018-01-24 NOTE — PROGRESS NOTES
[facility]  Progress Note    Rosanna Weinberg Patient Status:  Inpatient    3/1/1950 MRN [id number]   [facility] 4NW-A Attending Orlando Sweeney MD   Hosp Day # 5 PCP Lily Sue MD       Assessment:    · CAD - last PCI 10/2016  · He Q12H   • bumetanide  2 mg Oral Daily   • escitalopram  10 mg Oral QAM   • Clopidogrel Bisulfate  75 mg Oral Daily   • cycloSPORINE modified  175 mg Oral BID   • gabapentin  400 mg Oral TID   • isosorbide dinitrate  10 mg Oral BID   • Metoprolol Tartrate  5

## 2018-01-25 ENCOUNTER — PRIOR ORIGINAL RECORDS (OUTPATIENT)
Dept: OTHER | Age: 68
End: 2018-01-25

## 2018-01-25 NOTE — OPERATIVE REPORT
[facility]    PATIENT'S NAME: Gadiel Torres   ATTENDING PHYSICIAN: Brandie Pope M.D. OPERATING PHYSICIAN: Richie Elias D.P.M.    PATIENT ACCOUNT#:   [de-identified]    LOCATION:  PACU [facility] PACU 2 EDWP 10  MEDICAL RECORD #:   [id number]       DATE OF B the fifth ray was anesthetized on the left foot as documented above. A 5 cm linear incision was made beginning over the fifth metatarsal head at the dorsal lateral aspect of the foot following the course of the fifth metatarsal proximally.   The incision w operating room, with vital signs stable and vascular status of his left foot intact, to the recovery room via cart.     Dictated By Karen Dubois D.P.M.  d: 01/25/2018 08:09:26  t: 01/25/2018 08:32:57  [facility] [id number]  /

## 2018-01-25 NOTE — PLAN OF CARE
Patient alert and oriented x4. Patient had surgery this morning for left 5th metatarsal amputation and foot debridement. Patient denies pain upon returning. Patient with ACE wrap to left foot. Patient with BLE edema. Pitting edema to left foot.  Patient Sigrid Stewart

## 2018-01-25 NOTE — PROGRESS NOTES
[facility]  Diabetes Clinical Nurse Specialist Progress Note    Mary Kay Smith Patient Status:  Inpatient    3/1/1950 MRN [id number]   [facility] 4NW-A Attending Maggy Kenny MD   Hosp Day # 6 PCP Uday Velarde MD     Reason for comorbidity (HCC)     Metabolic acidosis     Paresthesia     Pain in extremity, unspecified extremity     Pain in extremity     Chronic intractable headache     AVB (atrioventricular block)     Liver cirrhosis secondary to CORTÉS (HCC)     Diabetes mellitus,  (H) 01/19/2018     Discussion:  Pt met with CINTHYA Arroyo, TORRES, on 1/24/2018, discussed carb counting, proper administration times of 70/30, possibility of obtaining a CGM (continuous glucose monitoring) device.  Discussed f/u plans to see

## 2018-01-25 NOTE — PROGRESS NOTES
[facility]                INFECTIOUS DISEASE PROGRESS NOTE    Jennifer Bunn Patient Status:  Inpatient    3/1/1950 MRN [id number]   [facility] 4NW-A Attending Ariel Kumar MD   Hosp Day # 6 PCP Rubin Wheeler MD     Antibiotic RYAN (obstructive sleep apnea)     Anxiety     Hepatorenal syndrome (HCC)     Liver transplanted (HCC)     Transaminitis     OLAYINKA (acute kidney injury) ([facility] [address]     Syncope and collapse     Intractable migraine with status migrainosus, unspecified migraine t ESRD (end stage renal disease) ([facility] [address]     Obesity (BMI 30.0-34. 9)     Liver transplant recipient [facility])     Conversion disorder     Type 2 diabetes mellitus with hypoglycemia ([facility] [address]     Acute left-sided weakness     Chest pain of uncertain etiology     Unc

## 2018-01-25 NOTE — CM/SW NOTE
[facility] stating pt is covered at 80% for in office infusion and has a co pay of 45 per day. Not reymundo what the ID plan is at this time. SW to follow up.

## 2018-01-25 NOTE — BRIEF OP NOTE
Pre-Operative Diagnosis: Osteomyelitis fifth metatarsal head, necrotic infected ulcer plantar fifth metatarsal head left foot     Post-Operative Diagnosis:      Procedure Performed:   Procedure(s):  Fifth metatarsal head resection left foot , with debri

## 2018-01-25 NOTE — PROGRESS NOTES
Aware of surgery scheduled early AM.  Wife will be here. NPO. Given morphine for pain. Afebrile. On tele-rhythm paced.

## 2018-01-25 NOTE — PROGRESS NOTES
[facility]  Progress Note     Madi Root Patient Status:  Inpatient    3/1/1950 MRN [id number]   [facility] 4NW-A Attending Aileen Leon MD   Hosp Day # 6 PCP Rosie Billingsley MD     Chief Complaint: OM  S:  Post op resectio mg Oral TID   • [MAR Hold] isosorbide dinitrate  10 mg Oral BID   • [MAR Hold] Metoprolol Tartrate  50 mg Oral 2x Daily(Beta Blocker)   • [MAR Hold] mycophenolate mofetil  1,000 mg Oral BID   • [MAR Hold] Naltrexone HCl  50 mg Oral nightly   • [MAR Hold] N discharge to: KATHY Stanley MD

## 2018-01-25 NOTE — ANESTHESIA POSTPROCEDURE EVALUATION
[address] Patient Status:  Inpatient   Age/Gender 74year old male MRN [id number]   [facility] Attending Cliff Santos MD   [facility] Day # 6 PCP Maggy Arreaga MD       Anesthesia Post-op Note    Procedure(s):  5

## 2018-01-26 NOTE — PROGRESS NOTES
[facility]  Cardiology Progress Note    [facility] Patient Status:  Inpatient    3/1/1950 MRN [id number]   [facility] 4NW-A Attending Nasima Cornell MD   Hosp Day # 7 PCP Paddy Pastrana MD     Subjective:  Complaints of pain in l FAMILY DRUGS  Quinolones                Rifampin                  Statins                     Comment:Muscle weakness  Sulfa Antibiotics         Vancomycin                  Comment:Other reaction(s): Joint Pain    Physical Exam:    General: appears comfort cardiac perspective for discharge      Wesley Samuels NP   1/26/2018  12:30 PM

## 2018-01-26 NOTE — PROGRESS NOTES
01/26/18  1130   BP: 128/59   Pulse: 56   Resp: 16   Temp: 97.8 °F (36.6 °C)   Patient was seen resting comfortably in bed he is however complaining of shooting and throbbing pains in his left foot    Objective: Patient's left foot dressing was changed.

## 2018-01-26 NOTE — CM/SW NOTE
Not sure what the pt will need at this time. Awaiting PT.  Potential for LEXI (per RN note) and IV antibiotics

## 2018-01-26 NOTE — PROGRESS NOTES
[facility]                INFECTIOUS DISEASE PROGRESS NOTE    Tiago Lowers Patient Status:  Inpatient    3/1/1950 MRN [id number]   [facility] 4NW-A Attending Anders Peralta MD   Hosp Day # 7 PCP Kyler Carrington MD     Antibiotic RYAN (obstructive sleep apnea)     Anxiety     Hepatorenal syndrome (HCC)     Liver transplanted (HCC)     Transaminitis     OLAYINKA (acute kidney injury) ([facility] [address]     Syncope and collapse     Intractable migraine with status migrainosus, unspecified migraine t ESRD (end stage renal disease) ([facility] [address]     Obesity (BMI 30.0-34. 9)     Liver transplant recipient [facility])     Conversion disorder     Type 2 diabetes mellitus with hypoglycemia ([facility] [address]     Acute left-sided weakness     Chest pain of uncertain etiology     Unc

## 2018-01-26 NOTE — PLAN OF CARE
Rapid Response called at 1541 first, Code Stroke called at 1542 to room 410. Arrived to dept at 1545. Upon arrival found patient with NIH of 9- R side flaccid, slight slurred speech. Alert and oriented. This occurred at 1534, nurses were at the bedside.

## 2018-01-26 NOTE — OCCUPATIONAL THERAPY NOTE
OCCUPATIONAL THERAPY EVALUATION - INPATIENT     Room Number: 410/410-A  Evaluation Date: 1/26/2018  Type of Evaluation: Initial  Presenting Problem: L 5th metatarsal head resection and debridement.     Physician Order: IP Consult to Occupational Therapy  Re intestine  No date: Esophageal reflux  No date: Essential hypertension  No date: Heart murmur      Comment: Since Childhood  No date: High blood pressure  No date: High cholesterol  8205-4648: History of blood transfusion  No date: Hyperlipidemia  No date: Yes (via hand controls)  Patient Regularly Uses: Reading glasses    Prior Level of Function: Pt reports modified independence with ADL prior to admit.       SUBJECTIVE   Pt states \"I bounce back quick\"    Patient self-stated goal is to perform the ceremon off regular lower body clothing?: A Lot  -   Bathing (including washing, rinsing, drying)?: A Lot  -   Toileting, which includes using toilet, bedpan or urinal? : Total  -   Putting on and taking off regular upper body clothing?: A Little  -   Taking care debridement. Complete medical history and occupational profile noted above. Functional outcome measures completed include AMPAC, ROM, MMT.  The AM-LISA ' '6-Clicks' Inpatient Daily Activity Short Form was completed and  this patient  is demonstrating a  53%

## 2018-01-26 NOTE — PAYOR COMM NOTE
--------------  CONTINUED STAY REVIEW      1/25    OPERATIVE REPORT        TO OR      PREOPERATIVE DIAGNOSIS:  Osteomyelitis of fifth metatarsal head left foot with cellulitis and necrotic infected ulcer.   POSTOPERATIVE DIAGNOSIS:  Osteomyelitis of fifth m

## 2018-01-26 NOTE — PROGRESS NOTES
Patient alert and oriented X4. Vital signs stable. Afebrile. Room air, not in respiratory distress. Blood glucose monitored, Insulin given per MAR. Pain medications given as needed. Left foot elevated on a pillow, with ace wrapped.  Dressing clean, dry and

## 2018-01-26 NOTE — CM/SW NOTE
Informed that patient will need LEXI at discharge. PT to eval. List of facilities given to patient. Patient chose [facility]. Referral sent, DON requested. QD. / to remain available for support and/or discharge planning.      David Jeffries

## 2018-01-26 NOTE — PHYSICAL THERAPY NOTE
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 ..   -   Moving to and from a bed to a chair (including a wheelchair)?: A Lot   -   Need to walk in hospital room?: A Lot   - and demonstrating aphasia and R sided weakness. ONSET OF SYMPTOMS AT 15:34 DR. RUTH Ely AND CODE STROKE TEAM. Pt cont to lean to the R and PT/OT took BP. SBP was 120.  Call light immediately pushed, OT remained c the pt and PT r mobility. Research supports that patients with this level of impairment may benefit from further skilled IP acute PT. Following the evaluation, PT/OT went back for further training and placement of lift sling.  At this time pt began demonstrating R sided we

## 2018-01-26 NOTE — PROGRESS NOTES
Patient working with PT and patient with right sided weakness and slurred speech with some expressive aphagia. Last known normal at 1534. Rapid response called - Dr. Adrienne Banegas called code stroke. Patient taken to CT brain by charge RN and ICU RN.  Wife notif

## 2018-01-26 NOTE — PROGRESS NOTES
[facility]  Progress Note     Gely Durán Patient Status:  Inpatient    3/1/1950 MRN [id number]   [facility] 4NW-A Attending Joby Williamson MD   Hosp Day # 7 PCP Catrina Westbrook MD     Chief Complaint: OM  S:  Post-op resectio gabapentin  400 mg Oral TID   • isosorbide dinitrate  10 mg Oral BID   • Metoprolol Tartrate  50 mg Oral 2x Daily(Beta Blocker)   • mycophenolate mofetil  1,000 mg Oral BID   • Naltrexone HCl  50 mg Oral nightly   • Nortriptyline HCl  20 mg Oral Nightly

## 2018-01-26 NOTE — DIETARY NOTE
Nutrition Short Note    Dietitian consult received for diabetes diet education. Appropriate education and handout provided.  Discussed carbohydrate counting, role of carbohydrates in blood sugars, carbohydrate containing foods, appropriate carbohydrates at

## 2018-01-26 NOTE — PROGRESS NOTES
Stroke Code:    Patient is a 76yrs old who had a left foot toe amputation yesterday.   Today at around 3.30pm was working with PT and suddenly developed right sided upper and lower ext weakness with nih initially 3 then 8 and then now improved with only let

## 2018-01-26 NOTE — PLAN OF CARE
Patient alert and oriented x4. Patient complaining of pain to left foot this morning post surgery. Patient stated 1mg IV dilaudid does not do much for his pain and wants something stronger. Patient with ACE wrap to left foot.  Dr. Aster Langley to see patient tod

## 2018-01-27 ENCOUNTER — PRIOR ORIGINAL RECORDS (OUTPATIENT)
Dept: OTHER | Age: 68
End: 2018-01-27

## 2018-01-27 NOTE — PROCEDURES
Date of Procedure: 1/26/2018    Procedure: EEG (ELECTROENCEPHALOGRAM)     HX: PT IS A 66 Y/O MALE THAT PRESENTS FOR EVALUATION AFTER EPISODE OF ACUTE RIGHT SIDED WEAKNESS, SLURRED SPEECH AND EXPRESSIVE APHASIA.  WIFE IS AT BEDSIDE AND STATES PT HAS HAD SONDRA

## 2018-01-27 NOTE — PROGRESS NOTES
Patient wife called. Patient alert and oriented x4. Patient no longer lethargic. Patient answers all questions appropriately. Patient passed dysphagia screening and diet restarted. Patient remains on neuro assessments very 4 hours. Patient wife at bedside.

## 2018-01-27 NOTE — SLP NOTE
ADULT SWALLOWING EVALUATION    ASSESSMENT    ASSESSMENT/OVERALL IMPRESSION:  Pt seen for BSSE per stroke protocol.  Per the pt's report the pt lost function of right leg while seated in chair here at [facility]. Pt reported that he became disoriented a with type 2 diabetes mellitus ([facility]    Type 2 diabetes mellitus with hyperglycemia ([facility]    CKD stage 3 secondary to diabetes (HCC)    Hyponatremia    Elevated liver enzymes    Splenomegaly    Splenomegaly    Anemia    Transient alteration of awareness or aneurysmal dilatation involving the major arterial structures in the head or neck. CXR 1/19/18: CONCLUSION:  No lobar pneumonia or overt congestive failure.  Stable chronic interstitial changes in both lungs.     SUBJECTIVE       OBJECTIVE   ORAL MICHAEL questions, please contact Li KnowReed

## 2018-01-27 NOTE — PROGRESS NOTES
INPATIENT NEUROLOGY PROGRESS NOTE    Date: 1/27/2018    Milly Perea is a 74year old male at Hospital Day ( LOS: 8 days )  Assessment:  Altered mental status: resolved, functional symptoms likely, other ddx narcolepsy, seizure, toxic metabolic encephalo (DULCOLAX) rectal suppository 10 mg 10 mg Rectal Daily PRN   FLEET ENEMA (FLEET) 7-19 GM/118ML enema 133 mL 1 enema Rectal Once PRN   ondansetron HCl (ZOFRAN) injection 4 mg 4 mg Intravenous Q6H PRN   Or      Metoclopramide HCl (REGLAN) injection 10 mg 10 mcg Oral Before breakfast   temazepam (RESTORIL) cap 7.5 mg 7.5 mg Oral Nightly PRN   ursodiol (ACTIGALL) cap 300 mg 300 mg Oral TID   glucose (DEX4) oral liquid 15 g 15 g Oral Q15 Min PRN   Or      Glucose-Vitamin C (DEX-4) 4-0.006 g chewable tab 4 tablet

## 2018-01-27 NOTE — PROGRESS NOTES
[facility]  Progress Note     Padmini Miller Patient Status:  Inpatient    3/1/1950 MRN [id number]   [facility] 4NW-A Attending Sandee Lindsay MD   Hosp Day # 8 PCP Sandy Toscano MD     Chief Complaint: OM  S:  Post-op resectio Oral Nightly   • famoTIDine  20 mg Oral Nightly    Or   • famoTIDine  20 mg Intravenous Nightly   • docusate sodium  100 mg Oral BID   • Insulin NPH & Regular 70/30  20 Units Subcutaneous Daily with dinner   • Dakins (1/2 strength)   Topical BID   • Insuli CABG 2015- continue home meds  12. Hx of leadless PPM for type 2A AV heart block 6/2017  13. Essential HTN- Continue home meds  14. Dyslipidemia-statin  15. GERD-PPI  16. PVD- Continue home meds  17. Hx of BKA of right leg in 2004  18.  Hx of CORTÉS cirrhosis

## 2018-01-27 NOTE — PROGRESS NOTES
01/27/18  1154   BP: 119/50   Pulse: 61   Resp: 17   Temp: 97.7 °F (36.5 °C)   Patient was seen resting comfortably in bed no complaints of pain.   There was a stroke alert on the patient last evening Dr. Lakisha Mondragon had called me and I said it was fine to go

## 2018-01-27 NOTE — PROGRESS NOTES
Patient is alert,oriented. Complains of pain to his left foot. Dilaudid IV given,with pain relief reported. Assist patient to pivot to bedside commode for bowel movement. Left foot  Dressing is C/D/I. Resting quietly at this time.

## 2018-01-27 NOTE — PLAN OF CARE
Pt is alert and oriented x 4. Vitals stable. MRI done. Ok to discharge per Neuro. Informed  about 's input that pt can not be discharged until Monday cunningham to insurance authorization. Dressing changed by Jigna Perez.  Per Felice Reyes

## 2018-01-27 NOTE — CONSULTS
[address]  Neurology Consultation    Date of consult: 1/26/2018    Reason for consult: unresponsiveness    HPI: Vivek Shelton is a 74year old male with Diabetic foot ulcer and infection/OM.  S/p sx 1/25 AM s/p a left foot toe amputation yesterd mg Intravenous Q6H PRN   Or      Metoclopramide HCl (REGLAN) injection 10 mg 10 mg Intravenous Q8H PRN   famoTIDine (PEPCID) tab 20 mg 20 mg Oral Nightly   Or      famoTIDine (PEPCID) injection 20 mg 20 mg Intravenous Nightly   docusate sodium (COLACE) cap Q15 Min PRN   Or      Glucose-Vitamin C (DEX-4) 4-0.006 g chewable tab 4 tablet 4 tablet Oral Q15 Min PRN   Or      dextrose 50% injection 50 mL 50 mL Intravenous Q15 Min PRN   Or      glucose (DEX4) oral liquid 30 g 30 g Oral Q15 Min PRN   Or      Glucose date: Anxiety state  10/31/16: Atherosclerosis of coronary artery      Comment: Stents X 4  10/18/16: Atherosclerosis of coronary artery      Comment: Stents X 1  No date: Autoimmune disease ([facility]  No date: Back problem  No date: Cataract  2006: Congestive TRANSPLANT LIVER,ALLOTRANSPLANT      Comment: 07/2006  Social History:     Smoking status: Former Smoker     Quit date: 10/18/1993    Smokeless tobacco: Former User    Quit date: 1993    Alcohol use No     Family History   Problem Relation Age of Onset   • Problems:    Cellulitis of left lower extremity    Hyperglycemia    Diabetic ulcer of left midfoot associated with type 2 diabetes mellitus, unspecified ulcer stage (HCC)    Diabetic ulcer of left midfoot associated with type 2 diabetes mellitus ([facility]    T

## 2018-01-27 NOTE — PLAN OF CARE
Mri completed. Pacemaker was turned off and on by [employer]. Vitals remained  stable throughout the MRI at start of procedure 131/54 70 90% RA pt was given 1mg ativan due to anxiety 2L placed on pt.   Last vitals 98% 2L HR 69 Pt transported back to floor wi

## 2018-01-27 NOTE — PHYSICAL THERAPY NOTE
Received orders for PT evaluation, however per RN, patient is undergoing MRI.   Will continue to follow for PT eval.

## 2018-01-27 NOTE — PLAN OF CARE
Problem: Impaired Swallowing  Goal: Minimize aspiration risk  Interventions:  - Patient should be alert and upright for all feedings (90 degrees preferred)  - Offer food and liquids at a slow rate  - Straws ok  - Encourage small bites of food and small sip

## 2018-01-27 NOTE — PROGRESS NOTES
[facility]  RAPID RESPONSE NOTE     Jan Degree Patient Status:  Inpatient    3/1/1950 MRN [id number]   [facility] 4NW-A Attending Connie Blair MD   Hosp Day # 7 PCP Sekou Roper MD     Reason for RRT:code stroke    Immedia

## 2018-01-27 NOTE — PHYSICAL THERAPY NOTE
PHYSICAL THERAPY TREATMENT NOTE - INPATIENT    Room Number: 410/410-A     Session: 1 of 4  Number of Visits to Meet Established Goals: 4    Presenting Problem: Cellulitis L LE with diabetic foot ulcer & OM, now s/p 5th metatarsal head resection & L foot d ([facility]      Comment: diabets broken ankle led to amputation  No date: Renal disorder  No date: Shingles  No date: Sleep apnea  2005: Stroke ([facility]  No date: Visual impairment      Comment: reading glasses    Past Surgical History  Past Surgical History:  10/ MOBILITY  How much difficulty does the patient currently have. ..  -   Turning over in bed (including adjusting bedclothes, sheets and blankets)?: None   -   Sitting down on and standing up from a chair with arms (e.g., wheelchair, bedside commode, etc.): A reach;RN aware of session/findings; All patient questions and concerns addressed    ASSESSMENT   Pt motivated to participate in PT.   Pt remains unable to maintain NWB L LE for safe transfers and gait training, likely due to current improper fit of prostheti

## 2018-01-27 NOTE — CM/SW NOTE
[facility] informed SW pt's insurance is out of network. He would have to meet out of network deductible of $10,000 and then insurance only pays 50%.  Informed pt who declined and requested referral to other [facility]F and to let him know who is in net

## 2018-01-28 NOTE — PROGRESS NOTES
[facility]  Progress Note     Meghan Butler Patient Status:  Inpatient    3/1/1950 MRN [id number]   [facility] 4NW-A Attending Tristian Marroquin MD   Hosp Day # 9 PCP Teodora Rodriguez MD     Chief Complaint: OM  S:  Pt denies CP, SO Topical BID   • Insulin NPH & Regular 70/30  45 Units Subcutaneous Daily with breakfast   • Pantoprazole Sodium  20 mg Oral BID AC   • meropenem  1 g Intravenous Q12H   • bumetanide  2 mg Oral Daily   • escitalopram  10 mg Oral QAM   • Clopidogrel Bisulfat BKA of right leg in 2004  18. Hx of CORTÉS cirrhosis  s/p Liver Transplant- Continue home immunosuppressant meds   19.  Depression- psych evaluation    DC planning pending bed availability and Neuro/ID recs  F/U ECHO    Quality:  · DVT Prophylaxis: heparin  ·

## 2018-01-28 NOTE — PROGRESS NOTES
INPATIENT NEUROLOGY PROGRESS NOTE    Date: 1/28/2018    Jan Brewer is a 74year old male at Hospital Day ( LOS: 9 days )  Assessment:  Altered mental status: resolved, functional symptoms likely,   other ddx narcolepsy, seizure, toxic metabolic encepha Daily PRN   bisacodyl (DULCOLAX) rectal suppository 10 mg 10 mg Rectal Daily PRN   FLEET ENEMA (FLEET) 7-19 GM/118ML enema 133 mL 1 enema Rectal Once PRN   ondansetron HCl (ZOFRAN) injection 4 mg 4 mg Intravenous Q6H PRN   Or      Metoclopramide HCl (LINA (SYNTHROID) tab 300 mcg 300 mcg Oral Before breakfast   temazepam (RESTORIL) cap 7.5 mg 7.5 mg Oral Nightly PRN   ursodiol (ACTIGALL) cap 300 mg 300 mg Oral TID   glucose (DEX4) oral liquid 15 g 15 g Oral Q15 Min PRN   Or      Glucose-Vitamin C (DEX-4) 4-0

## 2018-01-28 NOTE — PLAN OF CARE
Pt is alert and oriented x 4. Vitals stable. C/o pain on the Lt foot, IV Dilaudid given as needed. Dressing intact. No drainage noted . Lower leg swelling looks better. Pt is able to fit the prosthesis. Sat up the pt in chair.  Pt c/o cough with some mucus,

## 2018-01-28 NOTE — PROGRESS NOTES
Patient is alert,oriented. No complaint of pain at this time. Dressing to left foot is C/D/I. Up to bedside commode for BM this shift. Resting quietly at this time.

## 2018-01-28 NOTE — PROGRESS NOTES
01/28/18 1223   Clinical Encounter Type   Visited With Patient   Routine Visit Introduction   Continue Visiting No   Patient Spiritual Encounters   Spiritual Interventions  provided emotional support and blessing

## 2018-01-29 NOTE — CONSULTS
[facility]  Report of Inpatient Wound Care Consultation     Rosanna Weinberg Patient Status:  Inpatient    3/1/1950 MRN [id number]   [facility] 4NW-A Attending Orlando Sweeney MD   Hosp Day # 10 PCP Lily Sue MD     [facility] Father    • Other Sarahi Vizcaino Mother    • Cancer Sister      Past Surgical History:  10/31/16: ANGIOPLASTY (CORONARY)      Comment: Stents X 4   10/18/16: ANGIOPLASTY (CORONARY)      Comment: Stents x 1  2015: BYPASS SURGERY      Comment: Robotic CABG X 2  10/ 35.8*   --    --    --    --    --    --    --    INR  0.93   --    --    --    --    --    --    --    PGLU   --    --    < >   --    < >  197*  182*  175*    < > = values in this interval not displayed.        Imaging:   See EMR  Microbiology:   See EMR

## 2018-01-29 NOTE — HOME CARE LIAISON
Referral received for home health from  [facility]. I met with the patient and his intention is to [place] in [place] at end of this week to officiate his grandson's wedding. He does not intend to be home bound.   I left our brochure as a resource should

## 2018-01-29 NOTE — SLP NOTE
SPEECH DAILY NOTE - INPATIENT    ASSESSMENT & PLAN   ASSESSMENT  Pt seen at bedside this AM for speech therapy services. Pt cooperative, pleasant, and alert. Per RN documentation, pt tolerating diet well with no concerns of aspiration.  Trial thin liquids v contact Dory Lovett M.S. [address] [facility]  Pager [phone]

## 2018-01-29 NOTE — PROGRESS NOTES
01/29/18  1130   BP: 129/67   Pulse: 54   Resp: 19   Temp: 98 °F (36.7 °C)   Patient is now 4 days status post surgery his vital signs are stable he is anticipating discharge today or possibly tomorrow.   Wound care has recently seen him their input is branden

## 2018-01-29 NOTE — PROGRESS NOTES
[facility]                INFECTIOUS DISEASE PROGRESS NOTE    Genie Can Patient Status:  Inpatient    3/1/1950 MRN [id number]   [facility] 4NW-A Attending Thanh Bundy MD   Hosp Day # 10 PCP Brendan Hill MD     Antibioti CKD (chronic kidney disease) stage 4, GFR 15-29 ml/min (HCC)     Weakness     Syncope     Hypothyroidism     S/P drug eluting coronary stent placement     Type 2 diabetes mellitus with complication, with long-term current use of insulin ([address]     Dyslip Other complicated headache syndrome     Atypical migraine     Kidney transplanted     Suppression of immune system subtherapeutic (HCC)     Thrombocytosis (HCC)     History of anemia due to chronic kidney disease     ESRD (end stage renal disease) ([facility])

## 2018-01-29 NOTE — PHYSICAL THERAPY NOTE
PHYSICAL THERAPY TREATMENT NOTE - INPATIENT    Room Number: 410/410-A     Session: 2  Number of Visits to Meet Established Goals: 4     Presenting Problem: Cellulitis L LE with diabetic foot ulcer & OM, now s/p 5th metatarsal head resection & L foot debri Peripheral vascular disease ([facility] 2005    diabets broken ankle led to amputation   • Renal disorder    • Shingles    • Sleep apnea    • Stroke [facility]) 2005   • Visual impairment     reading glasses       Past Surgical History  Past Surgical History:  10/31/1 Turning over in bed (including adjusting bedclothes, sheets and blankets)?: None   -   Sitting down on and standing up from a chair with arms (e.g., wheelchair, bedside commode, etc.): Unable   -   Moving from lying on back to sitting on the side of the be of Session: Up in chair;Needs met;Call light within reach;RN aware of session/findings; All patient questions and concerns addressed    ASSESSMENT   Pt motivated to participate in PT.   Pt remains unable to maintain NWB L LE for safe transfers and gait train

## 2018-01-29 NOTE — CM/SW NOTE
Sharon Lorenzana stating they can admit under [employer] without auth. SW set up ambulance for 7:00pm. RN to call report to [facility] [phone]. Pt alert and oriented and will be able to inform family of dc.

## 2018-01-29 NOTE — PROGRESS NOTES
[facility]  Progress Note     Arnoldo Billing Patient Status:  Inpatient    3/1/1950 MRN [id number]   [facility] 4NW-A Attending Elda Maria MD   Hosp Day # 8 PCP Jarred Sue MD     Chief Complaint: OM  S:  Pt denies CP, S Sodium  20 mg Oral BID AC   • meropenem  1 g Intravenous Q12H   • bumetanide  2 mg Oral Daily   • escitalopram  10 mg Oral QAM   • Clopidogrel Bisulfate  75 mg Oral Daily   • cycloSPORINE modified  175 mg Oral BID   • gabapentin  400 mg Oral TID   • isosor Liver Transplant- Continue home immunosuppressant meds   19.  Depression- psych evaluation    DC planning pending bed availability today     Quality:  · DVT Prophylaxis: heparin  · CODE status: full  Estimated date of discharge: today   Discharge is depende

## 2018-01-29 NOTE — PLAN OF CARE
CARDIOVASCULAR - ADULT    • Absence of cardiac arrhythmias or at baseline Progressing        Pt. W/o chest pain. HR remains in the 50s. BP stable.     METABOLIC/FLUID AND ELECTROLYTES - ADULT    • Glucose maintained within prescribed range Progressing

## 2018-01-29 NOTE — CM/SW NOTE
SW followed up w/pt regarding dc plan. Pt is now refusing LEXI. Pt stated he is doing better and stated MD Morgan told him it would be ok for him to dc home as long as he stays off his root.  Pt stated he would be staying off his foot and is going to rent a

## 2018-01-29 NOTE — CM/SW NOTE
PT stating pt is not safe to dc home. Pt now agreeable to [facility]I. Pt agreeable to [facility] since they are in network w/his insurance and Mono Chan from [facility] spoke to him about the facility. Mono Chan working on [employer].

## 2018-01-30 NOTE — DISCHARGE SUMMARY
[facility]  DISCHARGE SUMMARY     [place] Degree Patient Status:  Inpatient    3/1/1950 MRN [id number]   [facility] 4NW-A Attending No att. providers found   [address] Day # 10 PCP Sekou Roper MD     Date of Admission: 2018  [facility]: Other: DM ulcer; still recommend for TCM follow-up    Please note that only patients enrolled in the Medicare ACO, [facility] ACO and [address] [place] [address] will be handled by a member of the Care Management Team.  For all other patients, please follow usual protoc regarding syncope and strokelike symptoms. There was a consideration of psychogenic behavior. No further symptoms occurred. Patient was clinically improving and accepted to the rehab facility for further treatment.   Patient cleared by the consultants an modified 25 MG Caps  Commonly known as:  NEORAL      Take 175 mg by mouth 2 (two) times daily. Refills:  0     DiphenhydrAMINE HCl 25 MG Caps  Commonly known as:  BENADRYL      Take 50 mg by mouth nightly as needed for Itching or Sleep.    Refills:  0 tablet  Refills:  1     TAB-A-JEROD Tabs      Take 1 tablet by mouth daily. Refills:  0     temazepam 7.5 MG Caps  Commonly known as:  RESTORIL      Take 1 capsule (7.5 mg total) by mouth nightly as needed for Sleep.    Quantity:  30 capsule  Refills:  0

## 2018-01-30 NOTE — PAYOR COMM NOTE
--------------  DISCHARGE REVIEW    Payor: [employer] AD[employer] PPO  Subscriber #:  [id number]  Authorization Number: [id number]    Admit date: 1/19/18  Admit time:  1830  Discharge Date: 1/29/2018  8:37 PM     Admitting Physician: MD Luis Alfredo Hernandez

## 2018-01-30 NOTE — PROGRESS NOTES
Discharged by ambulance in stable condition. Evening meds given. No complaints of pain. Comfortable.

## 2018-01-31 NOTE — PROGRESS NOTES
Meghan Butler  : 3/1/1950  Age 74year old  male patient is admitted to Facility: [place] [id number] for LEXI after LLE cellulitis/infected DM foot ulcers/left 5th metatarsal head [facility] Admit date:    18  Discharge date to [facility] MI/CABG/Stents, SSS s/p leadless PPM, HTN, HL,  CHF, PVD, anemia, GERD, CORTÉS/cirrhosis s/p liver transplant, CKD, hypothyroid, T2 DM w/ PN, s/p right BKA and BPH. Hx of left heel ulcer x 6 wks followed by [facility].   At f/u appt increased draina Shingles    • Sleep apnea    • Stroke [facility]) 2005   • Visual impairment     reading glasses     Past Surgical History:  10/31/16: ANGIOPLASTY (CORONARY)      Comment: Stents X 4   10/18/16: ANGIOPLASTY (CORONARY)      Comment: Stents x 1  2015: BYPASS SURGE exposure it is thought that the reaction was more             likely related to daptomycin. Re-challenging with             cefepime 4/2014. Dexamethasone               Comment:Other reaction(s):  Other             Steroidal psychosis  Dexamethasone Sodiu* 10 MG Oral Cap Take 20 mg by mouth nightly. Disp:  Rfl:    Metoprolol Tartrate 50 MG Oral Tab Take 50 mg by mouth 2 (two) times daily. Disp:  Rfl:    Rosuvastatin Calcium 5 MG Oral Tab Take 1 tablet (5 mg total) by mouth nightly.  Disp: 30 tablet Rfl: 1   S negative  RESPIRATORY: denies shortness of breath, wheezing or cough ; +cough--see HPI  CARDIOVASCULAR:denies chest pain, no palpitations , denies syncope, denies orthopnea  GI: denies nausea, vomiting, constipation, diarrhea; no rectal bleeding; no heartb extremity  EXTREMITIES/VASCULAR:no cyanosis, clubbing or edema, radial pulses 2+ and dorsalis pedal pulses 2+; 2+ edema to b/l LEs, increased pigmentation c/w PVD  NEUROLOGIC: intact; no sensorimotor deficit, cranial nerves intact II-XII, follows commands 2. 5.18    Cough  1. Stat CXR    CVA/weakness  1.  mg daily  2. PT/OT  3. F/U w/ Dr Judi Johnsonost 3.8.18    Hematemesis/GERD  1. Omeprazole 20 mg BID    Anxiety/depression  1. Alprazolam 0.5 mg TID prn  2. Citalopram 20 mg daily  3.  Nortriptyline 20 CINTHYA Monzon  01/31/18   9:30 AM      1.31.18 @ ~1700  RN called w/ stat CXR result:  Perihilar congestion mild and bronchitis. Add Duoneb txs QID x 5 days and mucinex  mg BID x 5 days.

## 2018-01-31 NOTE — CM/SW NOTE
01/31/18 1100   Discharge disposition   Discharged to: Skilled Nurs   Name of [address]   Discharge transportation [employer]

## 2018-01-31 NOTE — PROGRESS NOTES
Padmini Miller  : 3/1/1950  Age 74year old  male      CC--Patient presents with:  Nursing Home: Admitted to [facility] for subacute rehab      H. P.I Padmini Miller is a 74year old male who was admitted to [facility] on 18 and disch recs appreciated from hematology eval , chronic in nature   10. Chronic Diastolic heart failure - stable   11. CAD s/p robotic assisted CABG 2015- continue home meds  12. Hx of leadless PPM for type 2A AV heart block 6/2017  13.  Essential HTN- Continue beltran COLONOSCOPY N/A      Comment: Procedure: COLONOSCOPY;  Surgeon: Albert Raymundo DO;  Location:  ENDOSCOPY  No date: [facility] RST[facility] [address] PLMT BAND  No date: OTHER      Comment: LLIVER TRANSPLANT  No date: OTHER      Comment: R BKA  No date DRUGS  Quinolones                Rifampin                  Statins                     Comment:Muscle weakness  Sulfa Antibiotics         Vancomycin                  Comment:Other reaction(s): Joint Pain    CODE STATUS:  Full Code    CURRENT MEDICATIONS tinnitus and denies tremors  PSYCHE: no symptoms of depression or anxiety   HEMATOLOGY:hx of anemia, on anticoagulants  ENDOCRINE: denies excessive thirst or urination; denies unexpected wt gain or wt loss ALLERGY/IMM.: denies food or seasonal allergies to monitor BUN/creatinine  Type 2 diabetes mellitus with hyperglycemia, with long-term current use of insulin (HCC)  Strict glycemic control  Check sugars before meals and at bedtime  Continue the sliding scale as well as 70/30  Obesity (BMI 30-39. 9)  Live

## 2018-02-01 LAB
ALBUMIN: 2.2 G/DL
ALKALINE PHOSPHATATE(ALK PHOS): 1054 IU/L
ALT (SGPT): 38 U/L
AST (SGOT): 58 U/L
BILIRUBIN TOTAL: 2.3 MG/DL
BUN: 61 MG/DL
BUN: 61 MG/DL
BUN: 67 MG/DL
CALCIUM: 7.6 MG/DL
CALCIUM: 7.7 MG/DL
CALCIUM: 8 MG/DL
CHLORIDE: 101 MEQ/L
CHLORIDE: 101 MEQ/L
CHLORIDE: 102 MEQ/L
CHOLESTEROL, TOTAL: 126 MG/DL
CREATININE, SERUM: 2.04 MG/DL
CREATININE, SERUM: 2.09 MG/DL
CREATININE, SERUM: 2.17 MG/DL
GLUCOSE: 142 MG/DL
GLUCOSE: 169 MG/DL
GLUCOSE: 169 MG/DL
GLUCOSE: 187 MG/DL
HDL CHOLESTEROL: 19 MG/DL
HEMOGLOBIN A1C: 11.1 %
LDL CHOLESTEROL: 86 MG/DL
POTASSIUM, SERUM: 4.5 MEQ/L
POTASSIUM, SERUM: 4.9 MEQ/L
POTASSIUM, SERUM: 5.1 MEQ/L
PROTEIN, TOTAL: 6.5 G/DL
SGOT (AST): 58 IU/L
SGPT (ALT): 38 IU/L
SODIUM: 131 MEQ/L
SODIUM: 132 MEQ/L
SODIUM: 133 MEQ/L
TRIGLYCERIDES: 103 MG/DL

## 2018-02-02 NOTE — PROGRESS NOTES
Arnoldo Garcia, 11/1950, 74year old, male    Chief Complaint:  Patient presents with:   Follow - Up: LLE cellulitis/infected DM foot ulcers/left 5th metatarsal head OM  Tremors  Bronchitis  Anxiety       Subjective:  PMH significant for CVA, anxiety/depre EOMI, sclera anicteric, conjunctiva normal; there is no nystagmus, no drainage from eyes, +IOP b/l  HENT: normocephalic; normal nose, no nasal drainage, mucous membranes pink, moist, pharynx no exudate, no visible cerumen.   NECK: supple; FROM; no JVD, no T manage drsg changes  11. Weekly CBC, CMP, ESR, CRP  12. F/U w/ Dr Morgan/podiatry and wound clinic 2.5.18     Cough/Bronchitis  1. Duonebs TID x 5 days  2. Mucinex  mg BID x 5 days     Tremors to UEs  1. Urgent f/u w/ Neurology    CVA/weakness  1.  A 1x/wk  2. Recheck vitamin D in 6 wks     Insomnia  1.  Temazepam 7.5 mg q HS     CINTHYA Zeng  2/2/2018  10:15 AM

## 2018-02-05 NOTE — PROGRESS NOTES
Toya Starks, 11/1950, 74year old, male    Chief Complaint:  Patient presents with:   Follow - Up: LLE cellulitis/infected DM foot ulcers/left 5th metatarsal head OM  Hypoglycemia       Subjective:  PMH significant for CVA, anxiety/depression, RYAN, CAD 1.5 x 1 cm--not examined d/t newly applied drsgs    Left lateral foot surgical wound:  5 x 2 cm--not examined d/t newly applied drsgs  Refer to wound APN notes for further description.   EYES: PERRLA, EOMI, sclera anicteric, conjunctiva normal; there is no 9.4 (L) 02/05/2018   HCT 30.3 (L) 02/05/2018   MCV 92.7 02/05/2018   MCH 28.7 02/05/2018   MCHC 31.0 02/05/2018   RDW 13.9 02/05/2018   .0 (L) 02/05/2018       Lab Results  Component Value Date   T4F 1.4 02/05/2018   TSH 0.317 (L) 02/05/2018       A labs prn  4. Consult Dr Martin/nephrology     Hypothyroid  1. LT4 300 mcg daily  2. F/U w/ Dr Sanford Ahuja after LEXI     T2 DM w/ PN/hypoglycemia/status post right BKA/edema/hypoglycemia  1. CHO controlled diet  2. Accu check QID; notify <70 or >300  3.  Chevy Riojas

## 2018-02-06 PROBLEM — E16.2 HYPOGLYCEMIA: Status: ACTIVE | Noted: 2018-01-01

## 2018-02-06 PROBLEM — N17.9 ACUTE KIDNEY INJURY (HCC): Status: ACTIVE | Noted: 2018-01-01

## 2018-02-06 PROBLEM — N17.9 ACUTE RENAL FAILURE (ARF) (HCC): Status: ACTIVE | Noted: 2018-01-01

## 2018-02-06 PROBLEM — R07.9 ACUTE CHEST PAIN: Status: ACTIVE | Noted: 2018-01-01

## 2018-02-06 NOTE — PLAN OF CARE
Received pt from er. Pt had unresponsive episode at the nursing home. Pt stated usually happens when blood sugar is below 100. Sternal rub done. Upon waking up, pt c/o chest pain. Trop(-)x1. Hx rt bka and recent s/p partial amputation of lt foot.  Dressing

## 2018-02-06 NOTE — H&P
[facility]  History and Physical     Genie Can Patient Status:  Emergency    3/1/1950 MRN [id number]   Location [facility] [facility] Attending Misti Aleman MD   Hosp Day # 0 PCP Brendan Hill MD     Chief Complaint: apnea    • Stroke [facility]) 2005   • Visual impairment     reading glasses        Past Surgical History: Past Surgical History:  10/31/16: ANGIOPLASTY (CORONARY)      Comment: Stents X 4   10/18/16: ANGIOPLASTY (CORONARY)      Comment: Stents x 1  2015: BYPASS likely related to daptomycin. Re-challenging with             cefepime 4/2014. Dexamethasone               Comment:Other reaction(s):  Other             Steroidal psychosis  Dexamethasone Sodiu*    Other (See Comments)    Comment:Steroidal psychosis Oral Tab Take 1 tablet (5 mg total) by mouth nightly.  Disp: 30 tablet Rfl: 1   SYNTHROID 300 MCG Oral Tab Take 1 tablet (300 mcg total) by mouth before breakfast. Disp: 90 tablet Rfl: 1   temazepam 7.5 MG Oral Cap Take 1 capsule (7.5 mg total) by mouth nig wheezes. No rhonchi. Cardiovascular: S1, S2. Regular rate and rhythm. No murmurs, rubs or gallops. Equal pulses. Chest and Back: No tenderness or deformity. Abdomen: Soft, nontender, nondistended. Positive bowel sounds.  No rebound, guarding or organom

## 2018-02-06 NOTE — PAYOR COMM NOTE
--------------  ADMISSION REVIEW     Payor: [employer] ADV PPO  Subscriber #:  [id number]  Authorization Number: N/A    Admit date: N/A  Admit time: N/A       Admitting Physician: Montse Sheikh MD  Attending Physician:  Umu Ball MD  Primary Car PT 15.2 (*)     INR 1.19 (*)     All other components within normal limits   POCT GLUCOSE - Abnormal; Notable for the following:     POC Glucose 114 (*)     All other components within normal limits   CBC W/ DIFFERENTIAL - Abnormal; Notable for the fol intake  4. Check A1c[AR.3]  3. CAD  4. Recent parial L foot amputation[AR.1]  1. Continue abx, pain control[AR.2]  5. Essential HTN  6. Dyslipidemia[AR.1]  1. statin[AR.2]  7. GERD  8. PVD  9. Chronically Elevated Alk Phos  10. Liver Tranplant[AR.1]  1.  Co Dose Route User    2/6/2018 0940 Given 20 mg Oral Thiago Gutierrez RN      ursodiol (ACTIGALL) cap 300 mg     Date Action Dose Route User    2/6/2018 0959 Given 300 mg Oral Cat VALDEZ RN      cycloSPORINE modified (NEORAL) cap 175 mg     Date Act

## 2018-02-06 NOTE — CM/SW NOTE
Per unit RN, pt to d/c back to [facility] today. SW notified Ashlyn Lin with [facility] and sent updates. Arranged 6pm ambulance per unit RN. NAREN FLOR#[phone] with nurse report.

## 2018-02-06 NOTE — ED PROVIDER NOTES
Patient Seen in: [facility] Emergency Department    History   Patient presents with:  Chest Pain Angina (cardiovascular)  Hypoglycemia (metabolic)    Stated Complaint: low blood sugar, chest pain    HPI    The patient is a 29-year-old male who prese Migraines    • Muscle weakness    • Myocardial infarction    • Neuropathy    • Obesity    • Peripheral vascular disease ([facility] [address] 2005    diabets broken ankle led to amputation   • Renal disorder    • Shingles    • Sleep apnea    • Stroke [facility]) 2005   • Visual 95%   BMI 35.91 kg/m²         Physical Exam  GENERAL: Well-developed, well-nourished male sitting up breathing easily in no apparent distress. Patient is nontoxic in appearance. HEENT: Head is normocephalic, atraumatic.  Pupils are 3 mm equally round and Notable for the following:     POC Glucose 114 (*)     All other components within normal limits   CBC W/ DIFFERENTIAL - Abnormal; Notable for the following:     RBC 2.93 (*)     HGB 8.6 (*)     HCT 26.4 (*)     .0 (*)     Lymphocyte Absolute 0.81 ( Patient was given aspirin here in the emergency room. Patient's blood sugar was found to be 70 and repeat blood sugar was found to be over 110.   The patient was observed for sometime in the emergency room and had no further new complaints throughout the r

## 2018-02-06 NOTE — PLAN OF CARE
Patient asleep O2s sat 82% omn RA; patient states he does not tolerate CPAP he does confirm history of RYAN; 2 L applied O2sat 92%    cardiology s/o DR Madi Rai updated

## 2018-02-06 NOTE — PROGRESS NOTES
[facility]  Progress Note    Genie Can Patient Status:  Observation    3/1/1950 MRN [id number]   [facility] 8NE-A Attending Pippa Hazel MD   Hosp Day # 0 PCP Brendan Hill MD       Assessment:    · Atypical chest pain  · CAD  · 02/05/2018   PTT 33.1 02/05/2018   INR 1.19 02/05/2018   TROP <0.046 02/06/2018       Medications:    • bumetanide  2 mg Oral Daily   • escitalopram  10 mg Oral QAM   • Clopidogrel Bisulfate  75 mg Oral Daily   • cycloSPORINE modified  175 mg Oral BID   •

## 2018-02-06 NOTE — CONSULTS
[facility] NOTE  Cardiology Consultation    Jan Degree Patient Status:  Observation    3/1/1950 MRN [id number]   [facility] 8NE-A Attending Stormy Mcmahon MD   Hosp Day # 0 PCP Rehan Anthony MD     Reason for [facility] cirrhosis.   Patient's troponins are all negative and the patient's EKG is unchanged      Impression:  Patient Active Problem List:     Coronary artery disease involving native heart with unstable angina pectoris ([address]     Chronic kidney disease     Amanda Recurrent major depressive disorder, in full remission ([facility] [address]     Amputee     Acute ischemic stroke (HCC)     Encephalopathy     Postural dizziness with presyncope     Acute right-sided weakness     Other complicated headache syndrome     Atypical migraine to the cardiologist knows them the best Dr. Shira Dawkins.   In addition the patient had a normal chest x-ray in the emergency room and a VQ scan was done but not read which was also delayed the ability to perform a nuclear stress test.    History:  Past Medical BKA  No date: OTHER      Comment: STENTS total of 13 stents   2005: OTHER SURGICAL HISTORY      Comment: Right Below knee amputee  No date: REPAIR ING HERNIA,5+Y/O,REDUCIBL  No date: THYROIDECTOMY  No date: TRANSPLANT LIVER,ALLOTRANSPLANT      Comment: 07/ hours as needed for Pain. Disp:  Rfl:  2/5/2018 at Unknown time   gabapentin 600 MG Oral Tab Take 600 mg by mouth 3 (three) times daily. Disp:  Rfl:  2/5/2018 at Unknown time   finasteride 5 MG Oral Tab Take 5 mg by mouth nightly.  Disp:  Rfl:  2/5/2018 at (three) times daily. Disp:  Rfl:  2/5/2018 at Unknown time   aspirin 325 MG Oral Tab Take 325 mg by mouth daily. Disp:  Rfl:  2/5/2018 at Unknown time   Citalopram Hydrobromide (CELEXA) 20 MG Oral Tab Take 20 mg by mouth daily.  Disp:  Rfl:  2/5/2018 at [facility] Insulin NPH & Regular 70/30 (NovoLIN 70/30) 100 UNIT/ML injection 30 Units, 30 Units, Subcutaneous, Daily with breakfast  •  Insulin NPH & Regular 70/30 (NovoLIN 70/30) 100 UNIT/ML injection 25 Units, 25 Units, Subcutaneous, Daily with dinner  •  isosorbid hemoptysis  GI no hematemesis no melena   no dysuria hematuria   hematologic no bleeding  Neurologic no TIA-like symptoms  Skin no rashes       Intake/Output:     Intake/Output Summary (Last 24 hours) at 02/06/18 8782  Last data filed at 02/06/18 6079 15.2 02/05/2018   TROP <0.046 02/06/2018   PGLU 114 02/06/2018       Imaging:  Normal chest x-ray    Thank you for allowing me to participate in the care of your patient.     Jay Rothman MD  2/6/2018  5:58 AM

## 2018-02-06 NOTE — ED INITIAL ASSESSMENT (HPI)
Arrived via EMS from [facility]. Was found unresponsive in his bed, staff did a sternal rub and checked blood sugar. Blood sugar 62 when checked at [facility], once pt more awake c/o chest pain.

## 2018-02-06 NOTE — HISTORICAL OFFICE NOTE
Zbigniew Murdock  : 1950  ACCOUNT:  [id number]  [phone]  PCP: Dr. Maggy Arreaga     TODAY'S DATE: 2018  DICTATED BY:  [Dr. Deejay Lozada: [Followup of CAD, of native vessels.]    HPI:    [On 2018, marcos Kruger [employer].  SOCIAL HISTORY: SMOKING: Former tobacco use. used to smoke but quit >5 years ago >20 years ago. CAFFEINE: 2 beverages daily. ALCOHOL: denies drinking. EXERCISE: no regular exercise. DIET: no special diet. MARITAL STATUS: .  OCCUPATION: disabled restriction    ASSESSMENT:  1. CAD, of native vessels  2. Angina, stable  3. Chest discomfort/tightness  4. DM, Type I  5. Dyspnea  6. History of CABG  7. Hypercholesteremia, pure  8. Hypertension, benign  9. Pacemaker, not dependent  10.  Pacer reprogrammi 100UNIT/  as directed                              01/13/16 Omeprazole            40MG      1 po daily                               01/13/16 Synthroid             300MCG    350 micrograms,2 po daily                01/13/16 Ursodiol              300

## 2018-02-08 NOTE — PATIENT INSTRUCTIONS
Refill policies:    • Allow 2-3 business days for refills; controlled substances may take longer.   • Contact your pharmacy at least 5 days prior to running out of medication and have them send an electronic request or submit request through the [facility] recommended that you have a procedure or additional testing performed. [facility]) will contact your insurance carrier to obtain pre-certification or prior authorization.     Unfortunately, [facility] has seen an increase in denial of paym

## 2018-02-08 NOTE — PROGRESS NOTES
Santiago Flowers, 11/1950, 74year old, male    Chief Complaint:  Patient presents with:   Follow - Up: LLE cellulitis/infected DM foot ulcers/left 5th metatarsal head OM  Altered Mental Status (neurologic)  Hypoglycemia  Tremors  Weakness       Subjective: drainage from eyes, +IOP b/l  HENT: normocephalic; normal nose, no nasal drainage, mucous membranes pink, moist, pharynx no exudate, no visible cerumen.   NECK: supple; FROM; no JVD, no TMG, no carotid bruits  BREAST: ---deferred  RESPIRATORY:clear to auscu free diet  2. Monitor stools, send sample for C Diff if more than 4 episodes in 24 hrs    Tremors to UEs  1. Urgent f/u w/ Neurology/has appt 2.8.18 at 1500    CVA/weakness  1.  mg daily  2.  PT/OT  3. F/U w/ Dr Elayne Ramirez 2.8.18     Hematemesis/ urgently. PT session aborted because pt experienced sudden onset of dizziness, weakness and decreased responsiveness but no LOC. C/O severe frontal HA, fatigue, weakness, drowsy but able to answer questions. VSS. Glucose 129.       Plan:   1. VS q shift

## 2018-02-08 NOTE — PROGRESS NOTES
Pat Hickman in [facility]  Neurology - Clinic Follow up      Gely Durán Patient Status:  No patient class for patient encounter    3/1/1950 MRN [id number]   Location [unfilled] PCP Javed Kumar MD     Patient presents with:  Nitza Mitchell been hospitalized for lethargy due to hypoglycemia and worsened thyroid function. He had another syncopal episode in the hospital, preceded by headache, lasting a few minutes. He is getting the episodes 1-2 time per week.  The triggers are urinating, and re spinning sensation. Overall his HA's are better on the nortriptyline. He is here for follow up. About 3-4 months ago, he noticed that he started to have a tremor.  Before it was only with activity, such as with writing or typing, however now it occurs with 19.0 (L)   PT      12.0 - 14.3 seconds  12.8 12.9    INR      0.89 - 1.11  0.96 0.97    APTT      25.0 - 34.0 seconds  25.3 26.9      Component      Latest Ref Rng & Units 6/4/2017   Glucose      70 - 99 mg/dL    BUN      8 - 20 mg/dL    CREATININE      0. Surgical History:  10/31/16: ANGIOPLASTY (CORONARY)      Comment: Stents X 4   10/18/16: ANGIOPLASTY (CORONARY)      Comment: Stents x 1  2015: BYPASS SURGERY      Comment: Robotic CABG X 2  10/2015: CABG  06/06/2017: Chris Douglas FLUORQUINOLONE AND/OR             FLOXIN FAMILY DRUGS  Quinolones                Rifampin                  Statins                     Comment:Muscle weakness  Sulfa Antibiotics         Vancomycin                  Comment:Other reaction(s): Joint Pain    M under the tongue as needed. , Disp: , Rfl:   •  ursodiol 300 MG Oral Cap, Take 300 mg by mouth 3 (three) times daily. , Disp: , Rfl:   •  ergocalciferol 36025 units Oral Cap, Take 50,000 Units by mouth twice a week.  Weekly- tues , Disp: , Rfl:   •  aspirin 3 B/L;  Musculoskeletal: no joint tenderness, others see neuro exam;  Extremities:  no pitting edema, no cyanosis or skin rash,  pulse is present,  Neurologic Examination  This patient is alert and orientated x 3.  Speech fluent.  Able to follow simple comman parkinsonian without other feature, differential includes essential tremor vs. metabolic vs. mutlifactorial secondary to sensory neuropathy from DM (for this recommend again EMG)- recent TSH wnl, hpoxia improved and no identifiable metabolic component now;

## 2018-02-09 NOTE — TELEPHONE ENCOUNTER
Melody Regan at [facility] said to call to schedule EMG but when she tried was told order has not been placed.

## 2018-02-12 NOTE — PROGRESS NOTES
Kylie Carter, 11/1950, 74year old, male    Chief Complaint:  Patient presents with:   Follow - Up: LLE cellulitis/infected DM foot ulcers/left 5th metatarsal head OM  Hyperglycemia       Subjective:  PMH significant for CVA, anxiety/depression, RYAN, CAD normocephalic; normal nose, no nasal drainage, mucous membranes pink, moist, pharynx no exudate, no visible cerumen.   NECK: supple; FROM; no JVD, no TMG, no carotid bruits  BREAST: ---deferred  RESPIRATORY:clear to auscultation anteriorly and posteriorly, OM/status post amputation/gait abnormality  1. Monitor for fever/sxs of infection  2. Consult Dr Wise/ID  3. 50% WBAT to LLE for max of 20 steps/day w/ Darco shoe  4. Elevate LLE   5. PT/OT eval and tx  6. ELOS 15-19 days  7.  Plan DC on or before 2.9.18/ Veronica/nephrology     Hypothyroid  1. LT4 300 mcg daily  2. F/U w/ Dr Crow Vitale after LEXI     T2 DM w/ PN/hypoglycemia/status post right BKA/edema/hypoglycemia  1. CHO controlled diet  2. Accu check QID; notify <70 or >300  3.  Increase Novolin 70/30 to 12u

## 2018-02-16 NOTE — PROGRESS NOTES
Meghan Butler, 11/1950, 74year old, male    Chief Complaint:  Patient presents with:   Follow - Up: LLE cellulitis/infected DM foot ulcers/left 5th metatarsal head OM  Weakness  Hyperglycemia       Subjective:  PMH significant for CVA, anxiety/depression and posteriorly, decreased bases, no wheezing  CARDIOVASCULAR: S1, S2 normal, RRR; no S3, no S4; , no click, +systolic murmur grade 3/6, leadless PPM  ABDOMEN:  normal active BS+, soft, nondistended; no organomegaly, no masses; no bruits; nontender, no gua BID     Anxiety/depression  1. Alprazolam to 0.5 mg q HS and BID prn  2. Citalopram 20 mg daily  3. Nortriptyline 20 mg q HS  4. Monitor mood, consult psych prn     RYAN  1. Does not tolerate mask for CPAP  2.  Consult RT to eval and tx     CAD/SSS s/p leadl

## 2018-02-19 NOTE — ED INITIAL ASSESSMENT (HPI)
Patient with chest pressure, felt dizzy, pain radiating down left arm. Hx of stents.  Patient had CP starting during physical therapy

## 2018-02-19 NOTE — ED PROVIDER NOTES
Patient Seen in: [facility] Emergency Department    History   Patient presents with:  Chest Pain Angina (cardiovascular)    Stated Complaint: CP    HPI    30-year-old male currently in a rehab facility presents to the emergency department for evalua Hyperlipidemia    • Hypothyroidism    • Kidney disorder    • Liver transplanted [facility])    • Migraines    • Muscle weakness    • Myocardial infarction [facility])    • Neuropathy    • Obesity    • Peripheral vascular disease ([facility] [address] 2005    diabets broken ankle led Ht 193 cm (6' 4\")   Wt 126.1 kg   SpO2 97%   BMI 33.84 kg/m²         Physical Exam    General appearance: This is a middle-aged male lying in a gurney. He was sound asleep on a gurney upon initial evaluation. Vital signs were reviewed per nurse's notes. ---------                               -----------         ------                     CBC W/ DIFFERENTIAL[[id number]]          Abnormal            Final result                 Please view results for these tests on the individual orders.    BRIGHT extremity    Disposition:  Discharge  2/19/2018  6:54 pm    Follow-up:  Suad Armando MD  [address] [place] [address]  [phone]    Call in 1 day          Medications Prescribed:  Current Discharge Medication List    START Ena Petty

## 2018-02-21 NOTE — PROGRESS NOTES
Radha Carpenter, 11/1950, 74year old, male    Chief Complaint:  Patient presents with:   Follow - Up: LLE cellulitis/infected DM foot ulcers/left 5th metatarsal head OM  Weakness  Hyperglycemia       Subjective:  PMH significant for CVA, anxiety/depression Refer to wound APN notes for further description.   EYES: PERRLA, EOMI, sclera anicteric, conjunctiva normal; there is no nystagmus, no drainage from eyes, +IOP b/l  HENT: normocephalic; normal nose, no nasal drainage, mucous membranes pink, moist, pharynx AST 65 (H) 02/19/2018   ALT 45 02/19/2018   BILT 2.9 (H) 02/19/2018   TP 6.8 02/19/2018   ALB 2.6 (L) 02/19/2018    02/19/2018   K 4.3 02/19/2018    02/19/2018   CO2 26.0 02/19/2018     TROPONIN I   Order: [id number]   Collected:  2/19/2018  3:3 2. Gabapentin 900/900/600mg    CVA/weakness  1.  mg daily  2. PT/OT  3. F/U w/ Dr Noel Page      Hematemesis/GERD  1. Omeprazole 20 mg BID     Anxiety/depression  1. Alprazolam to 0.5 mg q HS and BID prn  2. Citalopram 20 mg daily  3.  Nortripty

## 2018-02-26 NOTE — PROGRESS NOTES
Initial Post Discharge Follow Up   Discharge Date: 2/24/18[place] [facility]  Contact Date: 2/26/2018    Consent Verification:  Assessment Completed With: Patient  HIPAA Verified?   Yes    Discharge Dx:  LLE cellulitis, infected DM ulcers/left 5th finasteride 5 MG Oral Tab Take 5 mg by mouth nightly. Disp:  Rfl:    ALPRAZolam 0.5 MG Oral Tab Take 1 tablet (0.5 mg total) by mouth 3 (three) times daily as needed.  Disp: 30 tablet Rfl: 0   Doxycycline Hyclate 100 MG Oral Cap Take 1 capsule (100 mg tot 50 mg by mouth nightly as needed for Itching or Sleep. Disp:  Rfl:    Mycophenolate Mofetil (CELLCEPT) 500 MG Oral Tab Take 1,000 mg by mouth 2 (two) times daily.  Disp:  Rfl:      • When you were leaving the hospital were any medication changes discussed [place])    Mar 08, 2018 10:00 AM CST Follow up with Ne Pedroza, [address] [facility]        [address] [facility]  [address] [facility], [address]

## 2018-02-26 NOTE — TELEPHONE ENCOUNTER
Called pt to inform refills available [facility] [facility] listed. Pt verbalized understanding and agreed with POC.

## 2018-02-26 NOTE — TELEPHONE ENCOUNTER
Spoke to pt for TCM today. Pt requesting refill for Gabapentin 300mg 3 tabs with breakfast and lunch, and 2 tabs with dinner. Pt will be coming in for HFU appt on 3/1/18 with Dr. Tasia Biggs. Please advise.      TRIAGE:  Please call pt back with an update regar

## 2018-02-27 PROBLEM — R11.2 INTRACTABLE VOMITING WITH NAUSEA: Status: ACTIVE | Noted: 2018-01-01

## 2018-02-27 NOTE — TELEPHONE ENCOUNTER
Will AS sign home health for skilled nursin/ PT/OT-started augmentin today vomtted 5-6 times already-poss allergy to this med?  Call to advise

## 2018-02-27 NOTE — TELEPHONE ENCOUNTER
Patient states the last Augmentin tablet he took was yesterday, still vomiting, just got out of rehab, needs to make sure he is not dehydrated. Per AS, pt needs to go to the ER for evaluation. Pt verbalizes understanding and agreeable to plan.     LM for

## 2018-02-28 PROBLEM — R11.2 INTRACTABLE VOMITING WITH NAUSEA, UNSPECIFIED VOMITING TYPE: Status: ACTIVE | Noted: 2018-01-01

## 2018-02-28 NOTE — RESPIRATORY THERAPY NOTE
RYAN evaluation complete. Pt states that he does not wear CPAP at home due to it itching his face. Pt refuses CPAP use.

## 2018-02-28 NOTE — ED PROVIDER NOTES
Patient Seen in: [facility] Emergency Department    History   Patient presents with:  Nausea/Vomiting/Diarrhea (gastrointestinal)    Stated Complaint: n/v x2 days    HPI    Lorraine Nick is a 68-year-old gentleman coming with complaints of vomiting and abd 4   10/18/16: ANGIOPLASTY (CORONARY)      Comment: Stents x 1  2015: BYPASS SURGERY      Comment: Robotic CABG X 2  10/2015: CABG  06/06/2017: CARDIAC PACEMAKER PLACEMENT      Comment: Medtronic leadless pacemaker  No date: CATARACT  No date: CATH BARE MET the following:        Result Value    Glucose 235 (*)     BUN 48 (*)     Creatinine 1.55 (*)     GFR, Non- 46 (*)     GFR, -American 54 (*)     Alkaline Phosphatase 989 (*)     AST 75 (*)     Bilirubin, Total 2.7 (*)     Albumin 3.0

## 2018-02-28 NOTE — PAYOR COMM NOTE
--------------  ADMISSION REVIEW       2/27    ED LATE    History   Patient presents with:  Nausea/Vomiting/Diarrhea X 2 DAYS     Stated Complaint: n/v x2 days          Efrem Thomas is a 29-year-old gentleman coming with complaints of vomiting and abdominal pain other components within normal limits   CBC W/ DIFFERENTIAL - Abnormal; Notable for the following:      RBC 3.50 (*)       HGB 10.0 (*)       HCT 31.4 (*)       Lymphocyte Absolute 0.58 (*)      ALKPHOS 989  AST 75  TBILI 2.7      CBC WITH DIFFERENTIAL WIT

## 2018-02-28 NOTE — PROGRESS NOTES
[facility]  Progress Note     Vincent Cornejo Patient Status:  Inpatient    3/1/1950 MRN [id number]   [facility] 3SW-A Attending Moises Erickson MD   Hosp Day # 0 PCP Alexis Manuel MD     Chief Complaint: nausea vomiting    S: Patient Oral Nightly   • isosorbide dinitrate  10 mg Oral BID   • Metoprolol Tartrate  50 mg Oral 2x Daily(Beta Blocker)   • Naltrexone HCl  50 mg Oral nightly   • Nortriptyline HCl  20 mg Oral Nightly   • [START ON 3/1/2018] Rosuvastatin Calcium  5 mg Oral Nightl

## 2018-02-28 NOTE — PHYSICAL THERAPY NOTE
PHYSICAL THERAPY QUICK EVALUATION - INPATIENT    Room Number: 351/351-A  Evaluation Date: 2/28/2018  Presenting Problem: intrac vomiting, nausea  Physician Order: PT Eval and Treat  History Related to Present Admit:   Pt admitted from home due to nausea, History:  10/31/16: ANGIOPLASTY (CORONARY)      Comment: Stents X 4   10/18/16: ANGIOPLASTY (CORONARY)      Comment: Stents x 1  2015: BYPASS SURGERY      Comment: Robotic CABG X 2  10/2015: CABG  06/06/2017: CARDIAC PACEMAKER PLACEMENT      Comment: Medtr abdomen  Management Techniques:  Activity promotion   RANGE OF MOTION AND STRENGTH ASSESSMENT  Upper extremity ROM and strength are within functional limits     Lower extremity ROM is within functional limits except left ankle NT    Lower extremity strength awareness demonstrated. Pt discussed fact that it is imperative to don right prosthesis daily in order to cherri LE edema so that his prosthesis cont to fit. Discussed this importance with rn, pct also.   Pt appears safe for mobility with pct/nsg in room an

## 2018-02-28 NOTE — CONSULTS
[facility]  Report of Inpatient Wound Care Consultation     Genie Can Patient Status:  Inpatient    3/1/1950 MRN [id number]   [facility] 3SW-A Attending Pippa Hazel MD   Hosp Day # 0 PCP Brendan Hill MD     [facility] Stents X 4   10/18/16: ANGIOPLASTY (CORONARY)      Comment: Stents x 1  2015: BYPASS SURGERY      Comment: Robotic CABG X 2  10/2015: CABG  06/06/2017: CARDIAC PACEMAKER PLACEMENT      Comment: Medtronic leadless pacemaker  No date: CATARACT  No date: CATH contrast.     PATIENT STATED HISTORY:(As transcribed by Technologist)  Plantar wound on left foot down to the bone x 8  weeks      CONTRAST USED:  20  cc of paramagnetic gadolinium-based contrast was injected intravenously.      FINDINGS:       There is ulc EMR    ASSESSMENT/ RECOMMENDATIONS:  Received verbal order from  To Alexis Kim, PT, MPT for \"Physical Therapy wound care evaluate and treat. \"  Patient seen bedside, wife present.     Observation: Prosthesis on the R, L LE with 2 wounds, 1 to the p [phone]  VM: [phone]

## 2018-02-28 NOTE — ED INITIAL ASSESSMENT (HPI)
Pt with c/o vomiting after antibiotic use for possible cellulitis of leg. Vomiting x 2 days. Denies diarrhea.

## 2018-02-28 NOTE — PROGRESS NOTES
Consult to gastroenterologist,spoke with DR. GRIFFITHS,will see patient,WILL ENDORSED TO NEXT SHIFT.

## 2018-02-28 NOTE — CONSULTS
[facility]                       Gastroenterology [address] [facility] Gastroenterology    Toya Starks Patient Status:  Observation    3/1/1950 MRN [id number]   [facility] 3SW-A Attending Fadumo Victoria MD   Hosp Day # 0 JUSTIN Jacobs but ill-defined, cerebrovascular disease    • Anemia     on Iron Suppliments    • Anxiety state    • Atherosclerosis of coronary artery 10/31/16    Stents X 4   • Atherosclerosis of coronary artery 10/18/16    Stents X 1   • [facility] disease ([facility]    • B 07/2006  Medications:   [COMPLETED] Prochlorperazine Edisylate (COMPAZINE) injection 10 mg 10 mg Intravenous Once   0.9%  NaCl infusion  Intravenous Continuous   Heparin Sodium (Porcine) 5000 UNIT/ML injection 5,000 Units 5,000 Units Subcutaneous [place] [address] Units 2-10 Units Subcutaneous TID CC and HS   cycloSPORINE modified (NEORAL) cap 175 mg 175 mg Oral BID   Insulin NPH & Regular 70/30 (NovoLIN 70/30) 100 UNIT/ML injection 9 Units 9 Units Subcutaneous BID AC   Mycophenolate Mofetil HCl (CELLCEPT) 1 g in de substances. FamHx: The patient has no family history of colon cancer or other gastrointestinal malignancies;  + mother and sister with Crohn's disease   ROS:  In addition to the pertinent positives described above:             Infectious Disease: + left le obvious depression or anxiety  Labs:   Lab Results  Component Value Date   WBC 4.9 02/28/2018   HGB 8.9 02/28/2018   HCT 27.6 02/28/2018   .0 02/28/2018   CREATSERUM 1.44 02/28/2018   BUN 45 02/28/2018    02/28/2018   K 4.6 02/28/2018    mass.  KIDNEYS:  The right kidney measures 10.9 cm, left kidney 12.1 cm.  There is incomplete visualization of both kidneys due to bowel gas.  There is a 4.5 cm right renal simple cortical cyst and 2 cysts within the left kidney the largest measures 2.5 cm antibiotic for possible cellulitis of his leg.          FINDINGS:    This scan performed without IV or oral contrast, with limitations thereof.     Very small right pleural effusion and minimal right lower lobe atelectasis.  Noncontrast appearance of the t echotexture of liver with normal flow. LFTs are elevated (AST 70, ALT 48, alk phos 982, total bili 2.3); on exam, pt has right sided tenderness.  Given LFT elevation and pain on exam, will attempt to transfer pt to [facility] to his established transplant hepato

## 2018-02-28 NOTE — H&P
[facility]  History and Physical     Max Stein Patient Status:  Emergency    3/1/1950 MRN [id number]   Location [address] [facility] Attending Henry Burger MD   Hosp Day # 0 PCP Katie Reyez MD     Chief Complaint: • Sleep apnea    • Stroke [facility]) 2005   • Visual impairment     reading glasses        Past Surgical History: Past Surgical History:  10/31/16: ANGIOPLASTY (CORONARY)      Comment: Stents X 4   10/18/16: ANGIOPLASTY (CORONARY)      Comment: Stents x 1  20 thought that the reaction was more             likely related to daptomycin. Re-challenging with             cefepime 4/2014. Dexamethasone               Comment:Other reaction(s):  Other             Steroidal psychosis  Dexamethasone Sodiu*    Other (See Nortriptyline HCl 10 MG Oral Cap Take 20 mg by mouth nightly. Disp:  Rfl:    Metoprolol Tartrate 50 MG Oral Tab Take 50 mg by mouth 2 (two) times daily. Disp:  Rfl:    bumetanide 1 MG Oral Tab Take 2 mg by mouth daily.  Disp:  Rfl:    Rosuvastatin Calcium General: No acute distress. Alert and oriented x 3. HEENT: Normocephalic atraumatic. Moist mucous membranes. EOM-I. PERRLA. Anicteric. Neck: No lymphadenopathy. No JVD. No carotid bruits. Respiratory: Clear to auscultation bilaterally. No wheezes.  No on abx when he is tolerating PO       Quality:  · DVT Prophylaxis: heparin   · CODE status: full  · Salguero: no    Plan of care discussed with pt    Rachel Bradley MD  2/27/2018

## 2018-03-01 NOTE — DISCHARGE SUMMARY
[facility]IATR[facility] [place] HOSPITALIST  DISCHARGE SUMMARY     Toya Starks Patient Status:  Inpatient    3/1/1950 MRN [id number]   [facility] 3SW-A Attending Fadumo Victoria MD   Hosp Day # 0 PCP Sonido Donovan MD     Date of Admission: 2018  Date of Disc Prescription details   Insulin NPH & Regular 70/30 (70-30) 100 UNIT/ML Susp  Commonly known as:  NovoLIN 70/30  What changed:  · how much to take  · when to take this      Inject 10 Units into the skin daily with breakfast.   Stop taking on:  3/8/2018  Aayush Whitehead Metoprolol Tartrate 50 MG Tabs  Commonly known as:  LOPRESSOR      Take 50 mg by mouth 2 (two) times daily. Refills:  0     Mycophenolate Mofetil 500 MG Tabs  Commonly known as:  CELLCEPT      Take 1,000 mg by mouth 2 (two) times daily.    Refills:  0 Musculoskeletal: Moves all extremities. Extremities: No edema.   -----------------------------------------------------------------------------------------------  PATIENT DISCHARGE INSTRUCTIONS: See electronic chart    Rudy Bravo MD 2/28/2018    Time spe

## 2018-03-01 NOTE — PLAN OF CARE
Report given to [facility] RN at [facility] Jeff Landon, explained dressing changes , paperwork given to EMS, ate dinner prior to leaving .

## 2018-03-06 NOTE — PROCEDURES
Nerve Conduction/Electromyography Report      [facility]   EMG department  [address], [address] [place] [address]  [phone]      Patient name: Meghan Butler  YOB: 1950  Reason for study: Evaluate for neuropathy  Brief history to the patient.   5.  There is suggestion of bilateral superimposed carpal tunnel median mononeuropathies at the wrist.      Jessy Maynard DO  Neurology and Neuromuscular medicine  [facility]

## 2018-03-08 NOTE — PROGRESS NOTES
[facility] in [place]  Neurology - Clinic Follow up      Holly Shea Patient Status:  No patient class for patient encounter    3/1/1950 MRN [id number]   Location [unfilled] PCP Zane Valdez MD     Patient presents with:  Margaret hypoglycemia and worsened thyroid function. He had another syncopal episode in the hospital, preceded by headache, lasting a few minutes. He is getting the episodes 1-2 time per week.  The triggers are urinating, and recently when he lifted his arms overhea better on the nortriptyline. He is here for follow up. About 3-4 months ago, he noticed that he started to have a tremor.  Before it was only with activity, such as with writing or typing, however now it occurs with rest. He still has not been able to [employer] CALCIUM      8.3 - 10.3 mg/dL 10.2 9.9 9.9 8.9   ALKALINE PHOSPHATASE      45 - 117 U/L 1,235 (H) 1,146 (H) 1,348 (H)    AST (SGOT)      15 - 41 U/L 198 (H) 95 (H) 174 (H)    ALT (SGPT)      17 - 63 U/L 144 (H) 84 (H) 177 (H)    Total Bilirubin      0.1 of large intestine    • Esophageal reflux    • Essential hypertension    • Heart murmur     Since Childhood   • High blood pressure    • High cholesterol    • History of blood transfusion [id number]   • Hyperlipidemia    • Hypothyroidism    • Kidney disorde tolerated ceftriaxone and             cefepime, daptomycin has a 2-3% association with             LFT abnormalities and given that patient had not             been exposed to daptomycin prior to this recent             exposure it is thought that the reac Disp: , Rfl:   •  Metoprolol Tartrate 50 MG Oral Tab, Take 50 mg by mouth 2 (two) times daily. , Disp: , Rfl:   •  bumetanide 1 MG Oral Tab, Take 2 mg by mouth daily. , Disp: , Rfl:   •  Rosuvastatin Calcium 5 MG Oral Tab, Take 1 tablet (5 mg total) by mouth pain, no nausea or diarrhea;  : no incontinence; Neurological:  See history; relevant items discussed in the history.   Psychiatric: no depression, no suicidal attempt,   Musculoskeletal:  NO current complaint,  Endo: no cold intolerance, appetite is nor Imaging:  MRI/MRA brain 2/11/17- no interval stroke, old R putamen and BG strokes, no M1 or SARAI arteries  CTA from 10/2016- mild narrowing of R vertebral artery at the origin  EEG 10/2016- no epileptiform      Assessment/Plan:   Kinetic tremor, sen

## 2018-03-08 NOTE — PROGRESS NOTES
Patient her to review EMG results with provider. States he is doing the same and has noticed no changes in symptoms.

## 2018-03-08 NOTE — PATIENT INSTRUCTIONS
Refill policies:    • Allow 2-3 business days for refills; controlled substances may take longer.   • Contact your pharmacy at least 5 days prior to running out of medication and have them send an electronic request or submit request through the [facility] recommended that you have a procedure or additional testing performed. [facility]) will contact your insurance carrier to obtain pre-certification or prior authorization.     Unfortunately, [facility] has seen an increase in denial of paym

## 2018-03-12 NOTE — PAYOR COMM NOTE
--------------  DISCHARGE REVIEW    Payor: [employer]  Subscriber #:  [id number]  Authorization Number: [id number]    Admit date: 2/28/18  Admit time:  4746  Discharge Date: 2/28/2018  8:04 PM     Admitting Physician: MD Karrie Mckeon

## 2018-03-15 NOTE — TELEPHONE ENCOUNTER
[facility] from [facility] needs approval for the OT order for last week to be moved to this week.

## 2018-03-16 NOTE — TELEPHONE ENCOUNTER
LMOVM informing Sander Dill AD is ok with having OT moved to this week from last week. Sander Dill to call with any further questions.

## 2018-03-28 NOTE — TELEPHONE ENCOUNTER
From: Familia Lobo  Sent: 3/28/2018 2:37 PM CDT  Subject: Medication Renewal Request    Familia Lobo would like a refill of the following medications:     temazepam 7.5 MG Oral Cap [Chris Espinoza NP]   Patient Comment: Please send this request to [facility]

## 2018-03-28 NOTE — TELEPHONE ENCOUNTER
Medication: Gabapentin 300mg     Date of last refill: 2/8/18  [place]     Last office visit: 3/8/18  Due back to clinic per last office note:  4months  Date next office visit scheduled:  No appointment     Last OV note r

## 2018-03-28 NOTE — TELEPHONE ENCOUNTER
From: Krysta Dunlap  Sent: 3/28/2018 2:37 PM CDT  Subject: Medication Renewal Request    Krysta Dunlap would like a refill of the following medications:     gabapentin 300 MG Oral Cap Natasha Chowdhury, DO]   Patient Comment: Please send this request

## 2018-04-08 ENCOUNTER — PRIOR ORIGINAL RECORDS (OUTPATIENT)
Dept: OTHER | Age: 68
End: 2018-04-08

## 2018-04-08 PROBLEM — I24.9 ACUTE CORONARY SYNDROME (HCC): Status: ACTIVE | Noted: 2018-01-01

## 2018-04-08 NOTE — PROGRESS NOTES
[facility]  Report of Consultation    Arnoldo Billing Patient Status:  Inpatient    3/1/1950 MRN [id number]   [facility] 6NE-A Attending Darling Fisher MD   Hosp Day # 0 PCP Jarred Sue MD     Reason for Consultation:  Chest pain vascular disease ([facility] 2005    diabets broken ankle led to amputation   • Renal disorder    • Shingles    • Sleep apnea    • Stroke [facility]) 2005   • Visual impairment     reading glasses     Past Surgical History:  10/31/16: ANGIOPLASTY (CORONARY)      Comme this recent             exposure it is thought that the reaction was more             likely related to daptomycin. Re-challenging with             cefepime 4/2014. Dexamethasone               Comment:Other reaction(s):  Other             Steroidal psychos Min PRN **OR** dextrose 50% injection 50 mL, 50 mL, Intravenous, Q15 Min PRN **OR** glucose (DEX4) oral liquid 30 g, 30 g, Oral, Q15 Min PRN **OR** Glucose-Vitamin C (DEX-4) 4-0.006 g chewable tab 8 tablet, 8 tablet, Oral, Q15 Min PRN  •  0.9%  NaCl infusi deficits. Neck: No JVD, carotids 2+ no bruits. Cardiac: Normal S1-S2 no murmur no rub  Lungs: Clear to auscultation and percussion  Abdomen: Soft, non-tender. Extremities: Without clubbing, cyanosis or edema. Status post right BKA.   Neurologic: Alert disease involving native heart with angina pectoris ([facility]     Hyperlipidemia     Benign prostatic hyperplasia     Coronary artery disease involving autologous vein coronary bypass graft with unstable angina pectoris ([address]     Cerebrovascular accident (CVA) unspecified ulcer stage ([address]     Diabetic ulcer of left midfoot associated with type 2 diabetes mellitus ([address]     Type 2 diabetes mellitus with hyperglycemia ([address]     CKD stage 3 secondary to diabetes (HCC)     Hyponatremia     Elevated liver enzymes

## 2018-04-08 NOTE — CONSULTS
[facility]  Report of Consultation    Santiago Flowers Patient Status:  Inpatient    3/1/1950 MRN [id number]   [facility] 6NE-A Attending Anup Sol MD   Hosp Day # 0 PCP Sandro Howard MD     Reason for Consultation:  OLAYINKA/CKD 3-4/n (CORONARY)      Comment: Stents x 1  2015: BYPASS SURGERY      Comment: Robotic CABG X 2  10/2015: CABG  06/06/2017: CARDIAC PACEMAKER PLACEMENT      Comment: Medtronic leadless pacemaker  No date: CATARACT  No date: CATH BARE METAL STENT (BMS)  3/4/2016: Comments)    Comment:Steroidal psychosis  Floxin [Ofloxacin]        Other                       Comment:PT STATES HE IS ALLERGIC TO FLUORQUINOLONE AND/OR             FLOXIN FAMILY DRUGS  Quinolones                Rifampin                  Statins Sodium (Porcine) 5000 UNIT/ML injection 5,000 Units, 5,000 Units, Subcutaneous, 2 times per day  •  acetaminophen (TYLENOL) tab 650 mg, 650 mg, Oral, Q6H PRN  •  ondansetron HCl (ZOFRAN) injection 4 mg, 4 mg, Intravenous, Q6H PRN  •  insulin detemir (LEVEM thyromegaly  Cardiac: Regular rate and rhythm, S1, S2 normal, no murmur or rub  Lungs: Clear without wheezes, rales, rhonchi. Abdomen: Soft, non-tender. + bowel sounds, no palpable organomegaly  Extremities: Without clubbing, cyanosis or edema.   R BKA, amount and generally the creat is close to 2 mg/dl or so related to DM/HTN/JUVE toxicity. OLAYINKA appears related to mild vol depletion and is better with IVF.   Would be stable for angiogram tomorrow with risk of clinically sig OLAYINKA low and on the order of 5% o

## 2018-04-08 NOTE — PROGRESS NOTES
NURSING TRANSFER NOTE (FROM 5041 TO 0761)      Patient transferred via wheelchair w/telemetry already applied. Wife present at bedside. All belongings accounted for. Oriented to room. Safety precautions initiated. Bed in low position.   Call light in r

## 2018-04-08 NOTE — PLAN OF CARE
High fall risk precautions maintained  High risk for skin breakdown precautions maintained (bed extended complete length & foot board removed per wife request to prevent any rubbing on LLE as that caused skin ulcers in past)    Comments: Pt is A&OX4, VSS o electrolytes and administer replacement therapy as ordered   Outcome: Progressing    Problem: GASTROINTESTINAL - ADULT  Goal: Minimal or absence of nausea and vomiting  INTERVENTIONS:  - Maintain adequate hydration with IV or PO as ordered and tolerated  - nutrition consult as needed  - Instruct patient on self management of diabetes    Outcome: Progressing    Problem: SKIN/TISSUE INTEGRITY - ADULT  Goal: Incision(s), wounds(s) or drain site(s) healing without S/S of infection  INTERVENTIONS:  - Assess and d

## 2018-04-08 NOTE — PLAN OF CARE
Diabetes/Glucose Control    • Glucose maintained within prescribed range Not Progressing        GASTROINTESTINAL - ADULT    • Minimal or absence of nausea and vomiting Not Progressing          CARDIOVASCULAR - ADULT    • Absence of cardiac arrhythmias or a

## 2018-04-08 NOTE — ED PROVIDER NOTES
Patient Seen in: [facility] Emergency Department    History   Patient presents with:  Chest Pain Angina (cardiovascular)    Stated Complaint: chest pain    HPI    Patient with history of coronary disease presents with 3 days of intermittent chest pa ANGIOPLASTY (CORONARY)      Comment: Stents x 1  2015: BYPASS SURGERY      Comment: Robotic CABG X 2  10/2015: CABG  06/06/2017: CARDIAC PACEMAKER PLACEMENT      Comment: Medtronic leadless pacemaker  No date: CATARACT  No date: CATH BARE METAL STENT (BMS) or HSM noted. No hernia. No CVA tenderness. Extremities: Right BKA. No peripheral edema or evidence of DVT  Neuro: Alert and oriented. Speech clear. No facial asymmetry. Moving all 4 extremities normally.        ED Course     Labs Reviewed   COMP MET feeling much better after sublingual nitroglycerin and nitroglycerin drip. Initial troponin is negative. Patient most likely has acute coronary syndrome, no evidence of infarction at this point. Discussed with Dr. Shannan Salinas and Dr. Donald Amaya.   Patient will be

## 2018-04-08 NOTE — PROGRESS NOTES
[facility]  Progress Note    Pat Perkins Patient Status:  Inpatient    3/1/1950 MRN [id number]   [facility] 8NE-A Attending Mackenzie Burns MD   Hosp Day # 0 PCP Helene Mcneill MD     CC: Chest pain    SUBJECTIVE:  Complains of cons 04/07/2018   HCT 34.6 04/07/2018   .0 04/07/2018   CREATSERUM 2.22 04/08/2018   BUN 63 04/08/2018    04/08/2018   K 4.2 04/08/2018    04/08/2018   CO2 24.0 04/08/2018   GLU 85 04/08/2018   CA 8.4 04/08/2018   ALB 3.1 04/07/2018   ALKPHO tab 650 mg 650 mg Oral Q6H PRN   ondansetron HCl (ZOFRAN) injection 4 mg 4 mg Intravenous Q6H PRN   insulin detemir (LEVEMIR) 100 UNIT/ML flextouch 30 Units 30 Units Subcutaneous Nightly   Insulin Aspart Pen (NOVOLOG) 100 UNIT/ML flexpen 1-68 Units 1-68 Un is expected to be discharge to: Home      Questions/concerns and Plan of care were discussed with patient and family by bedside.       Dr. Calista Malhotra will follow from morning tomorrow      Peggy Garzon MD  4/8/2018  3:07 PM

## 2018-04-08 NOTE — HISTORICAL OFFICE NOTE
Kerns Raegan  : 1950  ACCOUNT:  [id number]  [phone]  PCP: Dr. Paddy Pastrana     TODAY'S DATE: 2018  DICTATED BY:  [Dr. Blanch Bernheim: [Followup of CAD, of native vessels.]    HPI:    [On 2018, Venus Lanes, a 6 AAA.  SOCIAL HISTORY: SMOKING: Former tobacco use. used to smoke but quit >5 years ago >20 years ago. CAFFEINE: 2 beverages daily. ALCOHOL: denies drinking. EXERCISE: no regular exercise. DIET: no special diet. MARITAL STATUS: .  OCCUPATION: disabled restriction    ASSESSMENT:  1. CAD, of native vessels  2. Angina, stable  3. Chest discomfort/tightness  4. DM, Type I  5. Dyspnea  6. History of CABG  7. Hypercholesteremia, pure  8. Hypertension, benign  9. Pacemaker, not dependent  10.  Pacer reprogrammi 100UNIT/  as directed                              01/13/16 Omeprazole            40MG      1 po daily                               01/13/16 Synthroid             300MCG    350 micrograms,2 po daily                01/13/16 Ursodiol              300

## 2018-04-08 NOTE — H&P
[facility]  History and Physical     Jan Degree Patient Status:  Emergency    3/1/1950 MRN [id number]   Location [facility] [facility] Attending Leopold Crooks, MD   Hosp Day # 0 PCP Sekou Roper MD     Chief Complai vascular disease ([facility] 2005    diabets broken ankle led to amputation   • Renal disorder    • Shingles    • Sleep apnea    • Stroke [facility]) 2005   • Visual impairment     reading glasses        Past Surgical History: Past Surgical History:  10/31/16: Eros Delacruz patient had not             been exposed to daptomycin prior to this recent             exposure it is thought that the reaction was more             likely related to daptomycin. Re-challenging with             cefepime 4/2014.   Dexamethasone Rfl:    Rosuvastatin Calcium 5 MG Oral Tab Take 1 tablet (5 mg total) by mouth nightly. Disp: 30 tablet Rfl: 1   Clopidogrel Bisulfate 75 MG Oral Tab Take 75 mg by mouth daily.  Disp:  Rfl:    nitroGLYCERIN (NITROSTAT) 0.4 MG Sublingual SL Tab Place 0.4 mg bowel sounds. No rebound, guarding or organomegaly. Neurologic: No focal neurological deficits. CNII-XII grossly intact. Musculoskeletal: Moves all extremities. Extremities: No edema or cyanosis. Integument: No rashes or lesions.    Psychiatric: Appropr

## 2018-04-09 NOTE — PROGRESS NOTES
[facility]  Nephrology Progress Note    Vincent Cornejo Patient Status:  Inpatient    3/1/1950 MRN [id number]   [facility] 8NE-A Attending Galilea Trujillo MD   Hosp Day # 1 PCP Alexis Manuel MD       SUBJECTIVE:  No acute events Medications:  ALPRAZolam (XANAX) tab 0.5 mg 0.5 mg Oral TID PRN   aspirin tab 325 mg 325 mg Oral Daily   escitalopram (LEXAPRO) tablet 10 mg 10 mg Oral QAM   cycloSPORINE modified (NEORAL) cap 175 mg 175 mg Oral BID   Clopidogrel Bisulfate (PLAVIX) tab 75 sulfate (PF) 4 MG/ML injection 2 mg 2 mg Intravenous Q2H PRN   DiphenhydrAMINE HCl (BENADRYL) injection 25 mg 25 mg Intravenous Q4H PRN   Or      diphenhydrAMINE (BENADRYL) cap/tab 25 mg 25 mg Oral Q4H PRN   nitroGLYCERIN infusion 50mg in D5W 250ml 5-20 mc

## 2018-04-09 NOTE — PROGRESS NOTES
[facility]  Progress Note    Toya Starks Patient Status:  Observation    3/1/1950 MRN [id number]   [facility] 8NE-A Attending Marga Diaz MD   Hosp Day # 0 PCP Sonido Donovan MD       Assessment:    · Chest pain - ECG and tropo 1,000 mg Oral BID   • Naltrexone HCl  50 mg Oral nightly   • Nortriptyline HCl  20 mg Oral Nightly   • Levothyroxine Sodium  300 mcg Oral Before breakfast   • ursodiol  300 mg Oral TID   • Pantoprazole Sodium  40 mg Oral QAM AC   • Heparin Sodium (Porcine

## 2018-04-09 NOTE — PROGRESS NOTES
[facility]  Progress Note    Kayanelson Butler Patient Status:  Inpatient    3/1/1950 MRN [id number]   [facility] 8NE-A Attending Suzie Benites MD   Hosp Day # 1 PCP Teodora Rodriguez MD     CC: Chest pain    SUBJECTIVE:  Having chest pain ergocalciferol (DRISDOL/VITAMIN D2) cap 50,000 Units 50,000 Units Oral Twice Weekly   finasteride (PROSCAR) tab 5 mg 5 mg Oral Nightly   gabapentin (NEURONTIN) cap 200 mg 200 mg Oral BID   Metoprolol Tartrate (LOPRESSOR) tab 50 mg 50 mg Oral 2x Daily(Bet Titrated       ASSESSMENT / PLAN:     1. Chest pain with underlying CAD with CABG and previous stents  1. suspect unstable angina-troponins negative ×2.    2. Seen by cardiology. 3. Cardiology plans cardiac cath today  2. GERD  1. Protonix  3.  Previous

## 2018-04-09 NOTE — PROGRESS NOTES
Dr Maria G Johnson, notified on pt labs this morning, no new orders given. Cath cancelled diet resumed, re check labs in am and follow crea.

## 2018-04-09 NOTE — CONSULTS
[address]  Report of GI Consultation    Gely Durán Patient Status:  Inpatient    3/1/1950 MRN [id number]   [facility] 8NE-A Attending Jess Zhang MD   Hosp Day # 0 PCP Sana Khan MD     Date of Admission:  2018  Date o High blood pressure    • High cholesterol    • History of blood transfusion [id number]   • Hyperlipidemia    • Hypothyroidism    • Kidney disorder    • Liver transplanted [facility])    • Migraines    • Muscle weakness    • Myocardial infarction [facility])    • Neurop aspirin tab 325 mg 325 mg Oral Daily   escitalopram (LEXAPRO) tablet 10 mg 10 mg Oral QAM   cycloSPORINE modified (NEORAL) cap 175 mg 175 mg Oral BID   Clopidogrel Bisulfate (PLAVIX) tab 75 mg 75 mg Oral Daily   [START ON 4/9/2018] ergocalciferol (ISDO Intravenous Q2H PRN   DiphenhydrAMINE HCl (BENADRYL) injection 25 mg 25 mg Intravenous Q4H PRN   Or      diphenhydrAMINE (BENADRYL) cap/tab 25 mg 25 mg Oral Q4H PRN   nitroGLYCERIN infusion 50mg in D5W 250ml 5-20 mcg/min Intravenous Titrated       Allergie icterus, no conjunctival pallor. NECK: No thyromegaly or cervical lymphadenopathy  PULM: Lungs clear to auscultation anteriorly  CV: Regular, no murmurs, 2+ radial pulses  ABD: Well-healed surgical scars. Normoactive bowel sounds; soft/nondistended.   The IVC, and aorta. PATIENT STATED HISTORY: (As transcribed by Technologist)  Abnormal liver enzymes. FINDINGS:  ABDOMEN LIVER:  Status post liver transplant. Normal echogenicity with coarsened echotexture.   Few scattered echogenic foci noted may be relat 4/08/2018 at 10:08     Approved by: Jabier Faith MD            Xr Chest Ap Portable  (cpt=71045)    Result Date: 4/7/2018  PROCEDURE:  XR CHEST AP PORTABLE  (CPT=71045)  TECHNIQUE:  AP chest radiograph was obtained.   COMPARISON:  [facility] , XR CHEST AP PORTABL

## 2018-04-10 NOTE — PLAN OF CARE
Assumed pt care at 1545. assessment unchanged. R groin soft, no bruising or bleeding noted. Pt education provided on post cath instructions, medications and POC. Pt verbalized understanding. All needs met. Will continue to monitor.      1700- pt BG 67-

## 2018-04-10 NOTE — PROCEDURES
[facility]    PATIENT'S NAME: Claudia Ramirez   ATTENDING PHYSICIAN: Keagan Sarabia M.D.    OPERATING PHYSICIAN: Jeanne Howell MD   PATIENT ACCOUNT#:   [id number]    LOCATION:  [address]  MEDICAL RECORD #:   [id number]       DATE OF BIRTH:  [birth year] artery. CONCLUSIONS:    1. Significant 2-vessel coronary artery disease involving the left anterior descending and posterior descending artery. 2.   Widely patent LIMA to the LAD.     Dictated By Deborah Hampton MD  d: 04/10/2018 15:40:32  t: 04/10/

## 2018-04-10 NOTE — PROGRESS NOTES
A&ox4, RA, still having CP - prn morphine & xanax, SB on tele w/ V-pacing  R BKA & L foot wound - wound managed by wound care - may need to be consulted if not discharged soon  Plan for cardiac angiogram tomorrow afternoon if Cr remains stable (baseline ~2

## 2018-04-10 NOTE — PLAN OF CARE
Problem: Diabetes/Glucose Control  Goal: Glucose maintained within prescribed range  INTERVENTIONS:  - Monitor Blood Glucose as ordered  - Assess for signs and symptoms of hyperglycemia and hypoglycemia  - Administer ordered medications to maintain glucose Com.

## 2018-04-10 NOTE — PROGRESS NOTES
[facility]  Progress Note    Arnoldo Billing Patient Status:  Inpatient    3/1/1950 MRN [id number]   [facility] 8NE-A Attending Darling Fisher MD   Hosp Day # 0 PCP Jarred Sue MD     CC: Chest pain    SUBJECTIVE:  Hypoglycemic this Oral Once   insulin detemir (LEVEMIR) 100 UNIT/ML flextouch 25 Units 25 Units Subcutaneous Nightly   ALPRAZolam (XANAX) tab 0.5 mg 0.5 mg Oral TID PRN   aspirin tab 325 mg 325 mg Oral Daily   escitalopram (LEXAPRO) tablet 10 mg 10 mg Oral QAM   cycloSPORIN sulfate (PF) 4 MG/ML injection 2 mg 2 mg Intravenous Q2H PRN   DiphenhydrAMINE HCl (BENADRYL) injection 25 mg 25 mg Intravenous Q4H PRN   Or      diphenhydrAMINE (BENADRYL) cap/tab 25 mg 25 mg Oral Q4H PRN   nitroGLYCERIN infusion 50mg in D5W 250ml 5-20 mc

## 2018-04-10 NOTE — PROGRESS NOTES
[facility]  Progress Note    Lonia Rater Patient Status:  Inpatient    3/1/1950 MRN [id number]   Location [address] [facility] Attending Mary Dobson MD   Hosp Day # 0 PCP Jonel Campos MD       Assessment:    · Chest pain - Metoprolol Tartrate  50 mg Oral 2x Daily(Beta Blocker)   • mycophenolate mofetil  1,000 mg Oral BID   • Naltrexone HCl  50 mg Oral nightly   • Nortriptyline HCl  20 mg Oral Nightly   • Levothyroxine Sodium  300 mcg Oral Before breakfast   • ursodiol  300 m

## 2018-04-10 NOTE — PROGRESS NOTES
[facility]  Nephrology Progress Note    Gely Durán Patient Status:  Inpatient    3/1/1950 MRN [id number]   [facility] 8NE-A Attending Xiomara Costa MD   Hosp Day # 0 PCP Javed Kumar MD       SUBJECTIVE:  No acute events, awai Recent Labs   Lab  04/09/18   1210  04/09/18   1724  04/09/18   2127  04/10/18   0757  04/10/18   0907   PGLU  81  144*  124*  71  126*       Meds:     Current Facility-Administered Medications:  0.9%  NaCl infusion  Intravenous Continuous   aspirin Insulin Aspart Pen (NOVOLOG) 100 UNIT/ML flexpen 1-68 Units 1-68 Units Subcutaneous TID CC   Insulin Aspart Pen (NOVOLOG) 100 UNIT/ML flexpen 1-10 Units 1-10 Units Subcutaneous TID AC and HS   morphINE sulfate (PF) 4 MG/ML injection 1 mg 1 mg Intravenous

## 2018-04-11 ENCOUNTER — PRIOR ORIGINAL RECORDS (OUTPATIENT)
Dept: OTHER | Age: 68
End: 2018-04-11

## 2018-04-11 NOTE — PROGRESS NOTES
[facility]  Progress Note    Rosanna Weinberg Patient Status:  Inpatient    3/1/1950 MRN [id number]   [facility] 8NE-A Attending Nikita Montano MD   Hosp Day # 1 PCP Lily Sue MD     CC: Chest pain    SUBJECTIVE:  Pt without acute UNIT/ML flextouch 10 Units 10 Units Subcutaneous Nightly   ALPRAZolam (XANAX) tab 0.5 mg 0.5 mg Oral TID PRN   aspirin tab 325 mg 325 mg Oral Daily   escitalopram (LEXAPRO) tablet 10 mg 10 mg Oral QAM   cycloSPORINE modified (NEORAL) cap 175 mg 175 mg Oral Intravenous Q2H PRN   DiphenhydrAMINE HCl (BENADRYL) injection 25 mg 25 mg Intravenous Q4H PRN   Or      diphenhydrAMINE (BENADRYL) cap/tab 25 mg 25 mg Oral Q4H PRN   nitroGLYCERIN infusion 50mg in D5W 250ml 5-20 mcg/min Intravenous Titrated       ASSESSME

## 2018-04-11 NOTE — PAYOR COMM NOTE
--------------  ADMISSION REVIEW     Payor: [employer] ADV PPO  Subscriber #:  [id number]  Authorization Number: N/A    Admit date: 4/10/18  Admit time: 1324       Admitting Physician: Facundo Shaffer MD  Attending Physician:  Alexa Fay MD  Prim following orders were created for panel order CBC WITH DIFFERENTIAL WITH PLATELET.   Procedure                               Abnormality         Status                     ---------                               -----------         ------ Date Action Dose Route User    4/11/2018 0937 Given 325 mg Oral Johnvomartae Otto, RN      Clopidogrel Bisulfate (PLAVIX) tab 75 mg     Date Action Dose Route User    4/11/2018 0937 Given 75 mg Oral Huber Coad, [employer], RN      glucose (DEX4) oral liquid 15 g     D Intravenous Doug Tapia RN    4/10/2018 7878 Given 2 mg Intravenous Weston Castro RN    4/10/2018 1416 Given 2 mg Intravenous Andres Nino RN      mycophenolate mofetil (CELLCEPT) cap 1,000 mg     Date Action Dose Route User    4/11/2018 cont usual immunosuppression   No further liver testing warranted (no MRCP, biopsy, etc)   - Continue immunosuppression as is   - If there is sharp decline in liver function (as evidence by bilirubin levels), then pls call the GI service back, but none wou

## 2018-04-11 NOTE — DISCHARGE SUMMARY
[facility] HOSPITALIST  DISCHARGE SUMMARY     Genie Can Patient Status:  Inpatient    3/1/1950 MRN [id number]   [facility] 8NE-A Attending Gracia Merritt MD   Hosp Day # 1 PCP Polly Mcnally MD     Date of Admission: 2018  Date of time.  He was started on a nitroglycerin drip. The emergency department with improvement of his chest pain. Brief Synopsis: Pt was admitted and monitored on telemetry.  He was evaluated by cardiology and renal and underwent angiogram that demonstrated 2 capsule  Refills:  1     HYDROcodone-acetaminophen 5-325 MG Tabs  Commonly known as:  NORCO      Take 1 tablet by mouth every 4 (four) hours as needed for Pain.    Refills:  0     Insulin NPH & Regular 70/30 (70-30) 100 UNIT/ML Susp  Commonly known as:  Nov (36.3 °C)  Pulse:  [49-55] 51  Resp:  [18] 18  BP: (352-154)/(69-18) 119/46      -----------------------------------------------------------------------------------------------  PATIENT DISCHARGE INSTRUCTIONS: See electronic chart    Rosie Michelle MD 4/

## 2018-04-11 NOTE — PROGRESS NOTES
[facility]  Progress Note    Padmini Miller Patient Status:  Inpatient    3/1/1950 MRN [id number]   [facility] 8NE-A Attending María Elena Sylvester MD   Hosp Day # 1 PCP Sandy Toscano MD     Subjective:  Patient up in chair at this time Left atrium: The left atrium was mildly enlarged. 5. Right ventricle: RV systolic pressure (S, est): 24mm Hg. Impressions:  This study is compared with previous dated 01/28/2018: No  significant change.     EKG 4/7/18:   Sinus rhythm with 1st degree A-V b · Statin held r/t liver disease and increase in AST ALT and alkaline phosphatase. · Continue to monitor kidney function. · I discussed case with cardiologist, patient is ok to d/c home. Resume nitrate and plavix asa.        Richie Cordon  4/11/2018

## 2018-04-11 NOTE — TREATMENT PLAN
Blood sugar 209, Dr. Raymundo Calderon made aware, ordered to hold levemir and novolog for now and repeat the blood sugar at 2330 and call.

## 2018-04-11 NOTE — PROGRESS NOTES
[facility]  Nephrology Progress Note    Padmini Miller Patient Status:  Inpatient    3/1/1950 MRN [id number]   [facility] 8NE-A Attending Mayra Oakes MD   Hosp Day # 1 PCP Sandy Toscano MD       SUBJECTIVE:  No acute events, roxanna 0712   [facility]  208*  209*  195*  144*       Meds:     Current Facility-Administered Medications:  insulin detemir (LEVEMIR) 100 UNIT/ML flextouch 10 Units 10 Units Subcutaneous Nightly   ALPRAZolam (XANAX) tab 0.5 mg 0.5 mg Oral TID PRN   aspirin tab 325 morphINE sulfate (PF) 4 MG/ML injection 1 mg 1 mg Intravenous Q3H PRN   Or      morphINE sulfate (PF) 4 MG/ML injection 2 mg 2 mg Intravenous Q2H PRN   DiphenhydrAMINE HCl (BENADRYL) injection 25 mg 25 mg Intravenous Q4H PRN   Or      diphenhydrAMINE (BE

## 2018-04-12 NOTE — PROGRESS NOTES
Initial Post Discharge Follow Up   Discharge Date: 4/11/18  Contact Date: 4/12/2018    Consent Verification:  Assessment Completed With: Patient  HIPAA Verified?   Yes    Discharge Dx:   CAD; S/P cardiac cath on 4/10    General:   • How have you been sin mouth nightly as needed for Sleep.  Disp: 30 capsule Rfl: 0   gabapentin 300 MG Oral Cap Take 3 tabs in am, 3 tabs midday, 2 tabs at night Disp: 720 capsule Rfl: 1   HYDROcodone-acetaminophen 5-325 MG Oral Tab Take 1 tablet by mouth every 4 (four) hours as Rfl:      • When you were leaving the hospital were any medication changes discussed with you? yes  • Did you  your discharge medications when you left the hospital? No new medications  • May I go over your medications with you to make sure we are n [address] [facility] [address]  [place] N[place] [address]  [facility], [address] [facility], [address] [place] [id number] [phone]          PCP TCM/HFU appointment: scheduled at

## 2018-04-16 NOTE — TELEPHONE ENCOUNTER
Pt was also on the line (3 way call) Pt is requesting a power wheel chair. Sohail from the intake dept at [facility] will be faxing a form attn: triage (total of 4 pages) for AS to complete. Insurance will cover as long as the form is complete.  Pt is schedul

## 2018-04-17 ENCOUNTER — PRIOR ORIGINAL RECORDS (OUTPATIENT)
Dept: OTHER | Age: 68
End: 2018-04-17

## 2018-04-17 PROBLEM — E11.621 DIABETIC ULCER OF LEFT MIDFOOT ASSOCIATED WITH TYPE 2 DIABETES MELLITUS, UNSPECIFIED ULCER STAGE (HCC): Status: RESOLVED | Noted: 2018-01-01 | Resolved: 2018-01-01

## 2018-04-17 PROBLEM — R11.2 INTRACTABLE VOMITING WITH NAUSEA, UNSPECIFIED VOMITING TYPE: Status: RESOLVED | Noted: 2018-01-01 | Resolved: 2018-01-01

## 2018-04-17 PROBLEM — R11.2 INTRACTABLE VOMITING WITH NAUSEA: Status: RESOLVED | Noted: 2018-01-01 | Resolved: 2018-01-01

## 2018-04-17 PROBLEM — L97.429 DIABETIC ULCER OF LEFT MIDFOOT ASSOCIATED WITH TYPE 2 DIABETES MELLITUS, UNSPECIFIED ULCER STAGE (HCC): Status: RESOLVED | Noted: 2018-01-01 | Resolved: 2018-01-01

## 2018-04-18 ENCOUNTER — MYAURORA ACCOUNT LINK (OUTPATIENT)
Dept: OTHER | Age: 68
End: 2018-04-18

## 2018-04-18 ENCOUNTER — PRIOR ORIGINAL RECORDS (OUTPATIENT)
Dept: OTHER | Age: 68
End: 2018-04-18

## 2018-04-18 NOTE — TELEPHONE ENCOUNTER
Forms were placed on AS bin for signature and review. Pt requesting name of a sleep specialist. Please advise.

## 2018-04-18 NOTE — PAYOR COMM NOTE
--------------  DISCHARGE REVIEW    Payor: [employer] ADV PPO  Subscriber #:  [id number]  Authorization Number: N/A    Admit date: 4/10/18  Admit time:  7345  Discharge Date: 4/11/2018  5:19 PM     Admitting Physician: Fausto Alexander MD  Attending Phys

## 2018-04-18 NOTE — TELEPHONE ENCOUNTER
Wife stopped by office to drop off the following forms to be completed by Dr Caleb Genao:      Mavis Sees for DM shoes- please complete and fax to [employer] at: [phone]    [place] - please call when comp

## 2018-04-19 LAB
BUN: 57 MG/DL
BUN: 58 MG/DL
CALCIUM: 8 MG/DL
CALCIUM: 8.2 MG/DL
CHLORIDE: 110 MEQ/L
CHLORIDE: 110 MEQ/L
CREATININE, SERUM: 2.13 MG/DL
CREATININE, SERUM: 2.17 MG/DL
GLUCOSE: 123 MG/DL
GLUCOSE: 89 MG/DL
HEMOGLOBIN A1C: 8.3 %
POTASSIUM, SERUM: 4.5 MEQ/L
POTASSIUM, SERUM: 5 MEQ/L
SODIUM: 137 MEQ/L
SODIUM: 140 MEQ/L

## 2018-04-19 NOTE — TELEPHONE ENCOUNTER
Jason Torres called back I informed her info listed below, stated she is able to come in Monday or Tuesday of next week to  forms. Please have it ready at the . No call back is necessary.

## 2018-04-19 NOTE — TELEPHONE ENCOUNTER
AS reviewed and filled out forms    Try reaching out to [facility] no answer    AS stated that Holly Villalpando MD from [address] [place] [address] for Pulmonary for sleep doctor.  Reina phone #: [phone]     Fax confirmed for    Form sent to [facility]

## 2018-04-23 NOTE — TELEPHONE ENCOUNTER
Called and spoke with [facility] indicating PA not required- confirmed Nortriptyline HCl 10 MG Oral Cap- 2 capsules (20 mg total) by mouth nightly is covered with $17 co-pay. Pt requesting refill- rx pended for your approval.  Thank you.

## 2018-04-23 NOTE — TELEPHONE ENCOUNTER
Appt scheduled with SD for 4/25/18 at 11:15am 45 minutes to fill out [employer] paperwork. Pt verbalizes understanding. Paperwork in Triage folder.

## 2018-04-23 NOTE — TELEPHONE ENCOUNTER
LM for pt at [phone] (M) vm to call back to schedule for [facility] paperwork and f/u appt with TONY. Paperwork in Triage folder.

## 2018-04-23 NOTE — TELEPHONE ENCOUNTER
LOV: 4/17/18 w/ JJEOVANY, 11/2/18 w/ AS  FOV: 5/25/18  Last labs: 4/11/18 BMP  Last Refill: 4/17/18 QT:30    Per protocol sent to provider

## 2018-04-24 PROBLEM — E11.649 TYPE 2 DIABETES MELLITUS WITH HYPOGLYCEMIA (HCC): Status: RESOLVED | Noted: 2017-01-01 | Resolved: 2018-01-01

## 2018-04-24 PROBLEM — R73.9 HYPERGLYCEMIA: Status: RESOLVED | Noted: 2018-01-01 | Resolved: 2018-01-01

## 2018-04-25 PROBLEM — E11.65 TYPE 2 DIABETES MELLITUS WITH HYPERGLYCEMIA, WITH LONG-TERM CURRENT USE OF INSULIN (HCC): Status: ACTIVE | Noted: 2018-01-01

## 2018-04-25 PROBLEM — D64.9 ANEMIA: Status: RESOLVED | Noted: 2017-03-22 | Resolved: 2018-01-01

## 2018-04-25 PROBLEM — Z89.511 HISTORY OF AMPUTATION OF RIGHT LOWER EXTREMITY THROUGH TIBIA AND FIBULA (HCC): Status: ACTIVE | Noted: 2018-01-01

## 2018-04-25 PROBLEM — R06.02 SOB (SHORTNESS OF BREATH) ON EXERTION: Status: ACTIVE | Noted: 2018-01-01

## 2018-04-25 PROBLEM — R73.9 HYPERGLYCEMIA: Status: ACTIVE | Noted: 2018-01-01

## 2018-04-25 PROBLEM — Z89.432 PARTIAL NONTRAUMATIC AMPUTATION OF LEFT FOOT (HCC): Status: ACTIVE | Noted: 2018-01-01

## 2018-04-25 PROBLEM — Z94.4 LIVER TRANSPLANTED (HCC): Status: ACTIVE | Noted: 2018-01-01

## 2018-04-25 PROBLEM — Z79.4 TYPE 2 DIABETES MELLITUS WITH HYPERGLYCEMIA, WITH LONG-TERM CURRENT USE OF INSULIN (HCC): Status: ACTIVE | Noted: 2018-01-01

## 2018-04-25 NOTE — ED INITIAL ASSESSMENT (HPI)
Pt went for diabetic shoes today. Found ulcers between toes. Pt states just had surgery on the foot, had bone removed.   Tried to get into wound clinic but was sent here

## 2018-04-25 NOTE — ED PROVIDER NOTES
Patient Seen in: [facility] Emergency Department    History   Patient presents with:  Cellulitis (integumentary, infectious)    Stated Complaint: cellulits    HPI    Is a pleasant 65-year-old male who presents the emergency room for evaluation.   Was [facility]) 2005    diabets broken ankle led to amputation   • Renal disorder    • Shingles    • Sleep apnea    • Stroke [facility]) 2005   • Visual impairment     reading glasses       Past Surgical History:  10/31/16: ANGIOPLASTY (CORONARY)      Comment: Stents X 4 Normocephalic atraumatic. Nonicteric sclera. Moist mucous membranes  Lungs: No tachypnea, clear to auscultation  Cardiac: No tachycardia, no murmurs  Skin: No rashes, pallor  Neuro: No focal deficits.   Extremities: Left lower extremity: There is a small acidotic. Given his multiple comorbidities including liver transplant, out-of-control diabetes, cellulitis of the left foot will admit for IV antibiotics. Patient with multiple drug allergies. Was started on IV Clinda here.   Discussed with podiatry as bea

## 2018-04-25 NOTE — ED PROVIDER NOTES
Patient Seen in: [address] [facility]    History   No chief complaint on file.     Stated Complaint: toe wounds    HPI    14-year-old male with history of diabetes, diabetic neuropathy, peripheral arterial disease, status post right lower leg a • Muscle weakness    • Myocardial infarction [facility])    • Neuropathy    • Obesity    • Peripheral vascular disease ([facility] [address] 2005    diabets broken ankle led to amputation   • Renal disorder    • Shingles    • Sleep apnea    • Stroke [facility]) 2005   • Visual impa (Temporal)   Resp 16   Ht 193 cm (6' 4\")   Wt 127 kg   SpO2 100%   BMI 34.08 kg/m²         Physical Exam    General appearance: alert, appears stated age and cooperative  Throat: lips, mucosa, and tongue normal; teeth and gums normal  Neck: no adenopathy 3:20 pm    Follow-up:  [facility] [phone] [phone]  Go to   For re-check, for higher level of care and further evaluation        Medications Prescribed:  Discharge Medication List as of 4/25/2018  3:21

## 2018-04-25 NOTE — PROGRESS NOTES
Naga Gallegos is a 79year old male. Patient presents with: Follow - Up: Room 10 - Paperwork to be completed today    Mobility examination    HPI:   Here for ov to discuss mobility needs.   This ov with focus on Mr Karin Wade limited mobility and needs asso exertion due to failed angioplasty of coronary arteries     Ability to use manual wheelchair:   He does not have the ability to use a manual wheelchair due to Left UE weakness and inablitly to propel the wheelchair.   He also does not have the stamina due t Cerebrovascular accident (CVA) ([facility] [address]     Transient neurological symptoms     Complicated migraine     Awareness alteration, transient     Thrombocytopenia (HCC)     Azotemia     Type 2 diabetes mellitus with circulatory disorder, with long-term current use Prescriptions:  finasteride 5 MG Oral Tab Take 1 tablet (5 mg total) by mouth nightly.  Disp: 90 tablet Rfl: 0   SYNTHROID 300 MCG Oral Tab Take 1 tablet (300 mcg total) by mouth before breakfast. Disp: 90 tablet Rfl: 0   Nortriptyline HCl 10 MG Oral Cap Ta Hydrobromide (CELEXA) 20 MG Oral Tab Take 20 mg by mouth daily. Disp:  Rfl:    cycloSPORINE modified (NEORAL) 25 MG Oral Cap Take 175 mg by mouth 2 (two) times daily.    Disp:  Rfl:    omeprazole (PRILOSEC) 20 MG Oral Capsule Delayed Release Take 20 mg by m date: 1993  Alcohol use:  No              Family History   Problem Relation Age of Onset   • Cancer Father    • Heart Disorder Father    • Other Lenin Royal Mother    • Cancer Sister         Allergies    Cephalosporins          Hives    Comment:Hives to darius distress  SKIN: no rashes,no suspicious lesions  HEENT: atraumatic, normocephalic,ears and throat are clear  NECK: supple,no adenopathy,  LUNGS: normal rate without respiratory distress, lungs clear to auscultation  No wheezing.     CARDIO: RRR murmur  GI: (obstructive sleep apnea)  Anxiety  Hepatorenal syndrome (hcc)  Liver transplant status (hcc)  Intractable migraine with status migrainosus, unspecified migraine type  At risk for falling  Pvd (peripheral vascular disease) (hcc)  Benign essential htn  Atax (hcc)  Abnormal serum level of alkaline phosphatase  Angina at rest (hcc)  Cellulitis of left lower extremity  Diabetic ulcer of left midfoot associated with type 2 diabetes mellitus, with bone involvement without evidence of necrosis (hcc)  Type 2 diabete 4/25/18   Phone: [phone]; Fax: [phone]

## 2018-04-26 NOTE — PROGRESS NOTES
[facility]  Report of Consultation    Padmini Miller Patient Status:  Inpatient    3/1/1950 MRN [id number]   [facility] 7NE-A Attending Jack Friend MD   Hosp Day # 0 PCP Sandy Toscano MD     Date of Admission:  2018  Date of Co 2  10/2015: CABG  06/06/2017: CARDIAC PACEMAKER PLACEMENT      Comment: Medtronic leadless pacemaker  No date: CATARACT  No date: CATH BARE METAL STENT (BMS)  3/4/2016: COLONOSCOPY N/A      Comment: Procedure: COLONOSCOPY;  Surgeon: Colette Rankin Comment:PT STATES HE IS ALLERGIC TO FLUORQUINOLONE AND/OR             FLOXIN FAMILY DRUGS  Quinolones                Rifampin                  Statins                     Comment:Muscle weakness  Sulfa Antibiotics         Vancomycin 300 mg, 300 mg, Oral, TID  •  glucose (DEX4) oral liquid 15 g, 15 g, Oral, Q15 Min PRN **OR** Glucose-Vitamin C (DEX-4) 4-0.006 g chewable tab 4 tablet, 4 tablet, Oral, Q15 Min PRN **OR** dextrose 50 % injection 50 mL, 50 mL, Intravenous, Q15 Min PRN **OR* dorsalis pedis and posterior tibial pulses  Neurologic: Patient has long-standing history of diabetic neuropathy  Musculoskeletal: Below-knee amputation and in addition to that he has had a partial fifth metatarsal head resection on the left for nonhealing intractable headache     AVB (atrioventricular block)     Liver cirrhosis secondary to CORTÉS (HCC)     Diabetes mellitus, controlled ([facility] [address]     Recurrent major depressive disorder, in full remission ([facility] [address]     Amputee     Acute ischemic stroke ([facility] [address]     Encep diabetic shoes.   I will follow-up with him in the wound care center

## 2018-04-26 NOTE — H&P
[facility]OSPITAL[facility]  History and Physical     Padmini Miller Patient Status:  Emergency    3/1/1950 MRN [id number]   Location [address] [facility] Attending Karis Melendez MD   Hosp Day # 0 PCP Sandy Toscano MD     Chief Complaint: Migraines    • Muscle weakness    • Myocardial infarction [facility])    • Neuropathy    • Obesity    • [facility]    diabets broken ankle led to amputation   • Renal disorder    • Shingles    • Sleep apnea    • Stroke [facility]) 2005   • and             cefepime, daptomycin has a 2-3% association with             LFT abnormalities and given that patient had not             been exposed to daptomycin prior to this recent             exposure it is thought that the reaction was more tablet Rfl: 0   Multiple Vitamin (TAB-A-JEROD) Oral Tab Take 1 tablet by mouth daily. Disp:  Rfl:    Metoprolol Tartrate 50 MG Oral Tab Take 50 mg by mouth 2 (two) times daily. Disp:  Rfl:    bumetanide 1 MG Oral Tab Take 2 mg by mouth daily.  Disp:  Rfl: deficits. CNII-XII grossly intact. Musculoskeletal: Moves all extremities. Extremities: No edema or cyanosis. Integument: No rashes or lesions. Psychiatric: Appropriate mood and affect.       Diagnostic Data:      Labs:  Recent Labs   Lab  04/25/18   1 Santo Gallegos, DO  4/25/2018

## 2018-04-26 NOTE — OCCUPATIONAL THERAPY NOTE
Order received for OT eval. Pt remains on bedrest per stroke protocol and awaiting neurology consult. Will follow up later, as activity orders upgraded and schedule permits.

## 2018-04-26 NOTE — PLAN OF CARE
Arrived in room 424 at 0015 due to rapid response being called (I'm CNICU charge RN). Patient reportedly had new onset mental status changes. Patient's last known normal was at 0000. NIH done and was 13 (charted in flowsheet). Code stroke called at [facility].  MAYCO

## 2018-04-26 NOTE — PAYOR COMM NOTE
--------------  ADMISSION REVIEW         4/25    ED LATE      Cellulitis (integumentary, infectious)     Stated Complaint: cellulits         46-year-old male who presents the emergency room for evaluation. Was referred from immediate care.   Patient has a 10/31/16     Stents X 4   • Atherosclerosis of coronary artery 10/18/16     Stents X 1   • Autoimmune disease ([facility] [address]      PVD      STROKE     • Congestive heart disease [facility])      Past Surgical History: Past Surgical History:  LIVER TRANSPLANT  10/31/16: AN 1830  ------------------------------------------------------------       Cellulitis of the left fifth toe extending to the foot. Multiple comorbidities. Treat with IV clindamycin. Multiple allergies. Check blood work. Blood work reviewed.   Glucose is

## 2018-04-26 NOTE — OCCUPATIONAL THERAPY NOTE
OCCUPATIONAL THERAPY QUICK EVALUATION - INPATIENT    Room Number: 2378/1930-J  Evaluation Date: 4/26/2018     Type of Evaluation: Quick Eval  Presenting Problem: L ulceration 5th toe, L sided weakness    Physician Order: IP Consult to Occupational Therapy • Autoimmune disease ([facility]    • Back problem    • Cataract    • Congestive heart disease ([facility] 2006   • Coronary atherosclerosis    • Depression    • Diabetes ([facility] 1983   • Disorder of liver    • Disorder of thyroid    • Diverticulosis of large intestin combo          Hand Dominance: Right  Drives: Yes (hand control)       Prior Level of Function: independent with ADL, IADL. Dons R prosthetic leg independently. Drives using hand-control device.   [facility] for about 5 weeks, home for about a month and ha Patient End of Session: Up in chair;Needs met;Call light within reach;RN aware of session/findings; All patient questions and concerns addressed    ASSESSMENT     Patient is a 79year old male admitted on 4/25/2018 for L 5th ulcer, dizziness, fall.  Comp

## 2018-04-26 NOTE — CODE DOCUMENTATION
[facility] HOSPITALIST  RAPID RESPONSE NOTE     [facility] Patient Status:  Observation    3/1/1950 MRN [id number]   [facility] 4NW-A Attending Maru Rebolledo MD   Hosp Day # 0 PCP Slim Arriola MD     Reason for RRT: Rapid response dorado

## 2018-04-26 NOTE — PHYSICAL THERAPY NOTE
Order received for PT eval. Pt remains on bedrest per stroke protocol and awaiting neurology consult. Will follow up later, as activity orders upgraded and schedule permits.

## 2018-04-26 NOTE — CONSULTS
INFECTIOUS DISEASE CONSULTATION    Kylie Carter Patient Status:  Inpatient    3/1/1950 MRN [id number]   [facility] 7NE-A Attending Natalie Fried MD   Hosp Day # 0 PCP Dimitri Chacon MD SURGERY      Comment: Robotic CABG X 2  10/2015: CABG  06/06/2017: CARDIAC PACEMAKER PLACEMENT      Comment: [employer]onic leadless pacemaker  No date: CATARACT  No date: CATH BARE METAL STENT (BMS)  3/4/2016: COLONOSCOPY N/A      Comment: Procedure: Lorraine Kent [Ofloxacin]        Other                       Comment:PT STATES HE IS ALLERGIC TO FLUORQUINOLONE AND/OR             FLOXIN FAMILY DRUGS  Quinolones                Rifampin                  Statins                     Comment:Muscle weakness  Sulfa Antibio Q15 Min PRN **OR** Glucose-Vitamin C (DEX-4) 4-0.006 g chewable tab 4 tablet, 4 tablet, Oral, Q15 Min PRN **OR** dextrose 50 % injection 50 mL, 50 mL, Intravenous, Q15 Min PRN **OR** glucose (DEX4) oral liquid 30 g, 30 g, Oral, Q15 Min PRN **OR** Glucose-V lymphadenopathy. supple, no masses  Respiratory: Clear to auscultation bilaterally. No wheezes. No rhonchi. Cardiovascular: S1, S2.  Regular rate and rhythm. No murmurs. Abdomen: Soft, nontender, nondistended. Positive bowel sounds.   Musculoskeletal: R Coronary artery disease involving native heart with angina pectoris, unspecified vessel or lesion type [facility])     Coronary artery disease involving native heart with angina pectoris ([facility]     Hyperlipidemia     Benign prostatic hyperplasia     Coronary cara Acute renal failure (ARF) ([facility])     Acute kidney injury ([facility]     Acute chest pain     Hypoglycemia     Neuropathy     Acute coronary syndrome ([facility]     History of amputation of right lower extremity through tibia and fibula ([facility])     SOB (shortness of b

## 2018-04-26 NOTE — CONSULTS
[id number] [facility] Neurology Initial Consultation    Santiago Flowers Patient Status:  Inpatient    3/1/1950 MRN [id number]   [facility] 7NE-A Attending Charlee Rothman MD   Hosp Day # 0 PCP Sandro Howard MD     REASON FOR CONSULTATION:  Rig Stents X 4   • Atherosclerosis of coronary artery 10/18/16    Stents X 1   • Autoimmune disease ([facility]    • Back problem    • Cataract    • Congestive heart disease ([facility] 2006   • Coronary atherosclerosis    • Depression    • Diabetes ([facility] 1983   • Disorde mother. SOCIAL HISTORY:   reports that he quit smoking about 24 years ago. He quit smokeless tobacco use about 25 years ago. He reports that he does not drink alcohol or use drugs.     ALLERGIES:    Cephalosporins          Hives    Comment:Hives to ceftr 4/24/2018 at 1200   gabapentin 300 MG Oral Cap Take 900 mg by mouth every morning. Disp:  Rfl:  4/25/2018 at 0900   gabapentin 300 MG Oral Cap Take 600 mg by mouth nightly.  Pt takes three times a day / 900mg in am, 900mg at noon, and 600mg at night Disp: Disp:  Rfl:  4/24/2018 at 0900   Citalopram Hydrobromide (CELEXA) 20 MG Oral Tab Take 20 mg by mouth daily. Disp:  Rfl:  4/24/2018 at 0900   omeprazole (PRILOSEC) 20 MG Oral Capsule Delayed Release Take 20 mg by mouth 2 (two) times daily.    Disp:  Rfl:  4/ Pantoprazole Sodium (PROTONIX) EC tab 20 mg 20 mg Oral QAM AC   Levothyroxine Sodium (SYNTHROID) tab 300 mcg 300 mcg Oral Before breakfast   temazepam (RESTORIL) cap 7.5 mg 7.5 mg Oral Nightly PRN   ursodiol (ACTIGALL) cap 300 mg 300 mg Oral TID   glucos 34.14 kg/m²      Patient Vitals for the past 24 hrs:   BP Temp Temp src Pulse Resp SpO2 Weight   04/26/18 1700 132/59 97.8 °F (36.6 °C) Oral 59 16 100 % -   04/26/18 1300 142/65 (!) 97.5 °F (36.4 °C) Oral 58 15 100 % -   04/26/18 1000 (!) 166/67 97.7 °F (3 CO2 22.0 04/26/2018    04/26/2018   CA 7.6 04/26/2018   ALB 2.6 04/26/2018   ALKPHO 1,449 04/26/2018   BILT 1.9 04/26/2018   TP 6.6 04/26/2018    04/26/2018    04/26/2018   INR 1.00 04/26/2018   PTP 13.6 04/26/2018   CRP 1.55 04/26/2 PFO - would not repeat at this time    TIA order set in place   - on  mg daily and Plavix 75 mg daily prior to admission - will continue   - hemoglobin A1C 7.7, , but unable to take statin due to elevated LFTs    - BP goal normotensive   - PT

## 2018-04-26 NOTE — PROGRESS NOTES
[id number] [facility] Neurology Initial Evaluation    Padmini Miller Patient Status:  Inpatient    3/1/1950 MRN [id number]   [facility] 7NE-A Attending Jack Friend MD   Hosp Day # 0 PCP Sandy Toscano MD     REASON FOR CONSULTATION:  Right • Essential hypertension    • Heart murmur     Since Childhood   • High blood pressure    • High cholesterol    • History of blood transfusion [id number]   • Hyperlipidemia    • Hypothyroidism    • Kidney disorder    • Liver transplanted [facility])    • Ariel Squires Comment:While receiving daptomycin with ceftriaxone,             patient developed rash on back and also reported             some SOB, also noted to have transaminitis.              Patient has previously tolerated ceftriaxone and             cefepime, dap nightly.  Disp: 90 tablet Rfl: 0 4/24/2018 at 2000   SYNTHROID 300 MCG Oral Tab Take 1 tablet (300 mcg total) by mouth before breakfast. Disp: 90 tablet Rfl: 0 4/25/2018 at 0800   Nortriptyline HCl 10 MG Oral Cap Take 2 capsules (20 mg total) by mouth night Disp:  Rfl:  4/24/2018 at 2000   DiphenhydrAMINE HCl (BENADRYL) 25 MG Oral Cap Take 50 mg by mouth nightly as needed for Itching or Sleep.    Disp:  Rfl:  4/24/2018 at 2000   Mycophenolate Mofetil (CELLCEPT) 500 MG Oral Tab Take 1,000 mg by mouth 2 (two) ti PRN   Or      glucose (DEX4) oral liquid 30 g 30 g Oral Q15 Min PRN   Or      Glucose-Vitamin C (DEX-4) 4-0.006 g chewable tab 8 tablet 8 tablet Oral Q15 Min PRN   Heparin Sodium (Porcine) 5000 UNIT/ML injection 5,000 Units 5,000 Units Subcutaneous [facility] palate elevate symmetrically. Shoulder shrug normal bilaterally. Remaining CNs GI. Sensation to light touch is intact bilaterally. Motor: No focal arm or leg weakness noted (known right BKA and recent left toe surgery).  Finger-to-nose coordination is int

## 2018-04-26 NOTE — PLAN OF CARE
Assumed care at 0730. Pt A&O x4. On room air. NSR/SB on tele. Following commands. Strong bilaterally, sensation decreased in LEs at baseline. Denies HA, blurry vision. Slight ataxia in RUE noted. EEG completed- see results.  Lt 5th toe reddened and painful

## 2018-04-26 NOTE — PLAN OF CARE
NURSING ADMISSION NOTE      Patient admitted via cart. Oriented to room. Safety precautions initiated. Bed in low position. Call light in reach. Admitted patient  from ED due to cellulitis of the left 5th toe. Patient alert and oriented X4.  Not in

## 2018-04-26 NOTE — PROCEDURES
[address] EEG report    Name: Vishnu Nick    Date of Study: 4/26/2018      Routine EEG Report    Ordering Physician: Maxine Miramontes MD                               Primary Care Physician: Corrie Morton MD    Technical As Nortriptyline HCl (PAMELOR) cap 20 mg 20 mg Oral Nightly   Pantoprazole Sodium (PROTONIX) EC tab 20 mg 20 mg Oral QAM AC   Levothyroxine Sodium (SYNTHROID) tab 300 mcg 300 mcg Oral Before breakfast   temazepam (RESTORIL) cap 7.5 mg 7.5 mg Oral Nightly MI CURRENTLY BACK TO BASELINE. STATES FELL WEEK AGO AFTER LEFT SIDE SUDDENLY WENT WEAK. FELL AND HIT RIGHT SIDE OF FOREHEAD. STATES NO ASSOC HA, CONFUSION, LOC, ABNORMAL MVMTS. PT STATES THOUGHT HE HAD A TIA.  PT STATES HX OF MULTIPLE STROKES, NO HX OF SZ, NO

## 2018-04-26 NOTE — PROGRESS NOTES
At [address] [facility] [address], patient noted having slurred speech and complaining of right side weakness. Rapid response called. Dr. Andrei Luque who happened to be near patient's room able to see patient right away. Code stroke initiated. STAT CT Brain done.  Neurologist consulted

## 2018-04-26 NOTE — PLAN OF CARE
Received patient from Med/Onc. An Mooney. NIH = 14. Bilateral right sided flaccid, slurred speech, decreased sensation, mild right facial droop. Patient started to recover slowly as the night progressed. Stroke protocol initiated.  Neuro notified of cons

## 2018-04-26 NOTE — PHYSICAL THERAPY NOTE
PHYSICAL THERAPY EVALUATION - INPATIENT     Room Number: 3932/0321-V  Evaluation Date: 4/26/2018  Type of Evaluation: Initial  Physician Order: PT Eval and Treat    Presenting Problem: L 4-5th toe wound, cellulitis   Reason for Therapy: Mobility Dysf • Back problem    • Cataract    • Congestive heart disease ([facility] 2006   • Coronary atherosclerosis    • Depression    • Diabetes ([facility] 1983   • Disorder of liver    • Disorder of thyroid    • Diverticulosis of large intestine    • Esophageal reflux    • ~45 days after being at [place] [address] for 5 weeks. Pt typically independent with ADLs and mobility. Pt ambulates with RW and is looking into power W/C. Pt reports ~ 4 falls since being home. Pt aware of WB restrictions.  Pt recalls ambulation distance limitation per patient currently need. ..   -   Moving to and from a bed to a chair (including a wheelchair)?: A Little   -   Need to walk in hospital room?: A Little   -   Climbing 3-5 steps with a railing?: A Lot       AM-PAC Score:  Raw Score: 19   PT Approx Degree of the following impairments limited endurance, strength deficits, and balance impairments. Functional outcome measures completed include AMPAC.   Based on this evaluation, patient's clinical presentation is evolving and overall the evaluation complexity is co

## 2018-04-26 NOTE — CM/SW NOTE
sw notified by therapy that [address] is recommended. [facility] is current with patient. [place] order obtained. Liaison notified.        [facility]  P:[phone]  O:[phone]

## 2018-04-26 NOTE — PROGRESS NOTES
Responded to CODE Stroke called at [facility]. Pt was a initial RRT called to rm 424 at [place] [address], which was then changed to a CS.     Per bedside RN report, she was in patients room administering midnight medications and having a conversation with him when he gestur aware  Plan:  -Transfer to CTU 7  -Ischemic stroke order set placed  -Maintain -180  -Continue daily ASA 325mg  -Continue Plavix 75mg daily  -Neuro checks and vital signs as per order set  -Consult order placed for [facility] Neurology to see in AM--Dr Vanegas Speak

## 2018-04-27 NOTE — TELEPHONE ENCOUNTER
[facility] [employer] order form received by AS, signed and faxed back to [facility] at [phone] confirmation received    Order is for [facility] OUTPATIENT CLINIC chair   Power Wheelchair accesory - battery  qty 2  Detachable armrest x 2

## 2018-04-27 NOTE — PLAN OF CARE
Assumed patient care at [facility]. Pt A&Ox4. Neuros Q4 intact. VSS on RA. NSR per tele. Norco for Left foot pain. IV atbx continued. Pt resting in bed comfortably. Will continue to monitor.      CARDIOVASCULAR - ADULT    • Maintains optimal cardiac output and he

## 2018-04-27 NOTE — PLAN OF CARE
Assumed care at 0730. Pt A&O x4. On room air. NSR on tele. Strong bilaterally, sensation decreased in lower extremities at baseline. Neuro assessment unchanged. Lt foot redness and swelling has decreased, pt continues to report pain to LLE.  Silver foam tia

## 2018-04-27 NOTE — PROGRESS NOTES
[facility]                INFECTIOUS DISEASE PROGRESS NOTE    Tinajero Iron Patient Status:  Inpatient    3/1/1950 MRN [id number]   [facility] 7NE-A Attending Li Mendez MD   Hosp Day # 1 PCP Laure Jason MD     Antibiotics: Collected: 04/25/18 1808   Order Status: Completed Lab Status: Preliminary result Updated: 04/26/18 1900   Specimen: Blood from Blood,peripheral     Blood Culture Result No Growth 1 Day   Blood Culture FREQ X 2 [[id number]] Collected: 04/25/18 1808   Order (atrioventricular block)     Liver cirrhosis secondary to CORTÉS (HCC)     Diabetes mellitus, controlled ([address]     Recurrent major depressive disorder, in full remission ([address]     Amputee     Acute ischemic stroke ([facility]     Encephalopathy     Acute right-trina tx/immunosupressed    3. CRI    4.  DM    Micro wont have sensi final until am, so can use doxy PO for dc      Caryle Bowie, MD  [facility]  [phone]

## 2018-04-27 NOTE — TELEPHONE ENCOUNTER
Received fax from [facility] requesting us to make a change to the order.     SD made changes    Fax confirmed  Form placed in scan

## 2018-04-27 NOTE — PROGRESS NOTES
04/27/18 1547   Clinical Encounter Type   Visited With Patient    intern [facility] provided emotional and spiritual support.

## 2018-04-27 NOTE — DISCHARGE SUMMARY
[facility]PITAL[facility]  DISCHARGE SUMMARY     Madi Root Patient Status:  Inpatient    3/1/1950 MRN [id number]   [facility] 7NE-A Attending Myra Wick MD   Hosp Day # 1 PCP Rosie Billingsley MD     Date of Admission: 2018  Date of Disc •     Incidental or significant findings and recommendations (brief descriptions):   •     Lab/Test results pending at Discharge:   ·     Consultants:  • id    Discharge Medication List:     Discharge Medications      START taking these medications      I Caps  Commonly known as:  NEURONTIN      Take 900 mg by mouth Noon. Refills:  0     gabapentin 300 MG Caps  Commonly known as:  NEURONTIN      Take 900 mg by mouth every morning.    Refills:  0     gabapentin 300 MG Caps  Commonly known as:  NEURONTIN capsule  Refills:  0     ursodiol 300 MG Caps  Commonly known as:  ACTIGALL      Take 300 mg by mouth 3 (three) times daily.    Refills:  0           Where to Get Your Medications      Please  your prescriptions at the location directed by your docto

## 2018-04-28 NOTE — CM/SW NOTE
04/28/18 0900   Discharge disposition   Expected discharge disposition Home-Health   Name of Facillity/Home Care/Hospice Residential   Home services after discharge Skilled home care   Discharge transportation Private car

## 2018-04-28 NOTE — PAYOR COMM NOTE
--------------  CONTINUED STAY REVIEW    Payor: [employer] MEDICARE ADV PPO  Subscriber #:  [id number]  Authorization Number: N/A    Admit date: 4/26/18  Admit time: 8913    Admitting Physician: Olya Mackey MD  Attending Physician:  No att. providers found Subcutaneous (Right Lower Abdomen) Germán Dickerson RN    4/27/2018 0546 Given 5000 Units Subcutaneous (Left Lower Abdomen) Epifanio Ramirez RN    4/26/2018 2200 Given 5000 Units Subcutaneous (Left Lower Abdomen) Epifanio Ramirez RN      HYDROcodone-acetamin mL MBP     Date Action Dose Route User    Discharged on 4/27/2018 4/27/2018 1009 New Bag 500 mg Intravenous Jake Jessica RN    4/26/2018 2254 New Bag 500 mg Intravenous Diana Lewis RN      Metoprolol Tartrate (LOPRESSOR) tab 50 mg     Date Action (CORONARY)      Comment: Stents X 4   10/18/16: ANGIOPLASTY (CORONARY)      Comment: Stents x 1  2015: BYPASS SURGERY      Comment: Robotic CABG X 2  10/2015: CABG  06/06/2017: CARDIAC PACEMAKER PLACEMENT  No date: FOOT SURGERY Left  No date: LEWIS PIERRE RST 97.5 °F (36.4 °C)  Pulse:  [54-80] 55  Resp:  [15-18] 15  BP: (124-162)/(50-78) 124/50  HEENT: Moist mucous membranes. Extraocular muscles are intact. Neck: No lymphadenopathy. supple, no masses  Respiratory: Clear to auscultation bilaterally. No wheezes. reviewed with nursing will just apply silver foam between the toes. They can be loosely taped in place but nothing to bring the 2 toes closer together.   In addition to that would recommend at this point in time to only fasten the distal strap of his surgi Cellulitis    Lace+ Score: 79  59-90 High Risk  29-58 Medium Risk  0-28   Low Risk. TCM Follow-Up Recommendation:  LACE > 58:  High Risk of readmission after discharge from the hospital.        Discharge Diagnosis:   History of liver transplant and right 2.5 MG Tabs  Commonly known as:  NORVASC      Take 2.5 mg by mouth daily. Refills:  0     aspirin 325 MG Tabs      Take 325 mg by mouth daily. Refills:  0     bumetanide 1 MG Tabs  Commonly known as:  BUMEX      Take 2 mg by mouth daily.    Refills:  0 50 MG Tabs  Commonly known as:  LOPRESSOR      Take 50 mg by mouth 2 (two) times daily. Refills:  0     MONISTAT SOOTHING CARE ITCH EX      Apply topically.    Refills:  0     Mycophenolate Mofetil 500 MG Tabs  Commonly known as:  CELLCEPT      Take 1,000 rebound or guarding. Neurologic: No focal neurological deficits. Musculoskeletal: Moves all extremities. Extremities: No edema.   -----------------------------------------------------------------------------------------------  PATIENT DISCHARGE INSTRUCT

## 2018-04-30 NOTE — TELEPHONE ENCOUNTER
Sharifa needs to know if AS will sign home care orders for this patient just d/c from hosp w/cellulitis? ?

## 2018-04-30 NOTE — TELEPHONE ENCOUNTER
Called Sharifa per AS will sign home care orders for patient. Sharifa verbalized understanding and will fax over orders.

## 2018-04-30 NOTE — TELEPHONE ENCOUNTER
Pt was scheduled to see AD on 5/4/18 which he cxl via [employer] and requested to combined his HFU with his diabetic follow up appt with AS. I replied to him and stated due to insurance purposes must be 2 separate appts.  Please call him to r.s his HFU

## 2018-04-30 NOTE — TELEPHONE ENCOUNTER
Spoke with patient stating was in ER on 4/25/2018 for Cellulitis of left lower extremity, indicates left extremity is looking better, [facility] nurse stopped by today to change his dressing. Patient continues to take his abx as prescribed.  He is doing well overal

## 2018-05-01 NOTE — PROGRESS NOTES
Initial Post Discharge Follow Up   Discharge Date: 4/27/18  Contact Date: 5/1/2018    Consent Verification:  Assessment Completed With: Patient  HIPAA Verified?   Yes     Discharge Dx:   History of liver transplant and right below-knee amputation with re they are the same. Med rec to be completed at [facility] apt. Current Outpatient Prescriptions:  Doxycycline Hyclate 100 MG Oral Cap Take 1 capsule (100 mg total) by mouth 2 (two) times daily.  Disp: 20 capsule Rfl: 0   Acidophilus/Pectin Oral Cap Take 1 capsu Disp:  Rfl:    bumetanide 1 MG Oral Tab Take 2 mg by mouth daily. Disp:  Rfl:    Clopidogrel Bisulfate 75 MG Oral Tab Take 75 mg by mouth daily. Disp:  Rfl:    ursodiol 300 MG Oral Cap Take 300 mg by mouth 3 (three) times daily.  Disp:  Rfl:    ergocalcifer specifics:  Please enter following SmartPhrase as applicable:  AMI:  .TCMAMITEMP  CHF:  .TCMCHFTEMP  COPD: .TCMCOPDTEMP  CVA: .TCMCVATEMP  CV Surgery:  . TCMCVSURGERY  Pneumonia: . TCMPNEUMONIATEMP  Ortho/Spine:  TCMORTHOSPINETEMP  Re-admit:  . TCMREADMITTEMP 7-14 days  yes     NCM Reviewed/scheduled/rescheduled PCP TCM/HFU appointment with pt:  Yes      Have you made all of your follow up appointments? yes    Is there any reason as to why you cannot make your appointments?    No     NCM Reviewed upcoming Specia

## 2018-05-03 NOTE — TELEPHONE ENCOUNTER
Patient was seen for OV today with SD, was to have labs completed for [facility] and req was to be faxed over to our office. Orders originally to [address], pt plans on having it done here, re-ordered to [facility] and SD aware.

## 2018-05-03 NOTE — PROGRESS NOTES
HPI:    Milly Perea is a 79year old male here today for hospital follow up.    He was discharged from Inpatient hospital, [facility] to Home   Admission Date: 4/25/18   Discharge Date: 4/27/18  Hospital Discharge Diagnoses (since 4/3/2018)     Ce problem. He continues on clinda. No diarrhea. [facility] is scheduled to come   PT and OT have been following    He needs RN to follow the toe. Mobility eval.   He is waiting for [employer] for the appropriate scooter vs chair.    I have asked them to fa (two) times daily. temazepam 7.5 MG Oral Cap Take 1 capsule (7.5 mg total) by mouth nightly as needed for Sleep. ALPRAZolam 0.5 MG Oral Tab Take 1 tablet (0.5 mg total) by mouth 3 (three) times daily as needed.    Multiple Vitamin (TAB-A-JEROD) Oral Tab Myocardial infarction ([facility] Neuropathy; Obesity; Peripheral vascular disease ([address] (2005); Renal disorder; Shingles; Sleep apnea; Stroke [facility]) (2005); and Visual impairment.     He  has a past surgical history that includes other; laps gstr rstcv px plmt b noted on medial 5th toe. No erythema. No drainage  NEURO: Oriented times three,     ASSESSMENT/ PLAN:   Diagnoses and all orders for this visit:    Cellulitis of toe of left foot  Continues on clindamycin.   Awaiting confirmation from [facility] for nurse dk.

## 2018-05-03 NOTE — PROGRESS NOTES
Followed up with Nga Chandra first I apologized for the disconnect between us then just followed up on some basic things.  Like updating his chart then he asked me to call him back next week Thursday at 11:00 AM. He said its better to talk then and we can review

## 2018-05-04 NOTE — TELEPHONE ENCOUNTER
Do we manage his nortriptylline? Need to proceed with Prior auth if we do. To AS to review as I just met Mr Redwin Apley a few weeks ago.

## 2018-05-04 NOTE — TELEPHONE ENCOUNTER
Fax received from [address] indicating nortriptyline needs a PA, no alternatives listed    CoverMymeds not started but [address] request that we process via CoverMyMeds if appropriate.   Member ID: [id number]    Please advise if you would like t

## 2018-05-04 NOTE — TELEPHONE ENCOUNTER
See TE 4/23/2018  Katie Watkins RN     4/23/18 3:24 PM   Note      Called and spoke with Armin Verduzco indicating PA not required- confirmed Nortriptyline HCl 10 MG Oral Cap- 2 capsules (20 mg total) by mouth nightly is covered with $17 co-pay.   Pt re

## 2018-05-05 NOTE — ED PROVIDER NOTES
Patient Seen in: [facility] Emergency Department    History   Patient presents with:  Abnormal Result (metabolic, cardiac)    Stated Complaint: elevated renal function tests    HPI    Patient was contacted by primary service after his BUN and creati Comment: Procedure: COLONOSCOPY;  Surgeon: Sunday DO Jorge Alberto;  Location:  ENDOSCOPY  No date: FOOT SURGERY Left  No date: LAPS GSTR RSTCV PX PLMT BAND  No date: OTHER      Comment: LLIVER TRANSPLANT  No date: OTHER      Comment: PRINCE HOANG Status post right lower extremity BKA. Previous left second toe amputation. Lymphadenopathy:     He has no cervical adenopathy. Neurological: He is alert and oriented to person, place, and time. He has normal strength. No sensory deficit.    Skin: Skin Procedure                               Abnormality         Status                     ---------                               -----------         ------                     CBC W/ DIFFERENTIAL[[id number]]          Abnormal            Final result Patient presents with acute elevation of BUN and creatinine without any other significant acute symptoms. He was given hydration in the emergency department with improvement of his BUN and creatinine values.   I discussed patient's case with his primary ca

## 2018-05-11 NOTE — PROGRESS NOTES
Attempted to contact pt no answer left detailed message for pt to call back.    Total time spent with patient including chart review: 2  Time spent with patient this month: 12    Total time spent with communication and chart review this month to date: 15

## 2018-05-11 NOTE — TELEPHONE ENCOUNTER
Forms have been received from [employer]: turn off the lights for dressing supplies. Forms given to AS for completion.  To be signed by PCP

## 2018-05-15 PROBLEM — S72.009A HIP FRACTURE (HCC): Status: ACTIVE | Noted: 2018-01-01

## 2018-05-15 NOTE — HISTORICAL OFFICE NOTE
Mone Acosta  : 1950  ACCOUNT:  [id number]  [phone]  PCP: Dr. Kyler Carrington     TODAY'S DATE: 2018  DICTATED BY:  [00]      CHIEF COMPLAINT: [Followup of Angina, stable, Followup of CAD and of native vessels.]    HPI:    [On 2018, Th leadless pacemaker (Medtronic)  6/2017, OM 10/2016, PTCA January 2017, PTCA/Stent and robotic    FAMILY HISTORY: Negative for premature CAD. Negative for AAA. SOCIAL HISTORY: SMOKING: Former tobacco use. used to smoke but quit >5 years ago >20 years ago. until his liver function testing improved    ASSESSMENT:  1. CAD, of native vessels  2. Angina, stable  3. Bradycardia  4. Chest discomfort/tightness  5. DM, Type I  6. Dyspnea  7. History of CABG  8. Hypercholesteremia, pure  9. Hypertension, benign  10. 01/13/16 Omeprazole            40MG      1 po daily                               01/13/16 Synthroid             300MCG    350 micrograms,2 po daily                01/13/16 Ursodiol              300MG     1 po three times daily

## 2018-05-15 NOTE — H&P
[facility]  History and Physical     [facility] Patient Status:  Inpatient    3/1/1950 MRN [id number]   [facility] 3SW-A Attending Gibson Stockton MD   Hosp Day # 0 PCP lSim Arriola MD     Chief Complaint: fall    History of Comment: Stents x 1  2015: BYPASS SURGERY      Comment: Robotic CABG X 2  10/2015: CABG  06/06/2017: CARDIAC PACEMAKER PLACEMENT      Comment: Medtronic leadless pacemaker  No date: CATARACT  No date: CATH BARE METAL STENT (BMS)  3/4/2016: COLONOSCOPY N/A Dion*    OTHER (SEE COMMENTS)    Comment:Steroidal psychosis  Floxin [Ofloxacin]        Other                       Comment:PT STATES HE IS ALLERGIC TO FLUORQUINOLONE AND/OR             FLOXIN FAMILY DRUGS  Quinolones                Rifampin Disp: 30 capsule Rfl: 0   Multiple Vitamin (TAB-A-JEROD) Oral Tab Take 1 tablet by mouth daily. Disp:  Rfl:    Metoprolol Tartrate 50 MG Oral Tab Take 50 mg by mouth 2 (two) times daily. Disp:  Rfl:    bumetanide 1 MG Oral Tab Take 2 mg by mouth daily.  Disp BKA  Extremities: No edema or cyanosis. Integument: No rashes or lesions. Psychiatric: Appropriate mood and affect. Diagnostic Data:      Labs:  No results for input(s): WBC, HGB, MCV, PLT, BAND, INR in the last 168 hours.     Invalid input(s): LYM#

## 2018-05-15 NOTE — TELEPHONE ENCOUNTER
Spoke to Carly Raza PT indicates pt was taken to [facility] because it's a mile from his home. Pt fell on his right side indicating he was having pain right arm and right knee.   Pt will f/u with pt to see if he was admitted or er discharge and let pt know he w

## 2018-05-15 NOTE — TELEPHONE ENCOUNTER
Aundrea Franklin needs verbal order to continue [place].      Order can be left on confidential voicemail

## 2018-05-15 NOTE — CONSULTS
[facility]    Report of Consultation    [facility] Patient Status:  Inpatient    3/1/1950 MRN [id number]   [facility] 3SW-A Attending Ansley Domingo MD   Hosp Day # 0 PCP Paddy Pastrana MD     Date of Admission:  5/15/2018  Date disorder    • Shingles    • Sleep apnea    • Stroke [facility]) 2005   • Visual impairment     reading glasses       Past Surgical History  Past Surgical History:  10/31/16: ANGIOPLASTY (CORONARY)      Comment: Stents X 4   10/18/16: ANGIOPLASTY (CORONARY) mg 0.5 mg Oral TID PRN   cycloSPORINE modified (NEORAL) cap 175 mg 175 mg Oral BID   isosorbide dinitrate (ISORDIL TITRADOSE) tab 5 mg 5 mg Oral BID   temazepam (RESTORIL) cap 7.5 mg 7.5 mg Oral Nightly PRN   finasteride (PROSCAR) tab 5 mg 5 mg Oral Nightl flexpen 1-68 Units 1-68 Units Subcutaneous TID CC   Insulin Aspart Pen (NOVOLOG) 100 UNIT/ML flexpen 1-10 Units 1-10 Units Subcutaneous TID AC and HS       Prescriptions Prior to Admission:  ALPRAZolam 0.5 MG Oral Tab Take 1 tablet (0.5 mg total) by mouth MG Oral Capsule Delayed Release Take 20 mg by mouth 2 (two) times daily. Naltrexone HCl (REVIA) 50 MG Oral Tab Take 50 mg by mouth nightly. DiphenhydrAMINE HCl (BENADRYL) 25 MG Oral Cap Take 50 mg by mouth nightly as needed for Itching or Sleep. comfortably  RLE - pain over the R hip. Below knee amputation. Able to grossly move knee. Sensation generally intact around knee.     Results:     Laboratory Data:    Lab Results  Component Value Date   WBC 5.2 05/05/2018   HGB 10.3 (L) 05/05/2018   HCT co-morbidities and will need medical clearance.   - please get R knee XR for surgical planning for traction pin placement  - medical clearance needed per hospitalist/cardiologist and all appropriate teams  - NPO at midnight  - tentatively plan for OR tomorr

## 2018-05-15 NOTE — TELEPHONE ENCOUNTER
Kiya Monson PT is calling to say Pt had a fall, was with Pt at time of fall. Pt lost balance and feel on floor, hurt his right arm and right elbow. Ambulance was called. Arrived at time of call. Kiya Monson had to assist them and hung up.

## 2018-05-15 NOTE — TELEPHONE ENCOUNTER
Tangela PEDRAZA [facility] didn't know pt had gone to [facility] by ambulance after a fall today. Will need to call back for orders once pt is discharged.  NARCISO

## 2018-05-16 PROBLEM — E11.8 TYPE 2 DIABETES MELLITUS WITH COMPLICATION (HCC): Status: ACTIVE | Noted: 2018-01-01

## 2018-05-16 NOTE — PAYOR COMM NOTE
--------------  ADMISSION REVIEW     Payor: [employer] MEDICARE ADV PPO  Subscriber #:  [id number]  Authorization Number: N/A    Admit date: 5/15/18  Admit time: 1626       Admitting Physician: Raman Batres MD  Attending Physician:  DO Star Khan Jack Hough RN      HYDROcodone-acetaminophen [facility]) 5-325 MG per tab 2 tablet     Date Action Dose Route User    5/15/2018 9432 Given 2 tablet Oral Jack Hough RN      HYDROmorphone HCl (DILAUDID) 1 MG/ML injection 0.4 mg     Date Action Dose R (PAMELOR) cap 20 mg     Date Action Dose Route User    5/15/2018 2138 Given 20 mg Oral Ronan Rocha RN      Pantoprazole Sodium (PROTONIX) EC tab 40 mg     Date Action Dose Route User    5/16/2018 0610 Given 40 mg Oral Ronan Rocha RN      0.9%  Na as needed (currently 7 cc/hr) in patient with preserved LV function.  - Prior to brain MRI will need Medtronic rep to adjust setting for pacemaker    [address]

## 2018-05-16 NOTE — ANESTHESIA PREPROCEDURE EVALUATION
PRE-OP EVALUATION    Patient Name: Kylie Carter    Pre-op Diagnosis: Fracture, hip ([facility] [address] Henna Abbott    Procedure(s):  RIGHT HIP NAILING, FRACTION PIN PLACEMENT     Surgeon(s) and Role:     Drew Kraus MD - Primary    Pre-op vitals reviewed.   Temp: 97 Heparin Sodium (Porcine) 5000 UNIT/ML injection 5,000 Units 5,000 Units Subcutaneous 2 times per day   acetaminophen (TYLENOL) tab 650 mg 650 mg Oral Q4H PRN   Or      HYDROcodone-acetaminophen (NORCO) 5-325 MG per tab 1 tablet 1 tablet Oral Q4H PRN   Or hypothyroidism                       Pulmonary                    (+) sleep apnea and CPAP      Neuro/Psych  Comment: Bilateral neuropathy to knees    Admitted for recurrent falls secondary to right sided weakness.  Pt has history of episodes of unilateral DO Cielo;  Location:  ENDOSCOPY  No date: FOOT SURGERY Left  No date: LAPS GSTR RSTCV PX PLMT BAND  No date: OTHER      Comment: LLIVER TRANSPLANT  No date: OTHER      Comment: R BKA  No date: OTHER      Comment: STENTS total of 13 sten multiple co morbidities, patient at high risk for cardiac complications perioperatively. Pt understands. Will proceed with GETA and regional block.      Present on Admission:  **None**

## 2018-05-16 NOTE — PROGRESS NOTES
[facility]  Progress Note     Jennifer Malhotra Patient Status:  Inpatient    3/1/1950 MRN [id number]   [facility] 3SW-A Attending Alexa Cheng, [address] [facility] Day # 1 PCP Flor Luciano MD     Chief Complaint: fall    S: Patient seen and • finasteride  5 mg Oral Nightly   • Levothyroxine Sodium  300 mcg Oral Before breakfast   • Nortriptyline HCl  20 mg Oral Nightly   • gabapentin  900 mg Oral Noon   • gabapentin  900 mg Oral QAM   • gabapentin  600 mg Oral Nightly   • escitalopram  10 m

## 2018-05-16 NOTE — PLAN OF CARE
accu check 418, page sent to hospitalist.  insulin given as ordered. Cont to monitor. Instructed to call for all asst needed, discomfort felt, verbalized understanding.   Per MRI tech, mri cannot be done until tomorrow, requires pacemaker rep to be there,

## 2018-05-16 NOTE — ANESTHESIA POSTPROCEDURE EVALUATION
[address] Patient Status:  Inpatient   Age/Gender 79year old male MRN [id number]   Location [address] [facility] Attending Sondra Du, [address] [facility] Day # 1 PCP Uday Velarde MD       Anesthesia Post-op Note

## 2018-05-16 NOTE — TELEPHONE ENCOUNTER
Called patient. Wife states he fell and broke his hip. He is having hip surgery this week at [facility]. He will not be calling back for an appointment for a while.

## 2018-05-16 NOTE — PROGRESS NOTES
[address] Patient Status:  Inpatient    3/1/1950 MRN [id number]   [facility] 3SW-A Attending Matthias Mcdowell, [address] [facility] Day # 1 PCP Michael Hearn MD         S:  No acute events overnight.   Still with R hip pain, and

## 2018-05-16 NOTE — CONSULTS
Neurology H&P    Anna Loera Patient Status:  Inpatient    3/1/1950 MRN [id number]   [facility] 3SW-A Attending Khushboo Mccauley, [address] [facility] Day # 1 PCP Toya Garcia MD     Subjective:  Anna Loera is a(n) 79year old male, with multip Subcutaneous 2 times per day   • docusate sodium  100 mg Oral BID   • insulin detemir  30 Units Subcutaneous Q12H   • Insulin Aspart Pen  1-68 Units Subcutaneous TID CC   • Insulin Aspart Pen  1-10 Units Subcutaneous TID AC and HS         No current outpat Liver cirrhosis secondary to CORTÉS ([facility] [address]     Diabetes mellitus, controlled ([facility] [address]     Recurrent major depressive disorder, in full remission ([facility] [address]     Amputee     Ac[facility]     Encephalopathy     Acute right-sided weakness     Other compl atherosclerosis    • Depression    • [facility]   • Disorder of liver    • Disorder of thyroid    • Diverticulosis of large intestine    • Esophageal reflux    • Essential hypertension    • Heart murmur     Since Childhood   • High blood pressure Onset   • Cancer Father    • Heart Disorder Father    • Other Siddharth Wilkinson Mother    • Cancer Sister        ROS:  Gen: no unexplained weight loss  Vision: no vision changes, no new blurry or double vision  Head and Neck: no eye or ear discharge  Pulmonary: no S ALT 66 05/16/2018   PTT 31.6 05/16/2018   INR 1.10 05/16/2018   PTP 14.6 05/16/2018   MG 2.3 05/16/2018   PGLU 190 05/16/2018       Imaging:  CTA/CTH   =====  CONCLUSION:    1. No evidence of intracranial hemorrhage or mass enhancing lesion.   Stable chr Ok for ASA hold if needed for surgery. He has multiple vascular risk factors and is at higher risk of stroke while off ASA and this should be restarted as soon as deemed medically safe by surgery following his surgery    2.  Severe neuropathy with acute R f

## 2018-05-16 NOTE — PROGRESS NOTES
[facility]  Progress Note    Jan Degree Patient Status:  Inpatient    3/1/1950 MRN [id number]   [facility] 3SW-A Attending Kirsten Long, [address] [facility] [address] Day # 1 PCP Sekou Roper MD       Assessment:    · Right hip fracture - For surg Once   • Pantoprazole Sodium  40 mg Oral QAM AC   • mycophenolate mofetil  1,000 mg Oral BID   • aspirin  325 mg Oral Daily   • ursodiol  300 mg Oral TID   • Metoprolol Tartrate  50 mg Oral 2x Daily(Beta Blocker)   • cycloSPORINE modified  175 mg Oral BID

## 2018-05-16 NOTE — CONSULTS
[facility]  Cardiology Consultation    Kylie Carter Patient Status:  Inpatient    3/1/1950 MRN [id number]   [facility] 3SW-A Attending Gil Butler MD   Hosp Day # 0 PCP Dimitri Chacon MD     Reason for Consultation:  Pre-op eval disease    • Anemia     on Iron Suppliments    • Anxiety state    • Atherosclerosis of coronary artery 10/31/16    Stents X 4   • Atherosclerosis of coronary artery 10/18/16    Stents X 1   • Autoimmune disease ([address]    • Back problem    • Cataract    • Co Age of Onset   • Cancer Father    • Heart Disorder Father    • Other Amy Borden Mother    • Cancer Sister       reports that he quit smoking about 24 years ago. He quit smokeless tobacco use about 25 years ago.  He reports that he does not drink alcohol or use (LOPRESSOR) tab 50 mg, 50 mg, Oral, BID  •  ALPRAZolam (XANAX) tab 0.5 mg, 0.5 mg, Oral, TID PRN  •  cycloSPORINE modified (NEORAL) cap 175 mg, 175 mg, Oral, BID  •  isosorbide dinitrate (ISORDIL TITRADOSE) tab 5 mg, 5 mg, Oral, BID  •  temazepam (RESTORIL 100 mg, 100 mg, Oral, BID  •  insulin detemir (LEVEMIR) 100 UNIT/ML flextouch 30 Units, 30 Units, Subcutaneous, Q12H  •  Insulin Aspart Pen (NOVOLOG) 100 UNIT/ML flexpen 1-68 Units, 1-68 Units, Subcutaneous, TID CC  •  Insulin Aspart Pen (NOVOLOG) 100 UNIT

## 2018-05-16 NOTE — BRIEF OP NOTE
Pre-Operative Diagnosis: right hip intertrochanteric fracture     Post-Operative Diagnosis: right hip intertrochanteric fracture     Procedure Performed:   Procedure(s):  Right proximal tibial traction pin placement, closed reduction and placement of right

## 2018-05-16 NOTE — PHYSICAL THERAPY NOTE
Orders received for PT evaluation. Chart reviewed.  Tentative plan for OR this afternoon for right proximal tibia traction pin placement and closed reduction of right hip intertrochanteric fracture and cephalomedullary nail placement pending medical clearan

## 2018-05-16 NOTE — PLAN OF CARE
Arrived via ambulance.  vss. A/o x4. Post mold and ace wrap to right arm, clean, dry, intact. Ace wrap to right lower extremity clean, dry, intact. Cap refill to right fingers < 3 seconds.   Instructed on plan of care, activity, pain management, to salvador

## 2018-05-17 NOTE — OCCUPATIONAL THERAPY NOTE
OCCUPATIONAL THERAPY EVALUATION - INPATIENT     Room Number: 354/354-A  Evaluation Date: 5/17/2018  Type of Evaluation: Initial  Presenting Problem: s/p R hip nailing, R proximal tibial traction pin placement 5/16/18    Physician Order: IP Consult to [employer] • Depression    • [facility]   • Disorder of liver    • Disorder of thyroid    • Diverticulosis of large intestine    • Esophageal reflux    • Essential hypertension    • Heart murmur     Since Childhood   • High blood pressure    • High cholest Reading glasses    Prior Level of Function: Patient reports independent in all BADL and functional mobility via RW prior to admission, uses prosthesis for RLE and reports able don/doff independently.      SUBJECTIVE   \"I'm having a lot of pain\"    Patient regular lower body clothing?: Total  -   Bathing (including washing, rinsing, drying)?: Total  -   Toileting, which includes using toilet, bedpan or urinal? : Total  -   Putting on and taking off regular upper body clothing?: A Lot  -   Taking care of pers with the following performance deficits: increased pain, decreased balance, decreased endurance, decreased knowledge of hip precautions and incorporation into ADLs, decreased knowledge of adaptive techniques.  These deficits impact the patient’s ability to strengthening/ROM; Endurance training;Patient/Family education;Patient/Family training; Compensatory technique education  Rehab Potential : Fair  Frequency (Obs): 5x/week  Number of Visits to Meet Established Goals: 5    ADL GOALS   Patient will perform [employer]

## 2018-05-17 NOTE — CM/SW NOTE
05/17/18 1500   CM/SW Referral Data   Referral Source Other  (PT)   Reason for Referral Discharge planning   Informant Patient;Spouse;Edward Staff   Readmission Assessment   Factors that patient feels contributed to this readmission Other (only choose i as well and she confirms choice. Referral sent via [facility] to  Jeannine Devine. Also spoke with liaison, Pricila Rutherford [phone]. She will process referral and see pt in AM.  SW requested that LEXI obtain ins auth if able to accept. DON screening requested.

## 2018-05-17 NOTE — PROGRESS NOTES
[facility]  Progress Note    Gordo Allison Patient Status:  Inpatient    3/1/1950 MRN [id number]   [facility] 3SW-A Attending Matthias Mcdowell, [address] [facility] Day # 2 PCP David Quintana MD       Assessment:    · CAD s/p CABG  · Right hip fra Oral QAM   • gabapentin  600 mg Oral Nightly   • escitalopram  10 mg Oral QAM   • insulin detemir  30 Units Subcutaneous Q12H   • Insulin Aspart Pen  1-68 Units Subcutaneous TID CC   • Insulin Aspart Pen  1-10 Units Subcutaneous TID AC and HS     • lactate

## 2018-05-17 NOTE — PLAN OF CARE
Call to MRI to discuss POC re MRI, pre medicate and med-tronic rep, no answer- message left requesting return call. Neuro APN spoke with MRI- MRI will contact writer with update- Med[employer] rep has been contacted per MRI no action needed on writer's part.

## 2018-05-17 NOTE — PHYSICAL THERAPY NOTE
PHYSICAL THERAPY EVALUATION - INPATIENT     Room Number: 354/354-A  Evaluation Date: 5/17/2018  Type of Evaluation: Initial  Physician Order: PT Eval and Treat    Presenting Problem: s/p R hip fracture with R hip nailing and R tibial traction pin linn Liver transplanted [facility])    • Migraines    • Muscle weakness    • Myocardial infarction [facility])    • Neuropathy    • Obesity    • Peripheral vascular disease ([facility] [address] 2005    diabets broken ankle led to amputation   • Renal disorder    • Shingles    • Sleep apn promotion;Repositioning    COGNITION  · Overall Cognitive Status:  WFL - within functional limits  · Arousal/Alertness:  lethargic- pt had a dose of ativan prior to MRI today.     RANGE OF MOTION AND STRENGTH ASSESSMENT  Upper extremity ROM and strength are agreeable to therapy. Pt supine to sit with max A. Pt sitting EOB x 8 min with sup and pt able to perform B LE exercises and UE MMT. Pt returned to supine with max A.      Exercise/Education Provided:  Bed mobility  Functional activity tolerated    Patient EOB @ level: moderate assistance     Goal #2 Attempt transfers as able     Goal #3 Assess ambulation as able. Goal #4 Ind with HEP for R LE strengthening.    Goal #5    Goal #6    Goal Comments: Goals established on 5/17/2018

## 2018-05-17 NOTE — BH PROGRESS NOTE
[facility] Resource Referral Counselor Note    Toya Starks Patient Status:  Inpatient    3/1/1950 MRN [id number]   [facility] 3SW-A Attending Eneida Cantu, [address] [facility] Day # 2 PCP Sonido Donovan MD       S(subjective) The pt ad

## 2018-05-17 NOTE — PAYOR COMM NOTE
--------------  CONTINUED STAY REVIEW    [employer] ADV PPO  Subscriber #:  [id number]  Authorization Number: N/A    Admit date: 5/15/18  Admit time: 1626    Admitting Physician: Jose Mathews MD  Attending Physician:  DO Karrie Lopez and vascular imaging. His recent fall was not associated with any LOC or shaking and numbness resolved prior to his admission.  He is at his baseline at this time      Plan:  1. R hip fracture  - management as per Orthopedic surgery  - [id number] Danielle Robison for surgery form n significant comorbidities including coronary artery disease, status post 13 stents, liver transplant, diabetes with Charcot neuropathy, status post a right below-the-knee amputation, pacemaker, as well as kidney failure.   He fell because of numbness in his to the procedure starting, he did receive antibiotics consisting of clindamycin due to a penicillin allergy. We then began by placing the proximal tibial traction pin. We made a stab incision 2 cm distal and posterior to the tibial tubercle.   We then use ream a 14.5 mm reamer without any significant chatter. We then used Nadya's largest nail, which is the 13 mm diameter cephalomedullary nail. Prior to reaming, we did measure the length of the guidewire and noted to be approximately 43 cm in length.   The satisfied with our hardware placement as well as reduction of the hip fracture.   We then irrigated all the wounds and closed the gluteal fascia with 0 Vicryl as well as the IT band fascia with 0 Vicryl, the deep dermal layer with 2-0 Vicryl, and staples fo 600 mg Oral Ros Blakely RN      HYDROcodone-acetaminophen [facility]) 5-325 MG per tab 2 tablet     Date Action Dose Route User    5/17/2018 9455 Given 2 tablet Oral Franco Thomas RN    5/17/2018 0246 Given 2 tablet Oral Anne Rivera, KEILA      HYDROmorp 5/16/2018 2359 Given 1000 mg Oral Rosia Pattee, RN    5/16/2018 1035 Given 1000 mg Oral Farzaneh Overall, RN      Nortriptyline HCl (PAMELOR) cap 20 mg     Date Action Dose Route User    5/17/2018 0000 Given 20 mg Oral Rosia Pattee, RN      Pantoprazole S

## 2018-05-17 NOTE — PROGRESS NOTES
[address] Patient Status:  Inpatient    3/1/1950 MRN [id number]   [facility] 3SW-A Attending Pedro Gordon, [address] [facility] Day # 2 PCP Helene Mcneill MD         S:  No acute events. Doing well today. No CP/SOB.     O:  B

## 2018-05-17 NOTE — CONSULTS
[facility]  Report of Inpatient Wound Care Consultation     Genie Can Patient Status:  Inpatient    3/1/1950 MRN [id number]   [facility] 3SW-A Attending Estefania Dodson, [address] [facility] Day # 2 PCP Taylor Payne MD     REASON FOR CONS Robotic CABG X 2  10/2015: CABG  06/06/2017: CARDIAC PACEMAKER PLACEMENT      Comment: [employer]tronic leadless pacemaker  No date: CATARACT  No date: CATH BARE METAL STENT (BMS)  3/4/2016: COLONOSCOPY N/A      Comment: Procedure: COLONOSCOPY;  Surgeon: Kim Telles, toe measures at (0.2cm x 0.2cm x0) with 100% red, no odor, scant drainage. Cleansed the wound, applied band-aide, RN aware. Continue to change every 2-3 days.  RN aware    Durable Medical Equipment:  NA  Offloading/Footwear:  Recommend L heel offloadi

## 2018-05-17 NOTE — CONSULTS
[facility]  Inpatient Wound Care Contact Note    Madi Root Patient Status:  Inpatient    3/1/1950 MRN [id number]   [facility] 3SW-A Attending Lazarus Handy, [address] [facility] Day # 2 PCP Rosie Billingsley MD     Attempted to see patient for

## 2018-05-17 NOTE — PROGRESS NOTES
[id number] Neurology Progress Note    Blanche Venegas Patient Status:  Inpatient    3/1/1950 MRN [id number]   [facility] 3SW-A Attending Aline Andujar, [address] [facility] Day # 2 PCP Sourav Arango MD         Subjective:  Blanche Venegas is • [MAR Hold] aspirin  325 mg Oral Daily   • ursodiol  300 mg Oral TID   • Metoprolol Tartrate  50 mg Oral 2x Daily(Beta Blocker)   • cycloSPORINE modified  175 mg Oral BID   • isosorbide dinitrate  5 mg Oral BID   • finasteride  5 mg Oral Nightly   • Venkatesh Reeves with long-term current use of insulin (HCC)     Stage 3 chronic kidney disease     Alteration of awareness     Lethargy     Headache(784.0)     Severe obesity (BMI 35.0-39. 9) with comorbidity (HCC)     Metabolic acidosis     Paresthesia     Pain in extremi fracture s/p right tibial pin placement on 5/16    This is a 79year old male admitted s/p fall associated right sided numbness. No  LOC or shaking associated with his fall. He has had multiple similar episodes with extensive unrevealing workup.  His acute

## 2018-05-17 NOTE — PROGRESS NOTES
[facility]  Progress Note     Kojodayana oRot Patient Status:  Inpatient    3/1/1950 MRN [id number]   [facility] 3SW-A Attending Lazarus Handy, [address] [facility] Day # 2 PCP Rosie Billingsley MD     Chief Complaint: fall    S: Patient seen and Tartrate  50 mg Oral 2x Daily(Beta Blocker)   • cycloSPORINE modified  175 mg Oral BID   • isosorbide dinitrate  5 mg Oral BID   • finasteride  5 mg Oral Nightly   • Levothyroxine Sodium  300 mcg Oral Before breakfast   • Nortriptyline HCl  20 mg Oral Nigh

## 2018-05-17 NOTE — PROGRESS NOTES
[facility] Pharmacy Note:  Renal Dose Adjustment for Enoxaparin (LOVENOX)    Rosanna Weinberg has been prescribed enoxaparin (LOVENOX) 30 mg subcutaneously q24hr.     Est CrCl > 30 mL/min    Weight: 124.7 kg  BMI: 33.5 kg/m²      Based on pt's weight and estimated r

## 2018-05-17 NOTE — TELEPHONE ENCOUNTER
Spoke to patients wife, Aminah Earl. I informed her that our providers signed the necessary paperwork to [facility] on 4/25/18 and 4/27/18. She could call [employer] to get the status of the Wheelchair/scooter.  [employer] would be the one responsible for get th

## 2018-05-17 NOTE — CM/SW NOTE
Informed by Levine, Susan. \Hospital Has a New Name and Outlook.\"" [phone] that pt is clinically accepted pending insurance auth to admit.

## 2018-05-17 NOTE — OPERATIVE REPORT
[address]    PATIENT'S NAME: Lashay Uribe   ATTENDING PHYSICIAN: Vinny Soto.  Evern Dakins   OPERATING PHYSICIAN: Carlos Holley MD   PATIENT ACCOUNT#:   [id number]    LOCATION:  [address] [place] [address]  MEDICAL RECORD #:   [id number]       DATE OF BIRTH:  [birth year] surgery including but not limited to bleeding, infection, blood clots, damage to nerves and blood vessels, failure of the hardware, need for further surgery, stiffness, problems with anesthesia.   I also discussed that because of the patient's below-the-kne abducted position. All bony parts were very well padded. Fluoroscopy was then brought in to evaluate the fracture site. This was an unstable intertrochanteric fracture.   Using traction with the Cutler table onto the traction bow and the proximal tibial tr good pin that was center on the AP view but slightly anterior on the lateral view. Due to his poor bone quality, I did not want to have multiple passes of this wire. Therefore, we accepted this because it was still in acceptable position.   We then [employer] MD  d: 05/16/2018 18:46:01  t: 05/16/2018 21:18:41  [facility] [id number]  /

## 2018-05-17 NOTE — TELEPHONE ENCOUNTER
Patient's wife(Aj) is calling wanting the statis of the approval of a Wheelchair/Scooter for her .   Patient fell yesterday and broke his hip and will be in the Hospital for a few days, however they just need to know the statis and whether it was

## 2018-05-18 NOTE — PROGRESS NOTES
[facility]  Progress Note     Gordo Estefany Patient Status:  Inpatient    3/1/1950 MRN [id number]   [facility] 3SW-A Attending Matthias Mcdowell, 160[address] [facility] Day # 3 PCP David Quintana MD     Chief Complaint: fall    S: Patient seen and • Pantoprazole Sodium  40 mg Oral QAM AC   • mycophenolate mofetil  1,000 mg Oral BID   • [MAR Hold] aspirin  325 mg Oral Daily   • ursodiol  300 mg Oral TID   • Metoprolol Tartrate  50 mg Oral 2x Daily(Beta Blocker)   • cycloSPORINE modified  175 mg Ora TBD  Discharge is dependent on: clinical progress  At this point Mr. Rubia Ceballos is expected to be discharge to: [facility] of care discussed with patient, family, and RN.     Magnolia Navarrete DO

## 2018-05-18 NOTE — OCCUPATIONAL THERAPY NOTE
OCCUPATIONAL THERAPY TREATMENT NOTE - INPATIENT     Room Number: 354/354-A  Session: 1   Number of Visits to Meet Established Goals: 5    Presenting Problem: s/p R hip nailing, R proximal tibial traction pin placement 5/16/18    History related to current • Diverticulosis of large intestine    • Esophageal reflux    • Essential hypertension    • Heart murmur     Since Childhood   • High blood pressure    • High cholesterol    • History of blood transfusion [id number]   • Hyperlipidemia    • Hypothyroidism ASSESSMENT  Ratin  Location: R hip, 8 at rest increasing with movement  Management Techniques: Activity promotion; Body mechanics;Breathing techniques;Relaxation;Repositioning     ACTIVITY TOLERANCE  O2 Saturation: 97%  Room air  No shortness of breath concerns addressed;SCDs in place; Ice applied    ASSESSMENT   Patient seen for OT services this pm. In this session patient making fair progress towards OT goals with improvements in balance, endurance and independence in ADL tasks, however patient remains

## 2018-05-18 NOTE — PROGRESS NOTES
[address] Patient Status:  Inpatient    3/1/1950 MRN [id number]   [facility] 3SW-A Attending Mindy Melton, [address] [facility] Day # 3 PCP Yolanda Ceja MD       S: Hgb 6.7, received 1 unit today.     O:  Blood pressure 114/

## 2018-05-18 NOTE — PROGRESS NOTES
Attempted to contact pt to see how he is doing, no answer left detailed message. Stating I reviewed his chart and understand he is at [address] I wanted to check on him.  Advised if he needs anything to call me and I will follow up with him if I don't hear dony

## 2018-05-18 NOTE — PLAN OF CARE
Paged on call hospitalist, Dr. Paul Aguirre, to notify [facility] at 6.7. Received call back at 06:32. Writer asked if she suggested infusion, she stated not now, to hold off.

## 2018-05-18 NOTE — CM/SW NOTE
[address] [place] still waiting on insurance auth. Per Erica with [employer], hope to have by end of day today. Pt not medically ready today per nursing, his HGB is low and he is getting transfusion. Weekend SW staff to follow.      Hyacinth Dennis RN, [facility]  [address]

## 2018-05-18 NOTE — PHYSICAL THERAPY NOTE
PHYSICAL THERAPY TREATMENT NOTE - INPATIENT    Room Number: 354/354-A     Session: 1   Number of Visits to Meet Established Goals: 5    Presenting Problem: s/p R hip fracture with R hip nailing and R tibial traction pin placement     History related to cu thyroid    • Diverticulosis of large intestine    • Esophageal reflux    • Essential hypertension    • Heart murmur     Since Childhood   • High blood pressure    • High cholesterol    • History of blood transfusion [id number]   • Hyperlipidemia    • Hypoth 10  Location: R hip  Management Techniques:  Activity promotion (ice pack)    BALANCE                                                                                                                     Static Sitting: Good  Dynamic Sitting: Fair + to perform AP on lle, quad set on rle several times a day. Bunny boot was provided to protect left heel , pt has a wound on heel.      THERAPEUTIC EXERCISES  Lower Extremity Quad set on RLE  AP on LLE  LAQ on RLE     Upper Extremity See OT note     Position

## 2018-05-19 NOTE — OCCUPATIONAL THERAPY NOTE
OCCUPATIONAL THERAPY TREATMENT NOTE - INPATIENT     Room Number: 354/354-A  Session: 2  Number of Visits to Meet Established Goals: 5    Presenting Problem: s/p R hip nailing, R proximal tibial traction pin placement 5/16/18    History related to current a Disorder of liver    • Disorder of thyroid    • Diverticulosis of large intestine    • Esophageal reflux    • Essential hypertension    • Heart murmur     Since Childhood   • High blood pressure    • High cholesterol    • History of blood transfusion 2003- thigh  Management Techniques:  Activity promotion     ACTIVITY TOLERANCE  O2 Saturation: 97%    ACTIVITIES OF DAILY LIVING ASSESSMENT  AM-PAC ‘6-Clicks’ Inpatient Daily Activity Short Form  How much help from another person does the patient currently need… Recommendations: TBD    PLAN  OT Treatment Plan: Balance activities; ADL training;Functional transfer training;UE strengthening/ROM; Endurance training;Patient/Family education;Patient/Family training; Compensatory technique education  Rehab Potential : [facility]

## 2018-05-19 NOTE — PLAN OF CARE
PAIN - ADULT    • Verbalizes/displays adequate comfort level or patient's stated pain goal Progressing        SAFETY ADULT - FALL    • Free from fall injury Progressing        Patient verbalized pain to right hip surgical site 6/10, oral pain medication gi

## 2018-05-19 NOTE — CONSULTS
[facility]  Report of Consultation    Naga Gallegos Patient Status:  Inpatient    3/1/1950 MRN [id number]   [facility] 3SW-A Attending Lucille Fay MD   Hosp Day # 3 PCP Solange Ortiz MD       Assessment / Plan:    1) OLAYINKA- due to Disorder of liver    • Disorder of thyroid    • Diverticulosis of large intestine    • Esophageal reflux    • Essential hypertension    • Heart murmur     Since Childhood   • High blood pressure    • High cholesterol    • History of blood transfusion 2003- hallucinations. Ciprofloxacin             Daptomycin              RASH    Comment:While receiving daptomycin with ceftriaxone,             patient developed rash on back and also reported             some SOB, also noted to have transaminitis. Daily PRN  •  bisacodyl (DULCOLAX) rectal suppository 10 mg, 10 mg, Rectal, Daily PRN  •  FLEET ENEMA (FLEET) 7-19 GM/118ML enema 133 mL, 1 enema, Rectal, Once PRN  •  lactated ringers infusion, , Intravenous, Continuous  •  Pantoprazole Sodium (PROTONIX) UNIT/ML flextouch 30 Units, 30 Units, Subcutaneous, Q12H  •  Insulin Aspart Pen (NOVOLOG) 100 UNIT/ML flexpen 1-68 Units, 1-68 Units, Subcutaneous, TID CC  •  Insulin Aspart Pen (NOVOLOG) 100 UNIT/ML flexpen 1-10 Units, 1-10 Units, Subcutaneous, TID AC and 994 05/18/2018   BILT 3.9 05/18/2018   TP 5.8 05/18/2018   AST 68 05/18/2018   ALT 60 05/18/2018   PGLU 165 05/18/2018       Imaging: All imaging studies reviewed. Thank you for allowing me to participate in the care of your patient.     Yossi Martin

## 2018-05-19 NOTE — PROGRESS NOTES
[facility]  Nephrology Progress Note    [facility] Attending:  Blair Wade MD       Assessment and Plan:    1) OLAYINKA- due to mild intravascular volume depletion / post-op fluid shifts superimposed on significant CKD; no other clear insult noted. normal, no murmur or tub  Lungs: Decreased BS at bases bilaterally   Abdomen: Soft, non-tender. + bowel sounds, no palpable organomegaly  Extremities: Without clubbing, cyanosis; no earlene  Neurologic: Cranial nerves grossly intact, moving all extremities  S cycloSPORINE modified (NEORAL) cap 175 mg 175 mg Oral BID   isosorbide dinitrate (ISORDIL TITRADOSE) tab 5 mg 5 mg Oral BID   temazepam (RESTORIL) cap 7.5 mg 7.5 mg Oral Nightly PRN   finasteride (PROSCAR) tab 5 mg 5 mg Oral Nightly   Levothyroxine Sodiu

## 2018-05-19 NOTE — PROGRESS NOTES
Dr Aramis Addison made aware of post blood transfusion Hg of 7.0.  Order received for repeat CBC in AM.

## 2018-05-19 NOTE — PLAN OF CARE
Microfoam dressing removed, and sterile coverlets placed to right hip. Ace wrap removed from right stump, sterile coverlets placed. Bandage to left 5th toe clean, dry, intact.   Instructed patient on procedure, verbalized understanding, tolerated procedur

## 2018-05-19 NOTE — PHYSICAL THERAPY NOTE
PHYSICAL THERAPY TREATMENT NOTE - INPATIENT    Room Number: 354/354-A     Session: 2   Number of Visits to Meet Established Goals: 5    Presenting Problem: right hip fracture s/p right hip nailing and right tibial traction pin (5/16/18)  History related t 1983   • Disorder of liver    • Disorder of thyroid    • Diverticulosis of large intestine    • Esophageal reflux    • Essential hypertension    • Heart murmur     Since Childhood   • High blood pressure    • High cholesterol    • History of blood transfus Bearing as Tolerated    PAIN ASSESSMENT   Rating: 10  Location: RLE  Management Techniques:  Activity promotion;Breathing techniques;Repositioning    BALANCE and pain. Sit to supine completed with max asst x 2.      THERAPEUTIC EXERCISES  Lower Extremity Alternating marching  Ankle pumps  Hip AB/AD  LAQ         Position Sitting     Repetitions   10   Sets   1     Patient End of Session: In bed;Needs met;Call lig

## 2018-05-19 NOTE — PROGRESS NOTES
[facility]  Progress Note     Naga Gallegos Patient Status:  Inpatient    3/1/1950 MRN [id number]   [facility] 3SW-A Attending Colin Miller, [address] [facility] Day # 4 PCP Solange Ortiz MD     Chief Complaint: fall    S: Patient without Epic.    Medications:   • Heparin Sodium (Porcine)  5,000 Units Subcutaneous 2 times per day   • Clopidogrel Bisulfate  75 mg Oral Daily   • aspirin EC  81 mg Oral Daily   • docusate sodium  100 mg Oral BID   • Pantoprazole Sodium  40 mg Oral QAM AC   • my 15. S/p PPM  16. Obesity  1. BMI 33    Quality:  · DVT Prophylaxis: Heparin   · CODE status: full  · Salguero: no  · Central line: no    Estimated date of discharge: 0-2 days  Discharge is dependent on:  Insurance approval  At this point Mr. Nicole [facility] is expecte

## 2018-05-20 NOTE — PROGRESS NOTES
Received a critical Hg result of 6.9. Pt denies any discomfort, no c/o dizziness or feeling lightheaded. Dsg to rt hip CDI, no active bleeding present. Dr Constance Parra made aware, order received for a rpt Hg draw at noon.

## 2018-05-20 NOTE — PROGRESS NOTES
Received a call from lab for glucose of 60 mg/dl. Bedside fingerstick done, result was 63. Pt is awake, alert and oriented. Denies any discomfort, no hypoglycemic sx noted. Glucose liquid given. BS after 15 min was 74.  BS improved to 88 after another dose

## 2018-05-20 NOTE — CONSULTS
[facility]                       Gastroenterology [address] [facility]    Arnoldo Billing Patient Status:  Inpatient    3/1/1950 MRN [id number]   [facility] 3SW-A Attending Emmie Toribio MD   [facility] Day • Myocardial infarction [facility])    • Neuropathy    • Obesity    • Peripheral vascular disease ([facility] [address] 2005    diabets broken ankle led to amputation   • Renal disorder    • Shingles    • Sleep apnea    • Stroke [facility]) 2005   • Visual impairment     reading gl HYDROcodone-acetaminophen (NORCO)  MG per tab 1 tablet 1 tablet Oral Q4H PRN   HYDROmorphone HCl (DILAUDID) 1 MG/ML injection 0.3 mg 0.3 mg Intravenous Q2H PRN   [COMPLETED] LORazepam (ATIVAN) injection 1 mg 1 mg Intravenous Once   [COMPLETED] HYDR 30 g Oral Q15 Min PRN   Or      Glucose-Vitamin C (DEX-4) 4-0.006 g chewable tab 8 tablet 8 tablet Oral Q15 Min PRN   acetaminophen (TYLENOL) tab 650 mg 650 mg Oral Q4H PRN   Insulin Aspart Pen (NOVOLOG) 100 UNIT/ML flexpen 1-68 Units 1-68 Units Subcutaneo or recent fevers prior to the acute illness            Cardiovascular: +CAD, CHF            Respiratory: occasional SOB due to heart disease            Hematologic: + known chronic anemia            Dermatologic: The patient reports no recent rashes or chr 05/19/18   0442  05/20/18   0440   GLU  153*  83  60*   BUN  76*  78*  85*   CREATSERUM  2.82*  2.90*  2.77*   GFRAA  26*  25*  26*   GFRNAA  22*  22*  23*   CA  7.8*  7.7*  7.6*   NA  137  136  135*   K  4.5  4.7  4.6   CL  108  108  108   CO2  17.0*  17. Gastroenterology  [phone]

## 2018-05-20 NOTE — PROGRESS NOTES
[facility]  Progress Note     Anna Loera Patient Status:  Inpatient    3/1/1950 MRN [id number]   [facility] 3SW-A Attending Khushboo Mccauley, [address] [facility] Day # 5 PCP Toya Garcia MD     Chief Complaint: fall    S: Patient without hours.         Imaging: Imaging data reviewed in Epic.     Medications:   • epoetin etta  10,000 Units Subcutaneous Q7 Days   • Heparin Sodium (Porcine)  5,000 Units Subcutaneous 2 times per day   • Clopidogrel Bisulfate  75 mg Oral Daily   • aspirin EC  81 8. Acute blood loss anemia secondary to #1  1. Transfuse to maintain hgb>7  9. OLAYINKA on CKD  1. Possibly related to anemia  2. Renal eval   10. Metabolic acidosis   1. Monitor   11. Dysuria  1. UA negative   12. BPH  1. Finasteride   13. RYAN  1. CPAP  14.

## 2018-05-20 NOTE — PLAN OF CARE
Diabetes/Glucose Control    • Glucose maintained within prescribed range Progressing    Patient had low glucose level this morning but did not exhibit any signs of hypoglycemia. Dr. Chiquita Choi notified of low blood sugar level, Levemir dose cut down in half.

## 2018-05-20 NOTE — PROGRESS NOTES
Hemoglobin repeated at 1200 with 6.8 results. Dr. Saloni Willams notified of low hemoglobin, received order for GI consult.

## 2018-05-20 NOTE — PROGRESS NOTES
[facility]  Nephrology Progress Note    Santiago Flowers Attending:  Tabatha Mclaughlin MD       Assessment and Plan:    1) OLAYINKA- due to mild intravascular volume depletion / post-op fluid shifts superimposed on significant CKD- improving with good UO.  PLAN- grossly intact, moving all extremities  Skin: Warm and dry, no rashes       Labs:     Lab Results  Component Value Date   WBC 4.2 05/20/2018   HGB 6.9 05/20/2018   HCT 20.9 05/20/2018   .0 05/20/2018   CREATSERUM 2.77 05/20/2018   BUN 85 05/20/2018 tab 5 mg 5 mg Oral Nightly   Levothyroxine Sodium (SYNTHROID) tab 300 mcg 300 mcg Oral Before breakfast   Nortriptyline HCl (PAMELOR) cap 20 mg 20 mg Oral Nightly   gabapentin (NEURONTIN) cap 900 mg 900 mg Oral Noon   gabapentin (NEURONTIN) cap 900 mg 900

## 2018-05-20 NOTE — PROGRESS NOTES
[facility]  Orthopedic Surgery  Progress Note    Shayy Ruiz Patient Status:  Inpatient    3/1/1950 MRN [id number]   [facility] 3SW-A Attending Kim Abdullahi MD   Hosp Day # 4 PCP Binu Herzog MD     SUBJECTIVE:  INTERVAL H

## 2018-05-20 NOTE — PROGRESS NOTES
No return call from MD. Dr Cha Farnsworth paged, order received to give Levemir 15 units this morning.

## 2018-05-21 NOTE — CM/SW NOTE
I spoke with Russel Bauer at [address] Pt will be covered 50% at this time. They still do not have a bed. F/U with pt and spouse. They are updated. Pt spouse said that pt does have a complex case manager with [employer].  I advised pts wife that she speak

## 2018-05-21 NOTE — PROGRESS NOTES
[facility]  Nephrology Progress Note    Madi Root Attending:  Randall Aschoff, MD       Assessment and Plan:    1) OLAYINKA- due to mild intravascular volume depletion / post-op fluid shifts superimposed on significant CKD- improving with good UO.  PLAN- clubbing, cyanosis; no earlene  Neurologic: Cranial nerves grossly intact, moving all extremities  Skin: Warm and dry, no rashes       Labs:     Lab Results  Component Value Date   HGB 6.8 05/20/2018   INR 1.15 05/20/2018   PTP 15.2 05/20/2018   PGLU 113 05/2 Levothyroxine Sodium (SYNTHROID) tab 300 mcg 300 mcg Oral Before breakfast   Nortriptyline HCl (PAMELOR) cap 20 mg 20 mg Oral Nightly   gabapentin (NEURONTIN) cap 900 mg 900 mg Oral Noon   gabapentin (NEURONTIN) cap 900 mg 900 mg Oral QAM   gabapentin (N

## 2018-05-21 NOTE — PHYSICAL THERAPY NOTE
PHYSICAL THERAPY TREATMENT NOTE - INPATIENT    Room Number: 354/354-A     Session: 3  Number of Visits to Meet Established Goals: 5    Presenting Problem: S/p right hip fracture s/p right hip nailing and right tibial traction pin (5/16/18)  History relate [facility]) 1983   • Disorder of liver    • Disorder of thyroid    • Diverticulosis of large intestine    • Esophageal reflux    • Essential hypertension    • Heart murmur     Since Childhood   • High blood pressure    • High cholesterol    • History of blood tr extremity        R Lower Extremity: Weight Bearing as Tolerated  L Lower Extremity: Weight Bearing as Tolerated    PAIN ASSESSMENT   Rating: 10  Location: Right hip & knee  Management Techniques: Activity promotion; Body mechanics;Breathing techniques;Relax of 6.8.   THERAPEUTIC EXERCISES  Lower Extremity Alternating marching  Ankle pumps  Glut sets  Hip AB/AD  Knee extension  Quad sets  SAQ         Position Supine     Repetitions   10   Sets   1     Patient End of Session: In bed;Needs met;Call light within r

## 2018-05-21 NOTE — CM/SW NOTE
Spoke with Dr. Joseluis Hernandez. He is requesting pt be transferred to [facility] where his renal transplant MD Dr. Moi Stringer is at. Per MD, pt will need higher level of care due to rising LFTs, possibility of organ rejection.      Contacted [address] transfer u

## 2018-05-21 NOTE — PROGRESS NOTES
[address] [facility]  Orthopedic Surgery  Progress Note    Toya Starks Patient Status:  Inpatient    3/1/1950 MRN [id number]   [facility] 3SW-A Attending Joe Williamson MD   Hosp Day # 6 PCP Sonido Donovan MD     SUBJECTIVE:  INTERVAL H

## 2018-05-21 NOTE — OCCUPATIONAL THERAPY NOTE
OCCUPATIONAL THERAPY TREATMENT NOTE - INPATIENT     Room Number: 354/354-A  Session: 3  Number of Visits to Meet Established Goals: 5    Presenting Problem: s/p R hip nailing, R proximal tibial traction pin placement 5/16/18    History related to current a Disorder of liver    • Disorder of thyroid    • Diverticulosis of large intestine    • Esophageal reflux    • Essential hypertension    • Heart murmur     Since Childhood   • High blood pressure    • High cholesterol    • History of blood transfusion 2003- 10  Location: R hip  Management Techniques:  Activity promotion     ACTIVITY TOLERANCE  O2 Saturation: 94%    ACTIVITIES OF DAILY LIVING ASSESSMENT  AM-PAC ‘6-Clicks’ Inpatient Daily Activity Short Form  How much help from another person does the patient cu transfers and other functional areas. Patient would benefit from continued OT services during hospital stay to further address these deficits and assist patient in returning to prior level of function. Continue to recommend LEXI at discharge.      N Ross Henley

## 2018-05-21 NOTE — CM/SW NOTE
Received call earlier from Mirna at [facility] that pt is out-of-network and his coverage would only be at 50%, she advised that pt should go to in-network transplant center.      I had [facility], [address] [place] [address] and apparently pt policy is not i

## 2018-05-21 NOTE — TELEPHONE ENCOUNTER
Called hover [facility] and spoke with Merle Orosco in regards to paperwork, we have not received paperwork faxed to us, provided fax number [phone] for paperwork to be re-faxed. [facility].

## 2018-05-21 NOTE — PROGRESS NOTES
[facility]    Gastroenterology Follow-Up Note      Alida Posadas Patient Status:  Inpatient    3/1/1950 MRN [id number]   [facility] 3SW-A Attending [facility] Day # 6 PCP Andie Berry MD     Chief Complaint/ Clinically he has generalized weakness and given his liver transplant history needs expedited transfer to [facility] for further work up/management.      Plan:     Elevated Liver Enzymes/History of liver transplant  *Enzymes worsening, consider acute o

## 2018-05-21 NOTE — TELEPHONE ENCOUNTER
[facility]a n[facility] round called and stated that they faxed us paperwork from the pts insurance stating that they needed more information in regards as to why the pt does not need the scooter in the home.      Please advise

## 2018-05-21 NOTE — PAYOR COMM NOTE
--------------  CONTINUED STAY REVIEW    [facility]  Subscriber #:  [id number]  Authorization Number: [id number]    Admit date: 5/15/18  Admit time: 1626    Admitting Physician: Sonia Kraus MD  Attending Physician:  Jameson Wise phosphatase 958  ast 51  Bilirubin, total 6.7  Albumin 2.0  co2 17.0  Rbc 2.23  hgb 6.8  hct 20.5  plt 130.0  poc glucose 140            MEDICATIONS ADMINISTERED IN LAST 1 DAY:  aspirin EC tab 81 mg     Date Action Dose Route User    5/21/2018 0912 Given 8 Given 2 tablet Oral Amber Sanon RN      HYDROmorphone HCl (DILAUDID) 1 MG/ML injection 0.3 mg     Date Action Dose Route User    5/21/2018 1140 Given 0.3 mg Intravenous Teresa Parrish RN    5/21/2018 0534 Given 0.3 mg Intravenous Marilee Barros Pantoprazole Sodium (PROTONIX) EC tab 40 mg     Date Action Dose Route User    5/21/2018 0913 Given 40 mg Oral Tamika Snow RN    5/20/2018 1722 Given 40 mg Oral Amber Sanon RN      ursodiol (ACTIGALL) cap 300 mg     Date Action Dose Route preserved EF with mild diastolic dysfunction  Anemia- multifactorial; stable overnight; no active bleeding.  Check Fe stores; start weekly EPO     7) Metabolic acidosis- due to CKD / OLAYINKA / med effect; follow   5/21  Elevated Liver Enzymes/History of liver t

## 2018-05-21 NOTE — PROGRESS NOTES
[facility]  Progress Note     Jennifer Bunn Patient Status:  Inpatient    3/1/1950 MRN [id number]   [facility] 3SW-A Attending [facility] Day # 6 PCP Rubin Wheeler MD     Chief Complaint: Sandee Collins: Patient 6. 0*  6.1       Estimated Creatinine Clearance: 31.4 mL/min (A) (based on SCr of 2.84 mg/dL (H)). Recent Labs   Lab  05/16/18   0434  05/20/18   1659   PTP  14.6  15.2*   INR  1.10  1.15*       No results for input(s): TROP, CK in the last 168 hours. classic for an acute obstruction and benign exam.  Pt has been off his transplant meds for a few months and just started retaking them. GI thinks pt is rejecting and is working on transplant to [facility]. 4. Acute onset right sided numbness  1.  MRI brain negat

## 2018-05-22 PROBLEM — T86.41 LIVER TRANSPLANT REJECTION (HCC): Status: ACTIVE | Noted: 2018-01-01

## 2018-05-22 PROBLEM — Z47.89 ORTHOPEDIC AFTERCARE: Status: ACTIVE | Noted: 2018-01-01

## 2018-05-22 NOTE — CM/SW NOTE
Contacted [facility] [phone] and spoke with Susu Michelle. They only do lung and heart transplants.      Spoke with Dr. Chuck Santos per his request attempted to make referral to [facility] [phone], spoke with Magi Gruber RN, she said they could not t

## 2018-05-22 NOTE — PROGRESS NOTES
[facility]    Gastroenterology Follow-Up Note      Jennifer Bunn Patient Status:  Inpatient    3/1/1950 MRN [id number]   [facility] 3SW-A Attending [facility] Day # 7 PCP Rubin Wheeler MD     [facility] denied for transfer by [employer]. I have spoken to REBECA DE LA CRUZ who will review case/insurance regarding potential transfer. Lactulose was added for encephalopathy. IV fluids for OLAYINKA. Anemia stable. Cnt cyclo. Will follow closely.

## 2018-05-22 NOTE — PLAN OF CARE
Notified of critical Hgb of 6.8 by lab. Romi from 5/20 and 5/21 AM labs. Notified Dr. Brittni Finch via page. Will monitor.

## 2018-05-22 NOTE — CM/SW NOTE
VM after I left yesterday from [facility] transfer center that they will not accept pt since he is out of network.  She also said their medical director spoke with Dr. Foreman All transplant MD) who did not feel transfer was warranted as pt noted to have \"chronically

## 2018-05-22 NOTE — PROGRESS NOTES
[facility]  Nephrology Progress Note    Arnoldo Billing Patient Status:  Inpatient    3/1/1950 MRN [id number]   [facility] 3SW-A Attending [address] [address] [place] Day # 7 PCP Jarred Sue MD       SUBJECTIVE:  Very sluggish Labs   Lab  05/16/18   0434  05/18/18   0434  05/19/18   0442  05/20/18   0440  05/21/18   0816  05/22/18   0437   NA  138  137  136  135*  137  134*   K  4.1  4.5  4.7  4.6  5.0  4.9   CL  112*  108  108  108  109  105   CO2  17.0*  17.0*  17.0*  17.0*  1 Daily PRN   bisacodyl (DULCOLAX) rectal suppository 10 mg 10 mg Rectal Daily PRN   FLEET ENEMA (FLEET) 7-19 GM/118ML enema 133 mL 1 enema Rectal Once PRN   mycophenolate mofetil (CELLCEPT) cap 1,000 mg 1,000 mg Oral BID   ursodiol (ACTIGALL) cap 300 mg 300 ortho    #3. Hx OLTx- GI following. Remains on CsA    #4. CAD- s/p CABG/PCI- has been stable    #5. Met acidosis- due to OLAYINKA/CKD. Will follow    #6. Anemia- due to mult factors incl recent femur fx, CKD and he is Fe def.   On IV FeSO4 and received Epo

## 2018-05-22 NOTE — TELEPHONE ENCOUNTER
Paperwork was received from [facility]. [facility] [phone]. Per Mariah Schwartz @ [facility] the scooter is not a covered benefit for this patient.  Because the scooter was mentioned in the office note we have to send them a letter or brief note on

## 2018-05-22 NOTE — CM/SW NOTE
CM and SW viewed [employer] on line using pts [address] ([address]) are in [employer]. I called both hospitals and both are able to do liver transplants. Yelena at [facility] called me back.  She contacted [facility]

## 2018-05-22 NOTE — SIGNIFICANT EVENT
Arrived at [facility], pt with lethargy/AMS  On exam he is drowsy, jaundiced  Cv RRR  Edematous     He was given narcan 0.4 x2 with slight improvement- he is alert/oriented to time person and place but still v lethargic  He has visible asterixis on ex

## 2018-05-22 NOTE — PROGRESS NOTES
[facility]  Progress Note     Toya Starks Patient Status:  Inpatient    3/1/1950 MRN [id number]   [facility] 3SW-A Attending [facility] Day # 7 PCP Sonido Donovan MD     Chief Complaint: Swetha Rodriguez: Patient 136  135*  137  134*   K  4.7  4.6  5.0  4.9   CL  108  108  109  105   CO2  17.0*  17.0*  17.0*  16.0*   ALKPHO  947*  986*  958*   --    AST  75*  67*  51*   --    ALT  51  43  34   --    BILT  4.4*  5.1*  6.7*   --    TP  5.9*  6.0*  6.1   --        Est following  2. Resume ASA/plavix   1. Was on 325mg ASA PTA, will restart at 81mg with plan to transition back to 325mg once taken off anticoagulation as outlined by ortho   3. 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 Continue home meds   2.  T. B

## 2018-05-22 NOTE — CM/SW NOTE
Received call from pts wife. Updated her that we have not been able to find facility willing to accept pt that accepts [facility] and is able to accept liver transplant pt. She provided me with number she found for  at [facility]. [phone].  I c

## 2018-05-22 NOTE — OCCUPATIONAL THERAPY NOTE
OCCUPATIONAL THERAPY TREATMENT NOTE - INPATIENT     Room Number: 354/354-A  Session: 4  Number of Visits to Meet Established Goals: 5    Presenting Problem: s/p R hip nailing, R proximal tibial traction pin placement 5/16/18    History related to current a Medical History  Past Medical History:   Diagnosis Date   • Acute, but ill-defined, cerebrovascular disease    • Anemia     on Iron Suppliments    • Anxiety state    • Atherosclerosis of coronary artery 10/31/16    Stents X 4   • Atherosclerosis of coronar HERNIA,5+Y/O,REDUCIBL  No date: THYROIDECTOMY  No date: TRANSPLANT LIVER,ALLOTRANSPLANT      Comment: 07/2006    SUBJECTIVE  \"I'm up for doing some therapy\"      OBJECTIVE  Precautions: Orthotic/prosthesis (PRINCE MENENDEZ, has prosthesis)    WEIGHT BEARING RESTRI (mod assist required to maintain), with intermittent periods of poor plus to poor minus; while sitting EOB patient participated in dynamic reaching tasks with poor to poor minus balance, as well as seated ADLs with setup assist for facial hygiene and UB ba training;Functional transfer training;UE strengthening/ROM; Endurance training;Patient/Family education;Patient/Family training; Compensatory technique education  Rehab Potential : Fair  Frequency (Obs): 5x/week      OT Goals:     ADL GOALS   Patient will pe

## 2018-05-22 NOTE — PHYSICAL THERAPY NOTE
PHYSICAL THERAPY TREATMENT NOTE - INPATIENT    Room Number: 354/354-A     Session: 4  Number of Visits to Meet Established Goals: 5    Presenting Problem: S/p right hip fracture s/p right hip nailing and right tibial traction pin (5/16/18)  History relate [facility])      Past Medical History  Past Medical History:   Diagnosis Date   • Acute, but ill-defined, cerebrovascular disease    • Anemia     on Iron Suppliments    • Anxiety state    • Atherosclerosis of coronary artery 10/31/16    Stents X 4   • Atheroscle HERNIA,5+Y/O,REDUCIBL  No date: THYROIDECTOMY  No date: TRANSPLANT LIVER,ALLOTRANSPLANT      Comment: 07/2006    SUBJECTIVE  Pt lethargic today, but does report minimal pain. Pt states that he is very \"sleepy\".      Patient’s self-stated goal is to return approved. Pt lying in bed and lethargic. Pt difficult to arouse and did agree to PT once aroused. Pt required constant stimuli to keep eyes open and remain alert during session. Pt cued to perform bed mobility.  Pt with difficulty with proper hand placement towards functional goals. Subacute rehab is recommended for 19-21 days. After this period of rehabilitation patient should achieve supervision level in bed mobility, transfers, and ambulation.       DISCHARGE RECOMMENDATIONS  PT Discharge Recommendations:

## 2018-05-22 NOTE — PROGRESS NOTES
Spoke with Aliya Childress at [facility] and gave clinical report ([phone]). Per Aliya Childress, she will run his insurance and keep things going on that end. She also stated that there currently is no bed available. Will continue to update as needed.

## 2018-05-22 NOTE — CM/SW NOTE
Call from Dr. Danyell Raymond. He has spoken with Dr. Jorge Perez at [facility]. MD is willing to accept pt, if hospital will accept pt.      Call to transfer center [phone], spoke with Nito Duvall, requested facesheet be faxed to [phone] which I did at this

## 2018-05-22 NOTE — CM/SW NOTE
Call from pts wife. She states she spoke with an [facility] who works in benefits dept at [facility] and was told that pts in and out of network benefits are the same. I tried calling [facility] at [phone] and was connected to [facility] who I spoke with yesterday.  Mani Curtis

## 2018-05-23 PROBLEM — K72.90 HEPATIC ENCEPHALOPATHY (HCC): Status: ACTIVE | Noted: 2018-01-01

## 2018-05-23 NOTE — PROGRESS NOTES
[facility]    Gastroenterology Follow-Up Note      Krysta Dunlap Patient Status:  Inpatient    3/1/1950 MRN [id number]   [facility] 3SW-A Attending Jennifer Jorge [facility] Day # 8 PCP Gerry Machado MD     [facility] denied by Carlos Pham and [facility] where he had been previously followed due to insurance.      Plan:     Hyperbilirubinemia  *Worsening, likely due to decompensated cirrhosis in acute on chronic rejection of liver graft  *Stat liver biopsy  *Cannot start high dose

## 2018-05-23 NOTE — PROGRESS NOTES
Returned to unit from IR procedure. Dressing to right neck puncture site CDI. Patient drowsy, received 0.5 mg versed and 25 mcg fentanyl in procedure. BP stable 110/47, HR 66, RR 18, 97% on 2L.

## 2018-05-23 NOTE — CM/SW NOTE
[facility] of hospitals in [place] has accepted, awaiting bed availability, will call primary RN when bed assigned this afternoon. [facility] MICU ambulance with 2L oxygen and sodium bicarb IV placed on will call, CM made call to radiology to make disk.  RN updated    PCS form completed

## 2018-05-23 NOTE — CM/SW NOTE
received call from Via Corey Bowling at [facility], will not accept patient for transfer d/t being out of insurance network. Left voice mail for [facility]  Siva Rivera [phone] for further assist in transferring patient.      905: call to [facility] Transplant Provide

## 2018-05-23 NOTE — PROGRESS NOTES
[facility]  Progress Note     Tinajero Iron Patient Status:  Inpatient    3/1/1950 MRN [id number]   [facility] 3SW-A Attending [facility] Day # 8 PCP Radha Donald MD     Chief Complaint: Toshia Granbury: Patient 2.0*   --   1.9*   NA  137  134*  135*  136   K  5.0  4.9  6.0*  5.3*   CL  109  105  106  108   CO2  17.0*  16.0*  16.0*  17.0*   ALKPHO  958*  1,049*   --   1,106*   AST  51*  47*   --   47*   ALT  34  30   --   29   BILT  6.7*  7.7*   --   9.8*   TP  6 transition back to 325mg once taken off anticoagulation as outlined by ortho   3. 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 Continue home meds   2. T. Bili elevated, however other LFT's are stable.   Not classic for an acute obstruction and benig

## 2018-05-23 NOTE — PAYOR COMM NOTE
--------------  CONTINUED STAY REVIEW    [employer] MEDIC[employer] ADV PPO  Subscriber #:  [id number]  Authorization Number: [id number]    Admit date: 5/15/18  Admit time: 1626    Admitting Physician: Shaylee Dinh MD  Attending Physician:  Eulogio Brown (VENOFER) IV Push 200 mg     Date Action Dose Route User    5/23/2018 1030 New Bag 200 mg Intravenous Radha Pierce, KEILA      isosorbide dinitrate (ISORDIL TITRADOSE) tab 5 mg     Date Action Dose Route User    5/23/2018 0902 Given 5 mg Oral Mary Friendly 40 mg Oral Eitan Chavez RN      sodium bicarbonate 150 mEq in Sterile Water for Injection 1,000 mL infusion     Date Action Dose Route User    5/23/2018 1233 New Bag 150 mEq Intravenous Geovanny Valadez RN      0.9%  NaCl infusion     Date Action Dose bm's daily  *Appreciate hospitalist assistance overnight      Coagulopathy  *No signs of bleeding  *Monitor     PLEASE FAX DAYS CERTIFIED AND NEXT REVIEW DATE

## 2018-05-23 NOTE — PROGRESS NOTES
Received call from transfer RN at [facility], will arrange transport for patient at 2000 this evening. Going to room [phone], accepting physician is Dr Mattie Petersen. Family at bedside updated.

## 2018-05-23 NOTE — PROGRESS NOTES
Lactulose 3 times daily (last bm 5/23/18)    Sodium bicarbonate 150mEq at 100 ml/hr     Salguero catheter inserted 5/23/18 at 1230 for urinary retention    Skin tear x2 to right buttock, skin tear to right elbow  prevalon boot to LLE for pressure relief to le

## 2018-05-23 NOTE — PROGRESS NOTES
[facility]  Nephrology Progress Note    Vishnu Nick Attending:  Olya Mackey MD       Assessment and Plan:    1) OLAYINKA- due to mild intravascular volume depletion / post-op fluid shifts superimposed on significant CKD exacerbated by liver dysfuncti sounds, no palpable organomegaly  Extremities: Without clubbing, cyanosis; no earlene  Neurologic: Cranial nerves grossly intact, moving all extremities  Skin: Warm and dry, no rashes       Labs:     Lab Results  Component Value Date   WBC 5.0 05/22/2018   HG ursodiol (ACTIGALL) cap 300 mg 300 mg Oral TID   Metoprolol Tartrate (LOPRESSOR) tab 50 mg 50 mg Oral 2x Daily(Beta Blocker)   cycloSPORINE modified (NEORAL) cap 175 mg 175 mg Oral BID   isosorbide dinitrate (ISORDIL TITRADOSE) tab 5 mg 5 mg Oral BID   f

## 2018-05-24 NOTE — CM/SW NOTE
05/24/18 1000   Discharge disposition   Expected discharge disposition Short Term H   Name of [address] [facility]   Discharge transportation Other (comment)  ([facility], sent by [facility])   DC 5/23/18

## 2018-05-24 NOTE — PROGRESS NOTES
Patient report called to [facility] at 2015. [facility] ambulance picked up patient at 2000. All belongings collected and sent with patient's spouse at this time. Copy of chart provided to [facility].  Scripts on chart sent with patient, RN notified that these meds

## 2018-05-24 NOTE — TELEPHONE ENCOUNTER
Letter from Geovanna regarding medical reason preventing this patient from having a power scooter was faxed to [facility] fax:[phone]. Confirmation of receipt was received.

## 2018-05-25 NOTE — DISCHARGE SUMMARY
[facility]  DISCHARGE SUMMARY     Tinajero Iron Patient Status:  Inpatient    3/1/1950 MRN [id number]   [facility] 3SW-A Attending No att. providers found   Hosp Day # 8 PCP Radha Donald MD     Date of Admission: 5/15/2018  Date total bilirubin. Imaging did not show obstruction gastroenterology was consulted and felt the patient's liver transplant was being rejected due to his noncompliance with his immunosuppression.   Patient's kidney function worsened depletion patient is fluid 5/28/2018  Quantity:  30 tablet  Refills:  0        CONTINUE taking these medications      Instructions Prescription details   Acidophilus/Pectin Caps  Commonly known as:  PROBIOTIC      Take 1 capsule by mouth daily.    Quantity:  10 capsule  Refills:  0 Tabs  Commonly known as:  ISORDIL TITRADOSE      Take 1 tablet (5 mg total) by mouth 2 (two) times daily. Quantity:  60 tablet  Refills:  0     Metoprolol Tartrate 50 MG Tabs  Commonly known as:  LOPRESSOR      Take 50 mg by mouth 2 (two) times daily. [facility]  [phone]    Schedule an appointment as soon as possible for a visit in 6 weeks  neurology    Yasmani Agustin MD  [address] [address] [phone]    Schedule an appointment as soon as possible for

## 2018-05-25 NOTE — OPERATIVE REPORT
[address]    PATIENT'S NAME: Lorena Ricci   ATTENDING PHYSICIAN: Lisa Hawkins D.O.   OPERATING PHYSICIAN: Mira Berry MD   PATIENT ACCOUNT#:   [id number]    LOCATION:  [address] [place] [address]  MEDICAL RECORD #:   [id number]       DATE OF BIRTH:  [birth year] well as pain control. I discussed the risks of surgery including but not limited to bleeding, infection, blood clots, damage to nerves and blood vessels, failure of the hardware, need for further surgery, stiffness, problems with anesthesia.   I also discu contralateral leg in a well-leg lindsey in a comfortable abducted position. All bony parts were very well padded. Fluoroscopy was then brought in to evaluate the fracture site. This was an unstable intertrochanteric fracture.   Using traction with the [place] patient had quite poor bone quality. We were able to get a good pin that was center on the AP view but slightly anterior on the lateral view. Due to his poor bone quality, I did not want to have multiple passes of this wire.   Therefore, we accepted this PACU in a stable condition.     Dictated By Chidi Zambrano MD  d: 05/16/2018 18:46:01  t: 05/25/2018 13:54:25  Job [id number]  KEILA/

## 2018-06-05 ENCOUNTER — PATIENT OUTREACH (OUTPATIENT)
Dept: CASE MANAGEMENT | Age: 68
End: 2018-06-05

## 2018-06-05 NOTE — PROGRESS NOTES
Opened pt outreach with initial attempt to call pt and try to follow up on CCM. Before calling I reviewed pt's chart and last admission note 5/28/2018 stated pt had passed away. Will remove from CCM program and send condolences.  Also sending staff message

## 2018-11-23 ENCOUNTER — IMAGING SERVICES (OUTPATIENT)
Dept: OTHER | Age: 68
End: 2018-11-23

## 2019-01-24 ENCOUNTER — IMAGING SERVICES (OUTPATIENT)
Dept: OTHER | Age: 69
End: 2019-01-24

## 2019-02-28 VITALS
HEIGHT: 76 IN | SYSTOLIC BLOOD PRESSURE: 140 MMHG | BODY MASS INDEX: 36.04 KG/M2 | DIASTOLIC BLOOD PRESSURE: 64 MMHG | HEART RATE: 66 BPM | WEIGHT: 296 LBS

## 2019-02-28 VITALS
HEIGHT: 76 IN | BODY MASS INDEX: 35.31 KG/M2 | WEIGHT: 290 LBS | SYSTOLIC BLOOD PRESSURE: 128 MMHG | DIASTOLIC BLOOD PRESSURE: 60 MMHG | HEART RATE: 74 BPM

## 2019-02-28 VITALS
DIASTOLIC BLOOD PRESSURE: 60 MMHG | HEART RATE: 56 BPM | BODY MASS INDEX: 33.12 KG/M2 | WEIGHT: 272 LBS | SYSTOLIC BLOOD PRESSURE: 112 MMHG | HEIGHT: 76 IN

## 2019-02-28 VITALS
BODY MASS INDEX: 35.56 KG/M2 | WEIGHT: 286 LBS | SYSTOLIC BLOOD PRESSURE: 150 MMHG | DIASTOLIC BLOOD PRESSURE: 50 MMHG | HEIGHT: 75 IN | HEART RATE: 66 BPM

## 2019-02-28 VITALS — SYSTOLIC BLOOD PRESSURE: 118 MMHG | DIASTOLIC BLOOD PRESSURE: 58 MMHG | HEART RATE: 54 BPM

## 2019-02-28 VITALS
HEIGHT: 76 IN | SYSTOLIC BLOOD PRESSURE: 170 MMHG | BODY MASS INDEX: 35.56 KG/M2 | DIASTOLIC BLOOD PRESSURE: 78 MMHG | HEART RATE: 90 BPM | WEIGHT: 292 LBS

## 2019-02-28 VITALS — DIASTOLIC BLOOD PRESSURE: 68 MMHG | HEART RATE: 60 BPM | SYSTOLIC BLOOD PRESSURE: 162 MMHG

## 2019-03-01 VITALS
SYSTOLIC BLOOD PRESSURE: 140 MMHG | WEIGHT: 315 LBS | HEIGHT: 76 IN | DIASTOLIC BLOOD PRESSURE: 72 MMHG | BODY MASS INDEX: 38.36 KG/M2 | HEART RATE: 74 BPM

## 2019-04-14 NOTE — PAYOR COMM NOTE
--------------  CONTINUED STAY REVIEW    Payor: Marcia Patel MEDICARE ADV PPO  Subscriber #:  [id number]  Authorization Number: N/A    Admit date: 1/19/18  Admit time: 7241    Admitting Physician: Rosio León MD  Attending Physician:  Aileen Leon MD  Pr 3. Holding ASA and plavix for Sx hopefully tomorrow- cardiology called to re-evaluate  4. ID following    2. Chest pain - non cardiac in nature- Cardiology recs appreciated   3. Hyponatremia -resolved  4.  Hematemesis - resolved.  No plan for urgent EGD, PP 1/24/2018 0600 Given 5000 Units Subcutaneous (Left Lower Abdomen) Julius Smith RN    1/23/2018 2031 Given 5000 Units Subcutaneous (Left Lower Abdomen) Julius Smith RN    1/23/2018 1412 Given 5000 Units Subcutaneous (Left Upper Arm) DIXON Sinha 1/23/2018 1726 Given 2 mg Intravenous Nestor Barbour RN      mycophenolate mofetil (CELLCEPT) cap 1,000 mg     Date Action Dose Route User    1/24/2018 0811 Given 1000 mg Oral Lora Wheeler RN    1/23/2018 2029 Given 1000 mg Oral Margy Patel, Re 01/22/18 1726  97.4 °F (36.3 °C) 53 18 143/62 100 % -- NS   01/22/18 1154 97.6 °F (36.4 °C)  49 18 110/54 100 % -- NS   01/22/18 0811  97.4 °F (36.3 °C) 55 18 133/59 93 % -- NS   01/22/18 0407  97.4 °F (36.3 °C) 51 18 108/51 98 % 283 lb 4.8 oz MELONIE   01/21/1 no

## 2019-11-05 NOTE — PROGRESS NOTES
[facility]  Progress Note     Mani Self Patient Status:  Inpatient    3/1/1950 MRN [id number]   [facility] 8NE-A Attending Federico Mosquera, [facility] Day # 1 PCP Kerry Kapoor MD     Chief Complaint: chest pain    S: Patient se Please advise if you are willing to remove from problem list.    BID   • aspirin  325 mg Oral Daily   • ursodiol  300 mg Oral TID   • Clopidogrel Bisulfate  75 mg Oral Daily   • gabapentin  400 mg Oral TID   • Levothyroxine Sodium  300 mcg Oral Before breakfast   • Rosuvastatin Calcium  5 mg Oral Nightly   • isosorbide

## 2020-03-18 NOTE — ED AVS SNAPSHOT
Holly Shea   MRN: [id number]    Department:  [facility] Emergency Department   Date of Visit:  8/16/2017           Disclosure     Insurance plans vary and the physician(s) referred by the ER may not be covered by your plan.  Please contact your If you have been prescribed any medication(s), please fill your prescription right away and begin taking the medication(s) as directed    If the emergency physician has read X-rays, these will be re-interpreted by a radiologist.  If there is a significant DISPLAY PLAN FREE TEXT

## 2020-07-17 NOTE — TELEPHONE ENCOUNTER
Outpatient Therapy Updated Plan of Care     Visit Date: 7/17/2020  Name: Joan Josue  Clinic Number: [id number]    Therapy Diagnosis:   Encounter Diagnoses   Name Primary?    Shoulder stiffness, left Yes    Left arm weakness     Stiffness of left hand joint     Pain in joint of left hand     Effusion of joint of left hand     Left hand weakness     Left shoulder pain, unspecified chronicity      Physician: Renzo Plata II, MD    Physician Orders: evaluate and tx  Medical Diagnosis: see evaluation  Evaluation Date: 6-1-20    Total Visits Received: 10  Cancelled Visits: see epic  No Show Visits: see epic    Current Certification Period:  6-1-20 to 7-13-20  Precautions:  [place], see epic  Visits from Evaluation Date:  10  Functional Level Prior to Evaluation:  Decreased rom, use, and strength; pre symptom onset had no deficits with functional use    Subjective     Update: happy with progress    Objective     Update: see prior notes for measurements    Assessment     Update: good decrease in pain and tightness since day 1 of OT    Previous Short Term Goals Status:   See today's note  New Short Term Goals Status:   n/a  Long Term Goal Status:   continue per initial plan of care.  Continue any LTGs added in notes post initial eval  Reasons for Recertification of Therapy:   Continued skilled and structured rehab imperative to properly promote full rom, balanced strength, and good mechanics long term with ADL    Plan     Updated Certification Period: 7/17/2020 to 8-14-20  Recommended Treatment Plan: 1 times per week for 4 weeks: evaluate and tx    Donovan Bryant OT/CHT  7/17/2020    frequency per week may change based on patient progress and need for therapy         LOV 6/16/17 with AS.   Joey Tierney RN [facility] calling, seeing pt now, pt has had nausea and vomiting bile for 7 days-10days, states severe itching head to toe, no throat or tongue swelling or itching, Benadryl is not helping, no new medications or change in products

## 2020-09-22 NOTE — PLAN OF CARE
Patient to scheduled testing as ordered. Tele monitoring in progress. Patient awake and alert. No apparent distress noted.   Problem: Impaired Activities of Daily Living  Goal: Achieve highest/safest level of independence in self care  Interventions:  - Assess ability and encourage patient to participate in ADLs to maximize function  - Promote sitting position while performing A

## 2022-01-11 NOTE — ED NOTES
Warm blankets & pillow provided, Pt wife at bedside Clindamycin Pregnancy And Lactation Text: This medication can be used in pregnancy if certain situations. Clindamycin is also present in breast milk.

## 2022-03-17 NOTE — HOME CARE LIAISON
Patient is requesting Metformin 500 mg   Triamterene-hydrochlorothiazide 37.5-25 mg per cap. Pravastatin 20 mg sent to [facility]        She had an office visit schedule for today 03-17-22 but she has fractured her foot so she couldn't make it and she rescheduled for July. Received referral for [facility]. Met with patient who is agreeable to [facility]. Agency brochure provided to patient. Referral sent to [facility] via [address]. [facility] has accepted pt and will provide skilled nurse and physical therapy.        Thank you for this r

## 2022-12-28 NOTE — SLP NOTE
ADULT SWALLOWING EVALUATION    ASSESSMENT    ASSESSMENT/OVERALL IMPRESSION:  Pt admitted for L toe cellulitis, developed AMS and R facial droop, rapid response was called and stroke protocol was initiated.   Pt denies h/o dysphagia, was seen by this departm [facility] Respiratory Therapy student, Caleb HERNANDEZ , provided respiratory care to this patient from 1800 to 2200, under the supervision of this writer. Direct patient care was overseen and all documentation was reviewed.    confirmation of CVA)  Discharge Recommendations/Plan: Home    HISTORY   MEDICAL HISTORY  Reason for Referral: Stroke protocol    Problem List  Principal Problem:    Cellulitis of left lower extremity  Active Problems:    CKD (chronic kidney disease) stage • Stroke [facility]) 2005   • Visual impairment     reading glasses          Diet Prior to Admission: Regular; Thin liquids  Precautions: Reflux    Patient/Family Goals:  To get better    SWALLOWING HISTORY  Current Diet Consistency: NPO  Dysphagia History: None

## 2023-01-05 NOTE — PROGRESS NOTES
[facility]  Progress Note     Mary Kay Smith Patient Status:  Inpatient    3/1/1950 MRN [id number]   [facility] 7NE-A Attending Maggy Kenny MD   Hosp Day # 3 PCP Uday Velarde MD     Chief Complaint: CVA  S:  Feeling good. • Naltrexone HCl  50 mg Oral nightly   • multivitamin  1 tablet Oral Daily   • mycophenolate mofetil  1,000 mg Oral BID   • aspirin  325 mg Oral Daily   • ferrous sulfate  325 mg Oral Daily   • [START ON 8/29/2017] ergocalciferol  50,000 Units Oral Mandi Choudhury yes

## 2023-03-06 NOTE — PROGRESS NOTES
Paged Dr Katharine Moe @[phone] to inform of psych consult. Informed Katharine Moe that Ipad was dead but will notify when ready to eval pt.      Calixto Moise  03/06/23 2933 Pt admitted from ER with CP and SOB, placed on tele monitor, oriented to the unit, admission assessment completed, Dr. Davide Lundy notified of the admission.  Pt A&O x 4, SPO2 98% on RA, sinus rhythm on tele, Right BKA with prosthetic limb, up ad napoleon in the room

## 2023-04-21 NOTE — OCCUPATIONAL THERAPY NOTE
IP OT attempt to see patient for therapeutic treatment. RN states that patient is currently on Bed Rest, per stroke protocol until 1500 today. Will attempt again when bed orders lifted and/or as able.
no

## 2025-06-06 NOTE — PROGRESS NOTES
[facility]  Progress Note    Delfina Hi Patient Status:  Inpatient    3/1/1950 MRN [id number]   [facility] 3SW-A Attending Jon Ospina, [address] [facility] [address] Day # 3 PCP Maggy Arreaga MD       Assessment:    · CAD with hx of CABG and recen Bisulfate  75 mg Oral Daily   • aspirin EC  81 mg Oral Daily   • docusate sodium  100 mg Oral BID   • Pantoprazole Sodium  40 mg Oral QAM AC   • mycophenolate mofetil  1,000 mg Oral BID   • ursodiol  300 mg Oral TID   • Metoprolol Tartrate  50 mg Oral 2x D no

## (undated) DEVICE — KENDALL SCD EXPRESS SLEEVES, KNEE LENGTH, MEDIUM: Brand: KENDALL SCD

## (undated) DEVICE — ZIMMER® STERILE DISPOSABLE TOURNIQUET CUFF WITH PLC, DUAL PORT, SINGLE BLADDER, 18 IN. (46 CM)

## (undated) DEVICE — SOL  .9 1000ML BTL

## (undated) DEVICE — STERILE POLYISOPRENE POWDER-FREE SURGICAL GLOVES: Brand: PROTEXIS

## (undated) DEVICE — LOWER EXTREMITY CDS-LF: Brand: MEDLINE INDUSTRIES, INC.

## (undated) DEVICE — SUTURE VICRYL 0 CP-1

## (undated) DEVICE — NON-ADHERENT STRIPS,OIL EMULSION: Brand: CURITY

## (undated) DEVICE — OCCLUSIVE GAUZE STRIP OVERWRAP,3% BISMUTH TRIBROMOPHENATE IN PETROLATUM BLEND: Brand: XEROFORM

## (undated) DEVICE — 3M™ STERI-DRAPE™ U-DRAPE 1015: Brand: STERI-DRAPE™

## (undated) DEVICE — PIN FX 30.5CM 3MM NTR NL

## (undated) DEVICE — STRETCH BANDAGE ROLL: Brand: DERMACEA

## (undated) DEVICE — REM POLYHESIVE ADULT PATIENT RETURN ELECTRODE: Brand: VALLEYLAB

## (undated) DEVICE — HIP PINNING CDS: Brand: MEDLINE INDUSTRIES, INC.

## (undated) DEVICE — SUTURE PROLENE 3-0 SH

## (undated) DEVICE — GLOVE BIOGEL M SURG SZ 71/2

## (undated) DEVICE — SUTURE VICRYL 3-0 SH

## (undated) DEVICE — IMPLANTABLE DEVICE
Type: IMPLANTABLE DEVICE | Site: HIP | Status: NON-FUNCTIONAL
Removed: 2018-05-16

## (undated) DEVICE — GUIDEWIRE ORTH 100CM 3MM TIB

## (undated) DEVICE — DRAPE,U/SHT,SPLIT,FILM,60X84,STERILE: Brand: MEDLINE

## (undated) DEVICE — 3M™ MICROFOAM™ TAPE 1528-4: Brand: 3M™ MICROFOAM™

## (undated) DEVICE — DRAPE HALF 40X58 DYNJP2410

## (undated) DEVICE — SOL  .9 3000ML

## (undated) DEVICE — 3M™ IOBAN™ 2 ANTIMICROBIAL INCISE DRAPE 6648EZ: Brand: IOBAN™ 2

## (undated) DEVICE — BLADE SAW KM33-11

## (undated) DEVICE — 1200CC GUARDIAN II: Brand: GUARDIAN

## (undated) DEVICE — GOWN SURG AERO CHROME XXL

## (undated) DEVICE — 1010 S-DRAPE TOWEL DRAPE 10/BX: Brand: STERI-DRAPE™

## (undated) DEVICE — PIN XTRNFX 508MM 3.2MM NTR NL

## (undated) DEVICE — STANDARD HYPODERMIC NEEDLE,POLYPROPYLENE HUB: Brand: MONOJECT

## (undated) DEVICE — PREMIUM WET SKIN PREP TRAY: Brand: MEDLINE INDUSTRIES, INC.

## (undated) DEVICE — SUTURE VICRYL 2-0 FSL

## (undated) DEVICE — POSITIONER OR KT PT CR

## (undated) DEVICE — Device: Brand: BOOT LINER, DISPOSABLE

## (undated) DEVICE — BIT DRL 4.3MM NTR NL LNG CLBRT

## (undated) NOTE — IP AVS SNAPSHOT
[facility] [place] [address] [place], [address] ~ [phone]                Discharge Summary   4/17/2017    [place] [place]           Admission Information        Provider Department    4/17/2017 Chhaya Sainz MD  3ne-A         T Morning Afternoon Evening As Needed    Fluticasone Propionate 50 MCG/ACT Susp   Commonly known as:  FLONASE   Next dose due:  4/21 AM        2 sprays by Nasal route daily.     Julian Whitehead                             CHANGE how you take these medications Next dose due:  4/21 AM        Take 1 tablet by mouth daily. Citalopram Hydrobromide 20 MG Tabs   Commonly known as:  CeleXA   Next dose due:  4/21 AM        Take 20 mg by mouth daily.                             Clopidogrel Bisul Midodrine HCl 2.5 MG Tabs   Last time this was given:  2.5 mg on 4/20/2017 11:22 AM   Commonly known as:  PROAMATINE   Next dose due:  4/20 PM        Take 1 tablet (2.5 mg total) by mouth 3 (three) times daily.     Guicho Chan [address] [address]  [phone]          Follow up with Masha Frazier MD In 6 weeks.     Specialty:  ENDOCRINOLOGY    Contact information:    [address] [place] [address]  [phone]        Future Appointments     Apr 21, 2017 11:30 AM [facility] - [facility] ([address])    [address]              Discharge Orders     Future Labs/Procedures Expected by Expires    CARD MONITOR HOLTER 48 HOUR (CPT=93225)  4/20/20 Patient has following limitations/impairments:  Impaired mobility      Immunization History as of 4/20/2017  Reviewed on 4/18/2017    INFLUENZA 10/20/2016      Recent Hematology Lab Results  (Last 3 results in the past 90 days)    WBC RBC Hemoglobin Hemat 4.1 (04/18/17)  111      Radiology Exams     None      Patient Belongings       Most Recent Value    All belongings returned to patient at discharge Pt's bedside belongings    Medications Sent Home None to return    Medications Returned:           Russell prescribed to take and their potential SIDE EFFECTS. Your nurse will review your medications with you before you are discharged, and can provide you with additional printed information.  Not all patients will experience these side effects or respond to [facility] Most common side effects: Pain, fever, rash, fatigue, joint pain, blood clots, high blood pressure   What to report to your healthcare team: Pain, swelling, rash, fever           Blood Sugar Medications     Insulin NPH Isophane & Regular (70-30) 100 UNIT/M Endocrine Medications     Levothyroxine Sodium 175 MCG Oral Tab (Starting on 4/21/2017)         Use: Regulate metabolism and inflammation   Most common side effects:  Dizziness, swelling, appetite changes, weight changes, changes in sleep and alertness, pa Naltrexone HCl (REVIA) 50 MG Oral Tab    Multiple Vitamins-Minerals (CENTRUM SILVER) Oral Tab    Mycophenolate Mofetil (CELLCEPT) 500 MG Oral Tab

## (undated) NOTE — IP AVS SNAPSHOT
Patient Demographics     Address  [address] [place] [address] [address] [address] Phone  [phone] [facility])  [phone] (Mobile) *Preferred* E-mail Address  [email or web address]      Emergency Contact(s)     Name Relation Home Work Mobile    Aj Davis ALPRAZolam 0.5 MG Tabs  Commonly known as:  XANAX      Take 1 tablet (0.5 mg total) by mouth 3 (three) times daily as needed. Alla Farris MD         aspirin 325 MG Tabs  Next dose due: Tomorrow 2/7/18      Take 325 mg by mouth daily.           Rickie Cabral Next dose due: Tomorrow 2/7/18      Inject 10 Units into the skin daily with breakfast.  Stop taking on:  3/8/2018   Mikala Helm MD         isosorbide dinitrate 10 MG Tabs  Commonly known as:  ISORDIL  Next dose due:   Tonight 2/6/18      Take 10 mg by ever These medications were sent to [address] [place] [address], [address], [address]    Phone:  [phone]   Insulin NPH & Regular 70/30 (70-30) 100 UNIT/ML Susp           [id number]-A - MAR ACTION R SpO2  92 % Filed at 02/06/2018 0905      Patient's Most Recent Weight    Flowsheet Row Most Recent Value   Patient Weight  131.6 kg (290 lb 2 oz)         Lab Results Last 24 Hours      HEMOGLOBIN A1C [[id number]] (Abnormal)  Resulted: 02/06/18 1338, Result <26 mg/dL  Highest CHD risk    25-35 mg/dL  High CHD risk    35-45 mg/dL  Moderate CHD risk    45-60 mg/dL  Average CHD risk    60-75 md/dL  Below average CHD risk       LDL Cholesterol 120 <130 mg/dL — [facility]   VLDL 11 5 - 40 mg/dL [facility] Chloride 105 101 - 111 mmol/L — [facility]   CO2 24.0 22.0 - 32.0 mmol/L [facility]            TROPONIN I [[id number]] (Normal)  Resulted: 02/06/18 0517, Result status: Final result   Ordering provider:  Mayi Granados MD  02/06/18 0418 Resulting lab: RAINBOW DRAW LIGHT GREEN [[id number]]  Resulted: 02/06/18 0100, Result status: Final result   Ordering provider:  Guillermo Quintero MD  02/05/18 9493 Resulting lab:  [facility]    Specimen Information    Type Source Collected On   Blood — 02/05/18 2684 Sodium 142 136 - 144 mmol/L — [facility]   Potassium 4.7 3.6 - 5.1 mmol/L [facility]   Chloride 106 101 - 111 mmol/L — [facility]   CO2 26.0 22.0 - 32.0 mmol/L [facility]            TROPONIN I [[id number]] (Normal)  Resulted: 02/06/18 0007, Result statu ---------                               -----------         ------                     CBC W/ DIFFERENTIAL[[id number]]          Abnormal            Final result                 Please view results for these tests on the individual orders.     Specimen Inform they did do sternal rub while he was unresponsive.      Past Medical History:  Past Medical History:   Diagnosis Date   • Acute, but ill-defined, cerebrovascular disease    • Anemia     on Iron Suppliments    • Anxiety state    • Atherosclerosis of coronary No date: REPAIR ING HERNIA,5+Y/O,REDUCIBL  No date: THYROIDECTOMY  No date: TRANSPLANT LIVER,ALLOTRANSPLANT      Comment: 07/2006    Social History:  reports that he quit smoking about 24 years ago. He quit smokeless tobacco use about 25 years ago.  He repo every 4 (four) hours as needed for Pain. Disp:  Rfl:    gabapentin 600 MG Oral Tab Take 600 mg by mouth 3 (three) times daily. Disp:  Rfl:    finasteride 5 MG Oral Tab Take 5 mg by mouth nightly.  Disp:  Rfl:    ALPRAZolam 0.5 MG Oral Tab Take 1 tablet (0.5 Citalopram Hydrobromide (CELEXA) 20 MG Oral Tab Take 20 mg by mouth daily. Disp:  Rfl:    cycloSPORINE modified (NEORAL) 25 MG Oral Cap Take 175 mg by mouth 2 (two) times daily.    Disp:  Rfl:    omeprazole (PRILOSEC) 20 MG Oral Capsule Delayed Release Take CREATSERUM  2.63*  2.68*   CA  9.1  8.7   ALB  2.7*  2.5*   NA  138  142   K  5.0  4.7   CL  102  106   CO2  25.0  26.0   ALKPHO  933*  926*   AST  73*  62*   ALT  36  33   BILT  2.7*  2.5*   TP  7.3  6.6       Estimated Creatinine Clearance: 33.7 mL/min ( Hosp Day # 0 PCP Slim Arriola MD     Reason for Consultation:  Chest pain    History of Present Illness:  Edyth School is a a(n) 74year old male with multiple medical admissions for his various complex medical problems as outlined below.   He is usual pectoris ([address]     Chronic kidney disease     Weakness     Syncope     Hypothyroidism     S/P drug eluting coronary stent placement     Type 2 diabetes mellitus with complication, with long-term current use of insulin (HCC)     Dyslipidemia     RYAN (obstru Other complicated headache syndrome     Atypical migraine     Kidney transplanted     Suppression of immune system subtherapeutic (HCC)     Thrombocytosis (HCC)     History of anemia due to chronic kidney disease     ESRD (end stage renal disease) ([facility] read which was also delayed the ability to perform a nuclear stress test.    History:  Past Medical History:   Diagnosis Date   • Acute, but ill-defined, cerebrovascular disease    • Anemia     on Iron Suppliments    • Anxiety state    • Atherosclerosis of No date: REPAIR ING HERNIA,5+Y/O,REDUCIBL  No date: THYROIDECTOMY  No date: TRANSPLANT LIVER,ALLOTRANSPLANT      Comment: 07/2006  Family History   Problem Relation Age of Onset   • Cancer Father    • Heart Disorder Father    • Other Awilda Ship Mother    • Ca gabapentin 600 MG Oral Tab Take 600 mg by mouth 3 (three) times daily. Disp:  Rfl:  2/5/2018 at Unknown time   finasteride 5 MG Oral Tab Take 5 mg by mouth nightly.  Disp:  Rfl:  2/5/2018 at Unknown time   ALPRAZolam 0.5 MG Oral Tab Take 1 tablet (0.5 mg to aspirin 325 MG Oral Tab Take 325 mg by mouth daily. Disp:  Rfl:  2/5/2018 at Unknown time   Citalopram Hydrobromide (CELEXA) 20 MG Oral Tab Take 20 mg by mouth daily.  Disp:  Rfl:  2/5/2018 at Unknown time   cycloSPORINE modified (NEORAL) 25 MG Oral Cap Sarmad •  Insulin NPH & Regular 70/30 (NovoLIN 70/30) 100 UNIT/ML injection 30 Units, 30 Units, Subcutaneous, Daily with breakfast  •  Insulin NPH & Regular 70/30 (NovoLIN 70/30) 100 UNIT/ML injection 25 Units, 25 Units, Subcutaneous, Daily with dinner  •  isosor Chest no cough no hemoptysis  GI no hematemesis no melena   no dysuria hematuria   hematologic no bleeding  Neurologic no TIA-like symptoms  Skin no rashes       Intake/Output:     Intake/Output Summary (Last 24 hours) at 02/06/18 0950  Last data filed a INR 1.19 02/05/2018   PTP 15.2 02/05/2018   TROP <0.046 02/06/2018   PGLU 114 02/06/2018       Imaging:  Normal chest x-ray    Thank you for allowing me to participate in the care of your patient.     Bernarda Brewer MD  2/6/2018  5:58 AM[JS.1]    Brendan

## (undated) NOTE — IP AVS SNAPSHOT
Patient Demographics     Address  [address] [place] [address] [address] [address] Phone  [phone] [facility])  [phone] (Mobile) *Preferred* E-mail Address  [email or web address]      Emergency Contact(s)     Name Relation Home Work Mobile    Aj Davis 3.  Never take a single step without the surgical shoe and always check your foot and ankle to make sure there is no redness or swelling and if you suspect infection present to the emergency room and have Dr. Omero Lara paged.   You can also contact Dr. Turner Rank Take 325 mg by mouth daily. bumetanide 1 MG Tabs  Commonly known as:  BUMEX  Next dose due:  1/30 am      Take 2 mg by mouth daily.           Citalopram Hydrobromide 20 MG Tabs  Commonly known as:  CeleXA  Next dose due:  1/30 am      Take 20 mg b Commonly known as:  LOPRESSOR  Next dose due:  1/29 bedtime      Take 50 mg by mouth 2 (two) times daily.           Mycophenolate Mofetil 500 MG Tabs  Commonly known as:  CELLCEPT  Next dose due:  1/29 bedtime      Take 1,000 mg by mouth 2 (two) times daily [id number] ALPRAZolam Betha Poplin) tab 0.5 mg 01/28/18 2145 Given      [id number] Clopidogrel Bisulfate (PLAVIX) tab 75 mg 01/29/18 0831 Given      [id number] Dakins (1/2 strength) (Sodium hypochlorite (1/2 strength)) 0.2-0.25 % external solution 01/29/18 0834 Gi [id number] isosorbide dinitrate (ISORDIL) tab 10 mg 01/28/18 2144 Given      [id number] isosorbide dinitrate (ISORDIL) tab 10 mg 01/29/18 0833 Given      [id number] mycophenolate mofetil (CELLCEPT) cap 1,000 mg 01/28/18 2145 Given      [id number] mycophenolat Tissue Aerobic Culture [[id number]] Collected:  01/25/18 0731    Order Status:  Completed Lab Status:  Final result Updated:  01/27/18 1544    Specimen:  Tissue from Foot,left      Tissue Culture Result Mixture of organisms suggestive of normal skin osvaldo but noticed more foul smelling odor recently. He denies any F/C. He denies any redness or worsening swelling of the foot. Wound care was concerned as it was deep to the bone and wanted MRI. He states he has neuropathy so does not have much pain.  Pt otherwi DO Cielo;  Location: [address] [place] [address] ENDOSCOPY  No date: LAPS GSTR RSTCV 36 [facility] PLMT BAND  No date: OTHER      Comment: LLIVER TRANSPLANT  No date: OTHER      Comment: PRINCE MENENDEZ  No date: OTHER      Comment: STENTS total of 13 stents   2005: OTHER SURGICAL HIST Comment:Other reaction(s): Joint Pain[AR.2]    Medications:[AR.1]      No current facility-administered medications on file prior to encounter.    Current Outpatient Prescriptions on File Prior to Encounter:  Insulin NPH & Regular 70/30 (70-30) 100 UNIT/M cycloSPORINE modified (NEORAL) 25 MG Oral Cap Take 175 mg by mouth 2 (two) times daily. Disp:  Rfl:    omeprazole (PRILOSEC) 20 MG Oral Capsule Delayed Release Take 20 mg by mouth 2 (two) times daily.    Disp:  Rfl:    Naltrexone HCl (REVIA) 50 MG Oral Ta ALT  93*   BILT  4.3*   TP  7.8       Estimated Creatinine Clearance: 40.2 mL/min (based on SCr of 2.25 mg/dL (H)). No results for input(s): PTP, INR in the last 72 hours. No results for input(s): TROP, CK in the last 72 hours. [AR.2]    Imaging: Imag Today at around 3.30pm was working with PT and suddenly developedright sided weakness and slurred speech with some expressive aphagia. As per wife he had multiple episodes of similar incidences in the past and nothing was found.  Dr. Aden Jarvis saw pt immediatel docusate sodium (COLACE) cap 100 mg 100 mg Oral BID   Insulin NPH & Regular 70/30 (NovoLIN 70/30) 100 UNIT/ML injection 20 Units 20 Units Subcutaneous Daily with dinner   HYDROcodone-acetaminophen (NORCO) 5-325 MG per tab 1 tablet 1 tablet Oral Q6H PRN   H glucose (DEX4) oral liquid 30 g 30 g Oral Q15 Min PRN   Or      Glucose-Vitamin C (DEX-4) 4-0.006 g chewable tab 8 tablet 8 tablet Oral Q15 Min PRN   ondansetron HCl (ZOFRAN) injection 4 mg 4 mg Intravenous Q6H PRN   Insulin Aspart Pen (NOVOLOG) 100 UNIT/M No date: Autoimmune disease ([facility]  No date: Back problem  No date: Cataract  2006: Congestive heart disease ([facility]  No date: Coronary atherosclerosis  No date: Depression  1983: Diabetes ([facility]  No date: Disorder of liver  No date: Disorder of thyroid  No da Smokeless tobacco: Former User    Quit date: 1993    Alcohol use No     Family History   Problem Relation Age of Onset   • Cancer Father    • Heart Disorder Father    • Other Osbaldo Fleischer Mother    • Cancer Sister      Physical Examination:  /49   Pulse Diabetic ulcer of left midfoot associated with type 2 diabetes mellitus, unspecified ulcer stage ([address]    Diabetic ulcer of left midfoot associated with type 2 diabetes mellitus ([address]    Type 2 diabetes mellitus with hyperglycemia (HCC)    CKD stage 3 se Problem List  Principal Problem:    Osteomyelitis of left foot (HCC)  Active Problems:    Cellulitis of left lower extremity    Hyperglycemia    Diabetic ulcer of left midfoot associated with type 2 diabetes mellitus, unspecified ulcer stage ([facility]    Annalisa Comment: Medtronic leadless pacemaker  No date: CATARACT  No date: CATH BARE METAL STENT (BMS)  3/4/2016: COLONOSCOPY N/A      Comment: Procedure: COLONOSCOPY;  Surgeon: Cameron Rothman DO;  [facility]  ENDOS[facility]  [facility]   Climbing 3-5 steps with a railing?: Total       AM-PAC Score:  Raw Score: 13   PT Approx Degree of Impairment Score: 64.91%   Standardized Score (AM-PAC Scale): 36.74   CMS Modifier (G-Code): CL    FUNCTIONAL ABILITY STATUS  Gait Assessment   Gait Assi below his baseline level of function. Recommend continuation of skilled IPPT to address remaining deficits and increase functional abilities to maximize his independence/safety with functional mobility in order to restore PLOF.   Subacute rehab is recommen Osteomyelitis of left foot (HCC)  Active Problems:    Cellulitis of left lower extremity    Hyperglycemia    Diabetic ulcer of left midfoot associated with type 2 diabetes mellitus, unspecified ulcer stage (HCC)    Diabetic ulcer of left midfoot associat 10/18/16: ANGIOPLASTY (CORONARY)      Comment: Stents x 1  2015: BYPASS SURGERY      Comment: Robotic CABG X 2  10/2015: CABG  06/06/2017: CARDIAC PACEMAKER PLACEMENT      Comment: Medtronic leadless pacemaker  No date: CATARACT  No date: CATH BARE METAL S -   Moving from lying on back to sitting on the side of the bed?: None[JT.2]   How much help from another person does the patient currently need. ..[JT.1]   -   Moving to and from a bed to a chair (including a wheelchair)?: A Lot   -   Need to walk in hospi for safe transfers and gait training, likely due to current improper fit of prosthetic on R LE. The patient continues to present with pain, decreased strength, edema, impaired balance and impaired gait. Pt remains below his baseline level of function.   R Occupational Therapy Notes (last 72 hours) (Notes from 1/26/2018  6:26 PM through 1/29/2018  6:26 PM)     No notes of this type exist for this encounter. Video Swallow Study Notes     No notes of this type exist for this encounter.          SLP Note - without overt signs or symptoms of aspiration with 100 % accuracy over 1-2 session(s).   Met   Goal #2 The patient/family/caregiver will demonstrate understanding and implementation of aspiration precautions and swallow strategies independently over 1-2 ses

## (undated) NOTE — Clinical Note
Pt is coming for HFU on 5/3/18. Pt is currently in TCM- called him for an update. Pt stated he is doing well since d/c. The sore to his left foot is healing and he denies s/s of worsening infection. [facility] RN/PT services have started.  Pt denies any

## (undated) NOTE — IP AVS SNAPSHOT
[facility] [place] [address] [place], [address] ~ [phone]                Discharge Summary   1/11/2017    Kylie Carter           Admission Information        Provider Department    1/11/2017 Prince Chance MD  8ne-A         Than Commonly known as:  XANAX        Take 0.5 mg by mouth as needed. aspirin 325 MG Tabs   Last time this was given:  325 mg on 1/12/2017  9:33 AM   Next dose due:  1/13/2017        Take 325 mg by mouth daily.          9 AM gabapentin 300 MG Caps   Last time this was given:  300 mg on 1/12/2017  4:25 PM   Commonly known as:  NEURONTIN   Next dose due:  1/12/2017        Take 300 mg by mouth 3 (three) times daily.          9 AM       4 PM       9 PM           Insulin NPH Isopha topiramate 25 MG Tabs   Last time this was given:  12.5 mg on 1/11/2017  9:02 PM   Commonly known as: Topamax   Next dose due:  1/12/2017        Take 0.5 tablets (12.5 mg total) by mouth nightly.     Damari Perez                9 PM           morris CARDIAC REHAB PHASE II with [address] Cardiopulmonary [facility])    [place]  [facility]  [place] [address]   [phone]            Jan 16, 2017  3:30 PM   CARDIAC REHAB TAN CARDIAC REHAB PHASE II with [address] Cardiopulmonary [facility])    [place]  [facility]  [place] [address]   [phone]            Feb 03, 2017  3:30 PM   CARDIAC REHAB TAN [facility] Cardiopulmonary Rehabilitation [facility])    [place]  [place] [id number]   [phone]            Feb 20, 2017  3:30 PM   CARDIAC REHAB PHASE II with [address] [place], [place] [id number] Ca 6.8 (01/11/17)  4.06 (L) (01/11/17)  12.2 (L) (01/11/17)  36.9 (L) (01/11/17)  90.9 (01/11/17)  30.0 (01/11/17)  33.1  (01/11/17)  181.0     (12/02/16)  7.3 (12/02/16)  4.07 (L) (12/02/16)  11.8 (L) (12/02/16)  36.0 (L) (12/02/16)  88.5 (12/02/16)  29.0 (1 - If you are a smoker or have smoked in the last 12 months, we encourage you to explore options for quitting.     - If you have concerns related to behavioral health issues or thoughts of harming yourself, contact [facility] a Most common side effects: Dizziness or feeling lightheaded (especially with standing), heart rate changes, headaches, nausea/vomiting   What to report to your healthcare team:  Dizziness, nausea, chest pain, weakness, numbness           Water [place]     bum Non-Narcotic Pain Medications     aspirin 325 MG Oral Tab       Use: Treat pain, fever, inflammation   Most common side effects: Stomach upset   What to report to your healthcare team: Stomach upset, unresolved pain           GI Medications     omepr and other neurologic conditions   Most common side effects:  Dizziness, drowsiness, headache, nausea/vomiting, somnolence   What to report to your healthcare team: Dizziness, Somnolence, Weakness, Headache, Nausea/vomiting           [facility]

## (undated) NOTE — IP AVS SNAPSHOT
[address]            (For Outpatient Use Only) Initial Admit Date: 2/27/2018   Inpt/Obs Admit Date: Inpt: 2/28/18 / Obs: 02/28/18   Discharge Date:    Hospital Acct:  [de-identified]   MRN: [de-identified]   CSN: [id number]        [facility] Account Financial Class: Medicare Advantage    February 28, 2018

## (undated) NOTE — MR AVS SNAPSHOT
[facility]  [address] [address] [phone]               Thank you for choosing us for your health care visit with Corrie Morton MD.  We are glad to serve you and happy to provide you with this [facility] Dx:  Coronary artery disease involving native coronary artery of native heart with angina pectoris ([facility] [I25.119]                 Reason for [facility] F/U     Establish Care           Medical Issues Discussed Today     Diabetes ranjan Take 2 tablets (2 mg total) by mouth daily. Commonly known as:  BUMEX           CENTRUM SILVER Tabs   Take 1 tablet by mouth daily. Citalopram Hydrobromide 20 MG Tabs   Take 20 mg by mouth daily.    Commonly known as:  CeleXA           Clopidogr Take 5 mg by mouth nightly. Commonly known as:  CRESTOR           ursodiol 300 MG Caps   Take 300 mg by mouth 3 (three) times daily.    Commonly known as:  ACTIGALL                   MyChart     Visit MyChart  You can access your MyChart to more actively OUTSIDE SAFETY TIPS  ? Always wear good shoes with proper support and traction. ? Always use hand rails on stairs and escalators. ? Cover porch steps with gritty weather proof paint.   ? Pay attention to curbs and other changes in surfaces when out in t

## (undated) NOTE — IP AVS SNAPSHOT
[facility] [place] [address] [place], [address] ~ [phone]                Discharge Summary   3/22/2017    Elena Chopra           Admission Information        Provider Department    3/22/2017 Adryan Jenkins MD  8ne-A         T Instructions Authorizing Provider    Morning Afternoon Evening As Needed    ALPRAZolam 0.5 MG Tabs   Commonly known as:  XANAX        Take 0.5 mg by mouth as needed.                             aspirin 325 MG Tabs   Last time this was given:  325 mg on 3/2 gabapentin 300 MG Caps   Last time this was given:  300 mg on 3/24/2017  9:35 AM   Commonly known as:  NEURONTIN   Next dose due:  3/24 9 p.m. Take 300 mg by mouth 3 (three) times daily.                                   Insulin NPH Isoph Commonly known as:  PRILOSEC   Next dose due:  3/24 9 p.m. Take 40 mg by mouth 2 (two) times daily.                                SYNTHROID 300 MCG Tabs   Last time this was given:  3/24/2017  6:37 AM   Generic drug:  Levothyroxine Sodium   Next dos 34.1 (L) (03/22/17)  93.2    (03/22/17)  123.0 (L)     (02/11/17)  3.8 (L) (02/11/17)  3.59 (L) (02/11/17)  10.7 (L) (02/11/17)  34.1 (L) (02/11/17)  95.0    (02/11/17)  124.0 (L)       Recent Hematology Lab Results (cont.)  (Last 3 results in the past 90 - If you have concerns related to behavioral health issues or thoughts of harming yourself, contact [facility] at [phone].     - If you don’t have insurance, César Draper has partnered with Patient [place]oleen isosorbide dinitrate 10 MG Oral Tab    metoprolol Tartrate 25 MG Oral Tab         Use: Treat abnormal blood pressure (high or low), cardiac conditions; and/or abnormal heart rates/rhythms   Most common side effects: Dizziness or feeling lightheaded (espec Most common side effects: Low blood sugar:nausea, jitters, sweating, rapid heartbeat    What to report to your healthcare team:  Low blood sugar (less than 70) twice a week or high blood sugar (greater than 200) for more than 2-3 days           Non-Narcoti General Nerve Function Medications     Topiramate 25 MG Oral Capsule Sprinkle    gabapentin 300 MG Oral Cap       Use:  Treat conditions such as seizures, headaches, depression, anxiety and other neurologic conditions   Most common side effects:  Dizziness

## (undated) NOTE — LETTER
12/04/17        Delfina Hi  [id number] [facility]      Dear Diana Allen records indicate that you have outstanding lab work and or testing that was ordered for you and has not yet been completed:    MICROALB/CREAT RATIO, RANDOM

## (undated) NOTE — LETTER
July 21, 2017    Jan Degree  [id number] [facility]      Dear Jhon Hicks: It was a pleasure speaking with you over the phone recently.  To follow up, I wanted to send you my contact information to utilize when you have a question a

## (undated) NOTE — LETTER
July 12, 2017        Padmini Miller  [address] [facility]      Dear Ashwini Mari:    I am the Nurse Care Manager with Dr. Davon Winchester office. Just reaching out to see how you are doing since your discharge home.    When you have a minute w

## (undated) NOTE — LETTER
02/15/18        Shayy Ruiz  [id number] [facility]      Dear Kacie Ramirez records indicate that you have outstanding lab work and or testing that was ordered for you and has not yet been completed:          CBC W Differential W Pl

## (undated) NOTE — LETTER
[facility] [address], [address]  Consent for Procedure/Sedation    Date: 4/10/2018    Time: 9am      1.  I authorize the performance upon Mary Kay Smith the following:cardiac catheterization, left ventricular cineangiography, tk period, the physician will determine when the applicable recovery period ends for purposes of reinstating the Do Not Resuscitate (DNR) order.     Signature of Patient: ____________________________________________________    Signature of person authorized

## (undated) NOTE — LETTER
Date: 5/22/2018    Patient Name: Vishnu Nick          To Whom it may concern:    Mr. Tyrese French has been seen in our office for mutiple chronic medical conditions. He was recently seen for mobility examination at the request of your company.    Please see fu Severe obesity (BMI 35.0-39. 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 Mr Harleen Guardian has fallen several times per day. Most recent day prior to office visit he was ambulating in the house when going from kitchen counter to stove with left sided weakness leading to fall.    A walker is insufficient du

## (undated) NOTE — ED AVS SNAPSHOT
Shayy Ruiz   MRN: [id number]    Department:  [facility] Emergency Department   Date of Visit:  5/5/2018           Disclosure     Insurance plans vary and the physician(s) referred by the ER may not be covered by your plan.  Please contact your tell this physician (or your personal doctor if your instructions are to return to your personal doctor) about any new or lasting problems. The primary care or specialist physician will see patients referred from the [facility] Emergency Department.  Severo Pastures

## (undated) NOTE — LETTER
Consent to Procedure/Sedation    Date: 5/23/2018    Time: _______________    1. I authorize the performance upon Ejnniferedy Malhotra the following:    Transjugular Liver Biopsy    2.  I authorize Dr. Santana Parents (and whomever is designated as the doctor’s assistant), to Witness: ____________________     Date: ______________    Printed: 2018   3:08 PM    Patient Name: Arnoldo Garcia        : 3/1/1950       Medical Record #: [id number]

## (undated) NOTE — ED AVS SNAPSHOT
Jennifer Sepideh   MRN: [id number]    Department:  [facility] Emergency Department   Date of Visit:  2/19/2018           Disclosure     Insurance plans vary and the physician(s) referred by the ER may not be covered by your plan.  Please contact your tell this physician (or your personal doctor if your instructions are to return to your personal doctor) about any new or lasting problems. The primary care or specialist physician will see patients referred from the [facility] Emergency Department.  Natalie Randhawa

## (undated) NOTE — ED AVS SNAPSHOT
Jennifer Sepideh   MRN: [id number]    Department:  [facility] Emergency Department   Date of Visit:  7/31/2017           Disclosure     Insurance plans vary and the physician(s) referred by the ER may not be covered by your plan.  Please contact your If you have been prescribed any medication(s), please fill your prescription right away and begin taking the medication(s) as directed    If the emergency physician has read X-rays, these will be re-interpreted by a radiologist.  If there is a significant

## (undated) NOTE — LETTER
[facility] [address], [address]    Consent for Operation    Date: 6/6/2017    Time: _______________    1. I authorize the performance upon Familia Lobo the following operation:    [address] Pacemaker    2.  I authorize  videotape. [facility] will not be responsible for storage or maintenance of this tape. 6. For the purpose of advancing medical education, I consent to the admittance of observers to the Operating Room.     7. I authorize the use of any specimen, organs Signature of Patient:   ___________________________    When the patient is a minor or mentally incompetent to give consent:  Signature of person authorized to consent for patient: ___________________________   Relationship to patient: _____________________ these medicines may increase my risk of anesthetic complications. · If I am allergic to anything or have had a reaction to anesthesia before. 3. I understand how the anesthesia medicine will help me (benefits).     4. I understand that with any type of patient’s representative) and answered their questions. The patient or their representative has agreed to have anesthesia services.     _____________________________________________________________________________  Witness        Date   Time  I have kari

## (undated) NOTE — MR AVS SNAPSHOT
[facility], [address], [address] [place] [address] [phone]               Thank you for choosing us for your health care visit with Lashaun Cardenas DO.   We are glad to serve you and happy to provide you with th ? No narcotics or controlled substances are refilled after noon on Fridays or by on call physicians. ? By law, narcotics cannot be faxed or phoned into your pharmacy.  The prescription must be signed by the provider, picked up in our office and physically contact your insurance carrier to verify that your procedure/test has been approved and is a COVERED benefit.   Although the [facility] staff does its due diligence, the insurance carrier gives the disclaimer that \"Although the procedure is authorized, this does Clopidogrel Bisulfate 75 MG Tabs   Take 75 mg by mouth daily. Commonly known as:  PLAVIX           cycloSPORINE modified 25 MG Caps   Take 175 mg by mouth 2 (two) times daily.    Commonly known as:  NEORAL           DiphenhydrAMINE HCl 25 MG Caps   Take Vitamin D 1000 units Tabs   Take 1,000 Units by mouth daily. * Notice: This list has 2 medication(s) that are the same as other medications prescribed for you.  Read the directions carefully, and ask your doctor or other care provider to review

## (undated) NOTE — IP AVS SNAPSHOT
[facility] [place] [address] [place], [address] ~ [phone]                Discharge Summary   6/4/2017    Shayy Ruiz           Admission Information        Provider Department    6/4/2017 Melissa Petersen MD  2ne-A         Than Morning Afternoon Evening As Needed    gabapentin 400 MG Caps   Last time this was given:  400 mg on 6/7/2017  2:34 PM   Commonly known as:  NEURONTIN   What changed:    - medication strength  - how much to take  - Another medication with the same name wa Take 175 mg by mouth 2 (two) times daily.                                DiphenhydrAMINE HCl 25 MG Caps   Last time this was given:  50 mg on 6/5/2017  2:56 AM   Commonly known as:  BENADRYL        Take 50 mg by mouth nightly as needed for Itching or Sleep Next dose due:  6/7 9 p.m. Take 50 mg by mouth nightly. omeprazole 20 MG Cpdr   Commonly known as:  PRILOSEC   Next dose due:  6/8 7 a.m. Take 40 mg by mouth 2 (two) times daily.                                [place] [address] [place], [place] [id number] [phone]          Follow up with Jose Ramon Cruz DO.     Specialty:  NEUROLOGY    Why:  keep appointment as scheduled    Contact information:    [address]  [address] [place] [address]  8.9 (L) (06/07/17)  27.2 (L) (06/07/17)  90.1 -- -- -- (06/07/17)  124.0 (L) --    (06/04/17)  5.8 (06/04/17)  3.38 (L) (06/04/17)  10.0 (L) (06/04/17)  30.8 (L) (06/04/17)  91.1    (06/04/17)  140.0 (L)     (04/18/17)  7.9 (04/18/17)  3.57 (L) (04/18/17) - If you are a smoker or have smoked in the last 12 months, we encourage you to explore options for quitting.     - If you have concerns related to behavioral health issues or thoughts of harming yourself, contact [facility] and [facility]erral [facility] a your medications while at home.          Blood Pressure and Cardiac Medications     isosorbide dinitrate 10 MG Oral Tab         Use: Treat abnormal blood pressure (high or low), cardiac conditions; and/or abnormal heart rates/rhythms   Most common side effe What to report to your healthcare team: Pain, swelling, rash, fever           Blood Sugar Medications     Insulin NPH Isophane & Regular (70-30) 100 UNIT/ML Subcutaneous Suspension         Use:  Treat high blood sugar   Most common side effects: Low blood changes, changes in sleep and alertness, palpitations   What to report to your healthcare team:  Changes in thinking, confusion, skin swelling, palpitations, dizziness           General Nerve Function Medications     gabapentin 400 MG Oral Cap       Use:

## (undated) NOTE — IP AVS SNAPSHOT
Patient Demographics     Address  [address] [place] [address] [address] [address] Phone  [phone] [facility])  [phone] (Mobile) *Preferred* E-mail Address  [email or web address]      Emergency Contact(s)     Name Relation Home Work Mobile    Aj Davis bumetanide 1 MG Tabs  Commonly known as:  BUMEX      Take 2 mg by mouth daily. Citalopram Hydrobromide 20 MG Tabs  Commonly known as:  CeleXA      Take 20 mg by mouth daily.           Clopidogrel Bisulfate 75 MG Tabs  Commonly known as:  PLAVIX Take 20 mg by mouth nightly. omeprazole 20 MG Cpdr  Commonly known as:  PRILOSEC      Take 20 mg by mouth 2 (two) times daily. Rosuvastatin Calcium 5 MG Tabs  Commonly known as:  CRESTOR      Take 1 tablet (5 mg total) by mouth nightly. [id number] hydrALAzine HCl (APRESOLINE) injection 5 mg 02/28/18 1210 Given      [id number] isosorbide dinitrate (ISORDIL) tab 10 mg 02/28/18 1016 Given      [id number] morphINE sulfate (PF) 4 MG/ML injection 0.52 mg 02/28/18 1355 Given      [id number] morphIN Lab Results Last 24 Hours      IRON, SERUM [[id number]] (Normal)  Resulted: 02/28/18 6225, Result status: Final result   Ordering provider:  Evie Go MD  02/28/18 4555 Resulting lab:  [facility]    Specimen Information    Type Source Collected On   B Ordering provider:  Ami Goncalves MD  02/28/18 0882 Resulting lab:  [facility]   Narrative: The following orders were created for panel order CBC WITH DIFFERENTIAL WITH PLATELET.   Procedure                               Abnormality         Status Components    Component Value Reference Range Flag Lab   Hold Gold Auto Resulted [facility]            COMP METABOLIC PANEL (18) [[id number]] (Abnormal)  Resulted: 02/27/18 2126, Result status: Final result   Ordering provider:  Anali Carmen MD  0 CBC WITH DIFFERENTIAL WITH PLATELET [[id number]]  Resulted: 02/27/18 2111, Result status: Final result   Ordering provider:  Jennifer Lee MD  02/27/18 2037 Resulting lab:  [facility]   Narrative:   The following orders were created for panel order CBC W pt reports he was started on augmentin this past Thursday for LLE cellulitis and started to have N. V on 2/27- says this has happened to him before when he was on augmentin. The pt denies any diarrhea. Feels weak.  He is unable to keep his meds down and ski DO Cielo;  Location:  ENDOSCOPY  No date: FOOT SURGERY Left  No date: LAPS GSTR RSTCV PX PLMT BAND  No date: OTHER      Comment: LLIVER TRANSPLANT  No date: OTHER      Comment: R BKA  No date: OTHER      Comment: STENTS total of 13 stents Sulfa Antibiotics         Vancomycin                  Comment:Other reaction(s): Joint Pain    Medications:    No current facility-administered medications on file prior to encounter.    Current Outpatient Prescriptions on File Prior to Encounter:  HYDROcod as needed for Sleep. Disp: 30 capsule Rfl: 0   Clopidogrel Bisulfate 75 MG Oral Tab Take 75 mg by mouth daily. Disp:  Rfl:    nitroGLYCERIN (NITROSTAT) 0.4 MG Sublingual SL Tab Place 0.4 mg under the tongue as needed.  Disp:  Rfl:    ursodiol 300 MG Oral Ca Neurologic: No focal neurological deficits. CNII-XII grossly intact. Musculoskeletal:[GS.1] R BKA< left foot s/p sx changes[GS.3]  Extremities: No edema or cyanosis. Integument: No rashes or lesions. Psychiatric: Appropriate mood and affect.       [place] [address] GS.3 Yudi Yanes MD on 2/28/2018  5:01 AM  GS. 4 - Otilia Mckeon MD on 2/28/2018  4:44 AM               H&P signed by Otilia Mckeon MD at 2/28/2018  4:56 AM  Version 2 of 3    Author:  Otilia Mckeon MD Service:  (none) Author Type:  Physician    Lesa Zarate • Disorder of liver    • Disorder of thyroid    • Diverticulosis of large intestine    • Esophageal reflux    • Essential hypertension    • Heart murmur     Since Childhood   • High blood pressure    • High cholesterol    • History of blood transfusion 200 Cephalosporins          Hives    Comment:Hives to ceftriaxone. Duricef - hallucinations.   Ciprofloxacin             Daptomycin              Rash    Comment:While receiving daptomycin with ceftriaxone,             patient developed rash on back and also rep differently: Inject 18 Units into the skin 2 (two) times daily before meals.  ) Disp: 1 vial Rfl: 0   finasteride 5 MG Oral Tab Take 5 mg by mouth nightly.  Disp:  Rfl:    ALPRAZolam 0.5 MG Oral Tab Take 1 tablet (0.5 mg total) by mouth 3 (three) times miguel Naltrexone HCl (REVIA) 50 MG Oral Tab Take 50 mg by mouth nightly. Disp:  Rfl:    DiphenhydrAMINE HCl (BENADRYL) 25 MG Oral Cap Take 50 mg by mouth nightly as needed for Itching or Sleep.    Disp:  Rfl:    Mycophenolate Mofetil (CELLCEPT) 500 MG Oral Tab ASSESSMENT / PLAN:[GS.1]     1. Intractable N, V  1. CT A/P without contrast no acute findings, lipase normal  2. Pt states this happened  Before when he was on augmentin; stop abx  2.  RUQ pain   1. RUQ US with doppler; concerning as pt is s/p liver tx RAMONE who presents to the hospital with one day of nausea and vomiting. The pt reports he was started on augmentin this past Thursday for LLE cellulitis and started to have N. V on 2/27- says this has happened to him before when he was on augmentin.  The pt d 3/4/2016: COLONOSCOPY N/A      Comment: Procedure: COLONOSCOPY;  Surgeon: Kelsey Mcgovern DO;  [facility] ENDOSCOPY  No date: FOOT SURGERY Left  No date: [facility] GSTR RSTCV [facility] PLMT BAND  No date: OTHER      Comment: [facility]  No da Quinolones                Rifampin                  Statins                     Comment:Muscle weakness  Sulfa Antibiotics         Vancomycin                  Comment:Other reaction(s): Joint Pain    Medications:    No current facility-administered medicat temazepam 7.5 MG Oral Cap Take 1 capsule (7.5 mg total) by mouth nightly as needed for Sleep. Disp: 30 capsule Rfl: 0   Clopidogrel Bisulfate 75 MG Oral Tab Take 75 mg by mouth daily.  Disp:  Rfl:    nitroGLYCERIN (NITROSTAT) 0.4 MG Sublingual SL Tab Place Abdomen: Soft, nontender, nondistended. Positive bowel sounds. No rebound, guarding or organomegaly. Neurologic: No focal neurological deficits. CNII-XII grossly intact. Musculoskeletal: Moves all extremities. Extremities: No edema or cyanosis.   Integu Consults signed by Tori Edwards DO at 2/28/2018  5:25 PM     Author:  Tori Edwards DO Service:  Gastroenterology Author Type:  Physician    Filed:  2/28/2018  5:25 PM Date of Service:  2/28/2018  9:20 AM Status:  Signed    :  Jeannie Harrell evaluation by his transplant team 8/17 and has not had a routine liver biopsy for at least one year.   CT A/P without contrast suggested a very small right pleural effusion and splenomegaly; abd US with Doppler suggested diffuse coarse echotexture of liver Comment: Procedure: COLONOSCOPY;  Surgeon: Alma Ambrocio DO;  Location:  ENDOSCOPY  No date: FOOT SURGERY Left  No date: LAPS GSTR RSTCV PX PLMT BAND  No date: OTHER      Comment: LLIVER TRANSPLANT  No date: OTHER      Comment: PRINCE HOANG morphINE sulfate (PF) 2 MG/ML injection 0.5 mg 0.5 mg Intravenous Q4H PRN   hydrALAzine HCl (APRESOLINE) injection 5 mg 5 mg Intravenous Q6H PRN   glucose (DEX4) oral liquid 15 g 15 g Oral Q15 Min PRN   Or      Glucose-Vitamin C (DEX-4) 4-0.006 g chewable Steroidal psychosis  Dexamethasone Sodiu*    Other (See Comments)    Comment:Steroidal psychosis  Floxin [Ofloxacin]        Other                       Comment:PT STATES HE IS ALLERGIC TO FLUORQUINOLONE AND/OR             FLOXIN FAMILY DRUGS  Eleno Do HENT: EOMI, PERRL, oropharynx is clear with moist mucosal membranes  Eyes: Sclerae are anicteric  Neck:  Supple without nuchal rigidity  CV: Regular rate and rhythm, with normal S1 and S2  Resp: Clear to auscultation bilaterally without wheezes; rubs, rhon TECHNIQUE:  Real time gray-scale ultrasound was used to evaluate the abdomen.  B-mode images, Doppler color flow, and spectral waveform analysis were performed of the portal vein, hepatic artery, hepatic vein, and splenic vein.  The exam includes images    ______________________________________________________   PROCEDURE:  CT ABDOMEN+PELVIS (UWP=08276)     COMPARISON:  [facility] , CT ABDOMEN+PELVIS(CPT=74176), 2/19/2018, 17:45.     INDICATIONS:  n/v x2 days  Pt with c/o vomiting after antibiotic use for pos limitations thereof.  Very small right pleural effusion.  Decreased splenomegaly, 16.7 cm, previously 19.0 cm.  No developing ascites.  No acute process seen.      Dictated by: Tristian Esteves MD on 2/27/2018 at 21:50       Approved by: Tristian Esteves MD stricture vs. Rejection. Recommend transfer to [facility] for further evaluation.      QUYEN Veliz  Gastroenterology/Advanced Endoscopy  [facility][AD.1]    Electronically signed by Michael Hearn DO on 2/28/2018  5:25 PM   Attributi • Atherosclerosis of coronary artery 10/18/16    Stents X 1   • Autoimmune disease ([facility]    • Back problem    • Cataract    • Congestive heart disease ([facility].) 2006   • Coronary atherosclerosis    • Depression    • Diabetes ([facility].) 1983   • Disorder of liver Drives: Yes (with hand modifications)  Patient Owned Equipment: Rolling walker;Cane (RTS)  Patient Regularly Uses: Glasses    Prior Level of [place]: pt reports he [facility] in [facility] for 5 weeks and has been discharged home with home PT with spouse.   Pt r -   Climbing 3-5 steps with a railing?: A Little       AM-PAC Score:  Raw Score: 22   PT Approx Degree of Impairment Score: 20.91%   Standardized Score (AM-PAC Scale): 53.28   CMS Modifier (G-Code): CJ      FUNCTIONAL ABILITY STATUS  Gait Assessment  Gait include Results of the AM-PAC \"6 clicks\" Inpatient Daily Mobility Short Form for the patient is 21% degree of basic mobility impairment.   Research supports that patients with this level of impairment often benefit from home with home PT, pt reports he wa

## (undated) NOTE — MR AVS SNAPSHOT
[facility], [address], [address] [place] [address] [phone]               Thank you for choosing us for your health care visit with Lashaun Cardenas DO.   We are glad to serve you and happy to provide you with th are billed through [address] [facility] [address] and [address] are the billing codes.  Cost $67.5 per 30 minutes  CLASS LOCATION Classes and/or appointments are held at the [facility] located at [place] [address], [address] [place] [address] ? By law, narcotics cannot be faxed or phoned into your pharmacy. The prescription must be signed by the provider, picked up in our office and physically brought to the pharmacy. A 30 day supply with no refills is the maximum allowed.   ? If your prescript Ultimately the patient is responsible for payment. Thank you for your understanding in the matter. Allergies as of Apr 28, 2017     Cephalosporins Hives    Hives to ceftriaxone. Duricef - hallucinations.      Ciprofloxacin     Daptomycin Rash Take 50 mg by mouth nightly as needed for Itching or Sleep. Commonly known as:  BENADRYL           Ferrous Sulfate 324 (65 Fe) MG Tbec   Take 1 tablet by mouth daily. finasteride 5 MG Tabs   Take 5 mg by mouth daily.    Commonly known as:  PROSC view more details from this visit by going to [email or web address]. If you've recently had a stay at the Hospital you can access your discharge instructions in [employer] by going to Visits < Admission Summaries.  If you've been to the Emergency Depar

## (undated) NOTE — MR AVS SNAPSHOT
[facility], [place] [id number] [facility], [address] [place] [address] [phone]               Thank you for choosing us for your health care visit with Steven Fay DO.   We are glad to serve you and happy to provide you with th ? Allow 2-3 business days for refills; controlled substances may take longer. ?  Contact your pharmacy at least 5 days prior to running out of medication and have them send an electronic request or submit request through the “request refill” option in your If your physician has recommended that you have a procedure or additional testing performed. [facility]) will contact your insurance carrier to obtain pre-certification or prior authorization.     Unfortunately, INGRID has seen an incr Take 0.5 mg by mouth as needed. Commonly known as:  XANAX           aspirin 325 MG Tabs   Take 325 mg by mouth daily. bumetanide 1 MG Tabs   Take 2 tablets (2 mg total) by mouth daily.    Commonly known as:  BUMEX           Butorphanol Tartrate Take 1,000 mg by mouth 2 (two) times daily. Commonly known as:  CELLCEPT           Naltrexone HCl 50 MG Tabs   Take 50 mg by mouth nightly. Commonly known as:  ReVia           omeprazole 20 MG Cpdr   Take 40 mg by mouth 2 (two) times daily.    Commonly

## (undated) NOTE — LETTER
[facility]  [address] [place] [facility], [address] [place] [address]    Consent for Operation    Date: __________________    Time: _______________    1.  I authorize the performance upon Krysta Dunlap the following operation:    Procedure(s):  RIGHT HIP NAILING, TR procedure has been videotaped, the surgeon will obtain the original videotape. The hospital will not be responsible for storage or maintenance of this tape.     6. For the purpose of advancing medical education, I consent to the admittance of observers to t STATEMENTS REQUIRING INSERTION OR COMPLETION WERE FILLED IN.     Signature of Patient:   ___________________________    When the patient is a minor or mentally incompetent to give consent:  Signature of person authorized to consent for patient: ____________ drugs/illegal medications). Failure to inform my anesthesiologist about these medicines may increase my risk of anesthetic complications. · If I am allergic to anything or have had a reaction to anesthesia before.     3. I understand how the anesthesia med I have discussed the procedure and information above with the patient (or patient’s representative) and answered their questions. The patient or their representative has agreed to have anesthesia services.     _______________________________________________

## (undated) NOTE — IP AVS SNAPSHOT
[address]            (For Outpatient Use Only) Initial Admit Date: 1/19/2018   Inpt/Obs Admit Date: Inpt: 1/19/18 / Obs: N/A   Discharge Date:    Niraj Binet:  [de-identified]   MRN: [de-identified]   CSN: [id number]        ENCOUNTER  Patient January 29, 2018

## (undated) NOTE — LETTER
Patient Name: Lior Chris  YOB: 1950          MRN number:  [id number]  Date:  12/12/2017  Referring Physician:  Imani Garcia          FALL SCREEN EVALUATION    Referring Physician: Dr. 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   Rohith Cruz has a very complicated and significant PMHx including but not limited to R BKA, IDDM with peripheral neuropathy, pacemaker with syncope episodes, and multiple cerebellar CVAs that is contributing to his gait instability and recurrent 63-63 y/o Men: 15, Women: 15   65-72 y/o Men: 15, Women: 6   76-68 y/o Men: 15, Women: 8   71-74 y/o Men: 6, Women: 8   80-81 y/o Men: 8, Women: 5   80-81 y/o Men: 6, Women: 8   80-81 y/o Men 7, Women: 4]    Gait forward flexed, mildly unsteady witho (0)  Severe Impairment: Cannot change speeds, or loses balance and has to reach for wall or be caught. 3. Gait with horizontal head turns: 1  (3)  Normal: Performs head turns smoothly with no change in gait.   (2)  Mild Impairment: Preforms head turns sm restrictive AD to demonstrate ability to ambulate safely in crowded and busy environments  · Pt to report minimal to no dizziness with sit>supine and looking up to grab plates from [facility] cabinet.   · Pt to report ability to ambulate 200-500 ft with Darylene Hews s

## (undated) NOTE — LETTER
[facility] [address], [address]  Consent for Procedure/Sedation    Date: 4-8-18    Time: 1735      1.  I authorize the performance upon Madi Root the following:cardiac catheterization, left ventricular cineangiography, bilat period, the physician will determine when the applicable recovery period ends for purposes of reinstating the Do Not Resuscitate (DNR) order.     Signature of Patient: ____________________________________________________    Signature of person authorized

## (undated) NOTE — LETTER
April 12, 2018        Gordo Allison  [address] [facility]      Dear Libby Veliz:    I am the Nurse Care Manager with Dr. Prudencio Haq office. Just reaching out to see how you are doing since your discharge home.    When you have a minut

## (undated) NOTE — LETTER
Jeannine Trevizo [address]  [address] [place] [address], [address] [place] [address]    Consent for Operation  Date: __________________                                Time: _______________    1.  I authorize the performance upon De Los Santos Going the following operation:  cardiac ca procedures to be performed, including appropriate portions of my body for medical, scientific, or educational purposes, provided my identity is not revealed by the pictures or by descriptive texts accompanying them.  If the procedure has been videotaped, th ends for purposes of reinstating the Do Not Resuscitate order.     I CERTIFY THAT I HAVE READ AND UNDERSTAND THE ABOVE CONSENT TO OPERATION, THAT MY DOCTOR PROVIDED ME WITH THE ABOVE EXPLANATIONS, THAT ALL BLANKS OR STATEMENTS REQUIRING INSERTION OR COMPLET

## (undated) NOTE — MR AVS SNAPSHOT
[address], [address] [address] [phone]               Thank you for choosing us for your health care visit with Alissa Reyes DO.   We are glad to serve you and happy to provide you with th ? Please allow the office 48-72 hours to fill the prescription. ? Patient must present photo ID at time of .   If a designated family member will be picking up prescription, office must be given name of individual in advance and they must present a [facility] [address]   [phone]            Jan 09, 2017  3:30 PM   CARDIAC REHAB PHASE II with [address] 4135 Cardiopulmonary Rehabilitation [facility] [address] BP Pulse Height Weight BMI    140/64 mmHg 80 76\" 308 lb 37.51 kg/m2         Current Medications          This list is accurate as of: 1/5/17  3:46 PM.  Always use your most recent med list.                ACCU-CHEK KATIE PLUS Strp   Generic drug:  Glucos Take 1 tablet (10 mg total) by mouth 2 (two) times daily. Commonly known as:  ISORDIL           LEVOTHYROXINE SODIUM OR   Take 325 mcg by mouth daily.            metoprolol Tartrate 25 MG Tabs   Take 0.5 tablets (12.5 mg total) by mouth 2x Daily(Beta Bloc MRA BRAIN (YUP=54743) [18129]     1 Open [2] Intracranial carotid stenosis, right [[id number]]      MyChart     Call the [employer] for assistance with your inactive [employer] account    If you have questions, you can call [phone] to talk to our [email or web address]

## (undated) NOTE — LETTER
Jeannine Trevizo 182 [address] [address]  [place], [address] [facility]    Consent for Operation  Date: __________________                                Time: _______________    1.  I authorize the performance upon Vivek Shelton the following operation:  Procedure( procedure has been videotaped, the surgeon will obtain the original videotape. The hospital will not be responsible for storage or maintenance of this tape.     6. For the purpose of advancing medical education, I consent to the admittance of observers to t STATEMENTS REQUIRING INSERTION OR COMPLETION WERE FILLED IN.     Signature of Patient:   ___________________________    When the patient is a minor or mentally incompetent to give consent:  Signature of person authorized to consent for patient: ____________

## (undated) NOTE — LETTER
[facility]  [address] [facility], [address] [place] [address]    Consent for Operation    Date: __________________    Time: _______________    1.  I authorize the performance upon Corean Freed the following operation:    Procedure(s):  RIGHT HIP NAILING, FR procedure has been videotaped, the surgeon will obtain the original videotape. The hospital will not be responsible for storage or maintenance of this tape.     6. For the purpose of advancing medical education, I consent to the admittance of observers to t STATEMENTS REQUIRING INSERTION OR COMPLETION WERE FILLED IN.     Signature of Patient:   ___________________________    When the patient is a minor or mentally incompetent to give consent:  Signature of person authorized to consent for patient: ____________ drugs/illegal medications). Failure to inform my anesthesiologist about these medicines may increase my risk of anesthetic complications. · If I am allergic to anything or have had a reaction to anesthesia before.     3. I understand how the anesthesia med I have discussed the procedure and information above with the patient (or patient’s representative) and answered their questions. The patient or their representative has agreed to have anesthesia services.     _______________________________________________

## (undated) NOTE — IP AVS SNAPSHOT
[facility] [place]  [address] [place], [address] ~ [phone]                Discharge Summary   2/7/2017    Tiago Vaughn           Admission Information        Provider Department    2/7/2017 Lucio Gallardo MD  [id number]         Yue Se START taking these medications        Instructions Authorizing Provider    Morning Afternoon Evening As Needed    Midodrine HCl 2.5 MG Tabs   Last time this was given:  2.5 mg on 2/11/2017  5:00 PM   Commonly known as:  PROAMATINE   Next dose due:  2/12/17 Last time this was given:  175 mg on 2/11/2017  8:36 AM   Commonly known as:  NEORAL   Next dose due:  2/11/12        Take 175 mg by mouth 2 (two) times daily.                             DiphenhydrAMINE HCl 25 MG Caps   Last time this was given:  50 mg on Take 0.5 tablets (12.5 mg total) by mouth 2x Daily(Beta Blocker). Gualberto Marking                           Mycophenolate Mofetil 500 MG Tabs   Commonly known as:  CELLCEPT   Next dose due:  2/11/17        Take 1,000 mg by mouth 2 (two) times daily. Follow up with Loni Thomas Specialty:  Internal Medicine    Contact information:    [address] [facility], [address] [address] [place] [address]  [phone]          Follow up with Jina Benson DO.  Schedule an appointment as soon as possible for a CARDIAC REHAB PHASE II with [address] [facility] [facility])    [place]  [address] [place] [address]   [phone]            Feb 28, 2017 11:30 AM   Follow up with Cat To see a back pain PT as able      Immunization History as of 2/11/2017  Reviewed on 2/7/2017    INFLUENZA 10/20/2016      Recent Hematology Lab Results  (Last 3 results in the past 90 days)    WBC RBC Hemoglobin Hematocrit MCV MCH MCHC RDW Platelet MPV 6.7 (02/10/17)  2.8 (L) (02/11/17)  140 (02/11/17)  4.4 (02/11/17)  105      Radiology Exams     None      Patient Belongings       Most Recent Value    All belongings returned to patient at discharge Pt's bedside belongings    Medications Sent Home None t call your provider or healthcare team if you have any questions regarding your medications while at home.          Blood Pressure and Cardiac Medications     Midodrine HCl 2.5 MG Oral Tab    isosorbide dinitrate 10 MG Oral Tab    metoprolol Tartrate 25 MG O Insulin NPH Isophane & Regular (NOVOLIN 70/30) (70-30) 100 UNIT/ML Subcutaneous Suspension         Use:  Treat high blood sugar   Most common side effects: Low blood sugar:nausea, jitters, sweating, rapid heartbeat    What to report to your healthcare tea What to report to your healthcare team: Dizziness, breast tissue enlargement, abnormal ejaculation             General Nerve Function Medications     topiramate 50 MG Oral Tab    gabapentin 300 MG Oral Cap       Use:  Treat conditions such as seizures, hea